# Patient Record
Sex: FEMALE | Race: BLACK OR AFRICAN AMERICAN | NOT HISPANIC OR LATINO | Employment: OTHER | URBAN - METROPOLITAN AREA
[De-identification: names, ages, dates, MRNs, and addresses within clinical notes are randomized per-mention and may not be internally consistent; named-entity substitution may affect disease eponyms.]

---

## 2017-01-19 ENCOUNTER — LAB VISIT (OUTPATIENT)
Dept: LAB | Facility: HOSPITAL | Age: 52
End: 2017-01-19
Attending: EMERGENCY MEDICINE
Payer: MEDICARE

## 2017-01-19 ENCOUNTER — OFFICE VISIT (OUTPATIENT)
Dept: FAMILY MEDICINE | Facility: CLINIC | Age: 52
End: 2017-01-19
Payer: MEDICARE

## 2017-01-19 VITALS
HEART RATE: 71 BPM | HEIGHT: 66 IN | RESPIRATION RATE: 96 BRPM | WEIGHT: 293 LBS | BODY MASS INDEX: 47.09 KG/M2 | SYSTOLIC BLOOD PRESSURE: 190 MMHG | DIASTOLIC BLOOD PRESSURE: 110 MMHG

## 2017-01-19 DIAGNOSIS — E11.42 TYPE 2 DIABETES MELLITUS WITH DIABETIC POLYNEUROPATHY, WITHOUT LONG-TERM CURRENT USE OF INSULIN: ICD-10-CM

## 2017-01-19 DIAGNOSIS — M25.561 CHRONIC PAIN OF BOTH KNEES: ICD-10-CM

## 2017-01-19 DIAGNOSIS — G47.33 OSA (OBSTRUCTIVE SLEEP APNEA): ICD-10-CM

## 2017-01-19 DIAGNOSIS — E11.9 TYPE 2 DIABETES MELLITUS WITHOUT COMPLICATION, WITHOUT LONG-TERM CURRENT USE OF INSULIN: Primary | Chronic | ICD-10-CM

## 2017-01-19 DIAGNOSIS — E11.9 TYPE 2 DIABETES MELLITUS WITHOUT COMPLICATION, WITHOUT LONG-TERM CURRENT USE OF INSULIN: Chronic | ICD-10-CM

## 2017-01-19 DIAGNOSIS — G89.4 CHRONIC PAIN SYNDROME: Chronic | ICD-10-CM

## 2017-01-19 DIAGNOSIS — E03.9 HYPOTHYROIDISM, UNSPECIFIED TYPE: ICD-10-CM

## 2017-01-19 DIAGNOSIS — G89.29 CHRONIC PAIN OF BOTH KNEES: ICD-10-CM

## 2017-01-19 DIAGNOSIS — I10 ESSENTIAL HYPERTENSION: Chronic | ICD-10-CM

## 2017-01-19 DIAGNOSIS — M25.562 CHRONIC PAIN OF BOTH KNEES: ICD-10-CM

## 2017-01-19 DIAGNOSIS — K21.9 GASTROESOPHAGEAL REFLUX DISEASE WITHOUT ESOPHAGITIS: ICD-10-CM

## 2017-01-19 LAB
CREAT UR-MCNC: 112 MG/DL
MICROALBUMIN UR DL<=1MG/L-MCNC: 11 UG/ML
MICROALBUMIN/CREATININE RATIO: 9.8 UG/MG

## 2017-01-19 PROCEDURE — 1159F MED LIST DOCD IN RCRD: CPT | Mod: S$GLB,,, | Performed by: EMERGENCY MEDICINE

## 2017-01-19 PROCEDURE — 99999 PR PBB SHADOW E&M-NEW PATIENT-LVL III: CPT | Mod: PBBFAC,,, | Performed by: EMERGENCY MEDICINE

## 2017-01-19 PROCEDURE — 3044F HG A1C LEVEL LT 7.0%: CPT | Mod: S$GLB,,, | Performed by: EMERGENCY MEDICINE

## 2017-01-19 PROCEDURE — 2022F DILAT RTA XM EVC RTNOPTHY: CPT | Mod: S$GLB,,, | Performed by: EMERGENCY MEDICINE

## 2017-01-19 PROCEDURE — 99499 UNLISTED E&M SERVICE: CPT | Mod: S$GLB,,, | Performed by: EMERGENCY MEDICINE

## 2017-01-19 PROCEDURE — 3080F DIAST BP >= 90 MM HG: CPT | Mod: S$GLB,,, | Performed by: EMERGENCY MEDICINE

## 2017-01-19 PROCEDURE — 82570 ASSAY OF URINE CREATININE: CPT

## 2017-01-19 PROCEDURE — 99204 OFFICE O/P NEW MOD 45 MIN: CPT | Mod: S$GLB,,, | Performed by: EMERGENCY MEDICINE

## 2017-01-19 PROCEDURE — 4010F ACE/ARB THERAPY RXD/TAKEN: CPT | Mod: S$GLB,,, | Performed by: EMERGENCY MEDICINE

## 2017-01-19 PROCEDURE — 3077F SYST BP >= 140 MM HG: CPT | Mod: S$GLB,,, | Performed by: EMERGENCY MEDICINE

## 2017-01-19 RX ORDER — AMITRIPTYLINE HYDROCHLORIDE 25 MG/1
25 TABLET, FILM COATED ORAL NIGHTLY
Qty: 90 TABLET | Refills: 3 | Status: SHIPPED | OUTPATIENT
Start: 2017-01-19 | End: 2017-07-05 | Stop reason: SDUPTHER

## 2017-01-19 RX ORDER — NITROGLYCERIN 0.4 MG/1
0.4 TABLET SUBLINGUAL EVERY 5 MIN PRN
COMMUNITY
End: 2023-11-17 | Stop reason: SDUPTHER

## 2017-01-19 RX ORDER — FUROSEMIDE 40 MG/1
40 TABLET ORAL DAILY
COMMUNITY
End: 2018-06-05 | Stop reason: SDUPTHER

## 2017-01-19 RX ORDER — OLMESARTAN MEDOXOMIL, AMLODIPINE AND HYDROCHLOROTHIAZIDE TABLET 40/5/12.5 MG 40; 5; 12.5 MG/1; MG/1; MG/1
40 TABLET ORAL DAILY
COMMUNITY
End: 2017-01-19

## 2017-01-19 RX ORDER — GABAPENTIN 400 MG/1
400 CAPSULE ORAL 3 TIMES DAILY
COMMUNITY

## 2017-01-19 RX ORDER — LEVOTHYROXINE SODIUM 200 UG/1
200 TABLET ORAL DAILY
COMMUNITY
End: 2018-04-13 | Stop reason: SDUPTHER

## 2017-01-19 RX ORDER — POTASSIUM CHLORIDE 1.5 G/1.58G
20 POWDER, FOR SOLUTION ORAL 2 TIMES DAILY
COMMUNITY
End: 2017-11-27 | Stop reason: CLARIF

## 2017-01-19 RX ORDER — OMEPRAZOLE 20 MG/1
20 CAPSULE, DELAYED RELEASE ORAL 2 TIMES DAILY
COMMUNITY
End: 2017-01-19 | Stop reason: SDUPTHER

## 2017-01-19 RX ORDER — ALPRAZOLAM 1 MG/1
1 TABLET ORAL 2 TIMES DAILY
Qty: 60 TABLET | Refills: 0 | Status: SHIPPED | OUTPATIENT
Start: 2017-01-19 | End: 2017-03-28 | Stop reason: SDUPTHER

## 2017-01-19 RX ORDER — OMEPRAZOLE 20 MG/1
20 CAPSULE, DELAYED RELEASE ORAL 2 TIMES DAILY
Qty: 60 CAPSULE | Refills: 1 | Status: SHIPPED | OUTPATIENT
Start: 2017-01-19 | End: 2017-07-06 | Stop reason: SDUPTHER

## 2017-01-19 RX ORDER — NAPROXEN 500 MG/1
500 TABLET ORAL 2 TIMES DAILY
COMMUNITY

## 2017-01-19 RX ORDER — ASPIRIN 81 MG/1
81 TABLET ORAL DAILY
COMMUNITY

## 2017-01-19 RX ORDER — LISINOPRIL 20 MG/1
20 TABLET ORAL DAILY
Qty: 90 TABLET | Refills: 3 | Status: SHIPPED | OUTPATIENT
Start: 2017-01-19 | End: 2018-02-04 | Stop reason: SDUPTHER

## 2017-01-19 RX ORDER — METFORMIN HYDROCHLORIDE 500 MG/1
1000 TABLET ORAL NIGHTLY
COMMUNITY
End: 2017-03-31 | Stop reason: DRUGHIGH

## 2017-01-19 RX ORDER — AMLODIPINE BESYLATE 5 MG/1
5 TABLET ORAL DAILY
Qty: 90 TABLET | Refills: 3 | Status: SHIPPED | OUTPATIENT
Start: 2017-01-19 | End: 2017-11-27 | Stop reason: SDUPTHER

## 2017-01-19 RX ORDER — AMITRIPTYLINE HYDROCHLORIDE 25 MG/1
25 TABLET, FILM COATED ORAL NIGHTLY
COMMUNITY
End: 2017-01-19 | Stop reason: SDUPTHER

## 2017-01-19 RX ORDER — HYDROCODONE BITARTRATE AND ACETAMINOPHEN 10; 325 MG/1; MG/1
10 TABLET ORAL 3 TIMES DAILY
COMMUNITY

## 2017-01-19 RX ORDER — ALPRAZOLAM 1 MG/1
1 TABLET, EXTENDED RELEASE ORAL 2 TIMES DAILY
COMMUNITY
End: 2017-01-19 | Stop reason: DRUGHIGH

## 2017-01-19 NOTE — Clinical Note
We did not make arrangements for nurse visit follow-up on her blood pressure.  Please contact her for an appointment early next week.

## 2017-01-19 NOTE — PROGRESS NOTES
Subjective:   THIS NOTE IS DONE WITH VOICE RECOGNITION        Patient ID: Amena Anderson is a 51 y.o. female.    Chief Complaint: Establish Care      HPI     This is my first opportunity to meet this 51-year-old woman.  She presents to establish care.    She is complaining about discomfort in her stomach.  When asked specifically where, she points to her groin, not the epigastric region.  She has had a hysterectomy.  She is not had any unusual bleeding.  She had her hysterectomy for nonmalignant reasons, her history.  This was many years ago.  She is not on estrogen replacement at this point.  She is very uncomfortable talking about GYN topics.  She did see Dr. Luis Eduardo Mccarty.  Mammography in 11/2016 was negative.  Labs done.  Unknown results.    She also has possible thyroid problems.  April 2012 she had thyroid surgery.  It sounds like this was a partial thyroidectomy for nonmalignant reasons.  She has not had recent thyroid testing by her history.  She does describe some hot flushing and generally feeling poorly.  She has been overweight for a significant portion of her life, and does not feel that the thyroid surgery in 2012 impacted her ability to either being or lose weight.      She does have diabetes, likely type II.  She has an eye appointment scheduled within the next few weeks.  She may have associated neuropathy, again somewhat difficult to get a clear understanding of what's happening.  She is accompanied by a friend of hers who is helpful with many things, but these details she could not help me with.    She is seen Dr. Dio De La Cruz for pain management.  This sounds mostly like low back pain.  She describes epidural steroid injections which have helped some.  She does not know the status of her MRIs or other diagnostic criteria.    She does have sleep apnea.  She does use a CPAP.  This is followed by Dr. Mckeon at [facility].  She is not complaining of any equipment issues.  Her sleep study  is not available, she thinks is been within the past 2-3 years.    She has been seen by cardiology, Dr. Rao.  She does not describe catheterization, echocardiograms or other diagnostic maneuvers.  She has not been taking her blood pressure medications for several months.  She was on a combination medication that was too expensive for her, so she stopped taking it.  That combination included an ARB, a diuretic, and amlodipine.    She also describes bilateral knee pain.  This is mostly lateral.  There've been no formal recommendations for intervention.    As mentioned above, she has had morbid obesity for a number of years.  She has not had any bariatric evaluation.  She is aware that her weight is contributing to many of her medical conditions.      Current Outpatient Prescriptions   Medication Sig Dispense Refill    amitriptyline (ELAVIL) 25 MG tablet Take 1 tablet (25 mg total) by mouth every evening. 90 tablet 3    amlodipine (NORVASC) 5 MG tablet Take 1 tablet (5 mg total) by mouth once daily. 90 tablet 3    aspirin (ECOTRIN) 81 MG EC tablet Take 81 mg by mouth once daily.      furosemide (LASIX) 40 MG tablet Take 40 mg by mouth once daily.      gabapentin (NEURONTIN) 400 MG capsule Take 400 mg by mouth 3 (three) times daily.      hydrocodone-acetaminophen 10-325mg (NORCO)  mg Tab Take 10 tablets by mouth 3 (three) times daily.      levothyroxine (SYNTHROID) 200 MCG tablet Take 200 mcg by mouth once daily.      lisinopril (PRINIVIL,ZESTRIL) 20 MG tablet Take 1 tablet (20 mg total) by mouth once daily. 90 tablet 3    metformin (GLUCOPHAGE) 500 MG tablet Take 1,000 mg by mouth nightly.      naproxen (EC NAPROSYN) 500 MG EC tablet Take 500 mg by mouth 2 (two) times daily.      nitroGLYCERIN (NITROSTAT) 0.4 MG SL tablet Place 0.4 mg under the tongue every 5 (five) minutes as needed for Chest pain.      omeprazole (PRILOSEC) 20 MG capsule Take 1 capsule (20 mg total) by mouth 2 (two) times daily.  60 capsule 1    potassium chloride (KLOR-CON) 20 mEq Pack Take 20 mEq by mouth 2 (two) times daily.      alprazolam (XANAX) 1 MG tablet Take 1 tablet (1 mg total) by mouth 2 (two) times daily. 60 tablet 0     No current facility-administered medications for this visit.          Review of Systems   Constitutional: Positive for fatigue and unexpected weight change. Negative for chills and fever.   HENT: Negative for hearing loss, trouble swallowing and voice change.    Eyes: Negative for visual disturbance.   Respiratory: Positive for shortness of breath. Negative for cough and wheezing.    Cardiovascular: Positive for palpitations and leg swelling. Negative for chest pain.   Gastrointestinal: Positive for abdominal pain (if she does not use her proton pump inhibitor.). Negative for anal bleeding.   Genitourinary: Positive for pelvic pain and vaginal pain. Negative for difficulty urinating, dysuria, flank pain, frequency and menstrual problem.   Musculoskeletal: Positive for arthralgias (bilateral knee) and back pain. Negative for neck pain and neck stiffness.   Skin: Negative for pallor and wound.   Neurological: Negative for seizures, syncope and speech difficulty.        Describes neuropathic pain in lower legs, bilateral.   Hematological: Does not bruise/bleed easily.   Psychiatric/Behavioral: Negative.        Objective:      Physical Exam   Constitutional:   Pleasant woman, significantly overweight.  I do not have a good feel for reliability of reporting.  She does keeping meticulous journal with her about her health care.   HENT:   Head: Normocephalic and atraumatic.   Mouth/Throat: Oropharynx is clear and moist.   Eyes: Conjunctivae are normal. Pupils are equal, round, and reactive to light. No scleral icterus.   Neck: Normal range of motion. No JVD present.   Cardiovascular: Normal rate and regular rhythm.    No murmur heard.  Heart tones are distant, and hard to hear in a sitting position.   Abdominal:  There is no tenderness.   Significant obesity.  Palpation did not reveal any major issues in the epigastric area.   Genitourinary:   Genitourinary Comments: Gynecological exam not done today.   Musculoskeletal: She exhibits tenderness (bilateral joint line, knees.).   Lymphadenopathy:     She has no cervical adenopathy.   Skin: No rash noted.       Assessment:       1. Type 2 diabetes mellitus without complication, without long-term current use of insulin    2. Essential hypertension    3. Hypothyroidism, unspecified type    4. Gastroesophageal reflux disease without esophagitis    5. MANUEL (obstructive sleep apnea)    6. BMI 50.0-59.9, adult    7. Chronic pain syndrome    8. Chronic pain of both knees    9. Type 2 diabetes mellitus with diabetic polyneuropathy, without long-term current use of insulin        Plan:       1. Type 2 diabetes mellitus without complication, without long-term current use of insulin  Status unknown  Work was done in the late fall, we will update that as is been over 3 months.  No new medications added today.    - Comprehensive metabolic panel; Future  - Hemoglobin A1c; Future  - Microalbumin/creatinine urine ratio; Future  - Lipid panel; Future    2. Essential hypertension  As noted above, she was unable to afford the combination pill  restart amlodipine  Start lisinopril 20 mg  Will need to follow up renal function  Nurse visit for follow up on blood pressure in two weeks.    - Comprehensive metabolic panel; Future    3. Hypothyroidism, unspecified type  Status unknown  Check TSH  No change in medicines at this time.    - TSH; Future    4. Gastroesophageal reflux disease without esophagitis  Restart omeprazole twice daily based on her history.'    - omeprazole (PRILOSEC) 20 MG capsule; Take 1 capsule (20 mg total) by mouth 2 (two) times daily.  Dispense: 60 capsule; Refill: 1    5. MANUEL (obstructive sleep apnea)  Stable  Continue CPAP  Continue with pulmonary medicine    6. BMI 50.0-59.9,  adult  Discussed  No changes anticipated in regards to major interventions  If insurance would cover, excellent candidate for weight loss program.    7. Chronic pain syndrome  Spine, details unknown  She will continue with her current pain management physician.  We will request records to clarify cause and treatment plan     8. Chronic pain of both knees  Suspect osteoarthritis  Will workup as other issues addressed    9. Type 2 diabetes mellitus with diabetic polyneuropathy, without long-term current use of insulin  See above comments.  Foot exam with next visit.  Keep appointment for eye exam.  Copy of clinical exam note requested.    This was a lengthy exam.  The majority of time of this almost 1 hour visit was spent gathering history and data.  I did speak with her insurance nurse navigator, and have recommended case management, pharmacy consultation, and consolidation of records.    I have no idea about immunizations, colon cancer screening, or results of recent mammography.

## 2017-01-19 NOTE — MR AVS SNAPSHOT
[facility]  [address]  [facility] [address]  Phone: [phone]  Fax: [phone]                  Amena Anderson   2017 10:00 AM   Office Visit    Description:  Female : 1965   Provider:  Jose Miller Jr., MD   Department:  [facility]           Reason for Visit     Establish Care           Diagnoses this Visit        Comments    Hypothyroidism, unspecified type    -  Primary     Type 2 diabetes mellitus without complication, without long-term current use of insulin         Essential hypertension         Gastroesophageal reflux disease without esophagitis         MANUEL (obstructive sleep apnea)                To Do List           Future Appointments        Provider Department Dept Phone    2017 12:30 PM URINE [facility] [phone]    2017 12:35 PM LAB, [place] [facility] [phone]    2017 9:30 AM Ryland Mackey MD [facility] Endocrinology [phone]      Goals (5 Years of Data)     None       These Medications        Disp Refills Start End    lisinopril (PRINIVIL,ZESTRIL) 20 MG tablet 90 tablet 3 2017    Take 1 tablet (20 mg total) by mouth once daily. - Oral    Pharmacy: [facility] [place] [address] AT [facility] [address] Ph #: [phone]       amlodipine (NORVASC) 5 MG tablet 90 tablet 3 2017    Take 1 tablet (5 mg total) by mouth once daily. - Oral    Pharmacy: [facility] [address] [place] [address] [id number] [place] [address] [address] AT [facility] [address] Ph #: [phone]       amitriptyline (ELAVIL) 25 MG tablet 90 tablet 3 2017     Take 1 tablet (25 mg total) by mouth every evening. - Oral    Pharmacy: [facility] [address] [place] [id number] [facility] [address] Ph #: [phone]       alprazolam (XANAX) 1 MG tablet 60 tablet 0 2017    Take 1  tablet (1 mg total) by mouth 2 (two) times daily. - Oral    Pharmacy: [facility] [address] [place] [address] [id number] [place] [address] [address] [facility] [address] Ph #: [phone]       omeprazole (PRILOSEC) 20 MG capsule 60 capsule 1 1/19/2017     Take 1 capsule (20 mg total) by mouth 2 (two) times daily. - Oral    Pharmacy: [facility] [address] [place] [address] [id number] [place] [address] [address] [facility] [place] [facility] [address] Ph #: [phone]         [facility] On [facility]     [facility] - 24/7 Assistance  Registered nurses in the [facility] provide clinical advisement, health education, appointment booking, and other advisory services.  Call for this free service at [phone].             Medications           Message regarding Medications     Verify the changes and/or additions to your medication regime listed below are the same as discussed with your clinician today.  If any of these changes or additions are incorrect, please notify your healthcare provider.        START taking these NEW medications        Refills    lisinopril (PRINIVIL,ZESTRIL) 20 MG tablet 3    Sig: Take 1 tablet (20 mg total) by mouth once daily.    Class: Normal    Route: Oral    amlodipine (NORVASC) 5 MG tablet 3    Sig: Take 1 tablet (5 mg total) by mouth once daily.    Class: Normal    Route: Oral    amitriptyline (ELAVIL) 25 MG tablet 3    Sig: Take 1 tablet (25 mg total) by mouth every evening.    Class: Normal    Route: Oral    alprazolam (XANAX) 1 MG tablet 0    Sig: Take 1 tablet (1 mg total) by mouth 2 (two) times daily.    Class: Normal    Route: Oral    omeprazole (PRILOSEC) 20 MG capsule 1    Sig: Take 1 capsule (20 mg total) by mouth 2 (two) times daily.    Class: Normal    Route: Oral      STOP taking these medications     olmesartan-amlodipin-hcthiazid (TRIBENZOR) 40-5-12.5 mg Tab Take 40 mg by mouth once daily.    alprazolam (XANAX XR) 1 MG Tb24 Take 1 mg by mouth 2 (two) times daily.          "  Verify that the below list of medications is an accurate representation of the medications you are currently taking.  If none reported, the list may be blank. If incorrect, please contact your healthcare provider. Carry this list with you in case of emergency.           Current Medications     amitriptyline (ELAVIL) 25 MG tablet Take 1 tablet (25 mg total) by mouth every evening.    amlodipine (NORVASC) 5 MG tablet Take 1 tablet (5 mg total) by mouth once daily.    aspirin (ECOTRIN) 81 MG EC tablet Take 81 mg by mouth once daily.    furosemide (LASIX) 40 MG tablet Take 40 mg by mouth once daily.    gabapentin (NEURONTIN) 400 MG capsule Take 400 mg by mouth 3 (three) times daily.    hydrocodone-acetaminophen 10-325mg (NORCO)  mg Tab Take 10 tablets by mouth 3 (three) times daily.    levothyroxine (SYNTHROID) 200 MCG tablet Take 200 mcg by mouth once daily.    lisinopril (PRINIVIL,ZESTRIL) 20 MG tablet Take 1 tablet (20 mg total) by mouth once daily.    metformin (GLUCOPHAGE) 500 MG tablet Take 1,000 mg by mouth nightly.    naproxen (EC NAPROSYN) 500 MG EC tablet Take 500 mg by mouth 2 (two) times daily.    nitroGLYCERIN (NITROSTAT) 0.4 MG SL tablet Place 0.4 mg under the tongue every 5 (five) minutes as needed for Chest pain.    omeprazole (PRILOSEC) 20 MG capsule Take 1 capsule (20 mg total) by mouth 2 (two) times daily.    potassium chloride (KLOR-CON) 20 mEq Pack Take 20 mEq by mouth 2 (two) times daily.    alprazolam (XANAX) 1 MG tablet Take 1 tablet (1 mg total) by mouth 2 (two) times daily.           Clinical Reference Information           Vital Signs - Last Recorded  Most recent update: 1/19/2017 11:24 AM by Delmi Jones LPN    BP Pulse Resp Ht Wt BMI    (!) 190/110 (BP Location: Left arm, Patient Position: Sitting, BP Method: Manual) 71 (!) 96 5' 6" (1.676 m) (!) 142.8 kg (314 lb 13.1 oz) 50.81 kg/m2      Blood Pressure          Most Recent Value    BP  (!)  190/110      Allergies as of " 1/19/2017     No Known Allergies      Immunizations Administered on Date of Encounter - 1/19/2017     None      Orders Placed During Today's Visit     Future Labs/Procedures Expected by Expires    Comprehensive metabolic panel  1/19/2017 3/20/2018    Hemoglobin A1c  1/19/2017 3/20/2018    Lipid panel  1/19/2017 3/20/2018    Microalbumin/creatinine urine ratio  1/19/2017 3/20/2018    TSH  1/19/2017 3/20/2018

## 2017-01-20 DIAGNOSIS — Z12.31 OTHER SCREENING MAMMOGRAM: ICD-10-CM

## 2017-01-22 PROBLEM — K21.9 GASTROESOPHAGEAL REFLUX DISEASE WITHOUT ESOPHAGITIS: Chronic | Status: ACTIVE | Noted: 2017-01-22

## 2017-01-22 PROBLEM — G89.4 CHRONIC PAIN SYNDROME: Status: ACTIVE | Noted: 2017-01-22

## 2017-01-22 PROBLEM — E03.9 HYPOTHYROIDISM: Status: ACTIVE | Noted: 2017-01-22

## 2017-01-22 PROBLEM — G89.4 CHRONIC PAIN SYNDROME: Chronic | Status: ACTIVE | Noted: 2017-01-22

## 2017-01-22 PROBLEM — E11.42 TYPE 2 DIABETES MELLITUS WITH DIABETIC POLYNEUROPATHY, WITHOUT LONG-TERM CURRENT USE OF INSULIN: Status: ACTIVE | Noted: 2017-01-19

## 2017-01-22 PROBLEM — K21.9 GASTROESOPHAGEAL REFLUX DISEASE WITHOUT ESOPHAGITIS: Status: ACTIVE | Noted: 2017-01-22

## 2017-01-22 PROBLEM — E11.42 TYPE 2 DIABETES MELLITUS WITH DIABETIC POLYNEUROPATHY, WITHOUT LONG-TERM CURRENT USE OF INSULIN: Chronic | Status: ACTIVE | Noted: 2017-01-19

## 2017-01-22 PROBLEM — E03.9 HYPOTHYROIDISM: Chronic | Status: ACTIVE | Noted: 2017-01-22

## 2017-01-22 PROBLEM — G47.33 OSA (OBSTRUCTIVE SLEEP APNEA): Chronic | Status: ACTIVE | Noted: 2017-01-22

## 2017-01-30 ENCOUNTER — TELEPHONE (OUTPATIENT)
Dept: FAMILY MEDICINE | Facility: CLINIC | Age: 52
End: 2017-01-30

## 2017-01-30 DIAGNOSIS — E78.2 MIXED HYPERLIPIDEMIA: Primary | ICD-10-CM

## 2017-01-30 NOTE — TELEPHONE ENCOUNTER
----- Message from Jose Miller Jr., MD sent at 1/22/2017  7:04 PM CST -----  We did not make arrangements for nurse visit follow-up on her blood pressure.  Please contact her for an appointment early next week.

## 2017-01-30 NOTE — TELEPHONE ENCOUNTER
Spoke with patient and have patient scheduled for nurse visit tomorrow. Patient verbalized understanding,.

## 2017-01-31 RX ORDER — ATORVASTATIN CALCIUM 20 MG/1
20 TABLET, FILM COATED ORAL DAILY
Qty: 90 TABLET | Refills: 3 | Status: SHIPPED | OUTPATIENT
Start: 2017-01-31 | End: 2017-05-17

## 2017-01-31 NOTE — TELEPHONE ENCOUNTER
----- Message from Fatou Toro sent at 1/31/2017  8:31 AM CST -----  Patient is requesting to cancel her nurse visit this morning due to transportation issues she will call back to reschedule. Contact patient at [phone] with any questions.     Thank you

## 2017-01-31 NOTE — TELEPHONE ENCOUNTER
Cholesterol levels reviewed.  We'll start atorvastatin 20 mg, and recheck lipid profile and ALT in 2 months.  Orders are in.    This was discussed with PHN navigator.  PHN representative will be meeting with patient later today.    MARGARITO

## 2017-02-06 ENCOUNTER — TELEPHONE (OUTPATIENT)
Dept: FAMILY MEDICINE | Facility: CLINIC | Age: 52
End: 2017-02-06

## 2017-02-06 NOTE — TELEPHONE ENCOUNTER
----- Message from Hoa Glass sent at 2/6/2017  7:32 AM CST -----  Contact: self  Needs to schedule a nurse visit for a BP check on Wednesday2/8.  Please call back at

## 2017-02-08 ENCOUNTER — CLINICAL SUPPORT (OUTPATIENT)
Dept: FAMILY MEDICINE | Facility: CLINIC | Age: 52
End: 2017-02-08
Payer: MEDICARE

## 2017-02-08 ENCOUNTER — TELEPHONE (OUTPATIENT)
Dept: ENDOCRINOLOGY | Facility: CLINIC | Age: 52
End: 2017-02-08

## 2017-02-08 VITALS — SYSTOLIC BLOOD PRESSURE: 130 MMHG | DIASTOLIC BLOOD PRESSURE: 84 MMHG

## 2017-02-08 NOTE — TELEPHONE ENCOUNTER
Called pt regarding missed apt.  Spoke with pt's daughter Joyce.  Joyce stated that she had dropped her mother off at the clinic at 9am.  Pt's daughter said that she would call her mother and call me back.  Pt's daughter verbalized understanding.

## 2017-02-13 ENCOUNTER — OFFICE VISIT (OUTPATIENT)
Dept: OBSTETRICS AND GYNECOLOGY | Facility: CLINIC | Age: 52
End: 2017-02-13
Payer: MEDICARE

## 2017-02-13 VITALS
DIASTOLIC BLOOD PRESSURE: 89 MMHG | BODY MASS INDEX: 48.82 KG/M2 | SYSTOLIC BLOOD PRESSURE: 173 MMHG | HEIGHT: 65 IN | WEIGHT: 293 LBS | HEART RATE: 86 BPM

## 2017-02-13 DIAGNOSIS — E11.42 TYPE 2 DIABETES MELLITUS WITH DIABETIC POLYNEUROPATHY, WITHOUT LONG-TERM CURRENT USE OF INSULIN: ICD-10-CM

## 2017-02-13 DIAGNOSIS — E89.40 SURGICAL MENOPAUSE: ICD-10-CM

## 2017-02-13 DIAGNOSIS — B35.4 TINEA CORPORIS: Primary | ICD-10-CM

## 2017-02-13 PROCEDURE — 99999 PR PBB SHADOW E&M-EST. PATIENT-LVL III: CPT | Mod: PBBFAC,,, | Performed by: OBSTETRICS & GYNECOLOGY

## 2017-02-13 PROCEDURE — 3044F HG A1C LEVEL LT 7.0%: CPT | Mod: S$GLB,,, | Performed by: OBSTETRICS & GYNECOLOGY

## 2017-02-13 PROCEDURE — 3077F SYST BP >= 140 MM HG: CPT | Mod: S$GLB,,, | Performed by: OBSTETRICS & GYNECOLOGY

## 2017-02-13 PROCEDURE — 2022F DILAT RTA XM EVC RTNOPTHY: CPT | Mod: S$GLB,,, | Performed by: OBSTETRICS & GYNECOLOGY

## 2017-02-13 PROCEDURE — 4010F ACE/ARB THERAPY RXD/TAKEN: CPT | Mod: S$GLB,,, | Performed by: OBSTETRICS & GYNECOLOGY

## 2017-02-13 PROCEDURE — 3079F DIAST BP 80-89 MM HG: CPT | Mod: S$GLB,,, | Performed by: OBSTETRICS & GYNECOLOGY

## 2017-02-13 PROCEDURE — 99499 UNLISTED E&M SERVICE: CPT | Mod: S$GLB,,, | Performed by: OBSTETRICS & GYNECOLOGY

## 2017-02-13 PROCEDURE — 99204 OFFICE O/P NEW MOD 45 MIN: CPT | Mod: S$GLB,,, | Performed by: OBSTETRICS & GYNECOLOGY

## 2017-02-13 RX ORDER — ESTRADIOL 1 MG/1
1 TABLET ORAL DAILY
Qty: 30 TABLET | Refills: 6 | Status: SHIPPED | OUTPATIENT
Start: 2017-02-13 | End: 2017-07-29 | Stop reason: SDUPTHER

## 2017-02-13 RX ORDER — FLUCONAZOLE 200 MG/1
200 TABLET ORAL EVERY OTHER DAY
Qty: 7 TABLET | Refills: 1 | Status: SHIPPED | OUTPATIENT
Start: 2017-02-13 | End: 2017-07-06 | Stop reason: SDUPTHER

## 2017-02-13 RX ORDER — ESTRADIOL 0.1 MG/G
CREAM VAGINAL
Refills: 1 | COMMUNITY
Start: 2017-01-27 | End: 2017-02-13

## 2017-02-13 RX ORDER — NYSTATIN 100000 [USP'U]/G
POWDER TOPICAL
Qty: 60 G | Refills: 3 | Status: SHIPPED | OUTPATIENT
Start: 2017-02-13 | End: 2017-09-01 | Stop reason: SDUPTHER

## 2017-02-13 NOTE — PROGRESS NOTES
"Chief Complaint   Patient presents with    new patient    pain in lower pelvis       History of Present Illness   51 y.o. -American Female patient presents today for rash and vaginal itching on and off for months, acutely worsening last week. Poorly controlled diabetic. Also request change to oral estorgen, unable to place cream because of obesity. Up to date  On PAP, also complains of itchy rash below breasts.     Past medical and surgical history reviewed.   I have reviewed the patient's medical history in detail and updated the computerized patient record.    Review of patient's allergies indicates:  No Known Allergies      Review of Systems - Negative except HPI  GEN ROS: negative for - chills or fever  Breast ROS: negative for breast lumps  Genito-Urinary ROS: no dysuria, trouble voiding, or hematuria      Physical Examination:  Visit Vitals    BP (!) 173/89    Pulse 86    Ht 5' 5" (1.651 m)    Wt (!) 144.2 kg (317 lb 14.5 oz)    BMI 52.9 kg/m2      Constitutional: She is oriented to person, place, and time. She appears well-developed and well-nourished. No distress. Morbid obesity  HENT:   Head: Normocephalic and atraumatic.   Eyes: Conjunctivae and EOM are normal. No scleral icterus.   Neck: Normal range of motion. Neck supple. No tracheal deviation present.   Breasts: are symmetrical. Hyperpigmented tinea rash below both breasts.   Abdominal: Soft. She exhibits no distension and no mass. There is no tenderness. There is no rebound and no guarding.   Genitourinary:    External rectal exam shows no thrombosed external hemorrhoids.    Pelvic exam was performed with patient supine.   No labial fusion.   There is  Tinea rash, no lesion or injury on the right labia.   There is tinea rash, lesion or injury on the left labia.   No bleeding and no signs of injury around the vaginal introitus, urethra is without lesions and well supported.    thick white vaginal discharge found.    No significant " Cystocele, Enterocele or rectocele, and cuff well supported.   Bimanual exam:   The urethra and vagina are without palpable masses or tenderness.remainder deferred.   Musculoskeletal: Normal range of motion.   Lymphadenopathy: No inguinal adenopathy present.   Neurological: She is alert and oriented to person, place, and time. Coordination normal.   Skin: Skin is warm and dry. She is not diaphoretic.   Psychiatric: She has a normal mood and affect.          Assessment:  Tinea  Surgical menopause    Plan:  Diflucan x 2 weeks  Change to oral estrogen  Topical nystatin powder  Follow up 1 month if not improved

## 2017-02-13 NOTE — MR AVS SNAPSHOT
[facility] - OB/GYN  [address]  [facility] [address]  Phone: [phone]                  Amena Anderson   2017 11:00 AM   Office Visit    Description:  Female : 1965   Provider:  Jacob Vernon MD   Department:  [facility] - OB/GYN           Reason for Visit     new patient     pain in lower pelvis                To Do List           Future Appointments        Provider Department Dept Phone    2017 2:30 PM Jacob Dejesus DPM [place] - Podiatry [phone]    2017 8:40 AM Jose Miller Jr., MD [facility] Family Medicine [phone]    2017 9:30 AM Ryland Mackey MD [place] - Endocrinology [phone]      Goals (5 Years of Data)     None       These Medications        Disp Refills Start End    estradiol (ESTRACE) 1 MG tablet 30 tablet 6 2017    Take 1 tablet (1 mg total) by mouth once daily. - Oral    Pharmacy: [facility] [address] [place] [address] 59 AT [facility] OF [facility] [address] Ph #: [phone]       nystatin (MYCOSTATIN) powder 60 g 3 2017    Apply to affected area 3 times daily    Pharmacy: [facility] [address] [place] [address] 59 AT [facility] OF [facility] [address] Ph #: [phone]       fluconazole (DIFLUCAN) 200 MG Tab 7 tablet 1 2017    Take 1 tablet (200 mg total) by mouth every other day. - Oral    Pharmacy: [facility] [address] [place] [address] 59 AT [facility] OF [address] Ph #: [phone]         [facility]     [facility] On Call Nurse Care Line -  Assistance  Registered nurses in the [facility] On Call Center provide clinical advisement, health education, appointment booking, and other advisory services.  Call for this free service at [phone].             Medications           Message regarding Medications     Verify the changes and/or additions to your medication regime listed below are the same  as discussed with your clinician today.  If any of these changes or additions are incorrect, please notify your healthcare provider.        START taking these NEW medications        Refills    estradiol (ESTRACE) 1 MG tablet 6    Sig: Take 1 tablet (1 mg total) by mouth once daily.    Class: Normal    Route: Oral    nystatin (MYCOSTATIN) powder 3    Sig: Apply to affected area 3 times daily    Class: Normal    fluconazole (DIFLUCAN) 200 MG Tab 1    Sig: Take 1 tablet (200 mg total) by mouth every other day.    Class: Normal    Route: Oral      STOP taking these medications     ESTRACE 0.01 % (0.1 mg/gram) vaginal cream APPLY 1/2 CREAM VAGINALLY AT BEDTIME.           Verify that the below list of medications is an accurate representation of the medications you are currently taking.  If none reported, the list may be blank. If incorrect, please contact your healthcare provider. Carry this list with you in case of emergency.           Current Medications     alprazolam (XANAX) 1 MG tablet Take 1 tablet (1 mg total) by mouth 2 (two) times daily.    amitriptyline (ELAVIL) 25 MG tablet Take 1 tablet (25 mg total) by mouth every evening.    amlodipine (NORVASC) 5 MG tablet Take 1 tablet (5 mg total) by mouth once daily.    aspirin (ECOTRIN) 81 MG EC tablet Take 81 mg by mouth once daily.    furosemide (LASIX) 40 MG tablet Take 40 mg by mouth once daily.    gabapentin (NEURONTIN) 400 MG capsule Take 400 mg by mouth 3 (three) times daily.    hydrocodone-acetaminophen 10-325mg (NORCO)  mg Tab Take 10 tablets by mouth 3 (three) times daily.    levothyroxine (SYNTHROID) 200 MCG tablet Take 200 mcg by mouth once daily.    lisinopril (PRINIVIL,ZESTRIL) 20 MG tablet Take 1 tablet (20 mg total) by mouth once daily.    metformin (GLUCOPHAGE) 500 MG tablet Take 1,000 mg by mouth nightly.    naproxen (EC NAPROSYN) 500 MG EC tablet Take 500 mg by mouth 2 (two) times daily.    nitroGLYCERIN (NITROSTAT) 0.4 MG SL tablet Place 0.4  "mg under the tongue every 5 (five) minutes as needed for Chest pain.    omeprazole (PRILOSEC) 20 MG capsule Take 1 capsule (20 mg total) by mouth 2 (two) times daily.    potassium chloride (KLOR-CON) 20 mEq Pack Take 20 mEq by mouth 2 (two) times daily.    atorvastatin (LIPITOR) 20 MG tablet Take 1 tablet (20 mg total) by mouth once daily.    estradiol (ESTRACE) 1 MG tablet Take 1 tablet (1 mg total) by mouth once daily.    fluconazole (DIFLUCAN) 200 MG Tab Take 1 tablet (200 mg total) by mouth every other day.    nystatin (MYCOSTATIN) powder Apply to affected area 3 times daily           Clinical Reference Information           Your Vitals Were     BP Pulse Height Weight BMI    173/89 86 5' 5" (1.651 m) 144.2 kg (317 lb 14.5 oz) 52.9 kg/m2      Blood Pressure          Most Recent Value    BP  (!)  173/89      Allergies as of 2/13/2017     No Known Allergies      Immunizations Administered on Date of Encounter - 2/13/2017     None      Language Assistance Services     ATTENTION: Language assistance services are available, free of charge. Please call [phone].      ATENCIÓN: Si habla sharonda, tiene a baird disposición servicios gratuitos de asistencia lingüística. Llame al [phone].     VINCE Ý: N?u b?n nói Ti?ng Vi?t, có các d?ch v? h? tr? ngôn ng? mi?n phí dành cho b?n. G?i s? [phone].         [facility] - OB/GYN complies with applicable Federal civil rights laws and does not discriminate on the basis of race, color, national origin, age, disability, or sex.        "

## 2017-02-20 DIAGNOSIS — M79.671 PAIN IN BOTH FEET: Primary | ICD-10-CM

## 2017-02-20 DIAGNOSIS — M79.672 PAIN IN BOTH FEET: Primary | ICD-10-CM

## 2017-02-21 ENCOUNTER — HOSPITAL ENCOUNTER (OUTPATIENT)
Dept: RADIOLOGY | Facility: HOSPITAL | Age: 52
Discharge: HOME | End: 2017-02-21
Payer: MEDICARE

## 2017-02-21 ENCOUNTER — OFFICE VISIT (OUTPATIENT)
Dept: PODIATRY | Facility: CLINIC | Age: 52
End: 2017-02-21
Payer: MEDICARE

## 2017-02-21 VITALS — BODY MASS INDEX: 48.82 KG/M2 | HEIGHT: 65 IN | WEIGHT: 293 LBS

## 2017-02-21 DIAGNOSIS — M20.42 HAMMER TOES OF BOTH FEET: ICD-10-CM

## 2017-02-21 DIAGNOSIS — M20.10 HALLUX ABDUCTO VALGUS, UNSPECIFIED LATERALITY: ICD-10-CM

## 2017-02-21 DIAGNOSIS — M76.62 ACHILLES TENDINITIS OF BOTH LOWER EXTREMITIES: ICD-10-CM

## 2017-02-21 DIAGNOSIS — M76.61 ACHILLES TENDINITIS OF BOTH LOWER EXTREMITIES: ICD-10-CM

## 2017-02-21 DIAGNOSIS — B35.3 TINEA PEDIS OF BOTH FEET: ICD-10-CM

## 2017-02-21 DIAGNOSIS — M77.30 HEEL SPUR, UNSPECIFIED LATERALITY: ICD-10-CM

## 2017-02-21 DIAGNOSIS — M79.672 PAIN IN BOTH FEET: ICD-10-CM

## 2017-02-21 DIAGNOSIS — M79.671 PAIN IN BOTH FEET: ICD-10-CM

## 2017-02-21 DIAGNOSIS — E11.42 TYPE 2 DIABETES MELLITUS WITH DIABETIC POLYNEUROPATHY, WITHOUT LONG-TERM CURRENT USE OF INSULIN: Primary | Chronic | ICD-10-CM

## 2017-02-21 DIAGNOSIS — M20.41 HAMMER TOES OF BOTH FEET: ICD-10-CM

## 2017-02-21 PROCEDURE — 1160F RVW MEDS BY RX/DR IN RCRD: CPT | Mod: S$GLB,,,

## 2017-02-21 PROCEDURE — 73630 X-RAY EXAM OF FOOT: CPT | Mod: 26,RT,, | Performed by: RADIOLOGY

## 2017-02-21 PROCEDURE — 99203 OFFICE O/P NEW LOW 30 MIN: CPT | Mod: 25,S$GLB,,

## 2017-02-21 PROCEDURE — 73630 X-RAY EXAM OF FOOT: CPT | Mod: 26,LT,, | Performed by: RADIOLOGY

## 2017-02-21 PROCEDURE — 2022F DILAT RTA XM EVC RTNOPTHY: CPT | Mod: S$GLB,,,

## 2017-02-21 PROCEDURE — 99499 UNLISTED E&M SERVICE: CPT | Mod: S$GLB,,,

## 2017-02-21 PROCEDURE — 11721 DEBRIDE NAIL 6 OR MORE: CPT | Mod: Q9,S$GLB,,

## 2017-02-21 PROCEDURE — 4010F ACE/ARB THERAPY RXD/TAKEN: CPT | Mod: S$GLB,,,

## 2017-02-21 PROCEDURE — 99999 PR PBB SHADOW E&M-EST. PATIENT-LVL III: CPT | Mod: PBBFAC,,,

## 2017-02-21 PROCEDURE — 3044F HG A1C LEVEL LT 7.0%: CPT | Mod: S$GLB,,,

## 2017-02-21 RX ORDER — CLOTRIMAZOLE 1 G/ML
SOLUTION TOPICAL 2 TIMES DAILY
Qty: 30 ML | Refills: 11 | Status: SHIPPED | OUTPATIENT
Start: 2017-02-21 | End: 2017-05-22

## 2017-02-21 NOTE — MR AVS SNAPSHOT
[facility] Podiatry  [address]  [facility] [address]  Phone: [phone]                  Amena Anderson   2017 2:30 PM   Office Visit    Description:  Female : 1965   Provider:  Jacob Dejesus DPM   Department:  [facility] - Podiatry           Reason for Visit     Foot Problem     Diabetic Foot Exam           Diagnoses this Visit        Comments    Type 2 diabetes mellitus with diabetic polyneuropathy, without long-term current use of insulin    -  Primary     BMI 50.0-59.9, adult         Achilles tendinitis of both lower extremities         Heel spur, unspecified laterality         Tinea pedis of both feet         Hammer toes of both feet         Hallux abducto valgus, unspecified laterality                To Do List           Future Appointments        Provider Department Dept Phone    2017 2:30 PM Jacob Dejesus DPM [facility] Podiatry [phone]    2017 8:40 AM Jose Miller Jr., MD [facility] - Family Medicine [phone]    2017 1:00 PM Jacob Dejesus DPM [facility] Podiatry [phone]    2017 9:30 AM Ryland Mackey MD [facility] - Endocrinology [phone]      Goals (5 Years of Data)     None       These Medications        Disp Refills Start End    clotrimazole (LOTRIMIN) 1 % Soln 30 mL 11 2017     Apply topically 2 (two) times daily. - Topical    Pharmacy: [facility] [address] [place] [address] [address] AT [facility] [address] Ph #: [phone]         [facility] On Call     [facility] On Call Nurse Care Line -  Assistance  Registered nurses in the [facility] On Call Center provide clinical advisement, health education, appointment booking, and other advisory services.  Call for this free service at [phone].             Medications           Message regarding Medications     Verify the changes and/or additions to your medication regime listed below are the same as discussed with your clinician  today.  If any of these changes or additions are incorrect, please notify your healthcare provider.        START taking these NEW medications        Refills    clotrimazole (LOTRIMIN) 1 % Soln 11    Sig: Apply topically 2 (two) times daily.    Class: Normal    Route: Topical           Verify that the below list of medications is an accurate representation of the medications you are currently taking.  If none reported, the list may be blank. If incorrect, please contact your healthcare provider. Carry this list with you in case of emergency.           Current Medications     amitriptyline (ELAVIL) 25 MG tablet Take 1 tablet (25 mg total) by mouth every evening.    amlodipine (NORVASC) 5 MG tablet Take 1 tablet (5 mg total) by mouth once daily.    aspirin (ECOTRIN) 81 MG EC tablet Take 81 mg by mouth once daily.    atorvastatin (LIPITOR) 20 MG tablet Take 1 tablet (20 mg total) by mouth once daily.    estradiol (ESTRACE) 1 MG tablet Take 1 tablet (1 mg total) by mouth once daily.    fluconazole (DIFLUCAN) 200 MG Tab Take 1 tablet (200 mg total) by mouth every other day.    furosemide (LASIX) 40 MG tablet Take 40 mg by mouth once daily.    gabapentin (NEURONTIN) 400 MG capsule Take 400 mg by mouth 3 (three) times daily.    hydrocodone-acetaminophen 10-325mg (NORCO)  mg Tab Take 10 tablets by mouth 3 (three) times daily.    levothyroxine (SYNTHROID) 200 MCG tablet Take 200 mcg by mouth once daily.    lisinopril (PRINIVIL,ZESTRIL) 20 MG tablet Take 1 tablet (20 mg total) by mouth once daily.    metformin (GLUCOPHAGE) 500 MG tablet Take 1,000 mg by mouth nightly.    naproxen (EC NAPROSYN) 500 MG EC tablet Take 500 mg by mouth 2 (two) times daily.    nitroGLYCERIN (NITROSTAT) 0.4 MG SL tablet Place 0.4 mg under the tongue every 5 (five) minutes as needed for Chest pain.    nystatin (MYCOSTATIN) powder Apply to affected area 3 times daily    omeprazole (PRILOSEC) 20 MG capsule Take 1 capsule (20 mg total) by mouth 2  "(two) times daily.    potassium chloride (KLOR-CON) 20 mEq Pack Take 20 mEq by mouth 2 (two) times daily.    alprazolam (XANAX) 1 MG tablet Take 1 tablet (1 mg total) by mouth 2 (two) times daily.    clotrimazole (LOTRIMIN) 1 % Soln Apply topically 2 (two) times daily.           Clinical Reference Information           Your Vitals Were     Height Weight BMI          5' 5" (1.651 m) 144.2 kg (317 lb 14.5 oz) 52.9 kg/m2        Allergies as of 2/21/2017     No Known Allergies      Immunizations Administered on Date of Encounter - 2/21/2017     None      Orders Placed During Today's Visit      Normal Orders This Visit    DIABETIC SHOES FOR HOME USE       Instructions    Tinactin spray to feet and troes.    Clotrimazole to toenails daily.    Becker's wool between the toes    Diabetic shoes and inserts       Language Assistance Services     ATTENTION: Language assistance services are available, free of charge. Please call [phone].      ATENCIÓN: Si jessica mcdonough, tiene a baird disposición servicios gratuitos de asistencia lingüística. Llame al [phone].     VINCE Ý: N?u b?n nói Ti?ng Vi?t, có các d?ch v? h? tr? ngôn ng? mi?n phí dành cho b?n. G?i s? [phone].         [place] - Podiatry complies with applicable Federal civil rights laws and does not discriminate on the basis of race, color, national origin, age, disability, or sex.        "

## 2017-02-21 NOTE — PATIENT INSTRUCTIONS
Tinactin spray to feet and troes.    Clotrimazole to toenails daily.    Becker's wool between the toes    Diabetic shoes and inserts

## 2017-02-21 NOTE — PROGRESS NOTES
Subjective:       Patient ID: Amena Anderson is a 51 y.o. female.    Chief Complaint: Foot Problem (bone sput on amrkus heels ) and Diabetic Foot Exam (HgbA1c: 6.1 1/19/17 PCP: Paul 1/19/17)    HPI  Amena is a 51 y.o. female who presents to the clinic for evaluation and treatment of high risk feet. Amena has a past medical history of BMI 50.0-59.9, adult (1/22/2017); Hypertension; and Hypothyroidism. The patient's chief complaint is long, thick toenails. This patient has documented high risk feet requiring routine maintenance secondary to diabetes mellitis and those secondary complications of diabetes, as mentioned..    Also reporting severe pain at insertions of achilles tendon b/l as well as itchy macerated between the toes.    PCP: Jose Miller Jr, MD        Current shoe gear:  Affected Foot: slippers     Unaffected Foot: slippers    Hemoglobin A1C   Date Value Ref Range Status   01/19/2017 6.1 4.5 - 6.2 % Final     Comment:     According to ADA guidelines, hemoglobin A1C <7.0% represents  optimal control in non-pregnant diabetic patients.  Different  metrics may apply to specific populations.   Standards of Medical Care in Diabetes - 2016.  For the purpose of screening for the presence of diabetes:  <5.7%     Consistent with the absence of diabetes  5.7-6.4%  Consistent with increasing risk for diabetes   (prediabetes)  >or=6.5%  Consistent with diabetes  Currently no consensus exists for use of hemoglobin A1C  for diagnosis of diabetes for children.       Review of Systems  ROS:  Constitution: Negative for chills, fever, weakness and malaise/fatigue.   HEENT: Negative for headaches.   Cardiovascular: Negative for chest pain and claudication.   Respiratory: Negative for cough and shortness of breath.   Musculoskeletal: Positive for foot pain.  Negative for muscle cramps and muscle weakness.   Gastrointestinal: Negative for nausea and vomiting.   Neurological: Positive for numbness and paresthesias.    Dermatological: - for wound.        Objective:      Physical Exam  Constitutional:   Patient is oriented to person, place, and time. Vital signs are normal. Appears well-developed and well-nourished.     Vascular:   Dorsalis pedis pulses are 1+ on the right side, and 1+ on the left side.   Posterior tibial pulses are 2+ on the right side, and 2+ on the left side.   - digital hair growth, capillary fill time to all toes <3 seconds, toes are cool to touch  + swelling feet and ankles    Skin/Dermatological:   Skin is thin, warm, shiny and atrophic. No cyanosis or clubbing. No rashes noted. No open wounds.   All ten toenails yellow discolored, thickened 3 mm, elongated 3 mm with subungual debris and tenderness.  Webspaces macerated.    Musculoskeletal:   Mild bunions, hammertoes and bunionettes observed.  Decreased range of motion of bilateral midtarsal, subtalar joints, ankle joint dorsiflexion is restricted bilaterally. Muscle strength to tibialis anterior, extensor hallucis longus, extensor digitorum longus, peroneal muscles, flexor hallucis/digotorum longus, posterior tibial and gastrosoleal complex is 5/5.  Severe thickening of b/l achilles and tednerness at insertion b/l    Neurological:   Positive deficits to sharp/dull, light touch or vibratory sensation bilateral feet       X-ray weightbearing bilateral feet show severe heel spurs      Assessment:       1. Type 2 diabetes mellitus with diabetic polyneuropathy, without long-term current use of insulin    2. BMI 50.0-59.9, adult    3. Achilles tendinitis of both lower extremities    4. Heel spur, unspecified laterality    5. Tinea pedis of both feet    6. Hammer toes of both feet    7. Hallux abducto valgus, unspecified laterality        Plan:       Type 2 diabetes mellitus with diabetic polyneuropathy, without long-term current use of insulin  -     DIABETIC SHOES FOR HOME USE    BMI 50.0-59.9, adult  -     DIABETIC SHOES FOR HOME USE    Achilles tendinitis of  both lower extremities  -     DIABETIC SHOES FOR HOME USE    Heel spur, unspecified laterality  -     DIABETIC SHOES FOR HOME USE    Tinea pedis of both feet  -     clotrimazole (LOTRIMIN) 1 % Soln; Apply topically 2 (two) times daily.  Dispense: 30 mL; Refill: 11    Hammer toes of both feet  -     DIABETIC SHOES FOR HOME USE    Hallux abducto valgus, unspecified laterality  -     DIABETIC SHOES FOR HOME USE        Shoe inspection. Diabetic Foot Education. Patient reminded of the importance of good nutrition and blood sugar control to help prevent podiatric complications of diabetes. Patient instructed on proper foot hygeine. We discussed wearing proper shoe gear, daily foot inspections, never walking without protective shoe gear, never putting sharp instruments to feet.  We also discussed padding and shoes with high toe boxes for  foot deformities.    - With patient's permission, all ten toenails were aggressively reduced and debrided  to their soft tissue attachment mechanically with nail nipper, removing all offending nail and debris.  Patient relates relief following the procedure. Patient will continue to monitor the areas daily, inspect her feet, wear protective shoe gear when ambulatory, moisturizer to maintain skin integrity and follow in this office in approximately 3 months, sooner p.r.n.    Insertional b/l Achilles tendinitis/tendinosis, heel bursitis, Juancho's deformity: Patient was prescribed diabetic arch supports with a ¼ inch left heel lift.  Patient will rest, ice, elevate the lower extremity.   Patient is to avoid wearing flat flexible shoes.  Return to clinic 3 months.

## 2017-02-22 ENCOUNTER — PATIENT MESSAGE (OUTPATIENT)
Dept: ENDOCRINOLOGY | Facility: CLINIC | Age: 52
End: 2017-02-22

## 2017-03-28 ENCOUNTER — TELEPHONE (OUTPATIENT)
Dept: FAMILY MEDICINE | Facility: CLINIC | Age: 52
End: 2017-03-28

## 2017-03-28 RX ORDER — ALPRAZOLAM 1 MG/1
1 TABLET ORAL 2 TIMES DAILY
Qty: 60 TABLET | Refills: 0 | Status: SHIPPED | OUTPATIENT
Start: 2017-03-28 | End: 2017-04-28 | Stop reason: SDUPTHER

## 2017-03-28 RX ORDER — ALPRAZOLAM 1 MG/1
TABLET ORAL
Qty: 60 TABLET | Refills: 0 | OUTPATIENT
Start: 2017-03-28

## 2017-03-28 NOTE — TELEPHONE ENCOUNTER
----- Message from Jose Valles sent at 3/28/2017  8:45 AM CDT -----  Contact: self  Patient need a refill on alprazolam please send to [facility] any question please back at [phone]    [facility] [id number] [place] [address] [facility] 59 AT [facility] [address]  [address] [address] [address]  Phone: [phone] Fax: [phone]

## 2017-03-31 RX ORDER — METFORMIN HYDROCHLORIDE 500 MG/1
1000 TABLET ORAL NIGHTLY
Qty: 180 TABLET | Refills: 3 | Status: CANCELLED | OUTPATIENT
Start: 2017-03-31

## 2017-03-31 RX ORDER — METFORMIN HYDROCHLORIDE 1000 MG/1
1000 TABLET ORAL NIGHTLY
Qty: 90 TABLET | Refills: 1 | Status: SHIPPED | OUTPATIENT
Start: 2017-03-31 | End: 2017-09-27 | Stop reason: SDUPTHER

## 2017-03-31 NOTE — TELEPHONE ENCOUNTER
----- Message from Maggie Adam sent at 3/31/2017  1:28 PM CDT -----  Contact: [facility]   Need refill on Rx Metformin 100 mg at bedtime 90 day supply    [facility] [address] [facility] [address] [facility] 59 AT [facility] OF [address]  [address] [address] [address]  Phone: [phone] Fax: [phone]    Thank you!

## 2017-04-28 RX ORDER — ALPRAZOLAM 1 MG/1
TABLET ORAL
Qty: 60 TABLET | Refills: 0 | Status: SHIPPED | OUTPATIENT
Start: 2017-04-28 | End: 2017-05-27 | Stop reason: SDUPTHER

## 2017-05-17 ENCOUNTER — OFFICE VISIT (OUTPATIENT)
Dept: ENDOCRINOLOGY | Facility: CLINIC | Age: 52
End: 2017-05-17
Payer: MEDICARE

## 2017-05-17 VITALS
SYSTOLIC BLOOD PRESSURE: 138 MMHG | HEIGHT: 65 IN | WEIGHT: 293 LBS | HEART RATE: 81 BPM | DIASTOLIC BLOOD PRESSURE: 78 MMHG | BODY MASS INDEX: 48.82 KG/M2

## 2017-05-17 DIAGNOSIS — I10 ESSENTIAL HYPERTENSION: Chronic | ICD-10-CM

## 2017-05-17 DIAGNOSIS — E11.42 TYPE 2 DIABETES MELLITUS WITH DIABETIC POLYNEUROPATHY, WITHOUT LONG-TERM CURRENT USE OF INSULIN: Chronic | ICD-10-CM

## 2017-05-17 DIAGNOSIS — E03.9 HYPOTHYROIDISM, UNSPECIFIED TYPE: Primary | Chronic | ICD-10-CM

## 2017-05-17 PROCEDURE — 4010F ACE/ARB THERAPY RXD/TAKEN: CPT | Mod: S$GLB,,, | Performed by: INTERNAL MEDICINE

## 2017-05-17 PROCEDURE — 99499 UNLISTED E&M SERVICE: CPT | Mod: S$GLB,,, | Performed by: INTERNAL MEDICINE

## 2017-05-17 PROCEDURE — 3044F HG A1C LEVEL LT 7.0%: CPT | Mod: S$GLB,,, | Performed by: INTERNAL MEDICINE

## 2017-05-17 PROCEDURE — 3078F DIAST BP <80 MM HG: CPT | Mod: S$GLB,,, | Performed by: INTERNAL MEDICINE

## 2017-05-17 PROCEDURE — 3075F SYST BP GE 130 - 139MM HG: CPT | Mod: S$GLB,,, | Performed by: INTERNAL MEDICINE

## 2017-05-17 PROCEDURE — 1160F RVW MEDS BY RX/DR IN RCRD: CPT | Mod: S$GLB,,, | Performed by: INTERNAL MEDICINE

## 2017-05-17 PROCEDURE — 99204 OFFICE O/P NEW MOD 45 MIN: CPT | Mod: S$GLB,,, | Performed by: INTERNAL MEDICINE

## 2017-05-17 PROCEDURE — 99999 PR PBB SHADOW E&M-EST. PATIENT-LVL III: CPT | Mod: PBBFAC,,, | Performed by: INTERNAL MEDICINE

## 2017-05-17 RX ORDER — LISINOPRIL 20 MG/1
TABLET ORAL
Refills: 2 | COMMUNITY
Start: 2017-04-28 | End: 2017-05-22

## 2017-05-17 NOTE — PROGRESS NOTES
Subjective:      Patient ID: Amena Anderson is a 51 y.o. female.    Chief Complaint:  Hypothyroidism and Diabetes Mellitus      History of Present Illness    Ms.Sheila GAVI Anderson referred to me for hypothyroidism     [facility] thyroidectomy with  for goiter in 2012  She feels he left a piece -    She was following with      She is on synthroid 200 mcg daily     She was told by  she had remnant thyroid     C/o difficulty swallowing and change in voice - reports manly voice and hoarseness          Review of Systems   Constitutional: Negative for unexpected weight change.   HENT: Positive for trouble swallowing (feels on the right side ) and voice change.    Cardiovascular: Negative for palpitations and leg swelling.   Gastrointestinal: Negative for constipation and diarrhea.   Endocrine: Negative for cold intolerance and heat intolerance.   Neurological: Negative for tremors.   Psychiatric/Behavioral: Negative for sleep disturbance.        Sleeps with CPAP          Objective:   Physical Exam   Constitutional: She appears well-developed.   HENT:   Right Ear: External ear normal.   Left Ear: External ear normal.   Nose: Nose normal.   Neck: No tracheal deviation present. No thyromegaly present.   Cardiovascular: Normal rate.    No murmur heard.  Pulmonary/Chest: Effort normal and breath sounds normal.   Abdominal: Soft. There is no tenderness. No hernia.   Musculoskeletal: She exhibits no edema.   Neurological: She displays normal reflexes. No cranial nerve deficit.   Skin: No rash noted.          Psychiatric:   Difficulty with comprehension , requires repetition    Vitals reviewed.      Lab Review:   Results for orders placed or performed in visit on 01/19/17   Comprehensive metabolic panel   Result Value Ref Range    Sodium 142 136 - 145 mmol/L    Potassium 4.1 3.5 - 5.1 mmol/L    Chloride 105 95 - 110 mmol/L    CO2 26 23 - 29 mmol/L    Glucose 83 70 - 110 mg/dL    BUN, Bld 11 6 - 20  mg/dL    Creatinine 0.8 0.5 - 1.4 mg/dL    Calcium 9.3 8.7 - 10.5 mg/dL    Total Protein 7.9 6.0 - 8.4 g/dL    Albumin 3.7 3.5 - 5.2 g/dL    Total Bilirubin 0.4 0.1 - 1.0 mg/dL    Alkaline Phosphatase 84 55 - 135 U/L    AST 19 10 - 40 U/L    ALT 19 10 - 44 U/L    Anion Gap 11 8 - 16 mmol/L    eGFR if African American >60.0 >60 mL/min/1.73 m^2    eGFR if non African American >60.0 >60 mL/min/1.73 m^2   Hemoglobin A1c   Result Value Ref Range    Hemoglobin A1C 6.1 4.5 - 6.2 %    Estimated Avg Glucose 128 68 - 131 mg/dL   TSH   Result Value Ref Range    TSH 2.309 0.400 - 4.000 uIU/mL   Lipid panel   Result Value Ref Range    Cholesterol 227 (H) 120 - 199 mg/dL    Triglycerides 109 30 - 150 mg/dL    HDL 40 40 - 75 mg/dL    LDL Cholesterol 165.2 (H) 63.0 - 159.0 mg/dL    HDL/Chol Ratio 17.6 (L) 20.0 - 50.0 %    Total Cholesterol/HDL Ratio 5.7 (H) 2.0 - 5.0    Non-HDL Cholesterol 187 mg/dL         Assessment:       # hypothyroidism , post surgical , TSH at goal   - continue current dose of synthroid     # goiter s/p thyroidectomy   Still feels there is right sided trouble swallowing   Get US thyroid and referral to ENT     Plan discussed with her and sister   Sister verbalized understanding     # T2Dm controlled   Continue metformin       Plan:       Follow up:    Please get records from 's office and   ENT referral at [facility]   US thyroid prior to ENT visit   TSH in 6 months   F/U in 6 months

## 2017-05-17 NOTE — MR AVS SNAPSHOT
[facility] Endocrinology  [address]  [facility] [address]  Phone: [phone]  Fax: [phone]                  Amena Anderson   2017 11:30 AM   Office Visit    Description:  Female : 1965   Provider:  Ryland Mackey MD   Department:  [place] - Endocrinology           Reason for Visit     Hypothyroidism     Diabetes Mellitus           Diagnoses this Visit        Comments    Hypothyroidism, unspecified type    -  Primary     Essential hypertension                To Do List           Future Appointments        Provider Department Dept Phone    2017 8:40 AM Jose Miller Jr., MD [facility] Family [facility] [phone]    2017 1:00 PM Jacob Dejesus DPM [facility] Podiatry [phone]    2017 11:00 AM Noemy Gomez NP [facility] [phone]    10/11/2017 10:00 AM LAB, [place] [facility] [phone]    10/18/2017 9:00 AM Ryland Mackey MD [facility] Endocrinology [phone]      Goals (5 Years of Data)     None      [facility]     [facility] Nurse Care Line -  Assistance  Unless otherwise directed by your provider, please contact [facility], our nurse care line that is available for  assistance.     Registered nurses in the [facility] provide: appointment scheduling, clinical advisement, health education, and other advisory services.  Call: [phone] (toll free)               Medications           Message regarding Medications     Verify the changes and/or additions to your medication regime listed below are the same as discussed with your clinician today.  If any of these changes or additions are incorrect, please notify your healthcare provider.        STOP taking these medications     atorvastatin (LIPITOR) 20 MG tablet Take 1 tablet (20 mg total) by mouth once daily.           Verify that the below list of medications is an accurate representation of the medications you are currently  "taking.  If none reported, the list may be blank. If incorrect, please contact your healthcare provider. Carry this list with you in case of emergency.           Current Medications     alprazolam (XANAX) 1 MG tablet TAKE 1 TABLET BY MOUTH TWICE DAILY.    amitriptyline (ELAVIL) 25 MG tablet Take 1 tablet (25 mg total) by mouth every evening.    amlodipine (NORVASC) 5 MG tablet Take 1 tablet (5 mg total) by mouth once daily.    aspirin (ECOTRIN) 81 MG EC tablet Take 81 mg by mouth once daily.    clotrimazole (LOTRIMIN) 1 % Soln Apply topically 2 (two) times daily.    estradiol (ESTRACE) 1 MG tablet Take 1 tablet (1 mg total) by mouth once daily.    furosemide (LASIX) 40 MG tablet Take 40 mg by mouth once daily.    gabapentin (NEURONTIN) 400 MG capsule Take 400 mg by mouth 3 (three) times daily.    hydrocodone-acetaminophen 10-325mg (NORCO)  mg Tab Take 10 tablets by mouth 3 (three) times daily.    levothyroxine (SYNTHROID) 200 MCG tablet Take 200 mcg by mouth once daily.    lisinopril (PRINIVIL,ZESTRIL) 20 MG tablet Take 1 tablet (20 mg total) by mouth once daily.    lisinopril (PRINIVIL,ZESTRIL) 20 MG tablet TK 1 T PO ONCE D.    metformin (GLUCOPHAGE) 1000 MG tablet Take 1 tablet (1,000 mg total) by mouth every evening.    naproxen (EC NAPROSYN) 500 MG EC tablet Take 500 mg by mouth 2 (two) times daily.    nitroGLYCERIN (NITROSTAT) 0.4 MG SL tablet Place 0.4 mg under the tongue every 5 (five) minutes as needed for Chest pain.    nystatin (MYCOSTATIN) powder Apply to affected area 3 times daily    omeprazole (PRILOSEC) 20 MG capsule Take 1 capsule (20 mg total) by mouth 2 (two) times daily.    potassium chloride (KLOR-CON) 20 mEq Pack Take 20 mEq by mouth 2 (two) times daily.           Clinical Reference Information           Your Vitals Were     BP Pulse Height Weight BMI    138/78 (BP Method: Manual) 81 5' 5" (1.651 m) 136.1 kg (300 lb 0.7 oz) 49.93 kg/m2      Blood Pressure          Most Recent Value    BP  " 138/78      Allergies as of 5/17/2017     No Known Allergies      Immunizations Administered on Date of Encounter - 5/17/2017     None      Orders Placed During Today's Visit      Normal Orders This Visit    Ambulatory Referral to ENT     Future Labs/Procedures Expected by Expires    TSH  5/17/2017 7/16/2018    US Soft Tissue Head Neck Thyroid  5/17/2017 5/17/2018      Language Assistance Services     ATTENTION: Language assistance services are available, free of charge. Please call [phone].      ATENCIÓN: Si habla sharonda, tiene a baird disposición servicios gratuitos de asistencia lingüística. Llame al [phone].     CHÚ Ý: N?u b?n nói Ti?ng Vi?t, có các d?ch v? h? tr? ngôn ng? mi?n phí dành cho b?n. G?i s? [phone].         [facility] complies with applicable Federal civil rights laws and does not discriminate on the basis of race, color, national origin, age, disability, or sex.

## 2017-05-22 ENCOUNTER — OFFICE VISIT (OUTPATIENT)
Dept: FAMILY MEDICINE | Facility: CLINIC | Age: 52
End: 2017-05-22
Payer: MEDICARE

## 2017-05-22 VITALS
HEIGHT: 65 IN | BODY MASS INDEX: 48.82 KG/M2 | SYSTOLIC BLOOD PRESSURE: 140 MMHG | HEART RATE: 86 BPM | OXYGEN SATURATION: 95 % | DIASTOLIC BLOOD PRESSURE: 88 MMHG | RESPIRATION RATE: 16 BRPM | WEIGHT: 293 LBS

## 2017-05-22 DIAGNOSIS — Z23 VACCINE FOR STREPTOCOCCUS PNEUMONIAE AND INFLUENZA: ICD-10-CM

## 2017-05-22 DIAGNOSIS — G47.33 OSA (OBSTRUCTIVE SLEEP APNEA): Chronic | ICD-10-CM

## 2017-05-22 DIAGNOSIS — E03.9 HYPOTHYROIDISM, UNSPECIFIED TYPE: Chronic | ICD-10-CM

## 2017-05-22 DIAGNOSIS — E11.42 TYPE 2 DIABETES MELLITUS WITH DIABETIC POLYNEUROPATHY, WITHOUT LONG-TERM CURRENT USE OF INSULIN: Chronic | ICD-10-CM

## 2017-05-22 DIAGNOSIS — E78.2 MIXED HYPERLIPIDEMIA: ICD-10-CM

## 2017-05-22 DIAGNOSIS — I10 ESSENTIAL HYPERTENSION: Primary | Chronic | ICD-10-CM

## 2017-05-22 DIAGNOSIS — K21.9 GASTROESOPHAGEAL REFLUX DISEASE WITHOUT ESOPHAGITIS: Chronic | ICD-10-CM

## 2017-05-22 PROCEDURE — 4010F ACE/ARB THERAPY RXD/TAKEN: CPT | Mod: S$GLB,,, | Performed by: EMERGENCY MEDICINE

## 2017-05-22 PROCEDURE — 99999 PR PBB SHADOW E&M-EST. PATIENT-LVL III: CPT | Mod: PBBFAC,,, | Performed by: EMERGENCY MEDICINE

## 2017-05-22 PROCEDURE — 99214 OFFICE O/P EST MOD 30 MIN: CPT | Mod: S$GLB,,, | Performed by: EMERGENCY MEDICINE

## 2017-05-22 PROCEDURE — 1160F RVW MEDS BY RX/DR IN RCRD: CPT | Mod: S$GLB,,, | Performed by: EMERGENCY MEDICINE

## 2017-05-22 PROCEDURE — 3079F DIAST BP 80-89 MM HG: CPT | Mod: S$GLB,,, | Performed by: EMERGENCY MEDICINE

## 2017-05-22 PROCEDURE — 3077F SYST BP >= 140 MM HG: CPT | Mod: S$GLB,,, | Performed by: EMERGENCY MEDICINE

## 2017-05-22 PROCEDURE — 3044F HG A1C LEVEL LT 7.0%: CPT | Mod: S$GLB,,, | Performed by: EMERGENCY MEDICINE

## 2017-05-22 PROCEDURE — 99499 UNLISTED E&M SERVICE: CPT | Mod: S$GLB,,, | Performed by: EMERGENCY MEDICINE

## 2017-05-22 RX ORDER — ATORVASTATIN CALCIUM 20 MG/1
20 TABLET, FILM COATED ORAL DAILY
Qty: 90 TABLET | Refills: 3 | Status: SHIPPED | OUTPATIENT
Start: 2017-05-22 | End: 2017-08-16

## 2017-05-22 RX ORDER — FLUCONAZOLE 200 MG/1
100 TABLET ORAL DAILY
COMMUNITY
End: 2017-08-14

## 2017-05-22 NOTE — PATIENT INSTRUCTIONS
Dasha,    We gave you the first shot for pneumonia today.  You will need another shot for pneumonia in one year.    We need to check your diabetes blood work in July.    You are doing a great job of taking care of yourself.    I need to see you in six months.    RGM      Atorvastatin tablets  What is this medicine?  ATORVASTATIN (a TORE va sta tin) is known as a HMG-CoA reductase inhibitor or 'statin'. It lowers the level of cholesterol and triglycerides in the blood. This drug may also reduce the risk of heart attack, stroke, or other health problems in patients with risk factors for heart disease. Diet and lifestyle changes are often used with this drug.  How should I use this medicine?  Take this medicine by mouth with a glass of water. Follow the directions on the prescription label. You can take this medicine with or without food. Take your doses at regular intervals. Do not take your medicine more often than directed.  Talk to your pediatrician regarding the use of this medicine in children. While this drug may be prescribed for children as young as 10 years old for selected conditions, precautions do apply.  What side effects may I notice from receiving this medicine?  Side effects that you should report to your doctor or health care professional as soon as possible:  · allergic reactions like skin rash, itching or hives, swelling of the face, lips, or tongue  · dark urine  · fever  · joint pain  · muscle cramps, pain  · redness, blistering, peeling or loosening of the skin, including inside the mouth  · trouble passing urine or change in the amount of urine  · unusually weak or tired  · yellowing of eyes or skin  Side effects that usually do not require medical attention (report to your doctor or health care professional if they continue or are bothersome):  · constipation  · heartburn  · stomach gas, pain, upset  What may interact with this medicine?  Do not take this medicine with any of the following  medications:  · red yeast rice  · telaprevir  · telithromycin  · voriconazole  This medicine may also interact with the following medications:  · alcohol  · antiviral medicines for HIV or AIDS  · boceprevir  · certain antibiotics like clarithromycin, erythromycin, troleandomycin  · certain medicines for cholesterol like fenofibrate or gemfibrozil  · cimetidine  · clarithromycin  · colchicine  · cyclosporine  · digoxin  · female hormones, like estrogens or progestins and birth control pills  · grapefruit juice  · medicines for fungal infections like fluconazole, itraconazole, ketoconazole  · niacin  · rifampin  · spironolactone  What if I miss a dose?  If you miss a dose, take it as soon as you can. If it is almost time for your next dose, take only that dose. Do not take double or extra doses.  Where should I keep my medicine?  Keep out of the reach of children.  Store at room temperature between 20 to 25 degrees C (68 to 77 degrees F). Throw away any unused medicine after the expiration date.  What should I tell my health care provider before I take this medicine?  They need to know if you have any of these conditions:  · frequently drink alcoholic beverages  · history of stroke, TIA  · kidney disease  · liver disease  · muscle aches or weakness  · other medical condition  · an unusual or allergic reaction to atorvastatin, other medicines, foods, dyes, or preservatives  · pregnant or trying to get pregnant  · breast-feeding  What should I watch for while using this medicine?  Visit your doctor or health care professional for regular check-ups. You may need regular tests to make sure your liver is working properly.  Tell your doctor or health care professional right away if you get any unexplained muscle pain, tenderness, or weakness, especially if you also have a fever and tiredness. Your doctor or health care professional may tell you to stop taking this medicine if you develop muscle problems. If your muscle  problems do not go away after stopping this medicine, contact your health care professional.  This drug is only part of a total heart-health program. Your doctor or a dietician can suggest a low-cholesterol and low-fat diet to help. Avoid alcohol and smoking, and keep a proper exercise schedule.  Do not use this drug if you are pregnant or breast-feeding. Serious side effects to an unborn child or to an infant are possible. Talk to your doctor or pharmacist for more information.  This medicine may affect blood sugar levels. If you have diabetes, check with your doctor or health care professional before you change your diet or the dose of your diabetic medicine.  If you are going to have surgery tell your health care professional that you are taking this drug.  Date Last Reviewed:   NOTE:This sheet is a summary. It may not cover all possible information. If you have questions about this medicine, talk to your doctor, pharmacist, or health care provider. Copyright© 2016 [employer]

## 2017-05-22 NOTE — PROGRESS NOTES
Subjective:   THIS NOTE IS DONE WITH VOICE RECOGNITION        Patient ID: Amena Anderson is a 51 y.o. female.    Chief Complaint: type 2 diabetes mellitus without complications (3 month follow up)      HPI     She has seen Dr. Dejesus for her podiatric issues.  She does have some calcaneal spurs.  Some plantar fasciitis.  She is reassured that she probably will not need surgery for this, and of encouraged her to adapt the therapies at Dr. Dejesus has adjusted.    She has seen endocrinology, Dr. Mackey.  She's concerned that there may be some residual thyroid gland or possible hypertrophy.  Not so much Dr. Mackey, but the patient.  An ultrasound has been scheduled.    She is seen optometry, and has been recommended that she receive additional care given her propensity for glaucoma and also that she has some increasing density of the the lens suggestive of cataract.  She will be following up with optometry.    Her CPAP machine is currently broken.  She's having increasing snoring.  She normally sees Dr. Mckeon for this.  When she her machine is working, she has very little snoring is quite comfortable.  We will try to get replacement parts for her existing CPAP machine until she sees Dr. Mckeon.      Mammogram and PAP done in November 2016.  No new issues were identified.    Lab Results   Component Value Date    CHOL 227 (H) 01/19/2017     Lab Results   Component Value Date    HDL 40 01/19/2017     Lab Results   Component Value Date    LDLCALC 165.2 (H) 01/19/2017     Lab Results   Component Value Date    TRIG 109 01/19/2017     Lab Results   Component Value Date    CHOLHDL 17.6 (L) 01/19/2017         Current Outpatient Prescriptions   Medication Sig Dispense Refill    alprazolam (XANAX) 1 MG tablet TAKE 1 TABLET BY MOUTH TWICE DAILY. 60 tablet 0    amitriptyline (ELAVIL) 25 MG tablet Take 1 tablet (25 mg total) by mouth every evening. 90 tablet 3    amlodipine (NORVASC) 5 MG tablet Take 1 tablet (5 mg total) by  mouth once daily. 90 tablet 3    aspirin (ECOTRIN) 81 MG EC tablet Take 81 mg by mouth once daily.      estradiol (ESTRACE) 1 MG tablet Take 1 tablet (1 mg total) by mouth once daily. 30 tablet 6    fluconazole (DIFLUCAN) 200 MG Tab Take 100 mg by mouth once daily.      furosemide (LASIX) 40 MG tablet Take 40 mg by mouth once daily.      gabapentin (NEURONTIN) 400 MG capsule Take 400 mg by mouth 3 (three) times daily.      hydrocodone-acetaminophen 10-325mg (NORCO)  mg Tab Take 10 tablets by mouth 3 (three) times daily.      levothyroxine (SYNTHROID) 200 MCG tablet Take 200 mcg by mouth once daily.      lisinopril (PRINIVIL,ZESTRIL) 20 MG tablet Take 1 tablet (20 mg total) by mouth once daily. 90 tablet 3    metformin (GLUCOPHAGE) 1000 MG tablet Take 1 tablet (1,000 mg total) by mouth every evening. 90 tablet 1    naproxen (EC NAPROSYN) 500 MG EC tablet Take 500 mg by mouth 2 (two) times daily.      nitroGLYCERIN (NITROSTAT) 0.4 MG SL tablet Place 0.4 mg under the tongue every 5 (five) minutes as needed for Chest pain.      nystatin (MYCOSTATIN) powder Apply to affected area 3 times daily 60 g 3    omeprazole (PRILOSEC) 20 MG capsule Take 1 capsule (20 mg total) by mouth 2 (two) times daily. 60 capsule 1    potassium chloride (KLOR-CON) 20 mEq Pack Take 20 mEq by mouth 2 (two) times daily.       No current facility-administered medications for this visit.          Review of Systems   Constitutional: Negative for activity change, appetite change, chills, diaphoresis, fatigue, fever and unexpected weight change.   HENT: Negative for congestion, ear pain, hearing loss, rhinorrhea, trouble swallowing and voice change.    Eyes: Negative for pain and visual disturbance.   Respiratory: Negative for cough, chest tightness and shortness of breath.    Cardiovascular: Negative for chest pain, palpitations and leg swelling.   Gastrointestinal: Negative for abdominal distention, abdominal pain and blood in  stool.   Genitourinary: Negative for difficulty urinating, flank pain, frequency and urgency.   Musculoskeletal: Negative for arthralgias, back pain, joint swelling, myalgias, neck pain and neck stiffness.   Skin: Negative for pallor and rash.   Neurological: Positive for numbness (right thigh). Negative for dizziness, tremors, syncope, weakness and headaches.   Hematological: Negative for adenopathy.   Psychiatric/Behavioral: Negative for dysphoric mood and sleep disturbance. The patient is not nervous/anxious.        Objective:      Physical Exam   Constitutional: She appears well-developed and well-nourished.   HENT:   Head: Normocephalic and atraumatic.   Mouth/Throat: Oropharynx is clear and moist.   Eyes: Conjunctivae and EOM are normal. Pupils are equal, round, and reactive to light.   Neck: Normal range of motion.   I cannot feel any exogenous thyroid tissue.   Vitals reviewed.      Assessment:       1. Essential hypertension    2. Type 2 diabetes mellitus with diabetic polyneuropathy, without long-term current use of insulin    3. BMI 50.0-59.9, adult    4. MANUEL (obstructive sleep apnea)    5. Hypothyroidism, unspecified type    6. Gastroesophageal reflux disease without esophagitis    7. Vaccine for streptococcus pneumoniae and influenza    8. Mixed hyperlipidemia        Plan:       1. Essential hypertension  Probably controlled  Continue to monitor  If not at target with next visit, we'll need to modify pharmacology.  Avoiding salt recommended  Continued focus on weight loss encouraged    2. Type 2 diabetes mellitus with diabetic polyneuropathy, without long-term current use of insulin  Stable  Hemoglobin A1c at her convenience    - Hemoglobin A1c; Future    3. BMI 50.0-59.9, adult  Central issue  Continued focus on trying to reduce weight discussed  I did not mention bariatric surgery today, as she's terrified of physicians.    4. MANUEL (obstructive sleep apnea)  Malfunctioning CPAP machine  Orders written  for updating equipment  She will follow-up with Dr. Mckeon if this is not adequate to fix it.    5. Hypothyroidism, unspecified type  TSH stable  Wait for ultrasound of thyroid for additional interventions.    6. Gastroesophageal reflux disease without esophagitis  Continue current medications, well-controlled  No new interventions anticipated    7. Vaccine for streptococcus pneumoniae and influenza  Initiation of pneumococcal vaccines, we'll start with Prevnar today  1 year Pneumovax (pneumococcal 23 Valent)    8. Mixed hyperlipidemia  Initiate atorvastatin therapy  Recheck in 3 months.  Side effects of medication reviewed.    - Lipid panel; Future  - ALT (SGPT); Future      She is encouraged follow-up with ophthalmology regarding possible cataract and elevated intraocular pressure.

## 2017-05-23 RX ORDER — CLOTRIMAZOLE 1 G/ML
SOLUTION TOPICAL 2 TIMES DAILY
Qty: 30 ML | Refills: 11 | Status: SHIPPED | OUTPATIENT
Start: 2017-05-23 | End: 2017-11-27

## 2017-05-27 DIAGNOSIS — F41.9 ANXIETY: Primary | ICD-10-CM

## 2017-05-28 RX ORDER — ALPRAZOLAM 1 MG/1
TABLET ORAL
Qty: 60 TABLET | Refills: 2 | Status: SHIPPED | OUTPATIENT
Start: 2017-05-28 | End: 2017-10-07 | Stop reason: SDUPTHER

## 2017-05-29 ENCOUNTER — HOSPITAL ENCOUNTER (OUTPATIENT)
Dept: RADIOLOGY | Facility: HOSPITAL | Age: 52
Discharge: HOME | End: 2017-05-29
Attending: INTERNAL MEDICINE
Payer: MEDICARE

## 2017-05-29 DIAGNOSIS — E03.9 HYPOTHYROIDISM, UNSPECIFIED TYPE: Chronic | ICD-10-CM

## 2017-05-29 DIAGNOSIS — I10 ESSENTIAL HYPERTENSION: Chronic | ICD-10-CM

## 2017-05-29 PROCEDURE — 76536 US EXAM OF HEAD AND NECK: CPT | Mod: TC,PO

## 2017-05-29 PROCEDURE — 76536 US EXAM OF HEAD AND NECK: CPT | Mod: 26,,, | Performed by: RADIOLOGY

## 2017-06-08 ENCOUNTER — TELEPHONE (OUTPATIENT)
Dept: FAMILY MEDICINE | Facility: CLINIC | Age: 52
End: 2017-06-08

## 2017-06-08 NOTE — TELEPHONE ENCOUNTER
Spoke with pt she had appt with ENT NP Sivan regarding thyroid issues it was canceled [place] wants to wait and see new provider that is scheduled for August. Please advise.

## 2017-06-20 ENCOUNTER — PATIENT MESSAGE (OUTPATIENT)
Dept: FAMILY MEDICINE | Facility: CLINIC | Age: 52
End: 2017-06-20

## 2017-06-22 ENCOUNTER — PATIENT MESSAGE (OUTPATIENT)
Dept: FAMILY MEDICINE | Facility: CLINIC | Age: 52
End: 2017-06-22

## 2017-06-22 DIAGNOSIS — Z86.010 HISTORY OF COLON POLYPS: Primary | ICD-10-CM

## 2017-07-05 RX ORDER — AMITRIPTYLINE HYDROCHLORIDE 25 MG/1
TABLET, FILM COATED ORAL
Qty: 90 TABLET | Refills: 0 | Status: SHIPPED | OUTPATIENT
Start: 2017-07-05 | End: 2018-01-03 | Stop reason: SDUPTHER

## 2017-07-06 DIAGNOSIS — K21.9 GASTROESOPHAGEAL REFLUX DISEASE WITHOUT ESOPHAGITIS: ICD-10-CM

## 2017-07-06 RX ORDER — FLUCONAZOLE 200 MG/1
TABLET ORAL
Qty: 7 TABLET | Refills: 0 | Status: SHIPPED | OUTPATIENT
Start: 2017-07-06 | End: 2017-08-16

## 2017-07-06 RX ORDER — OMEPRAZOLE 20 MG/1
CAPSULE, DELAYED RELEASE ORAL
Qty: 60 CAPSULE | Refills: 0 | Status: SHIPPED | OUTPATIENT
Start: 2017-07-06 | End: 2017-08-06 | Stop reason: SDUPTHER

## 2017-07-10 ENCOUNTER — LAB VISIT (OUTPATIENT)
Dept: LAB | Facility: HOSPITAL | Age: 52
End: 2017-07-10
Attending: EMERGENCY MEDICINE
Payer: MEDICARE

## 2017-07-10 DIAGNOSIS — E78.2 MIXED HYPERLIPIDEMIA: ICD-10-CM

## 2017-07-10 DIAGNOSIS — E11.42 TYPE 2 DIABETES MELLITUS WITH DIABETIC POLYNEUROPATHY, WITHOUT LONG-TERM CURRENT USE OF INSULIN: Chronic | ICD-10-CM

## 2017-07-10 LAB
ALT SERPL W/O P-5'-P-CCNC: 14 U/L
CHOLEST/HDLC SERPL: 3.3 {RATIO}
ESTIMATED AVG GLUCOSE: 120 MG/DL
HBA1C MFR BLD HPLC: 5.8 %
HDL/CHOLESTEROL RATIO: 30.5 %
HDLC SERPL-MCNC: 128 MG/DL
HDLC SERPL-MCNC: 39 MG/DL
LDLC SERPL CALC-MCNC: 75.4 MG/DL
NONHDLC SERPL-MCNC: 89 MG/DL
TRIGL SERPL-MCNC: 68 MG/DL

## 2017-07-10 PROCEDURE — 36415 COLL VENOUS BLD VENIPUNCTURE: CPT | Mod: PO

## 2017-07-10 PROCEDURE — 84460 ALANINE AMINO (ALT) (SGPT): CPT

## 2017-07-10 PROCEDURE — 80061 LIPID PANEL: CPT

## 2017-07-10 PROCEDURE — 83036 HEMOGLOBIN GLYCOSYLATED A1C: CPT

## 2017-07-17 ENCOUNTER — OFFICE VISIT (OUTPATIENT)
Dept: PODIATRY | Facility: CLINIC | Age: 52
End: 2017-07-17
Payer: MEDICARE

## 2017-07-17 VITALS — WEIGHT: 293 LBS | HEIGHT: 65 IN | BODY MASS INDEX: 48.82 KG/M2

## 2017-07-17 DIAGNOSIS — M76.62 ACHILLES TENDINITIS OF BOTH LOWER EXTREMITIES: ICD-10-CM

## 2017-07-17 DIAGNOSIS — M76.61 ACHILLES TENDINITIS OF BOTH LOWER EXTREMITIES: ICD-10-CM

## 2017-07-17 DIAGNOSIS — M77.30 HEEL SPUR, UNSPECIFIED LATERALITY: ICD-10-CM

## 2017-07-17 DIAGNOSIS — B35.3 TINEA PEDIS OF BOTH FEET: ICD-10-CM

## 2017-07-17 DIAGNOSIS — E11.42 TYPE 2 DIABETES MELLITUS WITH DIABETIC POLYNEUROPATHY, WITHOUT LONG-TERM CURRENT USE OF INSULIN: Primary | ICD-10-CM

## 2017-07-17 PROCEDURE — 4010F ACE/ARB THERAPY RXD/TAKEN: CPT | Mod: S$GLB,,,

## 2017-07-17 PROCEDURE — 99213 OFFICE O/P EST LOW 20 MIN: CPT | Mod: 25,S$GLB,,

## 2017-07-17 PROCEDURE — 11721 DEBRIDE NAIL 6 OR MORE: CPT | Mod: Q9,S$GLB,,

## 2017-07-17 PROCEDURE — 99499 UNLISTED E&M SERVICE: CPT | Mod: S$GLB,,,

## 2017-07-17 PROCEDURE — 99999 PR PBB SHADOW E&M-EST. PATIENT-LVL III: CPT | Mod: PBBFAC,,,

## 2017-07-17 PROCEDURE — 3044F HG A1C LEVEL LT 7.0%: CPT | Mod: S$GLB,,,

## 2017-07-17 NOTE — PROGRESS NOTES
Subjective:       Patient ID: Amena Anderson is a 51 y.o. female.    Chief Complaint: Diabetic Foot Exam (HgbA1c: 5.8 7/10/17 PCP: Paul 5/22/17)    HPI  Amena is a 51 y.o. female who presents to the clinic for evaluation and treatment of high risk feet. Amena has a past medical history of BMI 50.0-59.9, adult (1/22/2017); Hypertension; and Hypothyroidism. The patient's chief complaint is long, thick toenails, posterior heel pain secondary to achilles tendinitis, tinea pedis. This patient has documented high risk feet requiring routine maintenance secondary to diabetes mellitis and those secondary complications of diabetes, as mentioned..  She does report some improvement in the achilles pain since getting the inserts and shoes and opening the back up the shoes by OPS.  Maceration has improved since using the cream.        PCP: Jose Miller Jr, MD        Current shoe gear:  Affected Foot: slippers     Unaffected Foot: slippers    Hemoglobin A1C   Date Value Ref Range Status   07/10/2017 5.8 (H) 4.0 - 5.6 % Final     Comment:     According to ADA guidelines, hemoglobin A1c <7.0% represents  optimal control in non-pregnant diabetic patients. Different  metrics may apply to specific patient populations.   Standards of Medical Care in Diabetes-2016.  For the purpose of screening for the presence of diabetes:  <5.7%     Consistent with the absence of diabetes  5.7-6.4%  Consistent with increasing risk for diabetes   (prediabetes)  >or=6.5%  Consistent with diabetes  Currently, no consensus exists for use of hemoglobin A1c  for diagnosis of diabetes for children.  This Hemoglobin A1c assay has significant interference with fetal   hemoglobin   (HbF). The results are invalid for patients with abnormal amounts of   HbF,   including those with known Hereditary Persistence   of Fetal Hemoglobin. Heterozygous hemoglobin variants (HbAS, HbAC,   HbAD, HbAE, HbA2) do not significantly interfere with this assay;   however,  presence of multiple variants in a sample may impact the %   interference.     01/19/2017 6.1 4.5 - 6.2 % Final     Comment:     According to ADA guidelines, hemoglobin A1C <7.0% represents  optimal control in non-pregnant diabetic patients.  Different  metrics may apply to specific populations.   Standards of Medical Care in Diabetes - 2016.  For the purpose of screening for the presence of diabetes:  <5.7%     Consistent with the absence of diabetes  5.7-6.4%  Consistent with increasing risk for diabetes   (prediabetes)  >or=6.5%  Consistent with diabetes  Currently no consensus exists for use of hemoglobin A1C  for diagnosis of diabetes for children.       Review of Systems  ROS:  Constitution: Negative for chills, fever, weakness and malaise/fatigue.   HEENT: Negative for headaches.   Cardiovascular: Negative for chest pain and claudication.   Respiratory: Negative for cough and shortness of breath.   Musculoskeletal: Positive for foot pain.  Negative for muscle cramps and muscle weakness.   Gastrointestinal: Negative for nausea and vomiting.   Neurological: Positive for numbness and paresthesias.   Dermatological: - for wound.        Objective:      Physical Exam  Constitutional:   Patient is oriented to person, place, and time. Vital signs are normal. Appears well-developed and well-nourished.     Vascular:   Dorsalis pedis pulses are 1+ on the right side, and 1+ on the left side.   Posterior tibial pulses are 2+ on the right side, and 2+ on the left side.   - digital hair growth, capillary fill time to all toes <3 seconds, toes are cool to touch  + swelling feet and ankles    Skin/Dermatological:   Skin is thin, warm, shiny and atrophic. No cyanosis or clubbing. No rashes noted. No open wounds.   All ten toenails yellow discolored, thickened 3 mm, elongated 3 mm with subungual debris and tenderness.  Webspaces no maceration.    Musculoskeletal:   Mild bunions, hammertoes and bunionettes observed.  Decreased  range of motion of bilateral midtarsal, subtalar joints, ankle joint dorsiflexion is restricted bilaterally. Muscle strength to tibialis anterior, extensor hallucis longus, extensor digitorum longus, peroneal muscles, flexor hallucis/digotorum longus, posterior tibial and gastrosoleal complex is 5/5.  Severe thickening of b/l achilles but less tenderness at insertion b/l    Neurological:   Positive deficits to sharp/dull, light touch or vibratory sensation bilateral feet       X-ray weightbearing bilateral feet show severe heel spurs      Assessment:       1. Type 2 diabetes mellitus with diabetic polyneuropathy, without long-term current use of insulin    2. BMI 50.0-59.9, adult    3. Achilles tendinitis of both lower extremities    4. Heel spur, unspecified laterality    5. Tinea pedis of both feet        Plan:       Type 2 diabetes mellitus with diabetic polyneuropathy, without long-term current use of insulin    BMI 50.0-59.9, adult    Achilles tendinitis of both lower extremities    Heel spur, unspecified laterality    Tinea pedis of both feet        Shoe inspection. Diabetic Foot Education. Patient reminded of the importance of good nutrition and blood sugar control to help prevent podiatric complications of diabetes. Patient instructed on proper foot hygeine. We discussed wearing proper shoe gear, daily foot inspections, never walking without protective shoe gear, never putting sharp instruments to feet.  We also discussed padding and shoes with high toe boxes for  foot deformities.  Weight loss.    - With patient's permission, all ten toenails were aggressively reduced and debrided  to their soft tissue attachment mechanically with nail nipper, removing all offending nail and debris.  Patient relates relief following the procedure. Patient will continue to monitor the areas daily, inspect her feet, wear protective shoe gear when ambulatory, moisturizer to maintain skin integrity and follow in this office in  approximately 3 months, sooner p.r.n.    Continue the creams and the heel lifts, shoes.  She will return in a month to reevaluate the Achilles tendinitis, we did discuss PT and surgery for this.

## 2017-07-31 RX ORDER — ESTRADIOL 1 MG/1
TABLET ORAL
Qty: 90 TABLET | Refills: 0 | Status: SHIPPED | OUTPATIENT
Start: 2017-07-31 | End: 2018-02-06 | Stop reason: SDUPTHER

## 2017-08-06 DIAGNOSIS — K21.9 GASTROESOPHAGEAL REFLUX DISEASE WITHOUT ESOPHAGITIS: ICD-10-CM

## 2017-08-06 RX ORDER — OMEPRAZOLE 20 MG/1
CAPSULE, DELAYED RELEASE ORAL
Qty: 180 CAPSULE | Refills: 3 | Status: ON HOLD | OUTPATIENT
Start: 2017-08-06 | End: 2017-12-14 | Stop reason: HOSPADM

## 2017-08-14 ENCOUNTER — OFFICE VISIT (OUTPATIENT)
Dept: PODIATRY | Facility: CLINIC | Age: 52
End: 2017-08-14
Payer: MEDICARE

## 2017-08-14 VITALS — WEIGHT: 293 LBS | HEIGHT: 66 IN | BODY MASS INDEX: 47.09 KG/M2

## 2017-08-14 DIAGNOSIS — M20.41 HAMMER TOES OF BOTH FEET: ICD-10-CM

## 2017-08-14 DIAGNOSIS — M20.42 HAMMER TOES OF BOTH FEET: ICD-10-CM

## 2017-08-14 DIAGNOSIS — M20.10 HALLUX ABDUCTO VALGUS, UNSPECIFIED LATERALITY: ICD-10-CM

## 2017-08-14 DIAGNOSIS — M76.61 ACHILLES TENDINITIS OF BOTH LOWER EXTREMITIES: ICD-10-CM

## 2017-08-14 DIAGNOSIS — M76.62 ACHILLES TENDINITIS OF BOTH LOWER EXTREMITIES: ICD-10-CM

## 2017-08-14 DIAGNOSIS — E11.42 TYPE 2 DIABETES MELLITUS WITH DIABETIC POLYNEUROPATHY, WITHOUT LONG-TERM CURRENT USE OF INSULIN: Primary | ICD-10-CM

## 2017-08-14 DIAGNOSIS — M77.30 HEEL SPUR, UNSPECIFIED LATERALITY: ICD-10-CM

## 2017-08-14 DIAGNOSIS — L57.0 KERATOSIS: ICD-10-CM

## 2017-08-14 DIAGNOSIS — B35.3 TINEA PEDIS OF BOTH FEET: ICD-10-CM

## 2017-08-14 PROCEDURE — 99213 OFFICE O/P EST LOW 20 MIN: CPT | Mod: S$GLB,,,

## 2017-08-14 PROCEDURE — 99499 UNLISTED E&M SERVICE: CPT | Mod: S$GLB,,,

## 2017-08-14 PROCEDURE — 3008F BODY MASS INDEX DOCD: CPT | Mod: S$GLB,,,

## 2017-08-14 PROCEDURE — 3044F HG A1C LEVEL LT 7.0%: CPT | Mod: S$GLB,,,

## 2017-08-14 PROCEDURE — 99999 PR PBB SHADOW E&M-EST. PATIENT-LVL III: CPT | Mod: PBBFAC,,,

## 2017-08-14 PROCEDURE — 4010F ACE/ARB THERAPY RXD/TAKEN: CPT | Mod: S$GLB,,,

## 2017-08-14 RX ORDER — PRENATAL VIT 91/IRON/FOLIC/DHA 28-975-200
COMBINATION PACKAGE (EA) ORAL 2 TIMES DAILY
Qty: 42 G | Refills: 11 | Status: SHIPPED | OUTPATIENT
Start: 2017-08-14 | End: 2018-01-08 | Stop reason: ALTCHOICE

## 2017-08-14 NOTE — PROGRESS NOTES
Subjective:       Patient ID: Amena Anderson is a 51 y.o. female.    Chief Complaint: Diabetes Mellitus (PCP:  Dr Miller  5/22/17; HgbA1c: 7/10/17  5.8); Nail Problem (Discoloration); and Foot Pain (Improved some)    HPI  Amena is a 51 y.o. female who presents to the clinic for evaluation and treatment of high risk feet. Amena has a past medical history of BMI 50.0-59.9, adult (1/22/2017); Diabetes mellitus, type 2; Hypertension; and Hypothyroidism. The patient's chief complaint is long, thick toenails, posterior heel pain secondary to achilles tendinitis, tinea pedis. This patient has documented high risk feet requiring routine maintenance secondary to diabetes mellitis and those secondary complications of diabetes, as mentioned..  She does continued improvement in her achilles pain, is reporting that she does have some build up of callus on her heels.  She wears Spenco inserts and apex with opening in the back.  Also reports improvement in her toenails since using the cream.      PCP: Jose Miller Jr, MD            Hemoglobin A1C   Date Value Ref Range Status   07/10/2017 5.8 (H) 4.0 - 5.6 % Final     Comment:     According to ADA guidelines, hemoglobin A1c <7.0% represents  optimal control in non-pregnant diabetic patients. Different  metrics may apply to specific patient populations.   Standards of Medical Care in Diabetes-2016.  For the purpose of screening for the presence of diabetes:  <5.7%     Consistent with the absence of diabetes  5.7-6.4%  Consistent with increasing risk for diabetes   (prediabetes)  >or=6.5%  Consistent with diabetes  Currently, no consensus exists for use of hemoglobin A1c  for diagnosis of diabetes for children.  This Hemoglobin A1c assay has significant interference with fetal   hemoglobin   (HbF). The results are invalid for patients with abnormal amounts of   HbF,   including those with known Hereditary Persistence   of Fetal Hemoglobin. Heterozygous hemoglobin variants (HbAS,  HbAC,   HbAD, HbAE, HbA2) do not significantly interfere with this assay;   however, presence of multiple variants in a sample may impact the %   interference.     01/19/2017 6.1 4.5 - 6.2 % Final     Comment:     According to ADA guidelines, hemoglobin A1C <7.0% represents  optimal control in non-pregnant diabetic patients.  Different  metrics may apply to specific populations.   Standards of Medical Care in Diabetes - 2016.  For the purpose of screening for the presence of diabetes:  <5.7%     Consistent with the absence of diabetes  5.7-6.4%  Consistent with increasing risk for diabetes   (prediabetes)  >or=6.5%  Consistent with diabetes  Currently no consensus exists for use of hemoglobin A1C  for diagnosis of diabetes for children.       Review of Systems  ROS:  Constitution: Negative for chills, fever, weakness and malaise/fatigue.   HEENT: Negative for headaches.   Cardiovascular: Negative for chest pain and claudication.   Respiratory: Negative for cough and shortness of breath.   Musculoskeletal: Positive for foot pain.  Negative for muscle cramps and muscle weakness.   Gastrointestinal: Negative for nausea and vomiting.   Neurological: Positive for numbness and paresthesias.   Dermatological: - for wound.        Objective:      Physical Exam  Constitutional:   Patient is oriented to person, place, and time. Vital signs are normal. Appears well-developed and well-nourished.     Vascular:   Dorsalis pedis pulses are 1+ on the right side, and 1+ on the left side.   Posterior tibial pulses are 2+ on the right side, and 2+ on the left side.   - digital hair growth, capillary fill time to all toes <3 seconds, toes are cool to touch  + swelling feet and ankles    Skin/Dermatological:   Skin is thin, warm, shiny and atrophic. No cyanosis or clubbing. No rashes noted. No open wounds.   All ten toenails yellow discolored, thickened 3 mm, elongated 3 mm with subungual debris and tenderness but improved  Mild heel  keratosis and erythemetous patches.  Webspaces no maceration.    Musculoskeletal:   Mild bunions, hammertoes and bunionettes observed.  Decreased range of motion of bilateral midtarsal, subtalar joints, ankle joint dorsiflexion is restricted bilaterally. Muscle strength to tibialis anterior, extensor hallucis longus, extensor digitorum longus, peroneal muscles, flexor hallucis/digotorum longus, posterior tibial and gastrosoleal complex is 5/5.  Severe thickening of b/l achilles with mild tenderness at insertion b/l    Neurological:   Positive deficits to sharp/dull, light touch or vibratory sensation bilateral feet       X-ray weightbearing bilateral feet show severe heel spurs      Assessment:       1. Type 2 diabetes mellitus with diabetic polyneuropathy, without long-term current use of insulin    2. BMI 50.0-59.9, adult    3. Achilles tendinitis of both lower extremities    4. Heel spur, unspecified laterality    5. Tinea pedis of both feet    6. Hammer toes of both feet    7. Hallux abducto valgus, unspecified laterality    8. Keratosis        Plan:       Type 2 diabetes mellitus with diabetic polyneuropathy, without long-term current use of insulin    BMI 50.0-59.9, adult    Achilles tendinitis of both lower extremities    Heel spur, unspecified laterality    Tinea pedis of both feet  -     terbinafine HCl (LAMISIL) 1 % cream; Apply topically 2 (two) times daily.  Dispense: 42 g; Refill: 11  -     lactic acid 10 % Lotn; Apply 1 application topically 2 (two) times daily.  Dispense: 473.12 mL; Refill: 11    Hammer toes of both feet    Hallux abducto valgus, unspecified laterality    Keratosis        Shoe inspection. Diabetic Foot Education. Patient reminded of the importance of good nutrition and blood sugar control to help prevent podiatric complications of diabetes. Patient instructed on proper foot hygeine. We discussed wearing proper shoe gear, daily foot inspections, never walking without protective shoe  gear, never putting sharp instruments to feet.  We also discussed padding and shoes with high toe boxes for  foot deformities.  Weight loss.    Creams as above for the tinea pedis.    Continue spenco orthotics with heel lifts, shoes.  She will return in 3 months.

## 2017-08-21 ENCOUNTER — ANESTHESIA (OUTPATIENT)
Dept: ENDOSCOPY | Facility: HOSPITAL | Age: 52
End: 2017-08-21
Payer: MEDICARE

## 2017-08-21 ENCOUNTER — SURGERY (OUTPATIENT)
Age: 52
End: 2017-08-21

## 2017-08-21 ENCOUNTER — HOSPITAL ENCOUNTER (OUTPATIENT)
Facility: HOSPITAL | Age: 52
Discharge: HOME | End: 2017-08-21
Attending: INTERNAL MEDICINE | Admitting: INTERNAL MEDICINE
Payer: MEDICARE

## 2017-08-21 ENCOUNTER — ANESTHESIA EVENT (OUTPATIENT)
Dept: ENDOSCOPY | Facility: HOSPITAL | Age: 52
End: 2017-08-21
Payer: MEDICARE

## 2017-08-21 VITALS — RESPIRATION RATE: 13 BRPM

## 2017-08-21 DIAGNOSIS — Z86.010 HX OF COLONIC POLYPS: ICD-10-CM

## 2017-08-21 PROBLEM — Z86.0100 HX OF COLONIC POLYPS: Status: ACTIVE | Noted: 2017-08-21

## 2017-08-21 LAB — GLUCOSE SERPL-MCNC: 106 MG/DL (ref 70–110)

## 2017-08-21 PROCEDURE — 37000009 HC ANESTHESIA EA ADD 15 MINS: Mod: PO | Performed by: INTERNAL MEDICINE

## 2017-08-21 PROCEDURE — G0105 COLORECTAL SCRN; HI RISK IND: HCPCS | Mod: PO | Performed by: INTERNAL MEDICINE

## 2017-08-21 PROCEDURE — D9220A PRA ANESTHESIA: Mod: ANES,,, | Performed by: ANESTHESIOLOGY

## 2017-08-21 PROCEDURE — 63600175 PHARM REV CODE 636 W HCPCS: Mod: PO | Performed by: NURSE ANESTHETIST, CERTIFIED REGISTERED

## 2017-08-21 PROCEDURE — 37000008 HC ANESTHESIA 1ST 15 MINUTES: Mod: PO | Performed by: INTERNAL MEDICINE

## 2017-08-21 PROCEDURE — D9220A PRA ANESTHESIA: Mod: CRNA,,, | Performed by: NURSE ANESTHETIST, CERTIFIED REGISTERED

## 2017-08-21 PROCEDURE — 25000003 PHARM REV CODE 250: Mod: PO | Performed by: INTERNAL MEDICINE

## 2017-08-21 PROCEDURE — G0121 COLON CA SCRN NOT HI RSK IND: HCPCS | Mod: ,,, | Performed by: INTERNAL MEDICINE

## 2017-08-21 PROCEDURE — G0121 COLON CA SCRN NOT HI RSK IND: HCPCS | Mod: PO | Performed by: INTERNAL MEDICINE

## 2017-08-21 RX ORDER — LIDOCAINE HCL/PF 100 MG/5ML
SYRINGE (ML) INTRAVENOUS
Status: DISCONTINUED | OUTPATIENT
Start: 2017-08-21 | End: 2017-08-21

## 2017-08-21 RX ORDER — SODIUM CHLORIDE, SODIUM LACTATE, POTASSIUM CHLORIDE, CALCIUM CHLORIDE 600; 310; 30; 20 MG/100ML; MG/100ML; MG/100ML; MG/100ML
INJECTION, SOLUTION INTRAVENOUS CONTINUOUS
Status: DISCONTINUED | OUTPATIENT
Start: 2017-08-21 | End: 2017-08-21 | Stop reason: HOSPADM

## 2017-08-21 RX ORDER — PROPOFOL 10 MG/ML
VIAL (ML) INTRAVENOUS
Status: DISCONTINUED | OUTPATIENT
Start: 2017-08-21 | End: 2017-08-21

## 2017-08-21 RX ORDER — LIDOCAINE HYDROCHLORIDE 10 MG/ML
1 INJECTION, SOLUTION EPIDURAL; INFILTRATION; INTRACAUDAL; PERINEURAL ONCE
Status: COMPLETED | OUTPATIENT
Start: 2017-08-21 | End: 2017-08-21

## 2017-08-21 RX ADMIN — PROPOFOL 30 MG: 10 INJECTION, EMULSION INTRAVENOUS at 09:08

## 2017-08-21 RX ADMIN — LIDOCAINE HYDROCHLORIDE 100 MG: 20 INJECTION, SOLUTION INTRAVENOUS at 09:08

## 2017-08-21 RX ADMIN — PROPOFOL 50 MG: 10 INJECTION, EMULSION INTRAVENOUS at 09:08

## 2017-08-21 RX ADMIN — LIDOCAINE HYDROCHLORIDE 1 MG: 10 INJECTION, SOLUTION EPIDURAL; INFILTRATION; INTRACAUDAL; PERINEURAL at 09:08

## 2017-08-21 RX ADMIN — SODIUM CHLORIDE, SODIUM LACTATE, POTASSIUM CHLORIDE, AND CALCIUM CHLORIDE: .6; .31; .03; .02 INJECTION, SOLUTION INTRAVENOUS at 09:08

## 2017-08-21 RX ADMIN — PROPOFOL 100 MG: 10 INJECTION, EMULSION INTRAVENOUS at 09:08

## 2017-08-21 NOTE — H&P
History & Physical - Short Stay  Gastroenterology      SUBJECTIVE:     Procedure: Colonoscopy    Chief Complaint/Indication for Procedure: Surveillance, Hx of colon polyps    History of Present Illness:  Asymptomatic    Jose Miller Jr., MD   to Amena Anderson      6/22/17 8:06 PM   Amena,   Since you have had polyps in the past, I will submit for a diagnostic colonoscopy.   RGM    Last read by Amena Anderson at 1:46 PM on 6/26/2017.            6/22/17 10:01 AM   Note      Patient requesting a colonoscopy. Colonoscopy pending. Please advise       Amena Anderson   to Jose Miller Jr., MD           6/22/17 9:54 AM   I haven't had a colonoscopy in years and do not remember the ordering doctor.  I don't remember if I had any polyps since it has been so many years.  Please order test since I have medical history of this in the family.         PTA Medications   Medication Sig    alprazolam (XANAX) 1 MG tablet TAKE 1 TABLET BY MOUTH TWICE DAILY    amitriptyline (ELAVIL) 25 MG tablet TAKE 1 TABLET BY MOUTH EVERY EVENING.    amlodipine (NORVASC) 5 MG tablet Take 1 tablet (5 mg total) by mouth once daily.    aspirin (ECOTRIN) 81 MG EC tablet Take 81 mg by mouth once daily.    clotrimazole (LOTRIMIN) 1 % Soln Apply topically 2 (two) times daily.    estradiol (ESTRACE) 1 MG tablet TAKE 1 TABLET(1 MG) BY MOUTH EVERY DAY    furosemide (LASIX) 40 MG tablet Take 40 mg by mouth once daily.    gabapentin (NEURONTIN) 400 MG capsule Take 400 mg by mouth 3 (three) times daily.    hydrocodone-acetaminophen 10-325mg (NORCO)  mg Tab Take 10 tablets by mouth 3 (three) times daily.    lactic acid 10 % Lotn Apply 1 application topically 2 (two) times daily.    levothyroxine (SYNTHROID) 200 MCG tablet Take 200 mcg by mouth once daily.    lisinopril (PRINIVIL,ZESTRIL) 20 MG tablet Take 1 tablet (20 mg total) by mouth once daily.    metformin (GLUCOPHAGE) 1000 MG tablet Take 1 tablet (1,000 mg total) by mouth every  "evening.    naproxen (EC NAPROSYN) 500 MG EC tablet Take 500 mg by mouth 2 (two) times daily.    nystatin (MYCOSTATIN) powder Apply to affected area 3 times daily    omeprazole (PRILOSEC) 20 MG capsule TAKE 1 CAPSULE BY MOUTH TWICE DAILY    ranitidine (ZANTAC) 150 MG tablet Take 1 tablet (150 mg total) by mouth 2 (two) times daily.    terbinafine HCl (LAMISIL) 1 % cream Apply topically 2 (two) times daily.    nitroGLYCERIN (NITROSTAT) 0.4 MG SL tablet Place 0.4 mg under the tongue every 5 (five) minutes as needed for Chest pain.    potassium chloride (KLOR-CON) 20 mEq Pack Take 20 mEq by mouth 2 (two) times daily.       Review of patient's allergies indicates:  No Known Allergies     Past Medical History:   Diagnosis Date    Back pain     BMI 50.0-59.9, adult 2017    Diabetes mellitus, type 2     Hypertension     Hypothyroidism     Mitral valve prolapse     per pt    MANUEL on CPAP      Past Surgical History:   Procedure Laterality Date     SECTION      Three times    HYSTERECTOMY      total    LASIK      LUMBAR EPIDURAL INJECTION      THYROID SURGERY       History reviewed. No pertinent family history.  Social History   Substance Use Topics    Smoking status: Never Smoker    Smokeless tobacco: Never Used    Alcohol use No         OBJECTIVE:     Vital Signs (Most Recent)  Temp: 98.1 °F (36.7 °C) (17 0859)  Pulse: 86 (17 0859)  Resp: 18 (17 0859)  BP: (!) 166/87 (17 0859)  SpO2: 97 % (17 0859)    Physical Exam:   : Ht 5' 5" (1.651 m)    Wt 142.7 kg (314 lb 9.5 oz)    BMI 52.35 kg/m²                      GENERAL:  Comfortable, in no acute distress.                                 HEENT EXAM:  Nonicteric.  No adenopathy.  Oropharynx is clear.               NECK:  Supple.                                                               LUNGS:  Clear.                                                               CARDIAC:  Regular rate and rhythm.  S1, S2.  No " murmur.                      ABDOMEN:  OBESE.  Soft, positive bowel sounds, nontender.  No hepatosplenomegaly or masses.  No rebound or guarding.                   EXTREMITIES:  No edema.     MENTAL STATUS:  Alert and oriented.    ASSESSMENT/PLAN:     Assessment: Surveillance, Hx of colon polyps    Plan: Colonoscopy    Anesthesia Plan:   MAC / General Anaesthesia    ASA Grade: ASA 2 - Patient with mild systemic disease with no functional limitations    MALLAMPATI SCORE: II (hard and soft palate, upper portion of tonsils anduvula visible)

## 2017-08-21 NOTE — DISCHARGE INSTRUCTIONS
Procedural Sedation (Adult)  You have been given medicine by vein to make you sleep during your surgery. This may have included both a pain medicine and sleeping medicine. Most of the effects have worn off. But you may still have some drowsiness for the next 6 to 8 hours.  Home care  Follow these guidelines when you get home:  · For the next 8 hours, you should be watched by a responsible adult. This person should make sure your condition is not getting worse.  · Don't take any medicine by mouth for pain or for sleep during the next 4 hours. These might react with the medicines you were given in the hospital. This could cause a much stronger response than usual.  · Don't drink any alcohol for the next 24 hours.  · Don't drive, operate dangerous machinery, or make important business or personal decisions during the next 24 hours.  Follow-up care  Follow up with your healthcare provider if you are not alert and back to your usual level of activity within 12 hours.  When to seek medical advice  Call your healthcare provider right away if any of these occur:  · Drowsiness gets worse  · Weakness or dizziness gets worse  · Repeated vomiting  · You cannot be awakened   Date Last Reviewed: 10/18/2016  © 2149-9804 [employer]. [address] [place] [address]. All rights reserved. This information is not intended as a substitute for professional medical care. Always follow your healthcare professional's instructions.        High-Fiber Diet  Fiber is in fruits, vegetables, cereals, and grains. Fiber passes through your body undigested. A high-fiber diet helps food move through your intestinal tract. The added bulk is helpful in preventing constipation. In people with diverticulosis, fiber helps clean out the pouches along the colon wall. It also prevents new pouches from forming. A high-fiber diet reduces the risk of colon cancer. It also lowers blood cholesterol and prevents high blood sugar in  people with diabetes.    The fiber-rich foods listed below should be part of your diet. If you are not used to high-fiber foods, start with 1 or 2 foods from this list. Every 3 to 4 days add a new one to your diet. Do this until you are eating 4 high-fiber foods per day. This should give you 20 to 35 grams of fiber a day. It is also important to drink a lot of water when you are on this diet. You should have 6 to 8 glasses of water a day. Water makes the fiber swell and increases the benefit.  Foods high in dietary fiber  The following foods are high in dietary fiber:  · Breads. Breads made with 100% whole-wheat flour; arnoldo, wheat, or rye crackers; whole-grain tortillas, bran muffins.  · Cereals. Whole-grain and bran cereals with bran (shredded wheat, wheat flakes, raisin bran, corn bran); oatmeal, rolled oats, granola, and brown rice.  · Fruits. Fresh fruits and their edible skins (pears, prunes, raisins, berries, apples, and apricots); bananas, citrus fruit, mangoes, pineapple; and prune juice.  · Nuts. Any nuts and seeds.  · Vegetables. Best served raw or lightly cooked. All types, especially: green peas, celery, eggplant, potatoes, spinach, broccoli, [place] sprouts, winter squash, carrots, cauliflower, soybeans, lentils, and fresh and dried beans of all kinds.  · Other. Popcorn, any spices.  Date Last Reviewed: 8/1/2016  © 1533-3282 [employer]. [address] [place] [address], [place] [address]. All rights reserved. This information is not intended as a substitute for professional medical care. Always follow your healthcare professional's instructions.

## 2017-08-21 NOTE — TRANSFER OF CARE
"Anesthesia Transfer of Care Note    Patient: Amena Anderson    Procedure(s) Performed: Procedure(s) (LRB):  COLONOSCOPY (N/A)    Patient location: PACU    Anesthesia Type: general    Transport from OR: Transported from OR on room air with adequate spontaneous ventilation    Post pain: adequate analgesia    Post assessment: no apparent anesthetic complications and tolerated procedure well    Post vital signs: stable    Level of consciousness: awake and sedated    Nausea/Vomiting: no nausea/vomiting    Complications: none    Transfer of care protocol was followed      Last vitals:   Visit Vitals  BP (!) 166/87 (BP Location: Right arm, Patient Position: Lying)   Pulse 86   Temp 36.7 °C (98.1 °F) (Skin)   Resp 18   Ht 5' 6" (1.676 m)   Wt 134.3 kg (296 lb)   SpO2 97%   Breastfeeding? No   BMI 47.78 kg/m²     "

## 2017-08-21 NOTE — BRIEF OP NOTE
Discharge Note  Short Stay      SUMMARY     Admit Date: 8/21/2017    Attending Physician: Randy Benitez Jr., MD     Discharge Physician: Randy Benitez Jr., MD    Discharge Date: 8/21/2017 9:44 AM    Final Diagnosis: History of colon polyps [Z86.010]    Normal colon and TI.    Disposition: HOME OR SELF CARE    Patient Instructions:   Current Discharge Medication List      CONTINUE these medications which have NOT CHANGED    Details   alprazolam (XANAX) 1 MG tablet TAKE 1 TABLET BY MOUTH TWICE DAILY  Qty: 60 tablet, Refills: 2    Associated Diagnoses: Anxiety      amitriptyline (ELAVIL) 25 MG tablet TAKE 1 TABLET BY MOUTH EVERY EVENING.  Qty: 90 tablet, Refills: 0      amlodipine (NORVASC) 5 MG tablet Take 1 tablet (5 mg total) by mouth once daily.  Qty: 90 tablet, Refills: 3      aspirin (ECOTRIN) 81 MG EC tablet Take 81 mg by mouth once daily.      clotrimazole (LOTRIMIN) 1 % Soln Apply topically 2 (two) times daily.  Qty: 30 mL, Refills: 11      estradiol (ESTRACE) 1 MG tablet TAKE 1 TABLET(1 MG) BY MOUTH EVERY DAY  Qty: 90 tablet, Refills: 0    Comments: **Patient requests 90 days supply**      furosemide (LASIX) 40 MG tablet Take 40 mg by mouth once daily.      gabapentin (NEURONTIN) 400 MG capsule Take 400 mg by mouth 3 (three) times daily.      hydrocodone-acetaminophen 10-325mg (NORCO)  mg Tab Take 10 tablets by mouth 3 (three) times daily.      lactic acid 10 % Lotn Apply 1 application topically 2 (two) times daily.  Qty: 473.12 mL, Refills: 11    Associated Diagnoses: Tinea pedis of both feet      levothyroxine (SYNTHROID) 200 MCG tablet Take 200 mcg by mouth once daily.      lisinopril (PRINIVIL,ZESTRIL) 20 MG tablet Take 1 tablet (20 mg total) by mouth once daily.  Qty: 90 tablet, Refills: 3      metformin (GLUCOPHAGE) 1000 MG tablet Take 1 tablet (1,000 mg total) by mouth every evening.  Qty: 90 tablet, Refills: 1      naproxen (EC NAPROSYN) 500 MG EC tablet Take 500 mg by mouth 2 (two) times  daily.      nystatin (MYCOSTATIN) powder Apply to affected area 3 times daily  Qty: 60 g, Refills: 3      omeprazole (PRILOSEC) 20 MG capsule TAKE 1 CAPSULE BY MOUTH TWICE DAILY  Qty: 180 capsule, Refills: 3    Comments: **Patient requests 90 days supply**  Associated Diagnoses: Gastroesophageal reflux disease without esophagitis      ranitidine (ZANTAC) 150 MG tablet Take 1 tablet (150 mg total) by mouth 2 (two) times daily.  Qty: 60 tablet, Refills: 5      terbinafine HCl (LAMISIL) 1 % cream Apply topically 2 (two) times daily.  Qty: 42 g, Refills: 11    Associated Diagnoses: Tinea pedis of both feet      nitroGLYCERIN (NITROSTAT) 0.4 MG SL tablet Place 0.4 mg under the tongue every 5 (five) minutes as needed for Chest pain.      potassium chloride (KLOR-CON) 20 mEq Pack Take 20 mEq by mouth 2 (two) times daily.             Discharge Procedure Orders (must include Diet, Follow-up, Activity)    Follow Up:  Follow up with PCP as per your routine.  Please follow a high fiber diet.  Activity as tolerated.    No driving day of procedure.

## 2017-08-21 NOTE — ANESTHESIA PREPROCEDURE EVALUATION
08/21/2017  Amena Anderson is a 51 y.o., female.    Anesthesia Evaluation    I have reviewed the Patient Summary Reports.    I have reviewed the Nursing Notes.   I have reviewed the Medications.     Review of Systems  Anesthesia Hx:  Denies Family Hx of Anesthesia complications.   Denies Personal Hx of Anesthesia complications.   Social:  Non-Smoker    Cardiovascular:   Hypertension    Pulmonary:   Sleep Apnea, CPAP    Hepatic/GI:   Bowel Prep. GERD    Neurological:   Denies Neuromuscular Disease.   Peripheral Neuropathy    Endocrine:   Diabetes, type 2 Hypothyroidism        Physical Exam  General:  Malnutrition, Morbid Obesity    Airway/Jaw/Neck:  Airway Findings: Mouth Opening: Normal Tongue: Large  General Airway Assessment: Adult, Possible difficult intubation  Mallampati: IV  Improves to III with phonation.  TM Distance: Normal, at least 6 cm  Jaw/Neck Findings:  Neck ROM: Extension Decreased, Mild  Neck Findings:  Girth Increased      Dental:  Dental Findings: In tact   Chest/Lungs:  Chest/Lungs Clear    Heart/Vascular:  Heart Findings: Normal       Mental Status:  Mental Status Findings:  Cooperative, Alert and Oriented, Anxious         Anesthesia Plan  Type of Anesthesia, risks & benefits discussed:  Anesthesia Type:  general  Patient's Preference:   Intra-op Monitoring Plan: standard ASA monitors  Intra-op Monitoring Plan Comments:   Post Op Pain Control Plan:   Post Op Pain Control Plan Comments:   Induction:   IV  Beta Blocker:  Patient is not currently on a Beta-Blocker (No further documentation required).       Informed Consent: Patient understands risks and agrees with Anesthesia plan.  Questions answered. Anesthesia consent signed with patient.  ASA Score: 3     Day of Surgery Review of History & Physical:    H&P update referred to the provider.         Ready For Surgery From Anesthesia  Perspective.

## 2017-08-21 NOTE — ANESTHESIA POSTPROCEDURE EVALUATION
"Anesthesia Post Evaluation    Patient: Amena Anderson    Procedure(s) Performed: Procedure(s) (LRB):  COLONOSCOPY (N/A)    Final Anesthesia Type: general  Patient location during evaluation: PACU  Patient participation: Yes- Able to Participate  Level of consciousness: awake and alert  Post-procedure vital signs: reviewed and stable  Pain management: adequate  Airway patency: patent  PONV status at discharge: No PONV  Anesthetic complications: no      Cardiovascular status: blood pressure returned to baseline  Respiratory status: unassisted  Hydration status: euvolemic  Follow-up not needed.        Visit Vitals  BP (!) 144/76 (BP Location: Left arm, Patient Position: Lying)   Pulse 80   Temp 36.6 °C (97.8 °F) (Skin)   Resp 16   Ht 5' 6" (1.676 m)   Wt 134.3 kg (296 lb)   SpO2 96%   Breastfeeding? No   BMI 47.78 kg/m²       Pain/Marciano Score: Pain Assessment Performed: Yes (8/21/2017  9:38 AM)  Presence of Pain: denies (8/21/2017  9:38 AM)  Marciano Score: 9 (8/21/2017  9:38 AM)      "

## 2017-08-22 VITALS
OXYGEN SATURATION: 98 % | WEIGHT: 293 LBS | SYSTOLIC BLOOD PRESSURE: 164 MMHG | DIASTOLIC BLOOD PRESSURE: 89 MMHG | BODY MASS INDEX: 47.09 KG/M2 | HEART RATE: 76 BPM | TEMPERATURE: 98 F | RESPIRATION RATE: 16 BRPM | HEIGHT: 66 IN

## 2017-09-01 RX ORDER — NYSTATIN 100000 [USP'U]/G
POWDER TOPICAL
Qty: 60 G | Refills: 0 | Status: SHIPPED | OUTPATIENT
Start: 2017-09-01 | End: 2018-02-06 | Stop reason: SDUPTHER

## 2017-09-08 ENCOUNTER — OFFICE VISIT (OUTPATIENT)
Dept: URGENT CARE | Facility: CLINIC | Age: 52
End: 2017-09-08
Payer: MEDICARE

## 2017-09-08 VITALS
TEMPERATURE: 98 F | WEIGHT: 293 LBS | BODY MASS INDEX: 47.09 KG/M2 | RESPIRATION RATE: 16 BRPM | DIASTOLIC BLOOD PRESSURE: 85 MMHG | HEART RATE: 79 BPM | SYSTOLIC BLOOD PRESSURE: 162 MMHG | HEIGHT: 66 IN | OXYGEN SATURATION: 96 %

## 2017-09-08 DIAGNOSIS — R52 PAIN: Primary | ICD-10-CM

## 2017-09-08 DIAGNOSIS — G89.29 CHRONIC PAIN OF LEFT ANKLE: ICD-10-CM

## 2017-09-08 DIAGNOSIS — M25.572 CHRONIC PAIN OF LEFT ANKLE: ICD-10-CM

## 2017-09-08 PROCEDURE — 3077F SYST BP >= 140 MM HG: CPT | Mod: S$GLB,,, | Performed by: FAMILY MEDICINE

## 2017-09-08 PROCEDURE — 3079F DIAST BP 80-89 MM HG: CPT | Mod: S$GLB,,, | Performed by: FAMILY MEDICINE

## 2017-09-08 PROCEDURE — 3008F BODY MASS INDEX DOCD: CPT | Mod: S$GLB,,, | Performed by: FAMILY MEDICINE

## 2017-09-08 PROCEDURE — 99214 OFFICE O/P EST MOD 30 MIN: CPT | Mod: S$GLB,,, | Performed by: FAMILY MEDICINE

## 2017-09-08 RX ORDER — DIAZEPAM 10 MG/1
TABLET ORAL
Refills: 0 | COMMUNITY
Start: 2017-06-14 | End: 2017-10-08 | Stop reason: ALTCHOICE

## 2017-09-08 RX ORDER — POLYETHYLENE GLYCOL 3350, SODIUM SULFATE, SODIUM CHLORIDE, POTASSIUM CHLORIDE, ASCORBIC ACID, SODIUM ASCORBATE 7.5-2.691G
KIT ORAL
Refills: 0 | Status: ON HOLD | COMMUNITY
Start: 2017-08-01 | End: 2017-12-14 | Stop reason: CLARIF

## 2017-09-09 NOTE — PROGRESS NOTES
"Subjective:       Patient ID: Amena Anderson is a 51 y.o. female.    Vitals:  height is 5' 6" (1.676 m) and weight is 134.3 kg (296 lb). Her temperature is 98.3 °F (36.8 °C). Her blood pressure is 162/85 (abnormal) and her pulse is 79. Her respiration is 16 and oxygen saturation is 96%.     Chief Complaint: Ankle Pain    Patient left ankle started hurting when she stood from a seated position. Did not twist ankle of foot but when stood, she fell back to the sofa. Has ambulated with a cane on the foot but favors the ankle with her right leg. Left foot pain. Does have h/o bony spurs on foot. Narcotic pain medication didn't help.      Ankle Pain    The incident occurred 1 to 3 hours ago. The incident occurred at home. The injury mechanism was a fall. The pain is present in the left ankle. The pain is at a severity of 10/10. Associated symptoms include an inability to bear weight and a loss of motion. She reports no foreign bodies present. The symptoms are aggravated by movement and weight bearing.     Review of Systems   Constitution: Negative for chills and fever.   HENT: Negative for sore throat.    Eyes: Negative for blurred vision.   Cardiovascular: Negative for chest pain.   Respiratory: Negative for shortness of breath.    Skin: Negative for rash.   Musculoskeletal: Positive for falls, joint pain and joint swelling. Negative for back pain.   Gastrointestinal: Negative for abdominal pain, diarrhea, nausea and vomiting.   Neurological: Negative for headaches.   Psychiatric/Behavioral: The patient is not nervous/anxious.        Objective:      Physical Exam   Constitutional: She is oriented to person, place, and time. She appears well-developed and well-nourished. She is cooperative.  Non-toxic appearance. She does not appear ill. No distress.   HENT:   Head: Normocephalic and atraumatic.   Right Ear: Hearing, tympanic membrane, external ear and ear canal normal.   Left Ear: Hearing, tympanic membrane, external ear " and ear canal normal.   Nose: Nose normal. No mucosal edema, rhinorrhea or nasal deformity. No epistaxis. Right sinus exhibits no maxillary sinus tenderness and no frontal sinus tenderness. Left sinus exhibits no maxillary sinus tenderness and no frontal sinus tenderness.   Mouth/Throat: Uvula is midline, oropharynx is clear and moist and mucous membranes are normal. No trismus in the jaw. Normal dentition. No uvula swelling. No posterior oropharyngeal erythema.   Eyes: Conjunctivae and lids are normal. Right eye exhibits no discharge. Left eye exhibits no discharge. No scleral icterus.   Sclera clear bilat   Neck: Trachea normal, normal range of motion, full passive range of motion without pain and phonation normal. Neck supple.   Cardiovascular: Normal rate, intact distal pulses and normal pulses.    Pulmonary/Chest: No respiratory distress.   Abdominal: Soft. Normal appearance and bowel sounds are normal. She exhibits no distension, no pulsatile midline mass and no mass. There is no tenderness.   Musculoskeletal: She exhibits edema (equal bilateral ankles). She exhibits no deformity.        Left foot: There is tenderness and bony tenderness.        Feet:    Pt is limping and using walking cane   Neurological: She is alert and oriented to person, place, and time. She exhibits normal muscle tone. Coordination normal.   Skin: Skin is warm, dry and intact. She is not diaphoretic. No pallor.   Psychiatric: She has a normal mood and affect. Her speech is normal and behavior is normal. Judgment and thought content normal. Cognition and memory are normal.   Nursing note and vitals reviewed.    Discussed xray findings with patient. Patient v/u.    Assessment:       1. Pain    2. Chronic pain of left ankle        Plan:         Pain    Chronic pain of left ankle  -     X-Ray Ankle Complete Left; Future; Expected date: 09/08/2017  -     X-Ray Foot 2 View Left; Future; Expected date: 09/08/2017  -     HME - OTHER        pt  has relief with boot in place

## 2017-09-26 ENCOUNTER — OFFICE VISIT (OUTPATIENT)
Dept: PODIATRY | Facility: CLINIC | Age: 52
End: 2017-09-26
Payer: MEDICARE

## 2017-09-26 VITALS — WEIGHT: 293 LBS | HEIGHT: 66 IN | BODY MASS INDEX: 47.09 KG/M2

## 2017-09-26 DIAGNOSIS — S96.912A ANKLE STRAIN, LEFT, INITIAL ENCOUNTER: Primary | ICD-10-CM

## 2017-09-26 DIAGNOSIS — M25.472 PAIN AND SWELLING OF LEFT ANKLE: ICD-10-CM

## 2017-09-26 DIAGNOSIS — M24.573 EQUINUS CONTRACTURE OF ANKLE: ICD-10-CM

## 2017-09-26 DIAGNOSIS — M25.572 PAIN AND SWELLING OF LEFT ANKLE: ICD-10-CM

## 2017-09-26 PROCEDURE — 3008F BODY MASS INDEX DOCD: CPT | Mod: S$GLB,,, | Performed by: PODIATRIST

## 2017-09-26 PROCEDURE — 99213 OFFICE O/P EST LOW 20 MIN: CPT | Mod: S$GLB,,, | Performed by: PODIATRIST

## 2017-09-26 PROCEDURE — 99999 PR PBB SHADOW E&M-EST. PATIENT-LVL III: CPT | Mod: PBBFAC,,, | Performed by: PODIATRIST

## 2017-09-26 RX ORDER — DICLOFENAC SODIUM 10 MG/G
2 GEL TOPICAL 4 TIMES DAILY
Qty: 100 G | Refills: 2 | Status: SHIPPED | OUTPATIENT
Start: 2017-09-26 | End: 2023-01-24 | Stop reason: SDUPTHER

## 2017-09-26 NOTE — PROGRESS NOTES
Subjective:      Patient ID: Amena Anderson is a 51 y.o. female.    Chief Complaint: Foot Pain (Left )    Sharp pain lateral heel/ankle left.  [facility] with [facility]  Minimal improvement past few weeks, aggravated by increased weight bearing, shoe gear, pressure.  Boot and quad cane help some. No self care.      Review of Systems   Constitution: Negative for chills, diaphoresis, fever, malaise/fatigue and night sweats.   Cardiovascular: Negative for claudication, cyanosis, leg swelling and syncope.   Skin: Negative for color change, dry skin, nail changes, rash, suspicious lesions and unusual hair distribution.   Musculoskeletal: Positive for joint pain. Negative for falls, joint swelling, muscle cramps, muscle weakness and stiffness.   Gastrointestinal: Negative for constipation, diarrhea, nausea and vomiting.   Neurological: Negative for brief paralysis, disturbances in coordination, focal weakness, numbness, paresthesias, sensory change and tremors.           Objective:      Physical Exam   Constitutional: She appears well-developed and well-nourished. She is cooperative. No distress.   Cardiovascular:   Pulses:       Popliteal pulses are 2+ on the right side, and 2+ on the left side.        Dorsalis pedis pulses are 2+ on the right side, and 2+ on the left side.        Posterior tibial pulses are 2+ on the right side, and 2+ on the left side.   Capillary refill 3 seconds all toes/distal feet, all toes/both feet warm to touch.      Negative lymphadenopathy bilateral popliteal fossa and tarsal tunnel.      Negavie lower extremity edema bilateral.     Musculoskeletal:        Right ankle: Normal. She exhibits normal range of motion, no swelling, no ecchymosis, no deformity, no laceration and normal pulse. Achilles tendon normal. Achilles tendon exhibits no pain, no defect and normal De La Cruz's test results.   Moderate pain to palpation lateral left ankle at cfl and ptfl without  instability deformity or loss of function.    Ankle dorsiflexion decreased at <10 degrees bilateral with moderate increase with knee flexion bilateral.    Ankle bilateral has negative anterior drawer sign, negative posterior drawer sign, negative external rotation test, negative squeeze test.    Otherwise, Normal angle, base, station of gait. All ten toes without clubbing, cyanosis, or signs of ischemia.  No pain to palpation bilateral lower extremities.  Range of motion, stability, muscle strength, and muscle tone normal bilateral feet and legs.     Lymphadenopathy:   Negative lymphadenopathy bilateral popliteal fossa and tarsal tunnel.   Neurological: She is alert. She has normal strength. She displays no atrophy and no tremor. No sensory deficit. She exhibits normal muscle tone. She displays no seizure activity. Gait normal.   Reflex Scores:       Patellar reflexes are 2+ on the right side and 2+ on the left side.       Achilles reflexes are 2+ on the right side and 2+ on the left side.  Negative tinel sign to percussion sural, superficial peroneal, deep peroneal, saphenous, and posterior tibial nerves right and left ankles and feet.     Skin: Skin is warm, dry and intact. No abrasion, no bruising, no burn, no ecchymosis, no laceration, no lesion and no rash noted. She is not diaphoretic. No cyanosis or erythema. No pallor. Nails show no clubbing.     Skin is normal age and health appropriate color, turgor, texture, and temperature bilateral lower extremities without ulceration, hyperpigmentation, discoloration, masses nodules or cords palpated.  No ecchymosis, erythema, edema, or cardinal signs of infection bilateral lower extremities.               Assessment:       Encounter Diagnoses   Name Primary?    Ankle strain, left, initial encounter Yes    Equinus contracture of ankle     Pain and swelling of left ankle          Plan:       Amena was seen today for foot pain.    Diagnoses and all orders for this  visit:    Ankle strain, left, initial encounter  -     Ambulatory consult to Physical Therapy    Equinus contracture of ankle  -     Ambulatory consult to Physical Therapy    Pain and swelling of left ankle  -     Ambulatory consult to Physical Therapy    Other orders  -     diclofenac sodium 1 % Gel; Apply 2 g topically 4 (four) times daily.      I counseled the patient on her conditions, their implications and medical management.        Patient will stretch the tendo achilles complex three times daily as demonstrated in the office.  Literature was dispensed illustrating proper stretching technique.    Patient will obtain over the counter arch supports and wear them in shoes whenever possible.  Athletic shoes intended for walking or running are usually best.    The patient was advised that NSAID-type medications have two very important potential side effects: gastrointestinal irritation including hemorrhage and renal injuries. She was asked to take the medication with food and to stop if she experiences any GI upset. I asked her to call for vomiting, abdominal pain or black/bloody stools. The patient expresses understanding of these issues and questions were answered.    Discussed conservative treatment with shoes of adequate dimensions, material, and style to alleviate symptoms and delay or prevent surgical intervention.    Rx PT.    Continue fx boot left, quad cane.          Return in about 6 weeks (around 11/7/2017) for ankle strain left.

## 2017-09-27 DIAGNOSIS — E11.9 TYPE 2 DIABETES MELLITUS WITHOUT COMPLICATION, WITHOUT LONG-TERM CURRENT USE OF INSULIN: Primary | ICD-10-CM

## 2017-09-27 RX ORDER — METFORMIN HYDROCHLORIDE 1000 MG/1
TABLET ORAL
Qty: 90 TABLET | Refills: 3 | Status: SHIPPED | OUTPATIENT
Start: 2017-09-27 | End: 2018-09-27 | Stop reason: SDUPTHER

## 2017-09-28 RX ORDER — ESTRADIOL 1 MG/1
TABLET ORAL
Qty: 30 TABLET | Refills: 1 | Status: ON HOLD | OUTPATIENT
Start: 2017-09-28 | End: 2017-12-14 | Stop reason: CLARIF

## 2017-10-07 DIAGNOSIS — F41.9 ANXIETY: ICD-10-CM

## 2017-10-08 RX ORDER — ALPRAZOLAM 1 MG/1
TABLET ORAL
Qty: 60 TABLET | Refills: 0 | Status: SHIPPED | OUTPATIENT
Start: 2017-10-08 | End: 2017-11-30 | Stop reason: SDUPTHER

## 2017-10-10 ENCOUNTER — LAB VISIT (OUTPATIENT)
Dept: LAB | Facility: HOSPITAL | Age: 52
End: 2017-10-10
Attending: INTERNAL MEDICINE
Payer: MEDICARE

## 2017-10-10 DIAGNOSIS — E03.9 HYPOTHYROIDISM, UNSPECIFIED TYPE: Chronic | ICD-10-CM

## 2017-10-10 DIAGNOSIS — I10 ESSENTIAL HYPERTENSION: Chronic | ICD-10-CM

## 2017-10-10 LAB — TSH SERPL DL<=0.005 MIU/L-ACNC: 3.06 UIU/ML

## 2017-10-10 PROCEDURE — 84443 ASSAY THYROID STIM HORMONE: CPT

## 2017-10-10 PROCEDURE — 36415 COLL VENOUS BLD VENIPUNCTURE: CPT | Mod: PO

## 2017-10-18 ENCOUNTER — OFFICE VISIT (OUTPATIENT)
Dept: ENDOCRINOLOGY | Facility: CLINIC | Age: 52
End: 2017-10-18
Payer: MEDICARE

## 2017-10-18 VITALS
HEART RATE: 88 BPM | RESPIRATION RATE: 18 BRPM | SYSTOLIC BLOOD PRESSURE: 130 MMHG | BODY MASS INDEX: 47.09 KG/M2 | DIASTOLIC BLOOD PRESSURE: 88 MMHG | WEIGHT: 293 LBS | HEIGHT: 66 IN

## 2017-10-18 DIAGNOSIS — E03.9 HYPOTHYROIDISM, UNSPECIFIED TYPE: Primary | Chronic | ICD-10-CM

## 2017-10-18 DIAGNOSIS — R49.0 HOARSENESS: ICD-10-CM

## 2017-10-18 DIAGNOSIS — E04.2 MULTINODULAR GOITER: ICD-10-CM

## 2017-10-18 DIAGNOSIS — R13.10 DYSPHAGIA, UNSPECIFIED TYPE: ICD-10-CM

## 2017-10-18 PROCEDURE — 99499 UNLISTED E&M SERVICE: CPT | Mod: S$GLB,,, | Performed by: INTERNAL MEDICINE

## 2017-10-18 PROCEDURE — 99214 OFFICE O/P EST MOD 30 MIN: CPT | Mod: S$GLB,,, | Performed by: INTERNAL MEDICINE

## 2017-10-18 PROCEDURE — 99999 PR PBB SHADOW E&M-EST. PATIENT-LVL III: CPT | Mod: PBBFAC,,, | Performed by: INTERNAL MEDICINE

## 2017-10-18 NOTE — PROGRESS NOTES
Subjective:      Patient ID: Amena Anderson is a 52 y.o. female.    Chief Complaint: MNG    History of Present Illness    Ms.Sheila GAVI Anderson referred to me for hypothyroidism  Comes for f/u today     [facility] thyroidectomy with  for goiter in 2012  She feels he left a piece -    She was following with      She is on synthroid 200 mcg daily     She was told by  she had remnant thyroid     C/o difficulty swallowing and change in voice - reports manly voice and hoarseness          Review of Systems   Constitutional: Negative for unexpected weight change.   HENT: Positive for trouble swallowing (feels on the right side ) and voice change.    Cardiovascular: Negative for palpitations and leg swelling.   Gastrointestinal: Negative for constipation and diarrhea.   Endocrine: Negative for cold intolerance and heat intolerance.   Neurological: Negative for tremors.   Psychiatric/Behavioral: Negative for sleep disturbance.        Sleeps with CPAP          Objective:   Physical Exam   Vitals reviewed.      Lab Review:   Results for orders placed or performed in visit on 10/10/17   TSH   Result Value Ref Range    TSH 3.055 0.400 - 4.000 uIU/mL     The patient is status post subtotal right thyroid lobe resection with a small remnant measuring 1.1 x 0.6 x 0.6 cm.  The left thyroid lobe is quite small and measures 2.5 x 0.9 x 1.2 cm.  The right thyroid lobe and remnant is homogeneous without evidence of focal defect or nodule.  The left thyroid lobe is heterogeneous.  A benign-appearing cystic nodule measuring 5 x 3 x 2 mm is present in the upper pole of the left thyroid lobe.  No solid or suspicious nodules are present in the left thyroid lobe.    The remainder of the soft tissues of the neck are unremarkable and no lymphadenopathy is present.   Impression       1.  Subtotal right thyroid lobe resection with a small thyroid remnant.  2.  Heterogeneous small left thyroid lobe with a benign  appearing cystic nodule in the upper pole.      Electronically signed by: Karlo Bryan  Date: 05/29/17  Time: 14:22          Assessment:       # hypothyroidism , post surgical , TSH at goal   - continue current dose of synthroid     # goiter s/p thyroidectomy   Us thyroid report as above   Get US thyroid in 6 months   Hoarseness and difficulty swallowing - referral to ENT       # T2Dm controlled   Continue metformin       Plan:       Follow up:      TSH and US thyroid in 6 months   F/U in 6 months

## 2017-11-06 ENCOUNTER — OFFICE VISIT (OUTPATIENT)
Dept: OTOLARYNGOLOGY | Facility: CLINIC | Age: 52
End: 2017-11-06
Payer: MEDICARE

## 2017-11-06 VITALS
DIASTOLIC BLOOD PRESSURE: 92 MMHG | WEIGHT: 293 LBS | HEIGHT: 66 IN | SYSTOLIC BLOOD PRESSURE: 154 MMHG | BODY MASS INDEX: 47.09 KG/M2

## 2017-11-06 DIAGNOSIS — R13.14 DYSPHAGIA, PHARYNGOESOPHAGEAL: Primary | ICD-10-CM

## 2017-11-06 PROCEDURE — 99499 UNLISTED E&M SERVICE: CPT | Mod: S$GLB,,, | Performed by: OTOLARYNGOLOGY

## 2017-11-06 PROCEDURE — 99204 OFFICE O/P NEW MOD 45 MIN: CPT | Mod: S$GLB,,, | Performed by: OTOLARYNGOLOGY

## 2017-11-06 PROCEDURE — 99999 PR PBB SHADOW E&M-EST. PATIENT-LVL III: CPT | Mod: PBBFAC,,, | Performed by: OTOLARYNGOLOGY

## 2017-11-06 NOTE — LETTER
November 7, 2017      Ryland Mackey MD  [address]  [facility] [address]           [place] [facility]  [address] [facility] [address] [place] [address]  Phone: [phone]  Fax: [phone]          Patient: Amena Anderson   MR Number: [id number]   YOB: 1965   Date of Visit: 11/6/2017       Dear Dr. Ryland Mackey:    Thank you for referring Amena Anderson to me for evaluation. Attached you will find relevant portions of my assessment and plan of care.    If you have questions, please do not hesitate to call me. I look forward to following Amena Anderson along with you.    Sincerely,    Kenan Carrasquillo MD    Enclosure  CC:  No Recipients    If you would like to receive this communication electronically, please contact [email or web address] or [phone] to request more information on [facility] access.    For providers and/or their staff who would like to refer a patient to [facility], please contact us through our one-stop-shop provider referral line, [facility], at [phone].    If you feel you have received this communication in error or would no longer like to receive these types of communications, please e-mail [email or web address]

## 2017-11-06 NOTE — PROGRESS NOTES
Subjective:       Patient ID: Amena Anderson is a 52 y.o. female.    Chief Complaint: Hoarse and feeling like throat closing    Amena is here for dysphagia. Symptoms have been present for a while but have been getting worse for 2-3 months. She has sticking of food with gagging. She does have acid reflux that is reportedly bad - she takes Omeprazole BID and Zantac as well. She reports coughing and choking with solids and liquids. She does report some hoarseness as well. No PNA. No ear pain. The dysphagia is not with every meal.     Right lobectomy in 2012 for MNG but a small right remnant remained.   Has MVP  Previous surgery: as above    History   Smoking Status    Never Smoker   Smokeless Tobacco    Never Used     History   Alcohol Use No          Review of Systems   Constitutional: Negative for activity change and appetite change.   Eyes: Negative for discharge.   Respiratory: Negative for difficulty breathing and wheezing   Cardiovascular: Negative for chest pain.   Gastrointestinal: Negative for abdominal distention and abdominal pain.   Endocrine: Negative for cold intolerance and heat intolerance.   Genitourinary: Negative for dysuria.   Musculoskeletal: Negative for gait problem and joint swelling.   Skin: Negative for color change and pallor.   Neurological: Negative for syncope and weakness.   Psychiatric/Behavioral: Negative for agitation and confusion.     Objective:        Constitutional:   She is oriented to person, place, and time. She appears well-developed and well-nourished. She appears alert. She is active.   obese Normal speech.  No hoarse voice and breathy voice.      Head:  Normocephalic and atraumatic. Head is without TMJ tenderness. No scars. Salivary glands normal.  Facial strength is normal.      Ears:    Right Ear: No drainage or swelling. No middle ear effusion.   Left Ear: No drainage or swelling.  No middle ear effusion.     Nose:  No mucosal edema, rhinorrhea or sinus tenderness. No  turbinate hypertrophy.      Mouth/Throat  Oropharynx clear and moist without lesions or asymmetry, normal uvula midline and mirror exam normal. Normal dentition. No uvula swelling, lacerations or trismus. No oropharyngeal exudate. Tonsillar erythema, tonsillar exudate.    Strong gag      Neck:  Full range of motion with neck supple and no adenopathy. Thyroid tenderness is present. No tracheal deviation, no edema, no erythema, normal range of motion, no stridor, no crepitus and no neck rigidity present. No thyroid mass present.     Cardiovascular:   Intact distal pulses and normal pulses.      Pulmonary/Chest:   Effort normal and breath sounds normal. No stridor.     Psychiatric:   Her speech is normal. Her mood appears anxious. Her affect is not labile.     Neurological:   She is alert and oriented to person, place, and time. No sensory deficit.     Skin:   No abrasions, lacerations, lesions, or rashes. No abrasion and no bruising noted.         Tests / Results:  I personally reviewed the Ultrasound from May 2017 and my findings reveal: small thyroid remnant right superior pole. Normal appearing left thyroid lobe. No compression apparent    Official read: The patient is status post subtotal right thyroid lobe resection with a small remnant measuring 1.1 x 0.6 x 0.6 cm.  The left thyroid lobe is quite small and measures 2.5 x 0.9 x 1.2 cm.  The right thyroid lobe and remnant is homogeneous without evidence of focal defect or nodule.  The left thyroid lobe is heterogeneous.  A benign-appearing cystic nodule measuring 5 x 3 x 2 mm is present in the upper pole of the left thyroid lobe.  No solid or suspicious nodules are present in the left thyroid lobe.    Assessment:       1. Dysphagia, pharyngoesophageal          Plan:         She did not tolerate the anesthetic spray and was very anxious and intolerant of minimal head and neck examination. We deferred NP scope for now as a result. Would begin with modified barium  swallow with speech to evaluate pharyngeal swallow. I am not concerned at this point that the residual thyroid is causing her dysphagia. Would also strongly consider EGD given her reported severe reflux symptoms. Will call with MBS results.

## 2017-11-16 ENCOUNTER — TELEPHONE (OUTPATIENT)
Dept: OTOLARYNGOLOGY | Facility: CLINIC | Age: 52
End: 2017-11-16

## 2017-11-16 ENCOUNTER — TELEPHONE (OUTPATIENT)
Dept: GASTROENTEROLOGY | Facility: CLINIC | Age: 52
End: 2017-11-16

## 2017-11-16 NOTE — TELEPHONE ENCOUNTER
P/c from pt   Having trouble swallowing  Thinks she may need to have esophagus dilated     EGD scheduled for  12/14/17

## 2017-11-27 ENCOUNTER — HOSPITAL ENCOUNTER (OUTPATIENT)
Dept: RADIOLOGY | Facility: HOSPITAL | Age: 52
Discharge: HOME | End: 2017-11-27
Attending: EMERGENCY MEDICINE
Payer: MEDICARE

## 2017-11-27 ENCOUNTER — OFFICE VISIT (OUTPATIENT)
Dept: FAMILY MEDICINE | Facility: CLINIC | Age: 52
End: 2017-11-27
Payer: MEDICARE

## 2017-11-27 VITALS
HEART RATE: 72 BPM | BODY MASS INDEX: 48.04 KG/M2 | DIASTOLIC BLOOD PRESSURE: 96 MMHG | WEIGHT: 293 LBS | RESPIRATION RATE: 20 BRPM | SYSTOLIC BLOOD PRESSURE: 160 MMHG

## 2017-11-27 DIAGNOSIS — Z91.89 AT RISK FOR DECREASED BONE DENSITY: ICD-10-CM

## 2017-11-27 DIAGNOSIS — I10 ESSENTIAL HYPERTENSION: Primary | Chronic | ICD-10-CM

## 2017-11-27 DIAGNOSIS — E11.42 TYPE 2 DIABETES MELLITUS WITH DIABETIC POLYNEUROPATHY, WITHOUT LONG-TERM CURRENT USE OF INSULIN: Chronic | ICD-10-CM

## 2017-11-27 PROCEDURE — G0008 ADMIN INFLUENZA VIRUS VAC: HCPCS | Mod: S$GLB,,, | Performed by: EMERGENCY MEDICINE

## 2017-11-27 PROCEDURE — 77080 DXA BONE DENSITY AXIAL: CPT | Mod: TC,PO

## 2017-11-27 PROCEDURE — 99499 UNLISTED E&M SERVICE: CPT | Mod: S$GLB,,, | Performed by: EMERGENCY MEDICINE

## 2017-11-27 PROCEDURE — 77080 DXA BONE DENSITY AXIAL: CPT | Mod: 26,,, | Performed by: RADIOLOGY

## 2017-11-27 PROCEDURE — 99214 OFFICE O/P EST MOD 30 MIN: CPT | Mod: S$GLB,,, | Performed by: EMERGENCY MEDICINE

## 2017-11-27 PROCEDURE — 90686 IIV4 VACC NO PRSV 0.5 ML IM: CPT | Mod: S$GLB,,, | Performed by: EMERGENCY MEDICINE

## 2017-11-27 PROCEDURE — 99999 PR PBB SHADOW E&M-EST. PATIENT-LVL III: CPT | Mod: PBBFAC,,, | Performed by: EMERGENCY MEDICINE

## 2017-11-27 RX ORDER — POTASSIUM CHLORIDE 20 MEQ/1
20 TABLET, EXTENDED RELEASE ORAL 2 TIMES DAILY
Qty: 180 TABLET | Refills: 2 | Status: SHIPPED | OUTPATIENT
Start: 2017-11-27 | End: 2018-08-27 | Stop reason: SDUPTHER

## 2017-11-27 RX ORDER — AMLODIPINE BESYLATE 10 MG/1
10 TABLET ORAL DAILY
Qty: 90 TABLET | Refills: 3 | Status: SHIPPED | OUTPATIENT
Start: 2017-11-27 | End: 2018-02-05 | Stop reason: SDUPTHER

## 2017-11-27 NOTE — PROGRESS NOTES
"Subjective:   THIS NOTE IS DONE WITH VOICE RECOGNITION        Patient ID: Amena Anderson is a 52 y.o. female.    Chief Complaint: Hypertension      HPI     She is in today to follow-up on her diabetes, hypertension, and a couple of other issues.    Her blood pressure at home as been relatively good.  Her last couple of blood pressures here have not been at target.  The change in her blood pressure medications as advised.  She is not experiencing any angina or other worrisome symptomatology.  She is trying to avoid excessive salt.  She is also trying to limit her calories.     BP Readings from Last 3 Encounters:   11/27/17 (!) 160/96   11/06/17 (!) 154/92   10/18/17 130/88     Diabetic control continues to be good.  We will check again today to make sure that it really remains good.  She is not having trouble with her medications.      Results for AMENA ANDERSON (MRN [id number]) as of 11/26/2017 22:19   Ref. Range 1/19/2017 12:20 7/10/2017 08:02   Hemoglobin A1C Latest Ref Range: 4.0 - 5.6 % 6.1 5.8 (H)   Estimated Avg Glucose Latest Ref Range: 68 - 131 mg/dL 128 120     She is currently being evaluated for some discharge.  She is scheduled for EGD.  ENT consultation was not able to identify any specific cause.  Her gag reflex was particularly sensitive, and precluded exam.  She does not describe any significant pain, just a persistence of trouble swallowing.    Her sleep apnea is controlled.    She remains focused on her morbid obesity.  She is very pleased that she's been able to get below 300 pounds.    Vitals - 1 value per visit 9/26/2017 10/18/2017 11/6/2017   Weight (lb) 296 296.8 298.5   Weight (kg) 134.265 134.628 135.4   HEIGHT 5' 6" 5' 6" 5' 6"   BODY MASS INDEX 47.78 47.9 48.18     Dr. De La Cruz is concerned about bone density.  Dr. De La Cruz is her pain management physician.  He has done additional injections in the low back.  He is managing her pain medications.  We will get a bone density help answer these " questions.    Immunization History   Administered Date(s) Administered    Influenza - Quadrivalent - PF (3 years & older) 11/27/2017    Pneumococcal Conjugate - 13 Valent 05/22/2017     Pneumococcal vaccine recommended in the spring of 2018.    Influenza recommended.    Current Outpatient Prescriptions   Medication Sig Dispense Refill    alprazolam (XANAX) 1 MG tablet TAKE 1 TABLET BY MOUTH TWICE DAILY 60 tablet 0    amitriptyline (ELAVIL) 25 MG tablet TAKE 1 TABLET BY MOUTH EVERY EVENING. 90 tablet 0    amlodipine (NORVASC) 5 MG tablet Take 1 tablet (5 mg total) by mouth once daily. 90 tablet 3    aspirin (ECOTRIN) 81 MG EC tablet Take 81 mg by mouth once daily.      diclofenac sodium 1 % Gel Apply 2 g topically 4 (four) times daily. 100 g 2    estradiol (ESTRACE) 1 MG tablet TAKE 1 TABLET(1 MG) BY MOUTH EVERY DAY 90 tablet 0    furosemide (LASIX) 40 MG tablet Take 40 mg by mouth once daily.      gabapentin (NEURONTIN) 400 MG capsule Take 400 mg by mouth 3 (three) times daily.      hydrocodone-acetaminophen 10-325mg (NORCO)  mg Tab Take 10 tablets by mouth 3 (three) times daily.      lactic acid 10 % Lotn Apply 1 application topically 2 (two) times daily. 473.12 mL 11    levothyroxine (SYNTHROID) 200 MCG tablet Take 200 mcg by mouth once daily.      lisinopril (PRINIVIL,ZESTRIL) 20 MG tablet Take 1 tablet (20 mg total) by mouth once daily. 90 tablet 3    metformin (GLUCOPHAGE) 1000 MG tablet TAKE 1 TABLET(1000 MG) BY MOUTH EVERY EVENING 90 tablet 3    naproxen (EC NAPROSYN) 500 MG EC tablet Take 500 mg by mouth 2 (two) times daily.      nitroGLYCERIN (NITROSTAT) 0.4 MG SL tablet Place 0.4 mg under the tongue every 5 (five) minutes as needed for Chest pain.      NYSTOP powder APPLY AFFECTED AREA THREE TIMES DAILY 60 g 0    omeprazole (PRILOSEC) 20 MG capsule TAKE 1 CAPSULE BY MOUTH TWICE DAILY 180 capsule 3    potassium chloride (KLOR-CON) 20 mEq Pack Take 20 mEq by mouth 2 (two) times  daily.      ranitidine (ZANTAC) 150 MG tablet Take 1 tablet (150 mg total) by mouth 2 (two) times daily. 60 tablet 5    terbinafine HCl (LAMISIL) 1 % cream Apply topically 2 (two) times daily. 42 g 11    estradiol (ESTRACE) 1 MG tablet TAKE 1 TABLET(1 MG) BY MOUTH EVERY DAY 30 tablet 1    MOVIPREP 100-7.5-2.691 gram solution TK UTD  0     No current facility-administered medications for this visit.          Review of Systems   Constitutional: Negative for activity change, appetite change, chills, diaphoresis, fatigue, fever and unexpected weight change.   HENT: Negative for congestion, ear pain, hearing loss, rhinorrhea, trouble swallowing and voice change.    Eyes: Negative for pain and visual disturbance.   Respiratory: Negative for cough, chest tightness and shortness of breath.    Cardiovascular: Negative for chest pain, palpitations and leg swelling.   Gastrointestinal: Negative for abdominal distention, abdominal pain and blood in stool.   Genitourinary: Negative for difficulty urinating, flank pain, frequency and urgency.   Musculoskeletal: Negative for arthralgias, back pain, joint swelling, myalgias, neck pain and neck stiffness.   Skin: Negative for pallor and rash.   Neurological: Negative for dizziness, tremors, syncope, weakness and headaches.   Hematological: Negative for adenopathy.   Psychiatric/Behavioral: Negative for dysphoric mood and sleep disturbance. The patient is not nervous/anxious.        Objective:      Physical Exam   Constitutional: She appears well-developed. No distress.   HENT:   Head: Normocephalic.   Mouth/Throat: Oropharynx is clear and moist.   No unusual movement of the soft palate or pharynx is noted with swallowing.   Eyes: Pupils are equal, round, and reactive to light.   Neck: Normal range of motion. No thyromegaly present.   Cardiovascular: Regular rhythm and normal heart sounds.    No murmur heard.  Pulmonary/Chest: Effort normal and breath sounds normal.   Abdominal:  Soft. Bowel sounds are normal. She exhibits no distension.   Lymphadenopathy:     She has no cervical adenopathy.   Neurological: She is alert.   Skin: Capillary refill takes less than 2 seconds.   Psychiatric: She has a normal mood and affect.   Vitals reviewed.      Assessment:       1. Essential hypertension    2. Type 2 diabetes mellitus with diabetic polyneuropathy, without long-term current use of insulin    3. BMI 50.0-59.9, adult    4. At risk for decreased bone density        Plan:     1. Essential hypertension  Not controlled  Increase amlodipine to 10 mg  Recheck in two weeks.    - amLODIPine (NORVASC) 10 MG tablet; Take 1 tablet (10 mg total) by mouth once daily.  Dispense: 90 tablet; Refill: 3    2. Type 2 diabetes mellitus with diabetic polyneuropathy, without long-term current use of insulin  Stable  Update hemoglobin A1c  Continued focus on appropriate food choices and amounts    - Hemoglobin A1c; Future    3. BMI 50.0-59.9, adult  Some improvement  Strongly encouraged to keep moving forward.  We did not discuss bariatric surgery, but she would be a potential candidate.    4. At risk for decreased bone density  Status unknown  Update bone density  Copy of bone density to her pain management physician.    - DXA Bone Density Spine And Hip; Future

## 2017-11-28 NOTE — PATIENT INSTRUCTIONS
Amena,    Please keep up the good work with your weight annual or diabetes.  We will check her diabetes today with a hemoglobin A1c.    I hope that your study on her throat is within normal limits.    You did get her flu shot today.  We need to give your second pneumonia shot in April and May of next year.    Please increase her amlodipine to 10 mg a day.  This is for your blood pressure, which was not controlled today.  I would like to recheck your blood pressure in 2-3 weeks.    MARGARITO

## 2017-11-30 DIAGNOSIS — F41.9 ANXIETY: ICD-10-CM

## 2017-11-30 RX ORDER — ALPRAZOLAM 1 MG/1
1 TABLET ORAL 2 TIMES DAILY
Qty: 60 TABLET | Refills: 2 | Status: SHIPPED | OUTPATIENT
Start: 2017-11-30 | End: 2018-04-05 | Stop reason: SDUPTHER

## 2017-11-30 NOTE — TELEPHONE ENCOUNTER
----- Message from Claribel Moss sent at 11/30/2017  2:11 PM CST -----  Contact: pt [phone]  Patient called she was seen on 11/27/2017 her Xanax has not been called into the pharmacy patient is asking if you will call in today. She just had a Death in the family.

## 2017-12-07 RX ORDER — ESTRADIOL 1 MG/1
TABLET ORAL
Qty: 30 TABLET | Refills: 0 | OUTPATIENT
Start: 2017-12-07

## 2017-12-14 ENCOUNTER — ANESTHESIA EVENT (OUTPATIENT)
Dept: ENDOSCOPY | Facility: HOSPITAL | Age: 52
End: 2017-12-14
Payer: MEDICARE

## 2017-12-14 ENCOUNTER — TELEPHONE (OUTPATIENT)
Dept: ENDOSCOPY | Facility: HOSPITAL | Age: 52
End: 2017-12-14

## 2017-12-14 ENCOUNTER — HOSPITAL ENCOUNTER (OUTPATIENT)
Facility: HOSPITAL | Age: 52
Discharge: HOME | End: 2017-12-14
Attending: INTERNAL MEDICINE | Admitting: INTERNAL MEDICINE
Payer: MEDICARE

## 2017-12-14 ENCOUNTER — ANESTHESIA (OUTPATIENT)
Dept: ENDOSCOPY | Facility: HOSPITAL | Age: 52
End: 2017-12-14
Payer: MEDICARE

## 2017-12-14 VITALS
HEIGHT: 67 IN | HEART RATE: 75 BPM | DIASTOLIC BLOOD PRESSURE: 85 MMHG | TEMPERATURE: 98 F | BODY MASS INDEX: 45.99 KG/M2 | SYSTOLIC BLOOD PRESSURE: 147 MMHG | OXYGEN SATURATION: 95 % | WEIGHT: 293 LBS | RESPIRATION RATE: 16 BRPM

## 2017-12-14 DIAGNOSIS — R13.10 DYSPHAGIA: ICD-10-CM

## 2017-12-14 LAB
GLUCOSE SERPL-MCNC: 77 MG/DL (ref 70–110)
H PYLORI INDEX VALUE: POSITIVE

## 2017-12-14 PROCEDURE — 87449 NOS EACH ORGANISM AG IA: CPT | Mod: PO | Performed by: INTERNAL MEDICINE

## 2017-12-14 PROCEDURE — D9220A PRA ANESTHESIA: Mod: CRNA,,, | Performed by: NURSE ANESTHETIST, CERTIFIED REGISTERED

## 2017-12-14 PROCEDURE — 43248 EGD GUIDE WIRE INSERTION: CPT | Mod: ,,, | Performed by: INTERNAL MEDICINE

## 2017-12-14 PROCEDURE — 27201012 HC FORCEPS, HOT/COLD, DISP: Mod: PO | Performed by: INTERNAL MEDICINE

## 2017-12-14 PROCEDURE — 88342 IMHCHEM/IMCYTCHM 1ST ANTB: CPT | Mod: 26,,, | Performed by: PATHOLOGY

## 2017-12-14 PROCEDURE — 88305 TISSUE EXAM BY PATHOLOGIST: CPT | Mod: 26,,, | Performed by: PATHOLOGY

## 2017-12-14 PROCEDURE — 82962 GLUCOSE BLOOD TEST: CPT | Mod: PO | Performed by: INTERNAL MEDICINE

## 2017-12-14 PROCEDURE — D9220A PRA ANESTHESIA: Mod: ANES,,, | Performed by: ANESTHESIOLOGY

## 2017-12-14 PROCEDURE — 63600175 PHARM REV CODE 636 W HCPCS: Mod: PO | Performed by: NURSE ANESTHETIST, CERTIFIED REGISTERED

## 2017-12-14 PROCEDURE — 43248 EGD GUIDE WIRE INSERTION: CPT | Mod: PO | Performed by: INTERNAL MEDICINE

## 2017-12-14 PROCEDURE — 43239 EGD BIOPSY SINGLE/MULTIPLE: CPT | Mod: 59,,, | Performed by: INTERNAL MEDICINE

## 2017-12-14 PROCEDURE — 37000008 HC ANESTHESIA 1ST 15 MINUTES: Mod: PO | Performed by: INTERNAL MEDICINE

## 2017-12-14 PROCEDURE — 43239 EGD BIOPSY SINGLE/MULTIPLE: CPT | Mod: PO | Performed by: INTERNAL MEDICINE

## 2017-12-14 PROCEDURE — 25000003 PHARM REV CODE 250: Mod: PO | Performed by: INTERNAL MEDICINE

## 2017-12-14 PROCEDURE — 88305 TISSUE EXAM BY PATHOLOGIST: CPT | Performed by: PATHOLOGY

## 2017-12-14 PROCEDURE — 37000009 HC ANESTHESIA EA ADD 15 MINS: Mod: PO | Performed by: INTERNAL MEDICINE

## 2017-12-14 RX ORDER — OMEPRAZOLE 40 MG/1
40 CAPSULE, DELAYED RELEASE ORAL
Qty: 60 CAPSULE | Refills: 11 | Status: SHIPPED | OUTPATIENT
Start: 2017-12-14 | End: 2018-08-05 | Stop reason: SDUPTHER

## 2017-12-14 RX ORDER — FENTANYL CITRATE 50 UG/ML
INJECTION, SOLUTION INTRAMUSCULAR; INTRAVENOUS
Status: DISCONTINUED | OUTPATIENT
Start: 2017-12-14 | End: 2017-12-14

## 2017-12-14 RX ORDER — LIDOCAINE HYDROCHLORIDE 10 MG/ML
1 INJECTION, SOLUTION EPIDURAL; INFILTRATION; INTRACAUDAL; PERINEURAL ONCE
Status: COMPLETED | OUTPATIENT
Start: 2017-12-14 | End: 2017-12-14

## 2017-12-14 RX ORDER — MIDAZOLAM HYDROCHLORIDE 1 MG/ML
INJECTION, SOLUTION INTRAMUSCULAR; INTRAVENOUS
Status: DISCONTINUED | OUTPATIENT
Start: 2017-12-14 | End: 2017-12-14

## 2017-12-14 RX ORDER — LIDOCAINE HCL/PF 100 MG/5ML
SYRINGE (ML) INTRAVENOUS
Status: DISCONTINUED | OUTPATIENT
Start: 2017-12-14 | End: 2017-12-14

## 2017-12-14 RX ORDER — SODIUM CHLORIDE, SODIUM LACTATE, POTASSIUM CHLORIDE, CALCIUM CHLORIDE 600; 310; 30; 20 MG/100ML; MG/100ML; MG/100ML; MG/100ML
INJECTION, SOLUTION INTRAVENOUS CONTINUOUS
Status: DISCONTINUED | OUTPATIENT
Start: 2017-12-14 | End: 2017-12-14 | Stop reason: HOSPADM

## 2017-12-14 RX ORDER — PROPOFOL 10 MG/ML
VIAL (ML) INTRAVENOUS
Status: DISCONTINUED | OUTPATIENT
Start: 2017-12-14 | End: 2017-12-14

## 2017-12-14 RX ADMIN — LIDOCAINE HYDROCHLORIDE 50 MG: 20 INJECTION, SOLUTION INTRAVENOUS at 01:12

## 2017-12-14 RX ADMIN — PROPOFOL 50 MG: 10 INJECTION, EMULSION INTRAVENOUS at 01:12

## 2017-12-14 RX ADMIN — FENTANYL CITRATE 50 MCG: 50 INJECTION, SOLUTION INTRAMUSCULAR; INTRAVENOUS at 01:12

## 2017-12-14 RX ADMIN — PROPOFOL 20 MG: 10 INJECTION, EMULSION INTRAVENOUS at 01:12

## 2017-12-14 RX ADMIN — MIDAZOLAM HYDROCHLORIDE 2 MG: 1 INJECTION, SOLUTION INTRAMUSCULAR; INTRAVENOUS at 01:12

## 2017-12-14 RX ADMIN — PROPOFOL 40 MG: 10 INJECTION, EMULSION INTRAVENOUS at 01:12

## 2017-12-14 RX ADMIN — LIDOCAINE HYDROCHLORIDE: 10 INJECTION, SOLUTION EPIDURAL; INFILTRATION; INTRACAUDAL; PERINEURAL at 01:12

## 2017-12-14 RX ADMIN — SODIUM CHLORIDE, SODIUM LACTATE, POTASSIUM CHLORIDE, CALCIUM CHLORIDE: 600; 310; 30; 20 INJECTION, SOLUTION INTRAVENOUS at 01:12

## 2017-12-14 RX ADMIN — PROPOFOL 30 MG: 10 INJECTION, EMULSION INTRAVENOUS at 01:12

## 2017-12-14 RX ADMIN — ALUMINUM HYDROXIDE, MAGNESIUM HYDROXIDE, AND SIMETHICONE 50 ML: 200; 200; 20 SUSPENSION ORAL at 02:12

## 2017-12-14 NOTE — TRANSFER OF CARE
"Anesthesia Transfer of Care Note    Patient: Amena Anderson    Procedure(s) Performed: Procedure(s) (LRB):  ESOPHAGOGASTRODUODENOSCOPY (EGD) (N/A)    Patient location: PACU    Anesthesia Type: general    Transport from OR: Transported from OR on room air with adequate spontaneous ventilation    Post pain: adequate analgesia    Post assessment: no apparent anesthetic complications and tolerated procedure well    Post vital signs: stable    Level of consciousness: awake and sedated    Nausea/Vomiting: no nausea/vomiting    Complications: none    Transfer of care protocol was followed      Last vitals:   Visit Vitals  BP (!) 172/96 (Patient Position: Lying)   Pulse 85   Temp 36.5 °C (97.7 °F) (Skin)   Resp 16   Ht 5' 7" (1.702 m)   Wt 134.3 kg (296 lb)   SpO2 (!) 93%   Breastfeeding? No   BMI 46.36 kg/m²     "

## 2017-12-14 NOTE — H&P
History & Physical - Short Stay  Gastroenterology      SUBJECTIVE:     Procedure: Gastroscopy    Chief Complaint/Indication for Procedure:  Dysphagia    History of Present Illness:  Office Visit     11/6/2017  [facility] - ENT   Kenan Carrasquillo MD   Otolaryngology   Dysphagia, pharyngoesophageal   Dx   Hoarse, feeling like throat closing; Referred by Ryland Mackey MD   Reason for Visit      Progress Notes      Subjective:       Patient ID: Amena Anderson is a 52 y.o. female.     Chief Complaint: Hoarse and feeling like throat closing     Amena is here for dysphagia. Symptoms have been present for a while but have been getting worse for 2-3 months. She has sticking of food with gagging. She does have acid reflux that is reportedly bad - she takes Omeprazole BID and Zantac as well. She reports coughing and choking with solids and liquids. She does report some hoarseness as well. No PNA. No ear pain. The dysphagia is not with every meal.      Right lobectomy in 2012 for MNG but a small right remnant remained.   Has MVP  Previous surgery: as above    Tests / Results:  I personally reviewed the Ultrasound from May 2017 and my findings reveal: small thyroid remnant right superior pole. Normal appearing left thyroid lobe. No compression apparent     Official read: The patient is status post subtotal right thyroid lobe resection with a small remnant measuring 1.1 x 0.6 x 0.6 cm.  The left thyroid lobe is quite small and measures 2.5 x 0.9 x 1.2 cm.  The right thyroid lobe and remnant is homogeneous without evidence of focal defect or nodule.  The left thyroid lobe is heterogeneous.  A benign-appearing cystic nodule measuring 5 x 3 x 2 mm is present in the upper pole of the left thyroid lobe.  No solid or suspicious nodules are present in the left thyroid lobe.     Assessment:       1. Dysphagia, pharyngoesophageal           Plan:          She did not tolerate the anesthetic spray and was very anxious and intolerant of  minimal head and neck examination. We deferred NP scope for now as a result. Would begin with modified barium swallow with speech to evaluate pharyngeal swallow. I am not concerned at this point that the residual thyroid is causing her dysphagia. Would also strongly consider EGD given her reported severe reflux symptoms. Will call with MBS results.         Communications              Modified Barium Swallow 11/13/2017   IMPRESSIONS:  Ms. Anderson's oral/pharyngeal swallow is within normal limits.   Although the study was limited by the patient's tolerance, there was no aspiration or penetration viewed on any consistency although she had some coughing, gagging and one episode of regurgitation with pudding consistency.            RECOMMENDATIONS:  Ms. Anderson viewed the swallow study and the normal swallow process was discussed.  It was recommended that she continue with those food items she feels she best tolerates and thin liquids.  No further S.T. Services are recommended.  Based on the patient's complaints, she may benefit from a GI referral.       Thank you for this referral.         ARIANA Ríos., CCC-SLP  Speech/Language Pathologist        PTA Medications   Medication Sig    ALPRAZolam (XANAX) 1 MG tablet Take 1 tablet (1 mg total) by mouth 2 (two) times daily.    amitriptyline (ELAVIL) 25 MG tablet TAKE 1 TABLET BY MOUTH EVERY EVENING.    amLODIPine (NORVASC) 10 MG tablet Take 1 tablet (10 mg total) by mouth once daily.    aspirin (ECOTRIN) 81 MG EC tablet Take 81 mg by mouth once daily.    diclofenac sodium 1 % Gel Apply 2 g topically 4 (four) times daily.    estradiol (ESTRACE) 1 MG tablet TAKE 1 TABLET(1 MG) BY MOUTH EVERY DAY    furosemide (LASIX) 40 MG tablet Take 40 mg by mouth once daily.    gabapentin (NEURONTIN) 400 MG capsule Take 400 mg by mouth 3 (three) times daily.    hydrocodone-acetaminophen 10-325mg (NORCO)  mg Tab Take 10 tablets by mouth 3 (three) times daily.    lactic  acid 10 % Lotn Apply 1 application topically 2 (two) times daily.    levothyroxine (SYNTHROID) 200 MCG tablet Take 200 mcg by mouth once daily.    lisinopril (PRINIVIL,ZESTRIL) 20 MG tablet Take 1 tablet (20 mg total) by mouth once daily.    metformin (GLUCOPHAGE) 1000 MG tablet TAKE 1 TABLET(1000 MG) BY MOUTH EVERY EVENING    naproxen (EC NAPROSYN) 500 MG EC tablet Take 500 mg by mouth 2 (two) times daily.    NYSTOP powder APPLY AFFECTED AREA THREE TIMES DAILY    omeprazole (PRILOSEC) 20 MG capsule TAKE 1 CAPSULE BY MOUTH TWICE DAILY    potassium chloride SA (K-DUR,KLOR-CON) 20 MEQ tablet Take 1 tablet (20 mEq total) by mouth 2 (two) times daily.    ranitidine (ZANTAC) 150 MG tablet Take 1 tablet (150 mg total) by mouth 2 (two) times daily.    terbinafine HCl (LAMISIL) 1 % cream Apply topically 2 (two) times daily.    nitroGLYCERIN (NITROSTAT) 0.4 MG SL tablet Place 0.4 mg under the tongue every 5 (five) minutes as needed for Chest pain.       Review of patient's allergies indicates:  No Known Allergies     Past Medical History:   Diagnosis Date    AC (acromioclavicular) joint bone spurs, unspecified laterality     bilateral    Anticoagulant long-term use     aspirin    Anxiety     Back pain     BMI 50.0-59.9, adult 2017    CHF (congestive heart failure)     Diabetes mellitus, type 2     Difficulty swallowing 10/18/2017    Dysphagia     Hoarseness 10/18/2017    Hormone replacement therapy (HRT)     Hypertension     Hypothyroidism     Insomnia     Mitral valve prolapse     per pt    Multinodular goiter 10/18/2017    MANUEL on CPAP      Past Surgical History:   Procedure Laterality Date     SECTION      Three times    HYSTERECTOMY      total    LASIK      LUMBAR EPIDURAL INJECTION      THYROIDECTOMY, PARTIAL       History reviewed. No pertinent family history.  Social History   Substance Use Topics    Smoking status: Never Smoker    Smokeless tobacco: Never Used    Alcohol  "use No         OBJECTIVE:     Vital Signs (Most Recent)  Temp: 97.7 °F (36.5 °C) (12/14/17 1303)  Pulse: 85 (12/14/17 1355)  Resp: 16 (12/14/17 1355)  BP: (!) 172/96 (12/14/17 1355)  SpO2: (!) 93 % (12/14/17 1355)    Physical Exam:           : Ht 5' 6" (1.676 m)    Wt 135.4 kg (298 lb 8.1 oz)    BMI 48.18 kg/m²                                                        GENERAL:  Comfortable, in no acute distress.                                 HEENT EXAM:  Nonicteric.  No adenopathy.  Oropharynx is clear.               NECK:  Supple.                                                               LUNGS:  Clear.                                                               CARDIAC:  Regular rate and rhythm.  S1, S2.  No murmur.                      ABDOMEN:  OBESE.  Soft, positive bowel sounds, nontender.  No hepatosplenomegaly or masses.  No rebound or guarding.                                             EXTREMITIES:  No edema.     MENTAL STATUS:  Alert and oriented.    ASSESSMENT/PLAN:     Assessment:  Dysphagia    Plan: Gastroscopy    Anesthesia Plan:   MAC / General Anaesthesia    ASA Grade: ASA 2 - Patient with mild systemic disease with no functional limitations    MALLAMPATI SCORE: II (hard and soft palate, upper portion of tonsils anduvula visible)    "

## 2017-12-14 NOTE — ANESTHESIA PREPROCEDURE EVALUATION
12/14/2017  Amena Anderson is a 52 y.o., female.    Anesthesia Evaluation      I have reviewed the Medications.     Review of Systems  Anesthesia Hx:  No problems with previous Anesthesia   Social:  Non-Smoker, No Alcohol Use    Cardiovascular:   Hypertension CHF    Pulmonary:   Sleep Apnea, CPAP    Renal/:  Renal/ Normal     Hepatic/GI:   GERD    Neurological:   Chronic Pain Syndrome   Endocrine:   Diabetes, type 2 Hypothyroidism        Physical Exam  General:  Morbid Obesity    Airway/Jaw/Neck:  Airway Findings: Mouth Opening: Normal Tongue: Normal  General Airway Assessment: Adult, Possible difficult mask airway, Possible difficult intubation  Mallampati: III  Jaw/Neck Findings:  Neck ROM: Normal ROM  Neck Findings:  Girth Increased       Chest/Lungs:  Chest/Lungs Findings: Clear to auscultation, Normal Respiratory Rate     Heart/Vascular:  Heart Findings: Rate: Normal  Rhythm: Regular Rhythm  Sounds: Normal  Heart murmur: negative       Mental Status:  Mental Status Findings:  Cooperative, Alert and Oriented, Anxious         Anesthesia Plan  Type of Anesthesia, risks & benefits discussed:  Anesthesia Type:  general  Patient's Preference:   Intra-op Monitoring Plan:   Intra-op Monitoring Plan Comments:   Post Op Pain Control Plan:   Post Op Pain Control Plan Comments:   Induction:   IV  Beta Blocker:  Patient is not currently on a Beta-Blocker (No further documentation required).       Informed Consent: Patient understands risks and agrees with Anesthesia plan.  Questions answered. Anesthesia consent signed with patient.  ASA Score: 3     Day of Surgery Review of History & Physical:        Anesthesia Plan Notes: Propofol general.        Ready For Surgery From Anesthesia Perspective.

## 2017-12-14 NOTE — DISCHARGE INSTRUCTIONS
Recovery After Procedural Sedation (Adult)  You have been given medicine by vein to make you sleep during your surgery. This may have included both a pain medicine and sleeping medicine. Most of the effects have worn off. But you may still have some drowsiness for the next 6 to 8 hours.  Home care  Follow these guidelines when you get home:  · For the next 8 hours, you should be watched by a responsible adult. This person should make sure your condition is not getting worse.  · Don't drink any alcohol for the next 24 hours.  · Don't drive, operate dangerous machinery, or make important business or personal decisions during the next 24 hours.  Note: Your healthcare provider may tell you not to take any medicine by mouth for pain or sleep in the next 4 hours. These medicines may react with the medicines you were given in the hospital. This could cause a much stronger response than usual.  Follow-up care  Follow up with your healthcare provider if you are not alert and back to your usual level of activity within 12 hours.  When to seek medical advice  Call your healthcare provider right away if any of these occur:  · Drowsiness gets worse  · Weakness or dizziness gets worse  · Repeated vomiting  · You can't be awakened   Date Last Reviewed: 10/18/2016  © 8075-6351 [employer]. [address] [place] [address], [place] [address]. All rights reserved. This information is not intended as a substitute for professional medical care. Always follow your healthcare professional's instructions.        Tips to Control Acid Reflux    To control acid reflux, youll need to make some basic diet and lifestyle changes. The simple steps outlined below may be all youll need to ease discomfort.  Watch what you eat  · Avoid fatty foods and spicy foods.  · Eat fewer acidic foods, such as citrus and tomato-based foods. These can increase symptoms.  · Limit drinking alcohol, caffeine, and fizzy beverages. All increase acid  reflux.  · Try limiting chocolate, peppermint, and spearmint. These can worsen acid reflux in some people.  Watch when you eat  · Avoid lying down for 3 hours after eating.  · Do not snack before going to bed.  Raise your head  Raising your head and upper body by 4 to 6 inches helps limit reflux when youre lying down. Put blocks under the head of your bed frame to raise it.  Other changes  · Lose weight, if you need to  · Dont exercise near bedtime  · Avoid tight-fitting clothes  · Limit aspirin and ibuprofen  · Stop smoking   Date Last Reviewed: 7/1/2016  © 0902-6032 [employer]. [address] [place] [address], [place] [address]. All rights reserved. This information is not intended as a substitute for professional medical care. Always follow your healthcare professional's instructions.

## 2017-12-14 NOTE — BRIEF OP NOTE
Discharge Note  Short Stay      SUMMARY     Admit Date: 12/14/2017    Attending Physician: Randy Benitez Jr., MD     Discharge Physician: Randy Benitez Jr., MD    Discharge Date: 12/14/2017 2:12 PM    Final Diagnosis: Dysphagia, unspecified type [R13.10]    Normal esophagus. Dilated, 54 Fr.  Fundal gastritis.    Disposition: HOME OR SELF CARE    Patient Instructions:   Current Discharge Medication List      CONTINUE these medications which have CHANGED    Details   omeprazole (PRILOSEC) 40 MG capsule Take 1 capsule (40 mg total) by mouth 2 (two) times daily before meals.  Qty: 60 capsule, Refills: 11      ranitidine (ZANTAC) 300 MG tablet Take 1 tablet (300 mg total) by mouth every evening. , about 30-60 minutes before bedtime.  Qty: 30 tablet, Refills: 11         CONTINUE these medications which have NOT CHANGED    Details   ALPRAZolam (XANAX) 1 MG tablet Take 1 tablet (1 mg total) by mouth 2 (two) times daily.  Qty: 60 tablet, Refills: 2    Associated Diagnoses: Anxiety      amitriptyline (ELAVIL) 25 MG tablet TAKE 1 TABLET BY MOUTH EVERY EVENING.  Qty: 90 tablet, Refills: 0      amLODIPine (NORVASC) 10 MG tablet Take 1 tablet (10 mg total) by mouth once daily.  Qty: 90 tablet, Refills: 3    Associated Diagnoses: Essential hypertension      aspirin (ECOTRIN) 81 MG EC tablet Take 81 mg by mouth once daily.      diclofenac sodium 1 % Gel Apply 2 g topically 4 (four) times daily.  Qty: 100 g, Refills: 2      estradiol (ESTRACE) 1 MG tablet TAKE 1 TABLET(1 MG) BY MOUTH EVERY DAY  Qty: 90 tablet, Refills: 0    Comments: **Patient requests 90 days supply**      furosemide (LASIX) 40 MG tablet Take 40 mg by mouth once daily.      gabapentin (NEURONTIN) 400 MG capsule Take 400 mg by mouth 3 (three) times daily.      hydrocodone-acetaminophen 10-325mg (NORCO)  mg Tab Take 10 tablets by mouth 3 (three) times daily.      lactic acid 10 % Lotn Apply 1 application topically 2 (two) times daily.  Qty: 473.12 mL,  Refills: 11    Associated Diagnoses: Tinea pedis of both feet      levothyroxine (SYNTHROID) 200 MCG tablet Take 200 mcg by mouth once daily.      lisinopril (PRINIVIL,ZESTRIL) 20 MG tablet Take 1 tablet (20 mg total) by mouth once daily.  Qty: 90 tablet, Refills: 3      metformin (GLUCOPHAGE) 1000 MG tablet TAKE 1 TABLET(1000 MG) BY MOUTH EVERY EVENING  Qty: 90 tablet, Refills: 3    Associated Diagnoses: Type 2 diabetes mellitus without complication, without long-term current use of insulin      naproxen (EC NAPROSYN) 500 MG EC tablet Take 500 mg by mouth 2 (two) times daily.      NYSTOP powder APPLY AFFECTED AREA THREE TIMES DAILY  Qty: 60 g, Refills: 0      potassium chloride SA (K-DUR,KLOR-CON) 20 MEQ tablet Take 1 tablet (20 mEq total) by mouth 2 (two) times daily.  Qty: 180 tablet, Refills: 2      terbinafine HCl (LAMISIL) 1 % cream Apply topically 2 (two) times daily.  Qty: 42 g, Refills: 11    Associated Diagnoses: Tinea pedis of both feet      nitroGLYCERIN (NITROSTAT) 0.4 MG SL tablet Place 0.4 mg under the tongue every 5 (five) minutes as needed for Chest pain.             Discharge Procedure Orders (must include Diet, Follow-up, Activity)    Follow Up:  Follow up with PCP as per your routine.  Please follow an anti reflux diet.  Activity as tolerated.    No driving day of procedure.

## 2017-12-14 NOTE — ANESTHESIA POSTPROCEDURE EVALUATION
"Anesthesia Post Evaluation    Patient: Amena Anderson    Procedure(s) Performed: Procedure(s) (LRB):  ESOPHAGOGASTRODUODENOSCOPY (EGD) (N/A)    Final Anesthesia Type: general  Patient location during evaluation: PACU  Patient participation: Yes- Able to Participate  Level of consciousness: awake and alert  Post-procedure vital signs: reviewed and stable  Pain management: adequate  Airway patency: patent  PONV status at discharge: No PONV  Anesthetic complications: no      Cardiovascular status: hemodynamically stable and blood pressure returned to baseline  Respiratory status: unassisted, spontaneous ventilation and room air  Hydration status: euvolemic  Follow-up not needed.        Visit Vitals  BP (!) 147/85   Pulse 75   Temp 36.5 °C (97.7 °F) (Skin)   Resp 16   Ht 5' 7" (1.702 m)   Wt 134.3 kg (296 lb)   SpO2 95%   Breastfeeding? No   BMI 46.36 kg/m²       Pain/Marciano Score: Pain Assessment Performed: Yes (12/14/2017  1:57 PM)  Presence of Pain: complains of pain/discomfort (12/14/2017  1:57 PM)  Marciano Score: 10 (12/14/2017  1:57 PM)      "

## 2017-12-18 ENCOUNTER — TELEPHONE (OUTPATIENT)
Dept: FAMILY MEDICINE | Facility: CLINIC | Age: 52
End: 2017-12-18

## 2017-12-18 ENCOUNTER — HOSPITAL ENCOUNTER (OUTPATIENT)
Dept: RADIOLOGY | Facility: HOSPITAL | Age: 52
Discharge: HOME | End: 2017-12-18
Attending: EMERGENCY MEDICINE
Payer: MEDICARE

## 2017-12-18 ENCOUNTER — CLINICAL SUPPORT (OUTPATIENT)
Dept: FAMILY MEDICINE | Facility: CLINIC | Age: 52
End: 2017-12-18
Payer: MEDICARE

## 2017-12-18 VITALS — HEART RATE: 68 BPM | SYSTOLIC BLOOD PRESSURE: 134 MMHG | OXYGEN SATURATION: 98 % | DIASTOLIC BLOOD PRESSURE: 84 MMHG

## 2017-12-18 DIAGNOSIS — Z12.31 SCREENING MAMMOGRAM, ENCOUNTER FOR: ICD-10-CM

## 2017-12-18 DIAGNOSIS — I10 HYPERTENSION, UNSPECIFIED TYPE: Primary | ICD-10-CM

## 2017-12-18 PROCEDURE — 77067 SCR MAMMO BI INCL CAD: CPT | Mod: 26,,, | Performed by: RADIOLOGY

## 2017-12-18 PROCEDURE — 99999 PR PBB SHADOW E&M-EST. PATIENT-LVL I: CPT | Mod: PBBFAC,,,

## 2017-12-18 PROCEDURE — 77063 BREAST TOMOSYNTHESIS BI: CPT | Mod: 26,,, | Performed by: RADIOLOGY

## 2017-12-18 PROCEDURE — 77067 SCR MAMMO BI INCL CAD: CPT | Mod: TC,PO

## 2017-12-18 NOTE — PROGRESS NOTES
Amena Anderson 52 y.o. female is here today for Blood Pressure check.   History of HTN yes.    Review of patient's allergies indicates:  No Known Allergies  Creatinine   Date Value Ref Range Status   01/19/2017 0.8 0.5 - 1.4 mg/dL Final     Sodium   Date Value Ref Range Status   01/19/2017 142 136 - 145 mmol/L Final     Potassium   Date Value Ref Range Status   01/19/2017 4.1 3.5 - 5.1 mmol/L Final   ]  Patient denies taking blood pressure medications on a regular basis at the same time of the day.     Current Outpatient Prescriptions:     ALPRAZolam (XANAX) 1 MG tablet, Take 1 tablet (1 mg total) by mouth 2 (two) times daily., Disp: 60 tablet, Rfl: 2    amitriptyline (ELAVIL) 25 MG tablet, TAKE 1 TABLET BY MOUTH EVERY EVENING., Disp: 90 tablet, Rfl: 0    amLODIPine (NORVASC) 10 MG tablet, Take 1 tablet (10 mg total) by mouth once daily., Disp: 90 tablet, Rfl: 3    aspirin (ECOTRIN) 81 MG EC tablet, Take 81 mg by mouth once daily., Disp: , Rfl:     diclofenac sodium 1 % Gel, Apply 2 g topically 4 (four) times daily., Disp: 100 g, Rfl: 2    estradiol (ESTRACE) 1 MG tablet, TAKE 1 TABLET(1 MG) BY MOUTH EVERY DAY, Disp: 90 tablet, Rfl: 0    furosemide (LASIX) 40 MG tablet, Take 40 mg by mouth once daily., Disp: , Rfl:     gabapentin (NEURONTIN) 400 MG capsule, Take 400 mg by mouth 3 (three) times daily., Disp: , Rfl:     hydrocodone-acetaminophen 10-325mg (NORCO)  mg Tab, Take 10 tablets by mouth 3 (three) times daily., Disp: , Rfl:     lactic acid 10 % Lotn, Apply 1 application topically 2 (two) times daily., Disp: 473.12 mL, Rfl: 11    levothyroxine (SYNTHROID) 200 MCG tablet, Take 200 mcg by mouth once daily., Disp: , Rfl:     lisinopril (PRINIVIL,ZESTRIL) 20 MG tablet, Take 1 tablet (20 mg total) by mouth once daily., Disp: 90 tablet, Rfl: 3    metformin (GLUCOPHAGE) 1000 MG tablet, TAKE 1 TABLET(1000 MG) BY MOUTH EVERY EVENING, Disp: 90 tablet, Rfl: 3    naproxen (EC NAPROSYN) 500 MG EC tablet,  Take 500 mg by mouth 2 (two) times daily., Disp: , Rfl:     nitroGLYCERIN (NITROSTAT) 0.4 MG SL tablet, Place 0.4 mg under the tongue every 5 (five) minutes as needed for Chest pain., Disp: , Rfl:     NYSTOP powder, APPLY AFFECTED AREA THREE TIMES DAILY, Disp: 60 g, Rfl: 0    omeprazole (PRILOSEC) 40 MG capsule, Take 1 capsule (40 mg total) by mouth 2 (two) times daily before meals., Disp: 60 capsule, Rfl: 11    potassium chloride SA (K-DUR,KLOR-CON) 20 MEQ tablet, Take 1 tablet (20 mEq total) by mouth 2 (two) times daily., Disp: 180 tablet, Rfl: 2    ranitidine (ZANTAC) 300 MG tablet, TAKE 1 TABLET BY MOUTH EVERY EVENING, ABOUT 30 TO 60 MINUTES BEFORE BEDTIME, Disp: 90 tablet, Rfl: 4    terbinafine HCl (LAMISIL) 1 % cream, Apply topically 2 (two) times daily., Disp: 42 g, Rfl: 11  Does patient have record of home blood pressure readings no. Readings have been averaging N/A.   Last dose of blood pressure medication (Lasix and Lisinopril) was taken at this morning at 0800 (Norvasc and Elavil taken at night) .  Patient is asymptomatic.   No complaints at this time.    ,   .    Blood pressure reading after 15 minutes was 134/84, Pulse 68.  Dr. Miller notified via Epic message.   Please advise.

## 2017-12-18 NOTE — TELEPHONE ENCOUNTER
Attempted to contact pt in regards to her nurse visit this AM. No answer on her phone and unable to leave a message. Called her 3-4 times after being checked in. Pt did have an appt for a mammogram. CLC

## 2017-12-21 DIAGNOSIS — A04.8 H. PYLORI INFECTION: Primary | ICD-10-CM

## 2017-12-21 NOTE — TELEPHONE ENCOUNTER
----- Message from Chula Chela sent at 12/21/2017  9:14 AM CST -----  Prescription for antibiotic.  Please send into [facility].  Any questions call friend/Deepthi Pan at [phone].

## 2017-12-22 RX ORDER — AMOXICILLIN 500 MG/1
1000 CAPSULE ORAL 2 TIMES DAILY WITH MEALS
Qty: 56 CAPSULE | Refills: 0 | Status: SHIPPED | OUTPATIENT
Start: 2017-12-22 | End: 2017-12-22 | Stop reason: SDUPTHER

## 2017-12-22 RX ORDER — CLARITHROMYCIN 500 MG/1
500 TABLET, FILM COATED ORAL 2 TIMES DAILY WITH MEALS
Qty: 28 TABLET | Refills: 0 | Status: SHIPPED | OUTPATIENT
Start: 2017-12-22 | End: 2017-12-22 | Stop reason: SDUPTHER

## 2017-12-22 NOTE — TELEPHONE ENCOUNTER
Let her she was positive for the bacteria, and we'll go ahead and put her on antibiotics.    Biaxin 500, # 20, 1 po bid with meals x 14 days.  Amoxicillin 500 mg, # 40, 2 po bid with meals x 14 days.  Omeprazole 40 mg,  1 po bid (before breakfast and 1 hour before supper) for 2 weeks, then back to every morning thereafter.    Rx sent to [facility] on [address].    If she wants to wait, til after the Xmas holidays, that's perfectly alright.

## 2017-12-22 NOTE — TELEPHONE ENCOUNTER
----- Message from Laron Bledsoe sent at 12/22/2017 10:15 AM CST -----  Contact: Patient  Patient states that some antibiotics were suppose to be called in and can you please look in to this matter because the holidays.  She would like to know if any antibiotics will be called in.  She would like this done today.  Please call her back at [phone].  Thank you      [facility] [address] [place] [address] [id number] [place] [address] AT [place] [facility] [address]  [id number] [place] [id number]  Phone: [phone] Fax: [phone]

## 2017-12-23 DIAGNOSIS — A04.8 H. PYLORI INFECTION: ICD-10-CM

## 2017-12-23 RX ORDER — AMOXICILLIN 500 MG/1
1000 TABLET, FILM COATED ORAL 2 TIMES DAILY WITH MEALS
Qty: 56 TABLET | Refills: 0 | Status: SHIPPED | OUTPATIENT
Start: 2017-12-23 | End: 2018-01-06

## 2017-12-23 RX ORDER — OMEPRAZOLE 40 MG/1
CAPSULE, DELAYED RELEASE ORAL
Qty: 60 CAPSULE | Refills: 0 | Status: SHIPPED | OUTPATIENT
Start: 2017-12-23 | End: 2018-01-08 | Stop reason: SDUPTHER

## 2017-12-23 RX ORDER — OMEPRAZOLE 40 MG/1
40 CAPSULE, DELAYED RELEASE ORAL
Qty: 30 CAPSULE | Refills: 0 | Status: SHIPPED | OUTPATIENT
Start: 2017-12-23 | End: 2017-12-23 | Stop reason: SDUPTHER

## 2017-12-23 RX ORDER — CLARITHROMYCIN 500 MG/1
500 TABLET, FILM COATED ORAL 2 TIMES DAILY WITH MEALS
Qty: 28 TABLET | Refills: 0 | Status: SHIPPED | OUTPATIENT
Start: 2017-12-23 | End: 2018-01-06

## 2018-01-03 RX ORDER — AMITRIPTYLINE HYDROCHLORIDE 25 MG/1
TABLET, FILM COATED ORAL
Qty: 90 TABLET | Refills: 1 | Status: SHIPPED | OUTPATIENT
Start: 2018-01-03 | End: 2018-04-02 | Stop reason: SDUPTHER

## 2018-01-08 ENCOUNTER — TELEPHONE (OUTPATIENT)
Dept: GASTROENTEROLOGY | Facility: CLINIC | Age: 53
End: 2018-01-08

## 2018-01-08 ENCOUNTER — OFFICE VISIT (OUTPATIENT)
Dept: PODIATRY | Facility: CLINIC | Age: 53
End: 2018-01-08
Payer: MEDICARE

## 2018-01-08 VITALS — HEIGHT: 67 IN | BODY MASS INDEX: 45.92 KG/M2 | WEIGHT: 292.56 LBS

## 2018-01-08 DIAGNOSIS — E11.42 TYPE 2 DIABETES MELLITUS WITH DIABETIC POLYNEUROPATHY, WITHOUT LONG-TERM CURRENT USE OF INSULIN: ICD-10-CM

## 2018-01-08 DIAGNOSIS — M25.472 PAIN AND SWELLING OF LEFT ANKLE: ICD-10-CM

## 2018-01-08 DIAGNOSIS — M76.62 ACHILLES TENDINITIS OF BOTH LOWER EXTREMITIES: ICD-10-CM

## 2018-01-08 DIAGNOSIS — B35.3 TINEA PEDIS OF BOTH FEET: ICD-10-CM

## 2018-01-08 DIAGNOSIS — G89.29 CHRONIC PAIN OF LEFT ANKLE: Primary | ICD-10-CM

## 2018-01-08 DIAGNOSIS — M25.572 CHRONIC PAIN OF LEFT ANKLE: Primary | ICD-10-CM

## 2018-01-08 DIAGNOSIS — B35.1 ONYCHOMYCOSIS DUE TO DERMATOPHYTE: ICD-10-CM

## 2018-01-08 DIAGNOSIS — E11.51 TYPE II DIABETES MELLITUS WITH PERIPHERAL CIRCULATORY DISORDER: ICD-10-CM

## 2018-01-08 DIAGNOSIS — M25.572 PAIN AND SWELLING OF LEFT ANKLE: ICD-10-CM

## 2018-01-08 DIAGNOSIS — M76.61 ACHILLES TENDINITIS OF BOTH LOWER EXTREMITIES: ICD-10-CM

## 2018-01-08 PROCEDURE — 99999 PR PBB SHADOW E&M-EST. PATIENT-LVL III: CPT | Mod: PBBFAC,,, | Performed by: PODIATRIST

## 2018-01-08 PROCEDURE — 99214 OFFICE O/P EST MOD 30 MIN: CPT | Mod: S$GLB,,, | Performed by: PODIATRIST

## 2018-01-08 PROCEDURE — 99499 UNLISTED E&M SERVICE: CPT | Mod: S$GLB,,, | Performed by: PODIATRIST

## 2018-01-08 RX ORDER — ATORVASTATIN CALCIUM 20 MG/1
20 TABLET, FILM COATED ORAL DAILY
Refills: 3 | COMMUNITY
Start: 2017-11-03 | End: 2018-08-02 | Stop reason: SDUPTHER

## 2018-01-08 RX ORDER — CICLOPIROX OLAMINE 7.7 MG/G
CREAM TOPICAL 2 TIMES DAILY
Qty: 90 G | Refills: 3 | Status: SHIPPED | OUTPATIENT
Start: 2018-01-08 | End: 2019-02-13 | Stop reason: SDUPTHER

## 2018-01-08 RX ORDER — METHYLPREDNISOLONE 4 MG/1
TABLET ORAL
Qty: 1 PACKAGE | Refills: 0 | Status: SHIPPED | OUTPATIENT
Start: 2018-01-08 | End: 2018-01-29

## 2018-01-08 NOTE — TELEPHONE ENCOUNTER
----- Message from Davi SALINAS Alyssa sent at 1/8/2018 11:31 AM CST -----  Contact: same  Patient called in and stated that she has completed the antibiotic, Clarithromycin 500 mg and was taking it 2 x daily and the other was Amoxicillin 500 mg and was taking 2 in AM & 2 in PM, that Dr. Benitez had given her.  Patient just had procedure on 12/14/17 and patient just wanted to know what her next plan of treatment was going to be.  Patient call back number is [phone]

## 2018-01-08 NOTE — TELEPHONE ENCOUNTER
Spoke to pt.  She has finished medicine and is not having any more trouble swallowing    Reassurance offered.  No need to do any more testing is symptoms are gone

## 2018-01-10 NOTE — PROGRESS NOTES
Subjective:      Patient ID: Amena Anderson is a 52 y.o. female.    Chief Complaint: Diabetes Mellitus (PCP:  Dr Miller  11/27/17; HgbA1c: 10/27/17  5.5); Foot Pain (both feet); Ankle Pain (left); and Nail Problem (fungus)    Sharp pain lateral heel/ankle left.  Sudden onset with fall around 9/8/17, she was seen by Dr. Davis around that time. She was in an orthopedic boot for a couple weeks but when she started to feel better came out of the boot on her own. The lateral left ankle has been bothering her more and more ever since. She relates increased pain with weight bearing, better with rest.     Also here for a routine diabetic foot exam and checkup, relates some burning and tingling sensations at times. She also relates thick and discolored nails and dry flaking skin to her feet. Denies wounds, change in color or drainage.       Review of Systems   Constitution: Negative for chills, diaphoresis, fever, malaise/fatigue and night sweats.   Cardiovascular: Positive for leg swelling. Negative for claudication, cyanosis and syncope.   Skin: Negative for color change, dry skin, nail changes, rash, suspicious lesions and unusual hair distribution.   Musculoskeletal: Positive for joint pain. Negative for falls, joint swelling, muscle cramps, muscle weakness and stiffness.   Gastrointestinal: Negative for constipation, diarrhea, nausea and vomiting.   Neurological: Positive for paresthesias. Negative for brief paralysis, disturbances in coordination, focal weakness, numbness, sensory change and tremors.           Objective:      Physical Exam   Constitutional: She appears well-developed and well-nourished. She is cooperative. No distress.   Cardiovascular:   Pulses:       Popliteal pulses are 2+ on the right side, and 2+ on the left side.        Dorsalis pedis pulses are 2+ on the right side, and 2+ on the left side.        Posterior tibial pulses are 2+ on the right side, and 2+ on the left side.   Capillary refill 3  seconds all toes/distal feet, all toes/both feet warm to touch.      Negative lymphadenopathy bilateral popliteal fossa and tarsal tunnel.      Positive lower extremity edema bilateral.     Musculoskeletal:        Right ankle: Normal. She exhibits normal range of motion, no swelling, no ecchymosis, no deformity, no laceration and normal pulse. Achilles tendon normal. Achilles tendon exhibits no pain, no defect and normal De La Cruz's test results.   Moderate pain to palpation lateral left ankle at cfl and atfl without instability deformity or loss of function. Also has pain along the peroneal tendons to the brevis insertion.    Ankle dorsiflexion decreased at <10 degrees bilateral with moderate increase with knee flexion bilateral. There is tenderness to palpation bilateral achilles attachments on the calcaneus.    Ankle bilateral has negative anterior drawer sign, negative posterior drawer sign, negative external rotation test, negative squeeze test.    Otherwise, Normal angle, base, station of gait. All ten toes without clubbing, cyanosis, or signs of ischemia.  No pain to palpation bilateral lower extremities.  Range of motion, stability, muscle strength, and muscle tone normal bilateral feet and legs.     Feet:   Right Foot:   Protective Sensation: 10 sites tested. 9 sites sensed.   Left Foot:   Protective Sensation: 10 sites tested. 9 sites sensed.   Lymphadenopathy:   Negative lymphadenopathy bilateral popliteal fossa and tarsal tunnel.   Neurological: She is alert. She has normal strength. She displays no atrophy and no tremor. No sensory deficit. She exhibits normal muscle tone. She displays no seizure activity. Gait normal.   Reflex Scores:       Patellar reflexes are 2+ on the right side and 2+ on the left side.       Achilles reflexes are 2+ on the right side and 2+ on the left side.  Negative tinel sign to percussion sural, superficial peroneal, deep peroneal, saphenous, and posterior tibial nerves right  and left ankles and feet.     Skin: Skin is warm, dry and intact. No abrasion, no bruising, no burn, no ecchymosis, no laceration, no lesion and no rash noted. She is not diaphoretic. No cyanosis. No pallor. Nails show no clubbing.     No ulceration, hyperpigmentation, discoloration, masses nodules or cords palpated.  No ecchymosis, erythema, edema, or cardinal signs of infection bilateral lower extremities.    Dry scale with superficial flakes over an erythematous base  without ulceration, drainage, pus, tracking, fluctuance, malodor, or cardinal signs infection.    Nails 1-5 bilateral are thick 3-4 mm, long 3-6 mm, and discolored with subungual debris                   Assessment:       Encounter Diagnoses   Name Primary?    Chronic pain of left ankle Yes    Type 2 diabetes mellitus with diabetic polyneuropathy, without long-term current use of insulin     Achilles tendinitis of both lower extremities     Pain and swelling of left ankle     BMI 50.0-59.9, adult     Type II diabetes mellitus with peripheral circulatory disorder     Onychomycosis due to dermatophyte     Tinea pedis of both feet          Plan:       Amena was seen today for diabetes mellitus, foot pain, ankle pain and nail problem.    Diagnoses and all orders for this visit:    Chronic pain of left ankle    Type 2 diabetes mellitus with diabetic polyneuropathy, without long-term current use of insulin    Achilles tendinitis of both lower extremities    Pain and swelling of left ankle    BMI 50.0-59.9, adult    Type II diabetes mellitus with peripheral circulatory disorder    Onychomycosis due to dermatophyte    Tinea pedis of both feet    Other orders  -     methylPREDNISolone (MEDROL DOSEPACK) 4 mg tablet; use as directed  -     ciclopirox (LOPROX) 0.77 % Crea; Apply topically 2 (two) times daily.      I counseled the patient on her conditions, their implications and medical management.      Patient will stretch the tendo achilles complex  three times daily as demonstrated in the office.  Literature was dispensed illustrating proper stretching technique.    Patient will go back into the orthopedic boot for the next month, after she will transition to a supportive tennis shoe with an AFO ankle brace and will consider PT.    Medrol dose pack for inflammation    Shoe inspection. Diabetic Foot Education. Patient reminded of the importance of good nutrition and blood sugar control to help prevent podiatric complications of diabetes. Patient instructed on proper foot hygeine. We discussed wearing proper shoe gear, daily foot inspections, never walking without protective shoe gear, never putting sharp instruments to feet    Ciclopirox cream BID for tines    Discussed importance of healthy eating and weight loss as pertaining to her diabetes control and foot stress/pain        No Follow-up on file.    Follow up in 1 months    Edson Martinez DPM

## 2018-02-04 DIAGNOSIS — I10 ESSENTIAL HYPERTENSION: Primary | ICD-10-CM

## 2018-02-04 DIAGNOSIS — E11.9 TYPE 2 DIABETES MELLITUS WITHOUT COMPLICATION, WITHOUT LONG-TERM CURRENT USE OF INSULIN: ICD-10-CM

## 2018-02-05 ENCOUNTER — HOSPITAL ENCOUNTER (OUTPATIENT)
Dept: RADIOLOGY | Facility: HOSPITAL | Age: 53
Discharge: HOME | End: 2018-02-05
Attending: NURSE PRACTITIONER
Payer: MEDICARE

## 2018-02-05 ENCOUNTER — OFFICE VISIT (OUTPATIENT)
Dept: GASTROENTEROLOGY | Facility: CLINIC | Age: 53
End: 2018-02-05
Payer: MEDICARE

## 2018-02-05 VITALS
WEIGHT: 292 LBS | BODY MASS INDEX: 46.93 KG/M2 | HEART RATE: 81 BPM | SYSTOLIC BLOOD PRESSURE: 139 MMHG | HEIGHT: 66 IN | DIASTOLIC BLOOD PRESSURE: 78 MMHG

## 2018-02-05 DIAGNOSIS — Z86.19 HISTORY OF HELICOBACTER PYLORI INFECTION: ICD-10-CM

## 2018-02-05 DIAGNOSIS — Z87.19 HISTORY OF GASTRITIS: ICD-10-CM

## 2018-02-05 DIAGNOSIS — R10.84 GENERALIZED ABDOMINAL PAIN: ICD-10-CM

## 2018-02-05 DIAGNOSIS — R10.13 DYSPEPSIA: ICD-10-CM

## 2018-02-05 DIAGNOSIS — R10.84 GENERALIZED ABDOMINAL PAIN: Primary | ICD-10-CM

## 2018-02-05 PROBLEM — Z86.0100 HX OF COLONIC POLYPS: Status: RESOLVED | Noted: 2017-08-21 | Resolved: 2018-02-05

## 2018-02-05 PROBLEM — Z86.010 HX OF COLONIC POLYPS: Status: RESOLVED | Noted: 2017-08-21 | Resolved: 2018-02-05

## 2018-02-05 PROCEDURE — 74019 RADEX ABDOMEN 2 VIEWS: CPT | Mod: 26,,, | Performed by: RADIOLOGY

## 2018-02-05 PROCEDURE — 99999 PR PBB SHADOW E&M-EST. PATIENT-LVL V: CPT | Mod: PBBFAC,,, | Performed by: NURSE PRACTITIONER

## 2018-02-05 PROCEDURE — 3008F BODY MASS INDEX DOCD: CPT | Mod: S$GLB,,, | Performed by: NURSE PRACTITIONER

## 2018-02-05 PROCEDURE — 74019 RADEX ABDOMEN 2 VIEWS: CPT | Mod: TC,FY,PO

## 2018-02-05 PROCEDURE — 99214 OFFICE O/P EST MOD 30 MIN: CPT | Mod: S$GLB,,, | Performed by: NURSE PRACTITIONER

## 2018-02-05 RX ORDER — DICYCLOMINE HYDROCHLORIDE 10 MG/1
10 CAPSULE ORAL EVERY 6 HOURS PRN
Qty: 90 CAPSULE | Refills: 1 | Status: SHIPPED | OUTPATIENT
Start: 2018-02-05 | End: 2018-03-07

## 2018-02-05 RX ORDER — LISINOPRIL 20 MG/1
TABLET ORAL
Qty: 90 TABLET | Refills: 0 | Status: SHIPPED | OUTPATIENT
Start: 2018-02-05 | End: 2018-05-07 | Stop reason: SDUPTHER

## 2018-02-05 RX ORDER — AMLODIPINE BESYLATE 5 MG/1
TABLET ORAL
Qty: 90 TABLET | Refills: 0 | Status: SHIPPED | OUTPATIENT
Start: 2018-02-05 | End: 2018-05-07 | Stop reason: SDUPTHER

## 2018-02-05 NOTE — PATIENT INSTRUCTIONS
Abdominal Pain    Abdominal pain is pain in the stomach or belly area. Everyone has this pain from time to time. In many cases it goes away on its own. But abdominal pain can sometimes be due to a serious problem, such as appendicitis. So its important to know when to seek help.  Causes of abdominal pain  There are many possible causes of abdominal pain. Common causes in adults include:  · Constipation, diarrhea, or gas  · Stomach acid flowing back up into the esophagus (acid reflux or heartburn)  · Severe acid reflux, called GERD (gastroesophageal reflux disease)  · A sore in the lining of the stomach or small intestine (peptic ulcer)  · Inflammation of the gallbladder, liver, or pancreas  · Gallstones or kidney stones  · Appendicitis   · Intestinal blockage   · An internal organ pushing through a muscle or other tissue (hernia)  · Urinary tract infections  · In women, menstrual cramps, fibroids, or endometriosis  · Inflammation or infection of the intestines  Diagnosing the cause of abdominal pain  Your healthcare provider will do a physical exam help find the cause of your pain. If needed, tests will be ordered. Belly pain has many possible causes. So it can be hard to find the reason for your pain. Giving details about your pain can help. Tell your provider where and when you feel the pain, and what makes it better or worse. Also let your provider know if you have other symptoms such as:  · Fever  · Tiredness  · Upset stomach (nausea)  · Vomiting  · Changes in bathroom habits  Treating abdominal pain  Some causes of pain need emergency medical treatment right away. These include appendicitis or a bowel blockage. Other problems can be treated with rest, fluids, or medicines. Your healthcare provider can give you specific instructions for treatment or self-care based on what is causing your pain.  If you have vomiting or diarrhea, sip water or other clear fluids. When you are ready to eat solid foods again,  start with small amounts of easy-to-digest, low-fat foods. These include apple sauce, toast, or crackers.   When to seek medical care  Call 911 or go to the hospital right away if you:  · Cant pass stool and are vomiting  · Are vomiting blood or have bloody diarrhea or black, tarry diarrhea  · Have chest, neck, or shoulder pain  · Feel like you might pass out  · Have pain in your shoulder blades with nausea  · Have sudden, severe belly pain  · Have new, severe pain unlike any you have felt before  · Have a belly that is rigid, hard, and tender to touch  Call your healthcare provider if you have:  · Pain for more than 5 days  · Bloating for more than 2 days  · Diarrhea for more than 5 days  · A fever of 100.4°F (38.0°C) or higher, or as directed by your provider  · Pain that gets worse  · Weight loss for no reason  · Continued lack of appetite  · Blood in your stool  How to prevent abdominal pain  Here are some tips to help prevent abdominal pain:  · Eat smaller amounts of food at one time.  · Avoid greasy, fried, or other high-fat foods.  · Avoid foods that give you gas.  · Exercise regularly.  · Drink plenty of fluids.  To help prevent GERD symptoms:  · Quit smoking.  · Reduce alcohol and certain foods that increase stomach acid.  · Avoid aspirin and over-the-counter pain and fever medicines (NSAIDS or nonsteroidal anti-inflammatory drugs), if possible  · Lose extra weight.  · Finish eating at least 2 hours before you go to bed or lie down.  · Raise the head of your bed.  Date Last Reviewed: 7/1/2016  © 5811-0470 [employer]. [address] [place] [address], [place] [address]. All rights reserved. This information is not intended as a substitute for professional medical care. Always follow your healthcare professional's instructions.

## 2018-02-05 NOTE — PROGRESS NOTES
Subjective:       Patient ID: Amena Anderson is a 52 y.o. female Body mass index is 47.13 kg/m².    Chief Complaint: Abdominal Pain (since egd in Dec has pain when eating)    This patient is new to me.  Established patient of Dr. Benitez.    Reports had mass in her thyroid that was removed at [facility] 2012. Over the past few months started with dysphagia. Had EGD and started 3 weeks ago with stomach pains after eating.      Abdominal Pain   This is a new problem. Episode onset: started 3 weeks ago. The problem occurs 2 to 4 times per day. Duration: lasts a few hours. The problem has been unchanged. The pain is located in the generalized abdominal region. The pain is at a severity of 5/10. The quality of the pain is sharp. The abdominal pain does not radiate. Associated symptoms include nausea and weight loss (trying to lose weight with dieting; lost about 100 lbs over the past few years). Pertinent negatives include no anorexia, belching, constipation, diarrhea, dysuria, fever, flatus, hematochezia, melena or vomiting. The pain is aggravated by eating. Relieved by: rest. She has tried proton pump inhibitors and H2 blockers (prilosec 40 mg BID, zantac 300 mg once daily, naproxen TID, Norco TID) for the symptoms. Prior diagnostic workup includes upper endoscopy. Her past medical history is significant for GERD. There is no history of Crohn's disease, gallstones, pancreatitis, PUD or ulcerative colitis.     Review of Systems   Constitutional: Positive for weight loss (trying to lose weight with dieting; lost about 100 lbs over the past few years). Negative for appetite change, chills, fatigue, fever and unexpected weight change.   HENT: Negative for sore throat and trouble swallowing (has resolved since EGD with dilation).    Respiratory: Negative for cough, choking and shortness of breath.    Cardiovascular: Negative for chest pain.   Gastrointestinal: Positive for abdominal pain and nausea. Negative for anal bleeding,  anorexia, blood in stool, constipation, diarrhea, flatus, hematochezia, melena, rectal pain and vomiting.        Bowel movements twice daily of formed stool.   Genitourinary: Negative for difficulty urinating, dysuria and flank pain.   Neurological: Negative for weakness.       Past Medical History:   Diagnosis Date    AC (acromioclavicular) joint bone spurs, unspecified laterality     bilateral    Anticoagulant long-term use     aspirin    Anxiety     Back pain     BMI 50.0-59.9, adult 2017    CHF (congestive heart failure)     Diabetes mellitus, type 2     Difficulty swallowing 10/18/2017    Dysphagia     H. pylori infection     Hoarseness 10/18/2017    Hormone replacement therapy (HRT)     Hypertension     Hypothyroidism     Insomnia     Mitral valve prolapse     per pt    Multinodular goiter 10/18/2017    MANUEL on CPAP      Past Surgical History:   Procedure Laterality Date     SECTION      Three times    COLONOSCOPY  2017    Dr. Benitez, repeat in 10 years    HYSTERECTOMY      total    LASIK      LUMBAR EPIDURAL INJECTION      OOPHORECTOMY      THYROIDECTOMY, PARTIAL      UPPER GASTROINTESTINAL ENDOSCOPY  2017    Dr. Benitez     Family History   Problem Relation Age of Onset    Throat cancer Maternal Uncle     Breast cancer Other     Colon cancer Neg Hx     Colon polyps Neg Hx     Crohn's disease Neg Hx     Ulcerative colitis Neg Hx     Stomach cancer Neg Hx     Esophageal cancer Neg Hx     Ovarian cancer Neg Hx      Wt Readings from Last 10 Encounters:   18 132.5 kg (292 lb)   18 132.7 kg (292 lb 8.8 oz)   17 134.3 kg (296 lb)   17 135 kg (297 lb 9.9 oz)   17 135.4 kg (298 lb 8.1 oz)   10/18/17 134.6 kg (296 lb 12.8 oz)   17 134.3 kg (296 lb)   17 134.3 kg (296 lb)   17 134.3 kg (296 lb)   17 134.7 kg (296 lb 15.4 oz)     Lab Results   Component Value Date    WBC 9.04 2008    HGB 11.8 (L)  04/04/2008    HCT 37.6 04/04/2008    MCV 83.6 04/04/2008     (H) 04/04/2008     CMP  Sodium   Date Value Ref Range Status   01/19/2017 142 136 - 145 mmol/L Final     Potassium   Date Value Ref Range Status   01/19/2017 4.1 3.5 - 5.1 mmol/L Final     Chloride   Date Value Ref Range Status   01/19/2017 105 95 - 110 mmol/L Final     CO2   Date Value Ref Range Status   01/19/2017 26 23 - 29 mmol/L Final     Glucose   Date Value Ref Range Status   01/19/2017 83 70 - 110 mg/dL Final     BUN, Bld   Date Value Ref Range Status   01/19/2017 11 6 - 20 mg/dL Final     Creatinine   Date Value Ref Range Status   01/19/2017 0.8 0.5 - 1.4 mg/dL Final     Calcium   Date Value Ref Range Status   01/19/2017 9.3 8.7 - 10.5 mg/dL Final     Total Protein   Date Value Ref Range Status   01/19/2017 7.9 6.0 - 8.4 g/dL Final     Albumin   Date Value Ref Range Status   01/19/2017 3.7 3.5 - 5.2 g/dL Final     Total Bilirubin   Date Value Ref Range Status   01/19/2017 0.4 0.1 - 1.0 mg/dL Final     Comment:     For infants and newborns, interpretation of results should be based  on gestational age, weight and in agreement with clinical  observations.  Premature Infant recommended reference ranges:  Up to 24 hours.............<8.0 mg/dL  Up to 48 hours............<12.0 mg/dL  3-5 days..................<15.0 mg/dL  6-29 days.................<15.0 mg/dL       Alkaline Phosphatase   Date Value Ref Range Status   01/19/2017 84 55 - 135 U/L Final     AST   Date Value Ref Range Status   01/19/2017 19 10 - 40 U/L Final     ALT   Date Value Ref Range Status   07/10/2017 14 10 - 44 U/L Final     Anion Gap   Date Value Ref Range Status   01/19/2017 11 8 - 16 mmol/L Final     eGFR if    Date Value Ref Range Status   01/19/2017 >60.0 >60 mL/min/1.73 m^2 Final     eGFR if non    Date Value Ref Range Status   01/19/2017 >60.0 >60 mL/min/1.73 m^2 Final     Comment:     Calculation used to obtain the estimated glomerular  "filtration  rate (eGFR) is the CKD-EPI equation. Since race is unknown   in our information system, the eGFR values for   -American and Non--American patients are given   for each creatinine result.       Lab Results   Component Value Date    TSH 3.055 10/10/2017     Reviewed prior medical records including radiology report of 11/13/17 modified barium swallow study & endoscopy history (see surgical history).    12/14/17 EGD was reviewed and procedure report states:   " Findings:       The oropharynx was normal.       The cricopharyngeus was normal. No Zenker's..       The examined esophagus was normal. (NERD?)       The Z-line was regular and was found 37 cm from the incisors.       Some laxness of the lower esophageal sphincter was noted, but I did        not see a definite hiatal hernia.       Minimal inflammation characterized by congestion (edema) and        erythema was found in the gastric fundus. Biopsies were taken with a        cold forceps for histology.       Thestomach was otherwise normal. Biopsies were taken from the antrum        with a cold forceps for Helicobacter pylori testing using CLOtest.        Biopsies were taken with a cold forceps for histology.       The examined duodenum was normal. Biopsies were taken with a cold        forceps for histology.       No endoscopic abnormality was evident in the esophagus to explain        the patient's complaint of dysphagia. It was decided, however, to        proceed with dilation of the entire esophagus. A guidewire was        placed and the scope was withdrawn. Dilation was performed with a        Savary dilator with no resistance at 54 Fr.  Impression:          - Normal oropharynx.                       - Normal cricopharyngeus.                       - Normal esophagus.                       - Z-line regular, 37 cm from the incisors.                       - No endoscopic esophageal abnormality to explain                        patient's " "dysphagia. Esophagus dilated, 54 Fr.                       - Non-erosive esophageal reflux (NERD) disease?.                       - The history / examination was suspicious for an                        esophageal spasm?.                       - Fundal Gastritis. Biopsied.                       - Normal stomach otherwise. Biopsied.                       - Normal examined duodenum. Biopsied.  Recommendation:      - Discharge patient to home.                       - Await pathology and CLOtest results.                       - Follow an antireflux regimen.                       - Use Prilosec (omeprazole) 40 mg PO BID for 2                        months.                       - Use Zantac (ranitidine) 300 mg PO nightly.                       - Return to GI clinic in 6-8 weeks.                       - If symptoms persist, next to perform a modified                        barium swallow. ".  Biopsy results:   "Supplemental Diagnosis  Immunohistochemical stains for Helicobacter species are performed on both specimens numbers 1 and 2 gastric  biopsies and is positive for Helicobacter species organisms in both biopsy specimens with satisfactory positive and  negative controls.  (Electronically Signed: 2017-12-20 14:54:58 )  Diagnosed by: Myranda Li M.D.  FINAL PATHOLOGIC DIAGNOSIS  Stomach, fundus, biopsy:  Marked chronic gastritis with lymphoid follicle formation and focal activity.  Immunohistochemical stain for Helicobacter species will be performed and results will follow in a supplemental  report.  2. Stomach, antrum, biopsy:  Marked chronic focally active gastritis.  Immunohistochemical stain for Helicobacter species will be performed and results will follow in a supplemental  report.  3. Duodenum, random biopsy:  Duodenal mucosa with gastric type foveolar metaplasia and chronic inflammation suggestive of peptic  duodenitis.  No evidence of dysplasia or malignancy."    8/21/17 Colonoscopy was reviewed and " "procedure report states:   " Findings:       The perianal and digital rectal examinations were normal. Pertinent        negatives include normal sphincter tone and no palpable rectal        lesions.       Non-bleeding internal hemorrhoids were found during retroflexion.        The hemorrhoids were small and Grade I (internal hemorrhoids that do        not prolapse).       No additional abnormalities were found on retroflexion.       The colon appeared normal.       The terminal ileum appeared normal.  Impression:          - Non-bleeding internal hemorrhoids.                       - The entire examined colon is normal.                       - The examined portion of the ileum was normal.                       - No specimens collected.  Recommendation:      - Discharge patient to home.                       - High fiber diet.                       - Repeat colonoscopy in 10 years for screening                        purposes.                       - Continue present medications.                       - Patient has a contact number available for                        emergencies. The signs and symptoms of potential                        delayed complications were discussed with the                        patient. Return to normal activities tomorrow.                        Written discharge instructions were provided to the                        patient.                       - Return to normal activities tomorrow. ".     Objective:      Physical Exam   Constitutional: She is oriented to person, place, and time. She appears well-developed and well-nourished. No distress.   HENT:   Mouth/Throat: Oropharynx is clear and moist and mucous membranes are normal. No oral lesions. No oropharyngeal exudate.   Eyes: Conjunctivae are normal. Pupils are equal, round, and reactive to light. No scleral icterus.   Cardiovascular: Normal rate.    Pulmonary/Chest: Effort normal and breath sounds normal. No respiratory distress. She " has no wheezes.   Abdominal: Normal appearance and bowel sounds are normal. She exhibits no distension, no abdominal bruit and no mass. There is no hepatosplenomegaly. There is tenderness (mild) in the right lower quadrant, suprapubic area and left lower quadrant. There is no rigidity, no rebound and no guarding. No hernia.   Neurological: She is alert and oriented to person, place, and time.   Skin: Skin is warm and dry. No rash noted. She is not diaphoretic. No erythema. No pallor.   Non-jaundiced   Psychiatric: She has a normal mood and affect. Her behavior is normal. Judgment and thought content normal.   Nursing note and vitals reviewed.      Assessment:       1. Generalized abdominal pain    2. History of Helicobacter pylori infection    3. Dyspepsia    4. History of gastritis        Plan:       Generalized abdominal pain  -     H. pylori antigen, stool; Future; Expected date: 02/05/2018  -     US Abdomen Complete; Future; Expected date: 02/05/2018  -     CBC auto differential; Future; Expected date: 02/05/2018  -     Comprehensive metabolic panel; Future; Expected date: 02/05/2018  -     Amylase; Future; Expected date: 02/05/2018  -     Lipase; Future; Expected date: 02/05/2018  -     X-Ray Abdomen Flat And Erect; Future; Expected date: 02/05/2018  -  START   dicyclomine (BENTYL) 10 MG capsule; Take 1 capsule (10 mg total) by mouth every 6 (six) hours as needed. PRN Abdominal pain/cramping  Dispense: 90 capsule; Refill: 1  - possible ct scan pending results of testing and if symptoms persist, patient verbalized understanding and reports will follow-up as directed     History of Helicobacter pylori infection  -     H. pylori antigen, stool; Future; Expected date: 02/05/2018    History of gastritis & Dyspepsia  -     H. pylori antigen, stool; Future; Expected date: 02/05/2018  -     X-Ray Abdomen Flat And Erect; Future; Expected date: 02/05/2018  - CONTINUE ZANTAC 300 MG ONCE DAILY AS DIRECTED  - CONTINUE  PRILOSEC 40 MG BID AS DIRECTED, take in the morning 30-60 minutes before a meal, discussed about possible long term use of medication (prefer to use lowest effective dose or discontinuing if possible) and discussed the risks & benefits with taking a reflux medication long term, and to take OTC calcium and vitamin d supplements as directed (such as Citracal +D), pt verbalized understanding  -discussed about the different types of medications used to treat reflux and how to use them, antacids can be used PRN for breakthrough heartburn symptoms by reducing stomach acid that is already produced, H2 blockers (zantac) work by limiting the amount acid production, & PPI's work to block acid production and are taken daily, patient verbalized understanding.  -Educated patient on lifestyle modifications to help control/reduce reflux/abdominal pain including: avoid large meals, avoid eating within 2-3 hours of bedtime (avoid late night eating & lying down soon after eating), elevate head of bed if nocturnal symptoms are present, smoking cessation (if current smoker), & weight loss (if overweight).   -Educated to avoid known foods which trigger reflux symptoms & to minimize/avoid high-fat foods, chocolate, caffeine, citrus, alcohol, & tomato products.  -Advised to avoid/limit use of NSAID's, since they can cause GI upset, bleeding, and/or ulcers. If needed, take with food.     Follow-up in about 1 month (around 3/5/2018), or if symptoms worsen or fail to improve.      If no improvement in symptoms or symptoms worsen, call/follow-up at clinic or go to ER.

## 2018-02-05 NOTE — TELEPHONE ENCOUNTER
Routine blood work ordered.  Would like to have this in last Feb or March.  Orders in.  Medications refilled.    MARGARITO

## 2018-02-06 ENCOUNTER — OFFICE VISIT (OUTPATIENT)
Dept: OBSTETRICS AND GYNECOLOGY | Facility: CLINIC | Age: 53
End: 2018-02-06
Payer: MEDICARE

## 2018-02-06 ENCOUNTER — TELEPHONE (OUTPATIENT)
Dept: GASTROENTEROLOGY | Facility: CLINIC | Age: 53
End: 2018-02-06

## 2018-02-06 VITALS
DIASTOLIC BLOOD PRESSURE: 92 MMHG | BODY MASS INDEX: 47.09 KG/M2 | HEIGHT: 66 IN | SYSTOLIC BLOOD PRESSURE: 142 MMHG | WEIGHT: 293 LBS

## 2018-02-06 DIAGNOSIS — B35.4 TINEA CORPORIS: Primary | ICD-10-CM

## 2018-02-06 DIAGNOSIS — E11.42 TYPE 2 DIABETES MELLITUS WITH DIABETIC POLYNEUROPATHY, WITHOUT LONG-TERM CURRENT USE OF INSULIN: ICD-10-CM

## 2018-02-06 DIAGNOSIS — E89.40 SURGICAL MENOPAUSE: ICD-10-CM

## 2018-02-06 PROCEDURE — 3008F BODY MASS INDEX DOCD: CPT | Mod: S$GLB,,, | Performed by: OBSTETRICS & GYNECOLOGY

## 2018-02-06 PROCEDURE — 99214 OFFICE O/P EST MOD 30 MIN: CPT | Mod: S$GLB,,, | Performed by: OBSTETRICS & GYNECOLOGY

## 2018-02-06 PROCEDURE — 99999 PR PBB SHADOW E&M-EST. PATIENT-LVL III: CPT | Mod: PBBFAC,,, | Performed by: OBSTETRICS & GYNECOLOGY

## 2018-02-06 PROCEDURE — 99499 UNLISTED E&M SERVICE: CPT | Mod: S$GLB,,, | Performed by: OBSTETRICS & GYNECOLOGY

## 2018-02-06 RX ORDER — ESTRADIOL 1 MG/1
TABLET ORAL
Qty: 90 TABLET | Refills: 3 | Status: SHIPPED | OUTPATIENT
Start: 2018-02-06 | End: 2019-02-24 | Stop reason: SDUPTHER

## 2018-02-06 RX ORDER — NYSTATIN 100000 [USP'U]/G
POWDER TOPICAL
Qty: 60 G | Refills: 3 | Status: SHIPPED | OUTPATIENT
Start: 2018-02-06 | End: 2019-05-14 | Stop reason: SDUPTHER

## 2018-02-06 NOTE — PROGRESS NOTES
"Chief Complaint   Patient presents with    Medication Refill       History of Present Illness   52 y.o. -American Female patient presents today for follow up rash and vaginal itching on and off for months, better on oral estrogen and nystatin powder, no powder around vaginal area. Poorly controlled diabetic. Also request refill oral estorgen, unable to place cream because of obesity. Up to date  On mammogram,  itchy rash below breasts.     Past medical and surgical history reviewed.   I have reviewed the patient's medical history in detail and updated the computerized patient record.    Review of patient's allergies indicates:  No Known Allergies      Review of Systems - Negative except HPI  GEN ROS: negative for - chills or fever  Breast ROS: negative for breast lumps  Genito-Urinary ROS: no dysuria, trouble voiding, or hematuria      Physical Examination:  BP (!) 142/92   Ht 5' 6" (1.676 m)   Wt 133.1 kg (293 lb 6.9 oz)   BMI 47.36 kg/m²    Constitutional: She is oriented to person, place, and time. She appears well-developed and well-nourished. No distress. Morbid obesity  HENT:   Head: Normocephalic and atraumatic.   Eyes: Conjunctivae and EOM are normal. No scleral icterus.   Neck: Normal range of motion. Neck supple. No tracheal deviation present.   Breasts: are symmetrical. Hyperpigmented tinea rash below both breasts. Rash improved below breasts. No masses , skin changes or nipple discharge bilaterally.  Abdominal: Soft. She exhibits no distension and no mass. There is no tenderness. There is no rebound and no guarding.   Genitourinary:    External rectal exam shows no thrombosed external hemorrhoids.    Pelvic exam was performed with patient supine.   No labial fusion.   There is  Tinea rash, no lesion or injury on the right labia.   There is tinea rash, lesion or injury on the left labia.   No bleeding and no signs of injury around the vaginal introitus, urethra is without lesions and well " supported.    thick white vaginal discharge found.    No significant Cystocele, Enterocele or rectocele, and cuff well supported.   Bimanual exam:   The urethra and vagina are without palpable masses or tenderness.remainder deferred.   Musculoskeletal: Normal range of motion.   Lymphadenopathy: No inguinal adenopathy present.   Neurological: She is alert and oriented to person, place, and time. Coordination normal.   Skin: Skin is warm and dry. She is not diaphoretic.   Psychiatric: She has a normal mood and affect.          Assessment:  Tinea corporis  Vaginal atrophy- improved on oral estrogen, counseled on Risks, Benefits and Alternatives, discused with patient in detail, all questions answered and patient agreed to proceed.    Surgical menopause    Plan:  Tinea powder as needed  Refill oral estrogen  Topical nystatin powder as needed  Follow up 1 year or as needed  Up to date  On mammogram

## 2018-02-06 NOTE — TELEPHONE ENCOUNTER
Please call to inform & review the results with the patient- radiology report of the Abdominal x-ray showed unremarkable bowel gas pattern, no signs of intestinal obstruction and a large amount of stool in the colon which suggest constipation. Recommend high fiber diet (20-30 grams of fiber daily)/OTC fiber supplements daily as directed.  Can try one time dose of OTC magnesium citrate (295ml- take as directed) to help clear the stool out of the colon. Otherwise, continue previous recommendations as discussed.  Blood work showed normal electrolytes, kidney and liver function levels; normal pancreatic enzymes; and stable blood counts (no anemia).  Continue with previous recommendations. If no improvement in symptoms or symptoms worsen, call/follow-up at clinic or go to ER.  Please release results to patient's mychart once you have discussed results and recommendations with patient.  Thanks,  Virgie CRABTREE

## 2018-02-12 ENCOUNTER — HOSPITAL ENCOUNTER (OUTPATIENT)
Dept: RADIOLOGY | Facility: HOSPITAL | Age: 53
Discharge: HOME | End: 2018-02-12
Attending: NURSE PRACTITIONER
Payer: MEDICARE

## 2018-02-12 DIAGNOSIS — R10.84 GENERALIZED ABDOMINAL PAIN: ICD-10-CM

## 2018-02-12 PROCEDURE — 76700 US EXAM ABDOM COMPLETE: CPT | Mod: TC,PO

## 2018-02-12 PROCEDURE — 76700 US EXAM ABDOM COMPLETE: CPT | Mod: 26,,, | Performed by: RADIOLOGY

## 2018-02-15 ENCOUNTER — TELEPHONE (OUTPATIENT)
Dept: GASTROENTEROLOGY | Facility: CLINIC | Age: 53
End: 2018-02-15

## 2018-02-15 DIAGNOSIS — K83.9 BILE DUCT ABNORMALITY: Primary | ICD-10-CM

## 2018-02-15 DIAGNOSIS — K76.0 FATTY LIVER: ICD-10-CM

## 2018-02-15 DIAGNOSIS — F40.240 CLAUSTROPHOBIA: ICD-10-CM

## 2018-02-15 DIAGNOSIS — R10.84 GENERALIZED ABDOMINAL PAIN: ICD-10-CM

## 2018-02-15 NOTE — TELEPHONE ENCOUNTER
I prescribe xanax 0.25 m tablet to be taken 1 hour prior and 1 tablet at time of procedure. Patient has xanax 1 mg on medication list. I recommend she take 1 mg xanax one hour prior to scan and recommend someone drive her home.  Thanks  LAURY

## 2018-02-15 NOTE — TELEPHONE ENCOUNTER
Please call to inform & review the results with the patient- radiology report of the abdominal ultrasound showed enlarged fatty liver. For fatty liver recommend: low fat, low cholesterol diet, maintain good control of blood sugars and cholesterol levels, exercise, weight loss, minimize/avoid alcohol and tylenol products, & follow-up with PCP for continued evaluation and management; if specialist is needed, recommend seeing hepatology.  Also the bile duct was mildly prominent (normal LFTs on 2/5/18). Recommend MRCP to further evaluate this finding. Please verify that the patient does not have a pacemaker/defibrillator, metal implant, cerebral aneurysm or surgical clip, pump, middle or inner ear prosthetic, or nerve or brain stimulator, if so, please notify me so I can adjust the order accordingly. Please ask if patient is claustrophobic, if so, let me know.    Otherwise unremarkable findings.  Continue with previous recommendations. If no improvement in symptoms or symptoms worsen, call/follow-up at clinic or go to ER.  Please release results to patient's mychart once you have discussed results and recommendations with patient.  Thanks,  Virgie CRABTREE

## 2018-02-19 ENCOUNTER — LAB VISIT (OUTPATIENT)
Dept: LAB | Facility: HOSPITAL | Age: 53
End: 2018-02-19
Attending: NURSE PRACTITIONER
Payer: MEDICARE

## 2018-02-19 ENCOUNTER — OFFICE VISIT (OUTPATIENT)
Dept: PODIATRY | Facility: CLINIC | Age: 53
End: 2018-02-19
Payer: MEDICARE

## 2018-02-19 VITALS — HEIGHT: 66 IN | WEIGHT: 293 LBS | BODY MASS INDEX: 47.09 KG/M2

## 2018-02-19 DIAGNOSIS — R10.13 DYSPEPSIA: ICD-10-CM

## 2018-02-19 DIAGNOSIS — M25.572 PAIN AND SWELLING OF LEFT ANKLE: Primary | ICD-10-CM

## 2018-02-19 DIAGNOSIS — M76.72 PERONEAL TENDONITIS OF LEFT LOWER EXTREMITY: ICD-10-CM

## 2018-02-19 DIAGNOSIS — M25.472 PAIN AND SWELLING OF LEFT ANKLE: Primary | ICD-10-CM

## 2018-02-19 DIAGNOSIS — B35.3 TINEA PEDIS OF BOTH FEET: ICD-10-CM

## 2018-02-19 DIAGNOSIS — Z86.19 HISTORY OF HELICOBACTER PYLORI INFECTION: ICD-10-CM

## 2018-02-19 DIAGNOSIS — R10.84 GENERALIZED ABDOMINAL PAIN: ICD-10-CM

## 2018-02-19 DIAGNOSIS — B35.1 ONYCHOMYCOSIS DUE TO DERMATOPHYTE: ICD-10-CM

## 2018-02-19 PROCEDURE — 3008F BODY MASS INDEX DOCD: CPT | Mod: S$GLB,,, | Performed by: PODIATRIST

## 2018-02-19 PROCEDURE — 99212 OFFICE O/P EST SF 10 MIN: CPT | Mod: S$GLB,,, | Performed by: PODIATRIST

## 2018-02-19 PROCEDURE — 99999 PR PBB SHADOW E&M-EST. PATIENT-LVL III: CPT | Mod: PBBFAC,,, | Performed by: PODIATRIST

## 2018-02-19 PROCEDURE — 87338 HPYLORI STOOL AG IA: CPT

## 2018-02-21 NOTE — PROGRESS NOTES
Subjective:      Patient ID: Amena Anderson is a 52 y.o. female.    Chief Complaint: Follow-up (1 mo re-ck ); Foot Pain (left foot); Nail Problem (right great - turning dark); and Diabetes Mellitus (PCP:  Dr Miller 5/22/17; HgbA1c: 11/27/17  5.5)    Sharp pain lateral heel/ankle left.  Sudden onset with fall around 9/8/17, she was seen by Dr. Davis around that time. She was in an orthopedic boot for a couple weeks but when she started to feel better came out of the boot on her own. The lateral left ankle has been bothering her more and more ever since. She relates increased pain with weight bearing, better with rest.     Also here for a routine diabetic foot exam and checkup, relates some burning and tingling sensations at times. She also relates thick and discolored nails and dry flaking skin to her feet. Denies wounds, change in color or drainage.     2/19/18: Patient returns for 1 month follow up left ankle pain. Ambulating in the boot, she relates significant improvement with the boot and the medrol dose pack. She relates she is still in 6/10 pain however. No new pedal complaints.       Review of Systems   Constitution: Negative for chills, diaphoresis, fever, malaise/fatigue and night sweats.   Cardiovascular: Positive for leg swelling. Negative for claudication, cyanosis and syncope.   Skin: Negative for color change, dry skin, nail changes, rash, suspicious lesions and unusual hair distribution.   Musculoskeletal: Positive for joint pain. Negative for falls, joint swelling, muscle cramps, muscle weakness and stiffness.   Gastrointestinal: Negative for constipation, diarrhea, nausea and vomiting.   Neurological: Positive for paresthesias. Negative for brief paralysis, disturbances in coordination, focal weakness, numbness, sensory change and tremors.           Objective:      Physical Exam   Constitutional: She appears well-developed and well-nourished. She is cooperative. No distress.   Cardiovascular:    Pulses:       Popliteal pulses are 2+ on the right side, and 2+ on the left side.        Dorsalis pedis pulses are 2+ on the right side, and 2+ on the left side.        Posterior tibial pulses are 2+ on the right side, and 2+ on the left side.   Capillary refill 3 seconds all toes/distal feet, all toes/both feet warm to touch.      Negative lymphadenopathy bilateral popliteal fossa and tarsal tunnel.      Positive lower extremity edema bilateral.     Musculoskeletal:        Right ankle: Normal. She exhibits normal range of motion, no swelling, no ecchymosis, no deformity, no laceration and normal pulse. Achilles tendon normal. Achilles tendon exhibits no pain, no defect and normal De La Cruz's test results.   Moderate pain to palpation lateral left ankle at cfl and atfl without instability deformity or loss of function. Also has pain along the peroneal tendons to the brevis insertion.    Ankle dorsiflexion decreased at <10 degrees bilateral with moderate increase with knee flexion bilateral. There is tenderness to palpation bilateral achilles attachments on the calcaneus.    Ankle bilateral has negative anterior drawer sign, negative posterior drawer sign, negative external rotation test, negative squeeze test.    Otherwise, Normal angle, base, station of gait. All ten toes without clubbing, cyanosis, or signs of ischemia.  No pain to palpation bilateral lower extremities.  Range of motion, stability, muscle strength, and muscle tone normal bilateral feet and legs.     Feet:   Right Foot:   Protective Sensation: 10 sites tested. 9 sites sensed.   Left Foot:   Protective Sensation: 10 sites tested. 9 sites sensed.   Lymphadenopathy:   Negative lymphadenopathy bilateral popliteal fossa and tarsal tunnel.   Neurological: She is alert. She has normal strength. She displays no atrophy and no tremor. No sensory deficit. She exhibits normal muscle tone. She displays no seizure activity. Gait normal.   Reflex Scores:        Patellar reflexes are 2+ on the right side and 2+ on the left side.       Achilles reflexes are 2+ on the right side and 2+ on the left side.  Negative tinel sign to percussion sural, superficial peroneal, deep peroneal, saphenous, and posterior tibial nerves right and left ankles and feet.     Skin: Skin is warm, dry and intact. No abrasion, no bruising, no burn, no ecchymosis, no laceration, no lesion and no rash noted. She is not diaphoretic. No cyanosis. No pallor. Nails show no clubbing.     No ulceration, hyperpigmentation, discoloration, masses nodules or cords palpated.  No ecchymosis, erythema, edema, or cardinal signs of infection bilateral lower extremities.    Dry scale with superficial flakes over an erythematous base  without ulceration, drainage, pus, tracking, fluctuance, malodor, or cardinal signs infection.    Nails 1-5 bilateral are thick 3-4 mm, long 3-6 mm, and discolored with subungual debris                   Assessment:       Encounter Diagnoses   Name Primary?    Pain and swelling of left ankle Yes    Onychomycosis due to dermatophyte     Peroneal tendonitis of left lower extremity     Tinea pedis of both feet          Plan:       Amena was seen today for follow-up, foot pain, nail problem and diabetes mellitus.    Diagnoses and all orders for this visit:    Pain and swelling of left ankle    Onychomycosis due to dermatophyte    Peroneal tendonitis of left lower extremity    Tinea pedis of both feet      I counseled the patient on her conditions, their implications and medical management.      Patient will stretch the tendo achilles complex three times daily as demonstrated in the office.  Literature was dispensed illustrating proper stretching technique.    Patient will go back into the orthopedic boot for the 2-3 weeks then slowly transition to a supportive tennis shoe with an AFO ankle brace and will consider PT.    Continue Ciclopirox cream BID for tinea    Discussed importance of  healthy eating and weight loss as pertaining to her diabetes control and foot stress/pain      If pain is not resolving I will order an MRI to further evaluate the lateral ankle ligaments and the peroneal tendons.    Follow up in 2 weeks    Edson Martinez DPM

## 2018-02-24 LAB — H PYLORI AG STL QL: NOT DETECTED

## 2018-03-01 ENCOUNTER — TELEPHONE (OUTPATIENT)
Dept: FAMILY MEDICINE | Facility: CLINIC | Age: 53
End: 2018-03-01

## 2018-03-01 NOTE — TELEPHONE ENCOUNTER
Spoke with Ms Tejeda she will come in and let michela see a lump on back of head. She is requesting to get xray at same time of her mri. She will need orders. She needs to see someone first. Appointment made.

## 2018-03-01 NOTE — TELEPHONE ENCOUNTER
----- Message from Edna Aguilar sent at 3/1/2018  3:25 PM CST -----  Contact: Pt  Pt is returning a call. Pt can be reached at [phone] (kmdw)

## 2018-03-01 NOTE — TELEPHONE ENCOUNTER
----- Message from Zenia Rees sent at 3/1/2018  2:02 PM CST -----  Contact: patient  Patient called requesting order for xray for head while having mri 03/12/18 done.call back at [phone] to advise

## 2018-03-12 ENCOUNTER — HOSPITAL ENCOUNTER (OUTPATIENT)
Dept: RADIOLOGY | Facility: HOSPITAL | Age: 53
Discharge: HOME | End: 2018-03-12
Attending: NURSE PRACTITIONER
Payer: MEDICARE

## 2018-03-12 DIAGNOSIS — K76.0 FATTY LIVER: ICD-10-CM

## 2018-03-12 DIAGNOSIS — K83.9 BILE DUCT ABNORMALITY: ICD-10-CM

## 2018-03-12 DIAGNOSIS — R10.84 GENERALIZED ABDOMINAL PAIN: ICD-10-CM

## 2018-03-12 PROCEDURE — 74181 MRI ABDOMEN W/O CONTRAST: CPT | Mod: 26,,, | Performed by: RADIOLOGY

## 2018-03-12 PROCEDURE — 76376 3D RENDER W/INTRP POSTPROCES: CPT | Mod: TC,PO

## 2018-03-12 PROCEDURE — 76376 3D RENDER W/INTRP POSTPROCES: CPT | Mod: 26,,, | Performed by: RADIOLOGY

## 2018-03-12 PROCEDURE — 74181 MRI ABDOMEN W/O CONTRAST: CPT | Mod: TC,PO

## 2018-03-13 ENCOUNTER — TELEPHONE (OUTPATIENT)
Dept: GASTROENTEROLOGY | Facility: CLINIC | Age: 53
End: 2018-03-13

## 2018-03-13 NOTE — TELEPHONE ENCOUNTER
Please call to inform & review the results with the patient- radiology report of the MRI/MRCP showed fatty liver (seen on previous imaging). Otherwise unremarkable findings including the common bile duct.  Continue with previous recommendations. If no improvement in symptoms or symptoms worsen, call/follow-up at clinic or go to ER.  Please release results to patient's mychart once you have discussed results and recommendations with patient.  Thanks,  Virgie CRABTREE

## 2018-03-19 ENCOUNTER — OFFICE VISIT (OUTPATIENT)
Dept: PODIATRY | Facility: CLINIC | Age: 53
End: 2018-03-19
Payer: MEDICARE

## 2018-03-19 VITALS — BODY MASS INDEX: 47.09 KG/M2 | HEIGHT: 66 IN | WEIGHT: 293 LBS

## 2018-03-19 DIAGNOSIS — G89.29 CHRONIC PAIN OF LEFT ANKLE: ICD-10-CM

## 2018-03-19 DIAGNOSIS — M25.572 CHRONIC PAIN OF LEFT ANKLE: ICD-10-CM

## 2018-03-19 DIAGNOSIS — M24.573 EQUINUS CONTRACTURE OF ANKLE: ICD-10-CM

## 2018-03-19 DIAGNOSIS — M25.472 PAIN AND SWELLING OF LEFT ANKLE: Primary | ICD-10-CM

## 2018-03-19 DIAGNOSIS — M25.572 PAIN AND SWELLING OF LEFT ANKLE: Primary | ICD-10-CM

## 2018-03-19 PROCEDURE — 99999 PR PBB SHADOW E&M-EST. PATIENT-LVL III: CPT | Mod: PBBFAC,,, | Performed by: PODIATRIST

## 2018-03-19 PROCEDURE — 99499 UNLISTED E&M SERVICE: CPT | Mod: S$GLB,,, | Performed by: PODIATRIST

## 2018-03-19 PROCEDURE — 99213 OFFICE O/P EST LOW 20 MIN: CPT | Mod: S$GLB,,, | Performed by: PODIATRIST

## 2018-03-19 NOTE — PROGRESS NOTES
Subjective:      Patient ID: Amena Anderson is a 52 y.o. female.    Chief Complaint: Follow-up (left foot pain - 1 mo re-ck - some improvement) and Diabetes Mellitus (PCP:  Dr Miller 11/27/17; HgbA1c: 11/27/17  5.5)    Sharp pain lateral heel/ankle left.  Sudden onset with fall around 9/8/17, she was seen by Dr. Davis around that time. She was in an orthopedic boot for a couple weeks but when she started to feel better came out of the boot on her own. The lateral left ankle has been bothering her more and more ever since. She relates increased pain with weight bearing, better with rest.     Also here for a routine diabetic foot exam and checkup, relates some burning and tingling sensations at times. She also relates thick and discolored nails and dry flaking skin to her feet. Denies wounds, change in color or drainage.     2/19/18: Patient returns for 1 month follow up left ankle pain. Ambulating in the boot, she relates significant improvement with the boot and the medrol dose pack. She relates she is still in 6/10 pain however. No new pedal complaints.     3/19/18: Patient returns for follow up left ankle pain. Ambulating in the boot and feels much better, still noting pain when not wearing the boot, stays in the boot as much as possible. No other pedal complaints.      Review of Systems   Constitution: Negative for chills, diaphoresis, fever, malaise/fatigue and night sweats.   Cardiovascular: Positive for leg swelling. Negative for claudication, cyanosis and syncope.   Skin: Negative for color change, dry skin, nail changes, rash, suspicious lesions and unusual hair distribution.   Musculoskeletal: Positive for joint pain. Negative for falls, joint swelling, muscle cramps, muscle weakness and stiffness.   Gastrointestinal: Negative for constipation, diarrhea, nausea and vomiting.   Neurological: Positive for paresthesias. Negative for brief paralysis, disturbances in coordination, focal weakness, numbness,  sensory change and tremors.           Objective:      Physical Exam   Constitutional: She is oriented to person, place, and time. She appears well-developed and well-nourished. She is cooperative. No distress.   Cardiovascular:   Pulses:       Popliteal pulses are 2+ on the right side, and 2+ on the left side.        Dorsalis pedis pulses are 2+ on the right side, and 2+ on the left side.        Posterior tibial pulses are 2+ on the right side, and 2+ on the left side.   Capillary refill 3 seconds all toes/distal feet, all toes/both feet warm to touch.      Negative lymphadenopathy bilateral popliteal fossa and tarsal tunnel.      Positive lower extremity edema bilateral.     Musculoskeletal:        Right ankle: Normal. She exhibits normal range of motion, no swelling, no ecchymosis, no deformity, no laceration and normal pulse. Achilles tendon normal. Achilles tendon exhibits no pain, no defect and normal De La Cruz's test results.   Improving pain to palpation lateral left ankle at cfl and atfl without instability deformity or loss of function. Also has pain along the peroneal tendons to the brevis insertion.    Ankle dorsiflexion decreased at <10 degrees bilateral with moderate increase with knee flexion bilateral. There is tenderness to palpation bilateral achilles attachments on the calcaneus.    Ankle bilateral has negative anterior drawer sign, negative posterior drawer sign, negative external rotation test, negative squeeze test.    Otherwise, Normal angle, base, station of gait. All ten toes without clubbing, cyanosis, or signs of ischemia.  No pain to palpation bilateral lower extremities.  Range of motion, stability, muscle strength, and muscle tone normal bilateral feet and legs.     Feet:   Right Foot:   Protective Sensation: 10 sites tested. 9 sites sensed.   Left Foot:   Protective Sensation: 10 sites tested. 9 sites sensed.   Lymphadenopathy:   Negative lymphadenopathy bilateral popliteal fossa and  tarsal tunnel.   Neurological: She is alert and oriented to person, place, and time. She has normal strength. She displays no atrophy and no tremor. No sensory deficit. She exhibits normal muscle tone. She displays no seizure activity. Gait normal.   Negative tinel sign to percussion sural, superficial peroneal, deep peroneal, saphenous, and posterior tibial nerves right and left ankles and feet.     Skin: Skin is warm, dry and intact. No abrasion, no bruising, no burn, no ecchymosis, no laceration, no lesion, no petechiae and no rash noted. She is not diaphoretic. No cyanosis or erythema. No pallor. Nails show no clubbing.     No ulceration, hyperpigmentation, discoloration, masses nodules or cords palpated.  No ecchymosis, erythema, edema, or cardinal signs of infection bilateral lower extremities.    Dry scale with superficial flakes over an erythematous base  without ulceration, drainage, pus, tracking, fluctuance, malodor, or cardinal signs infection.    Nails 1-5 bilateral are thick 3-4 mm, long 3-6 mm, and discolored with subungual debris         Psychiatric: She has a normal mood and affect. Her behavior is normal.             Assessment:       Encounter Diagnoses   Name Primary?    Pain and swelling of left ankle Yes    Chronic pain of left ankle     Equinus contracture of ankle          Plan:       Amena was seen today for follow-up and diabetes mellitus.    Diagnoses and all orders for this visit:    Pain and swelling of left ankle    Chronic pain of left ankle    Equinus contracture of ankle      I counseled the patient on her conditions, their implications and medical management.      Patient will stretch the tendo achilles complex three times daily as demonstrated in the office.  Literature was dispensed illustrating proper stretching technique.    Patient will slowly transition to a supportive tennis shoe with an AFO ankle brace and will consider PT.    Tinea has resolved with loprox  treatment    Discussed importance of healthy eating and weight loss as pertaining to her diabetes control and foot stress/pain      If pain is not continuing to resolve after returning to the ankle brace I will order an MRI to further evaluate the lateral ankle ligaments and the peroneal tendons.    Dispensed, fitted and gait trained with ankle brace. Instructed to wear with supportive shoe at all times when placing weight on the foot.    Follow up in 2 months    Edson Martinez DPM

## 2018-03-26 ENCOUNTER — TELEPHONE (OUTPATIENT)
Dept: ENDOCRINOLOGY | Facility: CLINIC | Age: 53
End: 2018-03-26

## 2018-03-26 NOTE — TELEPHONE ENCOUNTER
----- Message from Criss Wright sent at 3/26/2018  3:46 PM CDT -----  Pt does not have transportation for Monday 04/02  /asking to schedule for Wednesday or Thursday ... Call pt at [phone]

## 2018-03-28 ENCOUNTER — HOSPITAL ENCOUNTER (OUTPATIENT)
Dept: RADIOLOGY | Facility: HOSPITAL | Age: 53
Discharge: HOME | End: 2018-03-28
Attending: INTERNAL MEDICINE
Payer: MEDICARE

## 2018-03-28 DIAGNOSIS — R13.10 DYSPHAGIA, UNSPECIFIED TYPE: ICD-10-CM

## 2018-03-28 DIAGNOSIS — E04.2 MULTINODULAR GOITER: ICD-10-CM

## 2018-03-28 DIAGNOSIS — E03.9 HYPOTHYROIDISM, UNSPECIFIED TYPE: Chronic | ICD-10-CM

## 2018-03-28 DIAGNOSIS — R49.0 HOARSENESS: ICD-10-CM

## 2018-03-28 PROCEDURE — 76536 US EXAM OF HEAD AND NECK: CPT | Mod: TC,PO

## 2018-03-28 PROCEDURE — 76536 US EXAM OF HEAD AND NECK: CPT | Mod: 26,,, | Performed by: RADIOLOGY

## 2018-04-02 RX ORDER — AMITRIPTYLINE HYDROCHLORIDE 25 MG/1
TABLET, FILM COATED ORAL
Qty: 90 TABLET | Refills: 1 | Status: SHIPPED | OUTPATIENT
Start: 2018-04-02 | End: 2018-06-05 | Stop reason: SDUPTHER

## 2018-04-05 DIAGNOSIS — F41.9 ANXIETY: ICD-10-CM

## 2018-04-05 PROBLEM — R13.10 DYSPHAGIA: Status: RESOLVED | Noted: 2017-12-14 | Resolved: 2018-04-05

## 2018-04-05 PROBLEM — R49.0 HOARSENESS: Status: RESOLVED | Noted: 2017-10-18 | Resolved: 2018-04-05

## 2018-04-05 PROBLEM — R13.14 DYSPHAGIA, PHARYNGOESOPHAGEAL: Status: RESOLVED | Noted: 2017-10-18 | Resolved: 2018-04-05

## 2018-04-05 RX ORDER — ALPRAZOLAM 1 MG/1
TABLET ORAL
Qty: 60 TABLET | Refills: 0 | Status: SHIPPED | OUTPATIENT
Start: 2018-04-05 | End: 2018-06-05 | Stop reason: SDUPTHER

## 2018-04-13 ENCOUNTER — OFFICE VISIT (OUTPATIENT)
Dept: ENDOCRINOLOGY | Facility: CLINIC | Age: 53
End: 2018-04-13
Payer: MEDICARE

## 2018-04-13 VITALS
SYSTOLIC BLOOD PRESSURE: 138 MMHG | HEIGHT: 66 IN | DIASTOLIC BLOOD PRESSURE: 88 MMHG | BODY MASS INDEX: 47.09 KG/M2 | HEART RATE: 81 BPM | WEIGHT: 293 LBS

## 2018-04-13 DIAGNOSIS — E04.1 THYROID CYST: ICD-10-CM

## 2018-04-13 DIAGNOSIS — E03.9 HYPOTHYROIDISM, UNSPECIFIED TYPE: Primary | ICD-10-CM

## 2018-04-13 PROCEDURE — 99214 OFFICE O/P EST MOD 30 MIN: CPT | Mod: S$GLB,,, | Performed by: INTERNAL MEDICINE

## 2018-04-13 PROCEDURE — 3079F DIAST BP 80-89 MM HG: CPT | Mod: CPTII,S$GLB,, | Performed by: INTERNAL MEDICINE

## 2018-04-13 PROCEDURE — 3075F SYST BP GE 130 - 139MM HG: CPT | Mod: CPTII,S$GLB,, | Performed by: INTERNAL MEDICINE

## 2018-04-13 PROCEDURE — 99999 PR PBB SHADOW E&M-EST. PATIENT-LVL III: CPT | Mod: PBBFAC,,, | Performed by: INTERNAL MEDICINE

## 2018-04-13 RX ORDER — LEVOTHYROXINE SODIUM 200 UG/1
200 TABLET ORAL DAILY
Qty: 90 TABLET | Refills: 3 | Status: SHIPPED | OUTPATIENT
Start: 2018-04-13 | End: 2019-04-10

## 2018-04-13 NOTE — PROGRESS NOTES
CHIEF COMPLAINT: Hypothyroid  52 year old being seen as a new patient to me. Was seeing Dr. Mackey. Had a thyroidectomy in 2012. Appears to have some remnant tissue on the left with cyst.  On synthroid 200 mcg.  No palpitations. No tremors. Takes stool softener for constipation.       PAST MEDICAL HISTORY/PAST SURGICAL HISTORY:  Reviewed in [facility]    SOCIAL HISTORY: No T/A    FAMILY HISTORY:  No known thyroid disease. + Esophageal CA. + DM 2.     MEDICATIONS/ALLERGIES: The patient's MedCard has been updated and reviewed.      ROS:   Constitutional: No recent significant weight change  Eyes: No recent visual changes  ENT: Has had EGD with stretching and dysphagia is improved.   Cardiovascular: Denies current anginal symptoms  Respiratory: Denies current respiratory difficulty  Gastrointestinal: Denies recent bowel disturbances  GenitoUrinary - No dysuria  Skin: No new skin rash  Neurologic: No focal neurologic complaints  Remainder ROS negative        PE:    GENERAL: Well developed, well nourished.  PSYCH:  appropriate mood and affect  EYES:  PERRL, EOM intact.  ENT: Nares patent, oropharynx clear, mucosa pink,   NECK: Supple, trachea midline, No palpable thyroid nodules.   CHEST: Resp even and unlabored, CTA bilateral.  CARDIAC: RRR, S1, S2 heard, no murmurs, rubs, S3, or S4  ABDOMEN: Soft, non-tender, non-distended;  No organomegaly  VASCULAR:  DP pulses +2/4 bilaterally, no edema  NEURO: Gait steady, CN II-VII grossly intact  SKIN: No areas of breakdown, no acanthosis nigracans.    LABS   Results for NHUNG CRONIN (MRN [id number]) as of 4/13/2018 11:01   Ref. Range 3/28/2018 08:47   TSH Latest Ref Range: 0.400 - 4.000 uIU/mL 0.945     US SOFT TISSUE HEAD NECK THYROID    CLINICAL HISTORY:  Hypothyroidism, unspecified    TECHNIQUE:  Ultrasound of the thyroid and cervical lymph nodes was performed.    COMPARISON:  May 9, 2017    FINDINGS:  Multiple normal sized lymph nodes are seen in and about the right thyroid bed.   The largest of these measures 10 x 6 x 6 mm.  These are unchanged relative to prior examination.  The left lobe of the thyroid measures 2.4 x 0.9 x 1.4 cm.  It is diffusely heterogeneous.  T a he dominant lesion is not evident within the residual thyroid tissue there.  There is a cystic area superiorly measuring 5 x 3 x 2 mm.  This does not meet biopsy criteria this is similar to the the previous exam.   Impression       No significant change relative to May 29, 2017.  There is evidence of apparent partial thyroidectomy and normal sized lymph nodes are seen in the right neck and thyroid bed.    The remaining left thyroid tissue is heterogeneous and contains a small cystic area which does not meet by biopsy criteria         ASSESSMENT/PLAN:  1. Hypothyroid- TSH WNL. Symptomatically doing well. Will contnue current Tx    2. Thyroid Cyst- has some remnant tissue after thyroidectomy. Discussed that I do not see anything concerning. Juno reviewed US images with patient. She is concerned so will repeat US at f/u      FOLLOWUP  F/u 1 year with TSH, thyroid US

## 2018-05-07 DIAGNOSIS — E11.9 TYPE 2 DIABETES MELLITUS WITHOUT COMPLICATION, WITHOUT LONG-TERM CURRENT USE OF INSULIN: ICD-10-CM

## 2018-05-07 DIAGNOSIS — I10 ESSENTIAL HYPERTENSION: ICD-10-CM

## 2018-05-09 RX ORDER — AMLODIPINE BESYLATE 5 MG/1
TABLET ORAL
Qty: 90 TABLET | Refills: 0 | Status: SHIPPED | OUTPATIENT
Start: 2018-05-09 | End: 2018-08-08 | Stop reason: SDUPTHER

## 2018-05-09 RX ORDER — LISINOPRIL 20 MG/1
TABLET ORAL
Qty: 90 TABLET | Refills: 0 | Status: SHIPPED | OUTPATIENT
Start: 2018-05-09 | End: 2018-07-24 | Stop reason: SDUPTHER

## 2018-05-17 ENCOUNTER — TELEPHONE (OUTPATIENT)
Dept: FAMILY MEDICINE | Facility: CLINIC | Age: 53
End: 2018-05-17

## 2018-05-17 NOTE — TELEPHONE ENCOUNTER
----- Message from Zenia Rees sent at 5/17/2018  8:27 AM CDT -----  Contact: Lisa  Type: Needs Medical Advice    Who Called:  Lisa  Symptoms (please be specific):    How long has patient had these symptoms:    Pharmacy name and phone #:    Best Call Back Number: [phone]  Additional Information: Lisa with [employer] called requesting clinical notes, Rx, and medical necessity form for cpat supplies fax# [phone]

## 2018-05-22 ENCOUNTER — TELEPHONE (OUTPATIENT)
Dept: FAMILY MEDICINE | Facility: CLINIC | Age: 53
End: 2018-05-22

## 2018-05-22 NOTE — TELEPHONE ENCOUNTER
----- Message from Peter Munoz sent at 2018  9:00 AM CDT -----  Contact: [facility], [phone]  Good morning! We received a request from Ms. Anderson for her PAP supplies, but the Rx we have on file has . Please submit a new order for PAP supplies. Pt's mask of choice is FFM. Our fax number is [phone], but we can also pull the order from Epic as well. Thank you!

## 2018-05-23 ENCOUNTER — OFFICE VISIT (OUTPATIENT)
Dept: PODIATRY | Facility: CLINIC | Age: 53
End: 2018-05-23
Payer: MEDICARE

## 2018-05-23 VITALS — HEIGHT: 66 IN | WEIGHT: 293 LBS | BODY MASS INDEX: 47.09 KG/M2

## 2018-05-23 DIAGNOSIS — M25.572 CHRONIC PAIN OF LEFT ANKLE: ICD-10-CM

## 2018-05-23 DIAGNOSIS — G89.29 CHRONIC PAIN OF LEFT ANKLE: ICD-10-CM

## 2018-05-23 DIAGNOSIS — E11.42 TYPE 2 DIABETES MELLITUS WITH DIABETIC POLYNEUROPATHY, WITHOUT LONG-TERM CURRENT USE OF INSULIN: Primary | ICD-10-CM

## 2018-05-23 DIAGNOSIS — E11.51 TYPE II DIABETES MELLITUS WITH PERIPHERAL CIRCULATORY DISORDER: ICD-10-CM

## 2018-05-23 DIAGNOSIS — B35.1 ONYCHOMYCOSIS DUE TO DERMATOPHYTE: ICD-10-CM

## 2018-05-23 PROCEDURE — 99212 OFFICE O/P EST SF 10 MIN: CPT | Mod: 25,S$GLB,, | Performed by: PODIATRIST

## 2018-05-23 PROCEDURE — 3044F HG A1C LEVEL LT 7.0%: CPT | Mod: CPTII,S$GLB,, | Performed by: PODIATRIST

## 2018-05-23 PROCEDURE — 3008F BODY MASS INDEX DOCD: CPT | Mod: CPTII,S$GLB,, | Performed by: PODIATRIST

## 2018-05-23 PROCEDURE — 99999 PR PBB SHADOW E&M-EST. PATIENT-LVL III: CPT | Mod: PBBFAC,,, | Performed by: PODIATRIST

## 2018-05-23 PROCEDURE — 11721 DEBRIDE NAIL 6 OR MORE: CPT | Mod: Q9,S$GLB,, | Performed by: PODIATRIST

## 2018-05-23 RX ORDER — DICYCLOMINE HYDROCHLORIDE 10 MG/1
CAPSULE ORAL
Refills: 1 | COMMUNITY
Start: 2018-05-04 | End: 2018-05-24 | Stop reason: SDUPTHER

## 2018-05-23 NOTE — PROGRESS NOTES
Subjective:      Patient ID: Amena Anderson is a 52 y.o. female.    Chief Complaint: Follow-up (2 mo re-ck - left foot pain) and Diabetes Mellitus (PCP:  Dr Miller 11/27/17; HgbA1c: 3/28/17  5.4)    Sharp pain lateral heel/ankle left.  Sudden onset with fall around 9/8/17, she was seen by Dr. Davis around that time. She was in an orthopedic boot for a couple weeks but when she started to feel better came out of the boot on her own. The lateral left ankle has been bothering her more and more ever since. She relates increased pain with weight bearing, better with rest.     Also here for a routine diabetic foot exam and checkup, relates some burning and tingling sensations at times. She also relates thick and discolored nails and dry flaking skin to her feet. Denies wounds, change in color or drainage.     2/19/18: Patient returns for 1 month follow up left ankle pain. Ambulating in the boot, she relates significant improvement with the boot and the medrol dose pack. She relates she is still in 6/10 pain however. No new pedal complaints.     3/19/18: Patient returns for follow up left ankle pain. Ambulating in the boot and feels much better, still noting pain when not wearing the boot, stays in the boot as much as possible. No other pedal complaints.    5/23/18: Patient returns for follow up left ankle pain, she has transitioned to the ankle brace and reports there is gradual slow improvement with the brace although she still relates the pain is 6/10. She also wants to know about her thick and discolored nails, she is on penlac and feels they are slowly improving, she is inquiring about help with trimming the nails. She is high risk secondary to the diabetes with PVD.      Review of Systems   Constitution: Negative for chills, diaphoresis, fever, malaise/fatigue and night sweats.   Cardiovascular: Positive for leg swelling. Negative for claudication, cyanosis and syncope.   Skin: Negative for color change, dry skin,  nail changes, rash, suspicious lesions and unusual hair distribution.   Musculoskeletal: Positive for joint pain. Negative for falls, joint swelling, muscle cramps, muscle weakness and stiffness.   Gastrointestinal: Negative for constipation, diarrhea, nausea and vomiting.   Neurological: Positive for paresthesias. Negative for brief paralysis, disturbances in coordination, focal weakness, numbness, sensory change and tremors.           Objective:      Physical Exam   Constitutional: She is oriented to person, place, and time. She appears well-developed and well-nourished. She is cooperative. No distress.   Cardiovascular:   Pulses:       Popliteal pulses are 2+ on the right side, and 2+ on the left side.        Dorsalis pedis pulses are 2+ on the right side, and 2+ on the left side.        Posterior tibial pulses are 2+ on the right side, and 2+ on the left side.   Capillary refill 3 seconds all toes/distal feet, all toes/both feet warm to touch.      Positive lower extremity edema bilateral.     Musculoskeletal:        Right ankle: Normal. She exhibits normal range of motion, no swelling, no ecchymosis, no deformity, no laceration and normal pulse. Achilles tendon normal. Achilles tendon exhibits no pain, no defect and normal De La Cruz's test results.   Improving pain to palpation lateral left ankle at cfl and atfl without instability deformity or loss of function. Also has pain along the peroneal tendons to the brevis insertion.    Ankle dorsiflexion decreased at <10 degrees bilateral with moderate increase with knee flexion bilateral. There is tenderness to palpation bilateral achilles attachments on the calcaneus.    Ankle bilateral has negative anterior drawer sign, negative posterior drawer sign, negative external rotation test, negative squeeze test.    Otherwise, Normal angle, base, station of gait. All ten toes without clubbing, cyanosis, or signs of ischemia.  No pain to palpation bilateral lower  extremities.  Range of motion, stability, muscle strength, and muscle tone normal bilateral feet and legs.     Feet:   Right Foot:   Protective Sensation: 10 sites tested. 9 sites sensed.   Left Foot:   Protective Sensation: 10 sites tested. 9 sites sensed.   Lymphadenopathy:   Negative lymphadenopathy bilateral popliteal fossa and tarsal tunnel.   Neurological: She is alert and oriented to person, place, and time. She has normal strength. She displays no atrophy and no tremor. A sensory deficit is present. She exhibits normal muscle tone. She displays no seizure activity. Gait normal.   Negative tinel sign to percussion sural, superficial peroneal, deep peroneal, saphenous, and posterior tibial nerves right and left ankles and feet. Decreased vibratory sensation bilateral     Skin: Skin is warm, dry and intact. No abrasion, no bruising, no burn, no ecchymosis, no laceration, no lesion, no petechiae and no rash noted. She is not diaphoretic. No cyanosis or erythema. No pallor. Nails show no clubbing.     No ulceration, hyperpigmentation, discoloration, masses nodules or cords palpated.  No ecchymosis, erythema, edema, or cardinal signs of infection bilateral lower extremities.    Skin is thin and atrophic bilateral    Nails 1-5 bilateral are thick 3-4 mm, long 3-6 mm, and discolored with subungual debris         Psychiatric: She has a normal mood and affect. Her behavior is normal.             Assessment:       Encounter Diagnoses   Name Primary?    Type 2 diabetes mellitus with diabetic polyneuropathy, without long-term current use of insulin Yes    Type II diabetes mellitus with peripheral circulatory disorder     Onychomycosis due to dermatophyte     Chronic pain of left ankle          Plan:       Amena was seen today for follow-up and diabetes mellitus.    Diagnoses and all orders for this visit:    Type 2 diabetes mellitus with diabetic polyneuropathy, without long-term current use of insulin    Type II  diabetes mellitus with peripheral circulatory disorder    Onychomycosis due to dermatophyte    Chronic pain of left ankle      I counseled the patient on her conditions, their implications and medical management.    Shoe inspection. Diabetic Foot Education. Patient reminded of the importance of good nutrition and blood sugar control to help prevent podiatric complications of diabetes. Patient instructed on proper foot hygeine. We discussed wearing proper shoe gear, daily foot inspections, never walking without protective shoe gear, never putting sharp instruments to feet    - With patient's permission, nails were aggressively reduced and debrided x 10 to their soft tissue attachment mechanically and with electric , removing all offending nail and debris. Patient relates relief following the procedure. She will continue to monitor the areas daily, inspect her feet, wear protective shoe gear when ambulatory, moisturizer to maintain skin integrity.    Patient will stretch the tendo achilles complex three times daily as demonstrated in the office.  Literature was dispensed illustrating proper stretching technique.    Continue with an AFO ankle brace and will consider PT, decline PT for now.    Tinea has resolved with loprox treatment    Discussed importance of healthy eating and weight loss as pertaining to her diabetes control and foot stress/pain      If pain is not continuing to resolve after returning to the ankle brace I will order an MRI to further evaluate the lateral ankle ligaments and the peroneal tendons.    Follow up in 3 months    Edson Martinez DPM

## 2018-05-24 ENCOUNTER — TELEPHONE (OUTPATIENT)
Dept: ENDOCRINOLOGY | Facility: CLINIC | Age: 53
End: 2018-05-24

## 2018-05-24 NOTE — TELEPHONE ENCOUNTER
----- Message from Mamie Henry sent at 5/24/2018 10:42 AM CDT -----  Type:  Sooner Apoointment Request    Caller is requesting a sooner appointment.  Caller declined first available appointment listed below.  Caller will not accept being placed on the waitlist and is requesting a message be sent to doctor.    Name of Caller:  Amena  When is the first available appointment?  Nothing until end of open schedule  Symptoms:  Pain and swelling in stomach, lower abdominal  Best Call Back Number: [phone]  Additional Information:  n/a

## 2018-05-24 NOTE — TELEPHONE ENCOUNTER
Pt complaining of stomach pain and wants medication refilled that alleviates pain to her stomach. Pt informed this office sees her for thyroid. Pt has seen ANGELLA Irving NP in the past for gastro. Pt states she will call ANGELLA Irving's office

## 2018-05-25 ENCOUNTER — TELEPHONE (OUTPATIENT)
Dept: FAMILY MEDICINE | Facility: CLINIC | Age: 53
End: 2018-05-25

## 2018-05-25 RX ORDER — DICYCLOMINE HYDROCHLORIDE 10 MG/1
10 CAPSULE ORAL
Qty: 90 CAPSULE | Refills: 1 | Status: SHIPPED | OUTPATIENT
Start: 2018-05-25 | End: 2022-06-07

## 2018-06-05 ENCOUNTER — OFFICE VISIT (OUTPATIENT)
Dept: FAMILY MEDICINE | Facility: CLINIC | Age: 53
End: 2018-06-05
Payer: MEDICARE

## 2018-06-05 VITALS
OXYGEN SATURATION: 98 % | BODY MASS INDEX: 47.09 KG/M2 | SYSTOLIC BLOOD PRESSURE: 132 MMHG | DIASTOLIC BLOOD PRESSURE: 86 MMHG | HEART RATE: 74 BPM | WEIGHT: 293 LBS | HEIGHT: 66 IN | RESPIRATION RATE: 17 BRPM

## 2018-06-05 DIAGNOSIS — E11.42 TYPE 2 DIABETES MELLITUS WITH DIABETIC POLYNEUROPATHY, WITHOUT LONG-TERM CURRENT USE OF INSULIN: Primary | Chronic | ICD-10-CM

## 2018-06-05 DIAGNOSIS — E03.9 HYPOTHYROIDISM, UNSPECIFIED TYPE: Chronic | ICD-10-CM

## 2018-06-05 DIAGNOSIS — K76.0 FATTY LIVER: Chronic | ICD-10-CM

## 2018-06-05 DIAGNOSIS — F41.9 ANXIETY: ICD-10-CM

## 2018-06-05 DIAGNOSIS — I10 ESSENTIAL HYPERTENSION: Chronic | ICD-10-CM

## 2018-06-05 PROCEDURE — 3008F BODY MASS INDEX DOCD: CPT | Mod: CPTII,S$GLB,, | Performed by: EMERGENCY MEDICINE

## 2018-06-05 PROCEDURE — 3075F SYST BP GE 130 - 139MM HG: CPT | Mod: CPTII,S$GLB,, | Performed by: EMERGENCY MEDICINE

## 2018-06-05 PROCEDURE — 99214 OFFICE O/P EST MOD 30 MIN: CPT | Mod: S$GLB,,, | Performed by: EMERGENCY MEDICINE

## 2018-06-05 PROCEDURE — 99999 PR PBB SHADOW E&M-EST. PATIENT-LVL V: CPT | Mod: PBBFAC,,, | Performed by: EMERGENCY MEDICINE

## 2018-06-05 PROCEDURE — 3044F HG A1C LEVEL LT 7.0%: CPT | Mod: CPTII,S$GLB,, | Performed by: EMERGENCY MEDICINE

## 2018-06-05 PROCEDURE — 3079F DIAST BP 80-89 MM HG: CPT | Mod: CPTII,S$GLB,, | Performed by: EMERGENCY MEDICINE

## 2018-06-05 RX ORDER — ALPRAZOLAM 1 MG/1
TABLET ORAL
Qty: 60 TABLET | Refills: 0 | Status: SHIPPED | OUTPATIENT
Start: 2018-06-05 | End: 2018-07-06 | Stop reason: SDUPTHER

## 2018-06-05 RX ORDER — AMITRIPTYLINE HYDROCHLORIDE 25 MG/1
25 TABLET, FILM COATED ORAL NIGHTLY
Qty: 90 TABLET | Refills: 3 | Status: SHIPPED | OUTPATIENT
Start: 2018-06-05 | End: 2018-09-28 | Stop reason: SDUPTHER

## 2018-06-05 RX ORDER — FUROSEMIDE 40 MG/1
40 TABLET ORAL DAILY
Qty: 90 TABLET | Refills: 3 | Status: SHIPPED | OUTPATIENT
Start: 2018-06-05 | End: 2019-05-23 | Stop reason: SDUPTHER

## 2018-06-05 NOTE — PROGRESS NOTES
Subjective:   THIS NOTE IS DONE WITH VOICE RECOGNITION        Patient ID: Amena Anderson is a 52 y.o. female.    Chief Complaint: type 2 diabetes mellitus without complication, without long-      HPI     She is in today to follow-up on her diabetes, blood pressure, weight, and other issues.    Her diabetes has been remarkably well controlled.  She has been diligent about her medications, as focused on her diet, and is losing weight.  She does feel better.  She would like to lose another 100 pounds.    Lab Results   Component Value Date    HGBA1C 5.4 03/28/2018     Her cholesterol targets have been she.  She is using atorvastatin 20 milligrams a day.  No recognized side effects.    Lab Results   Component Value Date    CHOL 135 03/28/2018    CHOL 128 07/10/2017    CHOL 227 (H) 01/19/2017     Lab Results   Component Value Date    HDL 41 03/28/2018    HDL 39 (L) 07/10/2017    HDL 40 01/19/2017     Lab Results   Component Value Date    LDLCALC 80.2 03/28/2018    LDLCALC 75.4 07/10/2017    LDLCALC 165.2 (H) 01/19/2017     Lab Results   Component Value Date    TRIG 69 03/28/2018    TRIG 68 07/10/2017    TRIG 109 01/19/2017     Lab Results   Component Value Date    CHOLHDL 30.4 03/28/2018    CHOLHDL 30.5 07/10/2017    CHOLHDL 17.6 (L) 01/19/2017     She was evaluated by Gastroenterology earlier this year.  She did have an MRI secondary to concerns about pancreatic and common duct issues.  The MRI was unremarkable with the exception of fatty liver infiltration being noted.    Immunization History   Administered Date(s) Administered    Influenza - Quadrivalent - PF 11/27/2017    Pneumococcal Conjugate - 13 Valent 05/22/2017     Wants pneumovax during next visit.    Current Outpatient Prescriptions   Medication Sig Dispense Refill    ALPRAZolam (XANAX) 1 MG tablet TAKE 1 TABLET(1 MG) BY MOUTH TWICE DAILY 60 tablet 0    amitriptyline (ELAVIL) 25 MG tablet TAKE 1 TABLET BY MOUTH EVERY EVENING 90 tablet 1    amLODIPine  (NORVASC) 5 MG tablet TAKE 1 TABLET BY MOUTH EVERY DAY 90 tablet 0    aspirin (ECOTRIN) 81 MG EC tablet Take 81 mg by mouth once daily.      atorvastatin (LIPITOR) 20 MG tablet Take 20 mg by mouth once daily.   3    ciclopirox (LOPROX) 0.77 % Crea Apply topically 2 (two) times daily. 90 g 3    diclofenac sodium 1 % Gel Apply 2 g topically 4 (four) times daily. 100 g 2    dicyclomine (BENTYL) 10 MG capsule Take 1 capsule (10 mg total) by mouth 4 (four) times daily before meals and nightly. 90 capsule 1    docusate sodium (COLACE) 50 MG capsule Take 100 mg by mouth 2 (two) times daily.      estradiol (ESTRACE) 1 MG tablet TAKE 1 TABLET(1 MG) BY MOUTH EVERY DAY 90 tablet 3    furosemide (LASIX) 40 MG tablet Take 40 mg by mouth once daily.      gabapentin (NEURONTIN) 400 MG capsule Take 400 mg by mouth 3 (three) times daily.      hydrocodone-acetaminophen 10-325mg (NORCO)  mg Tab Take 10 tablets by mouth 3 (three) times daily.      levothyroxine (SYNTHROID) 200 MCG tablet Take 1 tablet (200 mcg total) by mouth once daily. 90 tablet 3    lisinopril (PRINIVIL,ZESTRIL) 20 MG tablet TAKE 1 TABLET BY MOUTH EVERY DAY 90 tablet 0    metformin (GLUCOPHAGE) 1000 MG tablet TAKE 1 TABLET(1000 MG) BY MOUTH EVERY EVENING 90 tablet 3    naproxen (EC NAPROSYN) 500 MG EC tablet Take 500 mg by mouth 2 (two) times daily.      nitroGLYCERIN (NITROSTAT) 0.4 MG SL tablet Place 0.4 mg under the tongue every 5 (five) minutes as needed for Chest pain.      nystatin (NYSTOP) powder APPLY AFFECTED AREA THREE TIMES DAILY 60 g 3    omeprazole (PRILOSEC) 40 MG capsule Take 1 capsule (40 mg total) by mouth 2 (two) times daily before meals. 60 capsule 11    potassium chloride SA (K-DUR,KLOR-CON) 20 MEQ tablet Take 1 tablet (20 mEq total) by mouth 2 (two) times daily. 180 tablet 2    ranitidine (ZANTAC) 300 MG tablet TAKE 1 TABLET BY MOUTH EVERY EVENING, ABOUT 30 TO 60 MINUTES BEFORE BEDTIME 90 tablet 4     No current  facility-administered medications for this visit.          Review of Systems   Constitutional: Negative for activity change, appetite change, chills, diaphoresis, fatigue, fever and unexpected weight change.   HENT: Negative for congestion, ear pain, hearing loss, rhinorrhea, trouble swallowing and voice change.    Eyes: Negative for pain and visual disturbance.   Respiratory: Negative for cough, chest tightness and shortness of breath.    Cardiovascular: Negative for chest pain, palpitations and leg swelling.   Gastrointestinal: Negative for abdominal distention, abdominal pain and blood in stool.   Genitourinary: Negative for difficulty urinating, flank pain, frequency and urgency.   Musculoskeletal: Negative for arthralgias, back pain, joint swelling, myalgias, neck pain and neck stiffness.   Skin: Negative for pallor and rash.   Neurological: Negative for dizziness, tremors, syncope, weakness and headaches.   Hematological: Negative for adenopathy.   Psychiatric/Behavioral: Negative for dysphoric mood and sleep disturbance. The patient is not nervous/anxious.        Objective:      Physical Exam   Constitutional: She is oriented to person, place, and time. She appears well-developed and well-nourished. No distress.   HENT:   Head: Normocephalic and atraumatic.   Nose: Nose normal.   Mouth/Throat: Oropharynx is clear and moist.   Eyes: Conjunctivae and EOM are normal. Pupils are equal, round, and reactive to light. No scleral icterus.   Neck: Normal range of motion. Neck supple. No JVD present. No thyromegaly present.   Cardiovascular: Normal rate, regular rhythm, normal heart sounds and intact distal pulses.    No murmur heard.  Pulmonary/Chest: Effort normal and breath sounds normal. She has no wheezes. She has no rales.   Abdominal: Soft. Bowel sounds are normal. She exhibits no distension. There is no tenderness.   Musculoskeletal: Normal range of motion. She exhibits no edema or tenderness.    Lymphadenopathy:     She has no cervical adenopathy.   Neurological: She is alert and oriented to person, place, and time. She has normal reflexes. No cranial nerve deficit. Coordination normal.   Skin: Skin is warm and dry. No rash noted. No erythema.   Psychiatric: She has a normal mood and affect. Her behavior is normal.   Vitals reviewed.      Assessment:       1. Type 2 diabetes mellitus with diabetic polyneuropathy, without long-term current use of insulin    2. Anxiety    3. Essential hypertension    4. Hypothyroidism, unspecified type    5. Fatty liver        Plan:       1. Anxiety  Continues to be a problem, but gradually improving.  She will continue current medications including alprazolam.  She is reminded alprazolam can be habit forming, and is not trials medication.    - amitriptyline (ELAVIL) 25 MG tablet; Take 1 tablet (25 mg total) by mouth every evening.  Dispense: 90 tablet; Refill: 3  - ALPRAZolam (XANAX) 1 MG tablet; TAKE 1 TABLET(1 MG) BY MOUTH TWICE DAILY  Dispense: 60 tablet; Refill: 0    2. Type 2 diabetes mellitus with diabetic polyneuropathy, without long-term current use of insulin  Stable  Recheck hemoglobin A1c  No change in medications  She is reminded that she continues to lose weight, that we may need to alter medications.    - Hemoglobin A1c; Future    3. Essential hypertension  Controlled.  Continue current medications  Continue to avoid excessive sodium  Continue home monitoring  Target blood pressure is 139/89 or less    - furosemide (LASIX) 40 MG tablet; Take 1 tablet (40 mg total) by mouth once daily.  Dispense: 90 tablet; Refill: 3    4. Hypothyroidism, unspecified type  Stable  Lab Results   Component Value Date    TSH 0.945 03/28/2018     Recheck March 2019    5. Fatty liver  Likely NAFLD  Confirmed on MRI  Not an issue with alcohol  Ongoing monitoring advised.  Weight loss is the preferred intervention    - Hepatitis C antibody; Future

## 2018-06-05 NOTE — PATIENT INSTRUCTIONS
Dasha,    You are doing a great job with your diabetes and weight loss.      You do need a Pneumovax shot the next time you are in the office.    I would appreciate your getting your blood work done a week or two prior to your next visit.    MARGARITO

## 2018-06-11 RX ORDER — CLOTRIMAZOLE 1 G/ML
SOLUTION TOPICAL
Qty: 30 ML | Refills: 0 | Status: SHIPPED | OUTPATIENT
Start: 2018-06-11 | End: 2019-09-09

## 2018-07-06 DIAGNOSIS — F41.9 ANXIETY: ICD-10-CM

## 2018-07-06 RX ORDER — ALPRAZOLAM 1 MG/1
TABLET ORAL
Qty: 60 TABLET | Refills: 0 | Status: SHIPPED | OUTPATIENT
Start: 2018-07-06 | End: 2018-08-06 | Stop reason: SDUPTHER

## 2018-07-24 DIAGNOSIS — B35.3 TINEA PEDIS OF BOTH FEET: ICD-10-CM

## 2018-07-24 DIAGNOSIS — I10 ESSENTIAL HYPERTENSION: ICD-10-CM

## 2018-07-24 DIAGNOSIS — E11.9 TYPE 2 DIABETES MELLITUS WITHOUT COMPLICATION, WITHOUT LONG-TERM CURRENT USE OF INSULIN: ICD-10-CM

## 2018-07-24 RX ORDER — CLOTRIMAZOLE 1 G/ML
SOLUTION TOPICAL
Qty: 30 ML | Refills: 0 | Status: SHIPPED | OUTPATIENT
Start: 2018-07-24 | End: 2018-10-11

## 2018-07-24 RX ORDER — LISINOPRIL 20 MG/1
TABLET ORAL
Qty: 90 TABLET | Refills: 3 | Status: SHIPPED | OUTPATIENT
Start: 2018-07-24 | End: 2018-08-06 | Stop reason: SDUPTHER

## 2018-07-31 ENCOUNTER — TELEPHONE (OUTPATIENT)
Dept: ADMINISTRATIVE | Facility: HOSPITAL | Age: 53
End: 2018-07-31

## 2018-08-02 RX ORDER — ATORVASTATIN CALCIUM 20 MG/1
TABLET, FILM COATED ORAL
Qty: 90 TABLET | Refills: 3 | Status: SHIPPED | OUTPATIENT
Start: 2018-08-02 | End: 2019-07-28 | Stop reason: SDUPTHER

## 2018-08-03 DIAGNOSIS — K21.9 GASTROESOPHAGEAL REFLUX DISEASE WITHOUT ESOPHAGITIS: ICD-10-CM

## 2018-08-05 RX ORDER — OMEPRAZOLE 20 MG/1
CAPSULE, DELAYED RELEASE ORAL
Qty: 180 CAPSULE | Refills: 0 | Status: SHIPPED | OUTPATIENT
Start: 2018-08-05 | End: 2018-11-02 | Stop reason: SDUPTHER

## 2018-08-06 DIAGNOSIS — I10 ESSENTIAL HYPERTENSION: ICD-10-CM

## 2018-08-06 DIAGNOSIS — E11.9 TYPE 2 DIABETES MELLITUS WITHOUT COMPLICATION, WITHOUT LONG-TERM CURRENT USE OF INSULIN: ICD-10-CM

## 2018-08-06 DIAGNOSIS — F41.9 ANXIETY: ICD-10-CM

## 2018-08-06 RX ORDER — LISINOPRIL 20 MG/1
TABLET ORAL
Qty: 90 TABLET | Refills: 3 | Status: SHIPPED | OUTPATIENT
Start: 2018-08-06 | End: 2019-07-28 | Stop reason: SDUPTHER

## 2018-08-06 RX ORDER — ALPRAZOLAM 1 MG/1
TABLET ORAL
Qty: 60 TABLET | Refills: 0 | Status: SHIPPED | OUTPATIENT
Start: 2018-08-06 | End: 2018-09-28 | Stop reason: SDUPTHER

## 2018-08-08 DIAGNOSIS — I10 ESSENTIAL HYPERTENSION: ICD-10-CM

## 2018-08-08 RX ORDER — AMLODIPINE BESYLATE 5 MG/1
TABLET ORAL
Qty: 90 TABLET | Refills: 0 | Status: SHIPPED | OUTPATIENT
Start: 2018-08-08 | End: 2018-11-07 | Stop reason: SDUPTHER

## 2018-08-28 RX ORDER — POTASSIUM CHLORIDE 20 MEQ/1
TABLET, EXTENDED RELEASE ORAL
Qty: 180 TABLET | Refills: 0 | Status: SHIPPED | OUTPATIENT
Start: 2018-08-28 | End: 2018-11-24 | Stop reason: SDUPTHER

## 2018-08-29 ENCOUNTER — OFFICE VISIT (OUTPATIENT)
Dept: PODIATRY | Facility: CLINIC | Age: 53
End: 2018-08-29
Payer: MEDICARE

## 2018-08-29 VITALS — HEIGHT: 66 IN | BODY MASS INDEX: 47.09 KG/M2 | WEIGHT: 293 LBS

## 2018-08-29 DIAGNOSIS — E11.51 TYPE II DIABETES MELLITUS WITH PERIPHERAL CIRCULATORY DISORDER: ICD-10-CM

## 2018-08-29 DIAGNOSIS — B35.1 ONYCHOMYCOSIS DUE TO DERMATOPHYTE: ICD-10-CM

## 2018-08-29 DIAGNOSIS — E11.42 TYPE 2 DIABETES MELLITUS WITH DIABETIC POLYNEUROPATHY, WITHOUT LONG-TERM CURRENT USE OF INSULIN: Primary | ICD-10-CM

## 2018-08-29 PROCEDURE — 11721 DEBRIDE NAIL 6 OR MORE: CPT | Mod: Q9,S$GLB,, | Performed by: PODIATRIST

## 2018-08-29 PROCEDURE — 99999 PR PBB SHADOW E&M-EST. PATIENT-LVL III: CPT | Mod: PBBFAC,,, | Performed by: PODIATRIST

## 2018-08-29 PROCEDURE — 99499 UNLISTED E&M SERVICE: CPT | Mod: S$GLB,,, | Performed by: PODIATRIST

## 2018-08-29 NOTE — PROGRESS NOTES
Subjective:      Patient ID: Amena Anderson is a 52 y.o. female.    Chief Complaint: No chief complaint on file.  Patient returns for follow up  thick and discolored nails for high risk foot care. She is high risk secondary to the diabetes with PVD. She relates the ankle is feeling much better as long as she wears the brace.      Review of Systems   Constitution: Negative for chills, diaphoresis, fever, malaise/fatigue and night sweats.   Cardiovascular: Positive for leg swelling. Negative for claudication, cyanosis and syncope.   Skin: Negative for color change, dry skin, nail changes, rash, suspicious lesions and unusual hair distribution.   Musculoskeletal: Positive for joint pain. Negative for falls, joint swelling, muscle cramps, muscle weakness and stiffness.   Gastrointestinal: Negative for constipation, diarrhea, nausea and vomiting.   Neurological: Positive for paresthesias. Negative for brief paralysis, disturbances in coordination, focal weakness, numbness, sensory change and tremors.           Objective:      Physical Exam   Constitutional: She is oriented to person, place, and time. She appears well-developed and well-nourished. She is cooperative. No distress.   Cardiovascular:   Pulses:       Popliteal pulses are 2+ on the right side, and 2+ on the left side.        Dorsalis pedis pulses are 2+ on the right side, and 2+ on the left side.        Posterior tibial pulses are 2+ on the right side, and 2+ on the left side.   Capillary refill 3 seconds all toes/distal feet, all toes/both feet warm to touch.      Positive lower extremity edema bilateral.     Musculoskeletal:        Right ankle: Normal. She exhibits normal range of motion, no swelling, no ecchymosis, no deformity, no laceration and normal pulse. Achilles tendon normal. Achilles tendon exhibits no pain, no defect and normal De La Cruz's test results.   Improving pain to palpation lateral left ankle at cfl and atfl without instability deformity  or loss of function. Also has pain along the peroneal tendons to the brevis insertion.    Ankle dorsiflexion decreased at <10 degrees bilateral with moderate increase with knee flexion bilateral. There is tenderness to palpation bilateral achilles attachments on the calcaneus.    Ankle bilateral has negative anterior drawer sign, negative posterior drawer sign, negative external rotation test, negative squeeze test.    Otherwise, Normal angle, base, station of gait. All ten toes without clubbing, cyanosis, or signs of ischemia.  No pain to palpation bilateral lower extremities.  Range of motion, stability, muscle strength, and muscle tone normal bilateral feet and legs.     Feet:   Right Foot:   Protective Sensation: 10 sites tested. 9 sites sensed.   Left Foot:   Protective Sensation: 10 sites tested. 9 sites sensed.   Lymphadenopathy:   Negative lymphadenopathy bilateral popliteal fossa and tarsal tunnel.   Neurological: She is alert and oriented to person, place, and time. She has normal strength. She displays no atrophy and no tremor. A sensory deficit is present. She exhibits normal muscle tone. She displays no seizure activity. Gait normal.   Negative tinel sign to percussion sural, superficial peroneal, deep peroneal, saphenous, and posterior tibial nerves right and left ankles and feet. Decreased vibratory sensation bilateral     Skin: Skin is warm, dry and intact. No abrasion, no bruising, no burn, no ecchymosis, no laceration, no lesion, no petechiae and no rash noted. She is not diaphoretic. No cyanosis or erythema. No pallor. Nails show no clubbing.     No ulceration, hyperpigmentation, discoloration, masses nodules or cords palpated.  No ecchymosis, erythema, edema, or cardinal signs of infection bilateral lower extremities.    Skin is thin and atrophic bilateral    Nails 1-5 bilateral are thick 3-4 mm, long 3-6 mm, and discolored with subungual debris         Psychiatric: She has a normal mood and  affect. Her behavior is normal.             Assessment:       Encounter Diagnoses   Name Primary?    Type 2 diabetes mellitus with diabetic polyneuropathy, without long-term current use of insulin Yes    Type II diabetes mellitus with peripheral circulatory disorder     Onychomycosis due to dermatophyte          Plan:       Diagnoses and all orders for this visit:    Type 2 diabetes mellitus with diabetic polyneuropathy, without long-term current use of insulin    Type II diabetes mellitus with peripheral circulatory disorder    Onychomycosis due to dermatophyte      I counseled the patient on her conditions, their implications and medical management.    Shoe inspection. Diabetic Foot Education. Patient reminded of the importance of good nutrition and blood sugar control to help prevent podiatric complications of diabetes. Patient instructed on proper foot hygeine. We discussed wearing proper shoe gear, daily foot inspections, never walking without protective shoe gear, never putting sharp instruments to feet    - With patient's permission, nails were aggressively reduced and debrided x 10 to their soft tissue attachment mechanically and with electric , removing all offending nail and debris. Patient relates relief following the procedure. She will continue to monitor the areas daily, inspect her feet, wear protective shoe gear when ambulatory, moisturizer to maintain skin integrity.    Continue with an AFO ankle brace and will consider PT, decline PT for now.    Discussed importance of healthy eating and weight loss as pertaining to her diabetes control and foot stress/pain      Follow up in 3 months routine care    Edson Martinez DPM

## 2018-09-27 DIAGNOSIS — E11.9 TYPE 2 DIABETES MELLITUS WITHOUT COMPLICATION, WITHOUT LONG-TERM CURRENT USE OF INSULIN: ICD-10-CM

## 2018-09-28 DIAGNOSIS — F41.9 ANXIETY: ICD-10-CM

## 2018-09-28 RX ORDER — METFORMIN HYDROCHLORIDE 1000 MG/1
TABLET ORAL
Qty: 90 TABLET | Refills: 3 | Status: SHIPPED | OUTPATIENT
Start: 2018-09-28 | End: 2019-09-22 | Stop reason: SDUPTHER

## 2018-09-28 RX ORDER — AMITRIPTYLINE HYDROCHLORIDE 25 MG/1
TABLET, FILM COATED ORAL
Qty: 90 TABLET | Refills: 3 | Status: SHIPPED | OUTPATIENT
Start: 2018-09-28 | End: 2019-09-23 | Stop reason: SDUPTHER

## 2018-09-28 RX ORDER — ALPRAZOLAM 1 MG/1
TABLET ORAL
Qty: 60 TABLET | Refills: 0 | Status: SHIPPED | OUTPATIENT
Start: 2018-09-28 | End: 2018-10-11 | Stop reason: SDUPTHER

## 2018-10-02 ENCOUNTER — TELEPHONE (OUTPATIENT)
Dept: FAMILY MEDICINE | Facility: CLINIC | Age: 53
End: 2018-10-02

## 2018-10-02 DIAGNOSIS — N39.0 URINARY TRACT INFECTION WITHOUT HEMATURIA, SITE UNSPECIFIED: Primary | ICD-10-CM

## 2018-10-02 NOTE — TELEPHONE ENCOUNTER
Spoke with pt she is coming in for lab work in the AM. She is c/o of pain when she urinates that started last night. She is drinking lots of water and cranberry juice. I put in order for UA>

## 2018-10-03 ENCOUNTER — LAB VISIT (OUTPATIENT)
Dept: LAB | Facility: HOSPITAL | Age: 53
End: 2018-10-03
Attending: EMERGENCY MEDICINE
Payer: MEDICARE

## 2018-10-03 DIAGNOSIS — K76.0 FATTY LIVER: Chronic | ICD-10-CM

## 2018-10-03 DIAGNOSIS — E11.42 TYPE 2 DIABETES MELLITUS WITH DIABETIC POLYNEUROPATHY, WITHOUT LONG-TERM CURRENT USE OF INSULIN: Chronic | ICD-10-CM

## 2018-10-03 PROCEDURE — 36415 COLL VENOUS BLD VENIPUNCTURE: CPT | Mod: PO

## 2018-10-03 PROCEDURE — 86803 HEPATITIS C AB TEST: CPT

## 2018-10-03 PROCEDURE — 83036 HEMOGLOBIN GLYCOSYLATED A1C: CPT

## 2018-10-04 LAB
ESTIMATED AVG GLUCOSE: 114 MG/DL
HBA1C MFR BLD HPLC: 5.6 %
HCV AB SERPL QL IA: NEGATIVE

## 2018-10-11 ENCOUNTER — OFFICE VISIT (OUTPATIENT)
Dept: FAMILY MEDICINE | Facility: CLINIC | Age: 53
End: 2018-10-11
Payer: MEDICARE

## 2018-10-11 VITALS
SYSTOLIC BLOOD PRESSURE: 142 MMHG | OXYGEN SATURATION: 96 % | WEIGHT: 293 LBS | BODY MASS INDEX: 47.09 KG/M2 | DIASTOLIC BLOOD PRESSURE: 90 MMHG | RESPIRATION RATE: 18 BRPM | HEIGHT: 66 IN | HEART RATE: 82 BPM

## 2018-10-11 DIAGNOSIS — G47.33 OSA (OBSTRUCTIVE SLEEP APNEA): Chronic | ICD-10-CM

## 2018-10-11 DIAGNOSIS — I10 ESSENTIAL HYPERTENSION: Primary | Chronic | ICD-10-CM

## 2018-10-11 DIAGNOSIS — F41.9 ANXIETY: ICD-10-CM

## 2018-10-11 DIAGNOSIS — E11.42 TYPE 2 DIABETES MELLITUS WITH DIABETIC POLYNEUROPATHY, WITHOUT LONG-TERM CURRENT USE OF INSULIN: Chronic | ICD-10-CM

## 2018-10-11 DIAGNOSIS — E03.9 HYPOTHYROIDISM, UNSPECIFIED TYPE: Chronic | ICD-10-CM

## 2018-10-11 DIAGNOSIS — Z23 VACCINE FOR STREPTOCOCCUS PNEUMONIAE AND INFLUENZA: ICD-10-CM

## 2018-10-11 DIAGNOSIS — G89.4 CHRONIC PAIN SYNDROME: Chronic | ICD-10-CM

## 2018-10-11 PROCEDURE — 99214 OFFICE O/P EST MOD 30 MIN: CPT | Mod: S$PBB,,, | Performed by: EMERGENCY MEDICINE

## 2018-10-11 PROCEDURE — 99999 PR PBB SHADOW E&M-EST. PATIENT-LVL V: CPT | Mod: PBBFAC,,, | Performed by: EMERGENCY MEDICINE

## 2018-10-11 PROCEDURE — 3008F BODY MASS INDEX DOCD: CPT | Mod: CPTII,,, | Performed by: EMERGENCY MEDICINE

## 2018-10-11 PROCEDURE — 3044F HG A1C LEVEL LT 7.0%: CPT | Mod: CPTII,,, | Performed by: EMERGENCY MEDICINE

## 2018-10-11 PROCEDURE — 90686 IIV4 VACC NO PRSV 0.5 ML IM: CPT | Mod: PBBFAC,PO

## 2018-10-11 PROCEDURE — 3080F DIAST BP >= 90 MM HG: CPT | Mod: CPTII,,, | Performed by: EMERGENCY MEDICINE

## 2018-10-11 PROCEDURE — 3077F SYST BP >= 140 MM HG: CPT | Mod: CPTII,,, | Performed by: EMERGENCY MEDICINE

## 2018-10-11 PROCEDURE — 99215 OFFICE O/P EST HI 40 MIN: CPT | Mod: PBBFAC,PO | Performed by: EMERGENCY MEDICINE

## 2018-10-11 RX ORDER — ALPRAZOLAM 1 MG/1
TABLET ORAL
Qty: 60 TABLET | Refills: 0 | Status: SHIPPED | OUTPATIENT
Start: 2018-10-11 | End: 2018-11-30 | Stop reason: SDUPTHER

## 2018-10-11 NOTE — PROGRESS NOTES
Subjective:   THIS NOTE IS DONE WITH VOICE RECOGNITION        Patient ID: Amena Anderson is a 53 y.o. female.    Chief Complaint: Hypertension (pt didnt take meds this am)      HPI     Secondary to logistics, she did not take her blood pressure medicines this morning.    Here to follow up on blood pressure.  She is being very diligent about taking her medicines on a regular basis.  She is not able to take her blood pressure at home, when she checks it in the community it is generally good.    She does not describe anginal pain.    She has not been able to lose substantial amounts a weight, but she does continue to focus on this.    BP Readings from Last 3 Encounters:   10/11/18 (!) 142/90   06/05/18 132/86   04/13/18 138/88     She has not had any hypoglycemia.  Her diabetes is doing very well.  No side effects from her medications.  She is reminded that her weight is central in controlling both her diabetes and her hypertension.  She is very much aware and is trying.    Results for AMENA ANDERSON (MRN [id number]) as of 10/11/2018 07:58   Ref. Range 7/10/2017 08:02 10/10/2017 09:32 11/27/2017 10:54 3/28/2018 08:47 10/3/2018 08:19   Hemoglobin A1C Latest Ref Range: 4.0 - 5.6 % 5.8 (H)  5.5 5.4 5.6   Estimated Avg Glucose Latest Ref Range: 68 - 131 mg/dL 120  111 108 114       Hyperlipidemia monitoring.  Currently using atorvastatin 20 milligrams daily.  No recognized side effects.    Lab Results   Component Value Date    CHOL 135 03/28/2018    CHOL 128 07/10/2017    CHOL 227 (H) 01/19/2017     Lab Results   Component Value Date    HDL 41 03/28/2018    HDL 39 (L) 07/10/2017    HDL 40 01/19/2017     Lab Results   Component Value Date    LDLCALC 80.2 03/28/2018    LDLCALC 75.4 07/10/2017    LDLCALC 165.2 (H) 01/19/2017     Lab Results   Component Value Date    TRIG 69 03/28/2018    TRIG 68 07/10/2017    TRIG 109 01/19/2017     Lab Results   Component Value Date    CHOLHDL 30.4 03/28/2018    CHOLHDL 30.5 07/10/2017     CHOLHDL 17.6 (L) 01/19/2017       Vitals - 1 value per visit 2/6/2018 2/19/2018 3/19/2018 4/13/2018 5/23/2018   Weight (lb) 293.43 293.43 293.43 298.72 298.72     Vitals - 1 value per visit 6/5/2018 8/29/2018   Weight (lb) 296.52 296.52       She is continuing to see Dr. Dio Pal for spine pain.  Last injection was two months ago.  The injections were efficacious.  She is planning routine follow-up in another month or 2.    She is using as needed alprazolam.  She has not exceeded her dosing.    Immunization History   Administered Date(s) Administered    Influenza - Quadrivalent - PF 11/27/2017, 10/11/2018    Pneumococcal Conjugate - 13 Valent 05/22/2017    Pneumococcal Polysaccharide - 23 Valent 10/11/2018       Current Outpatient Medications   Medication Sig Dispense Refill    ALPRAZolam (XANAX) 1 MG tablet TAKE 1 TABLET(1 MG) BY MOUTH TWICE DAILY 60 tablet 0    amitriptyline (ELAVIL) 25 MG tablet TAKE 1 TABLET BY MOUTH EVERY EVENING 90 tablet 3    amLODIPine (NORVASC) 5 MG tablet TAKE 1 TABLET BY MOUTH EVERY DAY 90 tablet 0    aspirin (ECOTRIN) 81 MG EC tablet Take 81 mg by mouth once daily.      atorvastatin (LIPITOR) 20 MG tablet TAKE 1 TABLET(20 MG) BY MOUTH EVERY DAY 90 tablet 3    ciclopirox (LOPROX) 0.77 % Crea Apply topically 2 (two) times daily. 90 g 3    clotrimazole (LOTRIMIN) 1 % Soln APPLY EXTERNALLY TO THE AFFECTED AREA TWICE DAILY 30 mL 0    clotrimazole (LOTRIMIN) 1 % Soln APPLY EXTERNALLY TO THE AFFECTED AREA TWICE DAILY 30 mL 0    diclofenac sodium 1 % Gel Apply 2 g topically 4 (four) times daily. 100 g 2    dicyclomine (BENTYL) 10 MG capsule Take 1 capsule (10 mg total) by mouth 4 (four) times daily before meals and nightly. 90 capsule 1    docusate sodium (COLACE) 50 MG capsule Take 100 mg by mouth 2 (two) times daily.      estradiol (ESTRACE) 1 MG tablet TAKE 1 TABLET(1 MG) BY MOUTH EVERY DAY 90 tablet 3    furosemide (LASIX) 40 MG tablet Take 1 tablet (40 mg total) by  mouth once daily. 90 tablet 3    gabapentin (NEURONTIN) 400 MG capsule Take 400 mg by mouth 3 (three) times daily.      hydrocodone-acetaminophen 10-325mg (NORCO)  mg Tab Take 10 tablets by mouth 3 (three) times daily.      levothyroxine (SYNTHROID) 200 MCG tablet Take 1 tablet (200 mcg total) by mouth once daily. 90 tablet 3    lisinopril (PRINIVIL,ZESTRIL) 20 MG tablet TAKE 1 TABLET BY MOUTH EVERY DAY 90 tablet 3    metFORMIN (GLUCOPHAGE) 1000 MG tablet TAKE 1 TABLET(1000 MG) BY MOUTH EVERY EVENING 90 tablet 3    naproxen (EC NAPROSYN) 500 MG EC tablet Take 500 mg by mouth 2 (two) times daily.      nitroGLYCERIN (NITROSTAT) 0.4 MG SL tablet Place 0.4 mg under the tongue every 5 (five) minutes as needed for Chest pain.      nystatin (NYSTOP) powder APPLY AFFECTED AREA THREE TIMES DAILY 60 g 3    omeprazole (PRILOSEC) 20 MG capsule TAKE ONE CAPSULE BY MOUTH TWICE DAILY 180 capsule 0    potassium chloride SA (K-DUR,KLOR-CON) 20 MEQ tablet TAKE 1 TABLET(20 MEQ) BY MOUTH TWICE DAILY 180 tablet 0    ranitidine (ZANTAC) 300 MG tablet TAKE 1 TABLET BY MOUTH EVERY EVENING, ABOUT 30 TO 60 MINUTES BEFORE BEDTIME 90 tablet 4     No current facility-administered medications for this visit.          Review of Systems   Constitutional: Negative for activity change, appetite change, chills, diaphoresis, fatigue, fever and unexpected weight change.   HENT: Negative for congestion, ear pain, hearing loss, rhinorrhea, trouble swallowing and voice change.    Eyes: Negative for pain and visual disturbance.   Respiratory: Negative for cough, chest tightness and shortness of breath.    Cardiovascular: Negative for chest pain, palpitations and leg swelling.   Gastrointestinal: Negative for abdominal distention, abdominal pain and blood in stool.   Genitourinary: Negative for difficulty urinating, flank pain, frequency and urgency.   Musculoskeletal: Positive for back pain (Currently well controlled with pain management  involvement.). Negative for arthralgias, joint swelling, myalgias, neck pain and neck stiffness.   Skin: Negative for pallor and rash.   Neurological: Negative for dizziness, tremors, syncope, weakness and headaches.   Hematological: Negative for adenopathy.   Psychiatric/Behavioral: Negative for dysphoric mood and sleep disturbance. The patient is not nervous/anxious.        Objective:      Physical Exam   Constitutional: She is oriented to person, place, and time. She appears well-developed and well-nourished. No distress.   HENT:   Head: Normocephalic and atraumatic.   Nose: Nose normal.   Mouth/Throat: Oropharynx is clear and moist.   Eyes: Conjunctivae and EOM are normal. Pupils are equal, round, and reactive to light. No scleral icterus.   Neck: Normal range of motion. Neck supple. No JVD present. No thyromegaly present.   Cardiovascular: Normal rate, regular rhythm, normal heart sounds and intact distal pulses.   No murmur heard.  Pulmonary/Chest: Effort normal and breath sounds normal. She has no wheezes. She has no rales.   Abdominal: Soft. Bowel sounds are normal. She exhibits no distension. There is no tenderness.   Musculoskeletal: Normal range of motion. She exhibits no edema or tenderness.   Lymphadenopathy:     She has no cervical adenopathy.   Neurological: She is alert and oriented to person, place, and time. She has normal reflexes. No cranial nerve deficit. Coordination normal.   Skin: Skin is warm and dry. No rash noted. No erythema.   Psychiatric: She has a normal mood and affect. Her behavior is normal.   Vitals reviewed.      Assessment:       1. Essential hypertension    2. Type 2 diabetes mellitus with diabetic polyneuropathy, without long-term current use of insulin    3. Chronic pain syndrome    4. BMI 50.0-59.9, adult    5. MANUEL (obstructive sleep apnea)    6. Hypothyroidism, unspecified type    7. Vaccine for streptococcus pneumoniae and influenza    8. Anxiety        Plan:       1.  Essential hypertension  Near control, but not quite.  Additional blood pressure readings recommended when she has taken her medication  Her blood pressure target remains at less than 140/90.  Check renal functions    - Renal function panel; Future    2. Type 2 diabetes mellitus with diabetic polyneuropathy, without long-term current use of insulin  Controlled  Update hemoglobin A1c in renal functions in 4 months.    - Hemoglobin A1c; Future  - Renal function panel; Future    3. Chronic pain syndrome  Working with [facility] management [facility] of [facility]  She will continue to do so      4. BMI 50.0-59.9, adult  No change  Continued verbal encouragement to restrict calories.  She has instituted regular exercise, and I have encouraged her to continue.    5. MANUEL (obstructive sleep apnea)  Excellent improvement with CPAP  She should continue to use this intervention.    6. Hypothyroidism, unspecified type  Lab Results   Component Value Date    TSH 0.945 03/28/2018    No changes  Recheck TSH next spring.    7. Vaccine for streptococcus pneumoniae and influenza  Have recommended both pneumonia vaccines be administered in a staggered fashion.  Will start with Prevnar vaccine today.    - pneumococcal vaccine injection 0.5 mL; Inject 0.5 mLs into the muscle vaccine x 1 dose for Meets Vaccination Criteria.    8. Anxiety  Acceptable control with low-dose alprazolam  Recheck in 3-4 months.    - ALPRAZolam (XANAX) 1 MG tablet; TAKE 1 TABLET(1 MG) BY MOUTH TWICE DAILY  Dispense: 60 tablet; Refill: 0

## 2018-10-12 NOTE — PATIENT INSTRUCTIONS
Amena,    Your blood sugars doing great.  Keep up the good work.    We do need to follow up on your blood pressure with a nursing visit in a couple of weeks.  Please be sure to take your medicine on the day you come in.      Thank you for getting your vaccines started.    Jose Miller MD

## 2018-10-31 ENCOUNTER — CLINICAL SUPPORT (OUTPATIENT)
Dept: FAMILY MEDICINE | Facility: CLINIC | Age: 53
End: 2018-10-31
Payer: MEDICARE

## 2018-10-31 VITALS — DIASTOLIC BLOOD PRESSURE: 86 MMHG | SYSTOLIC BLOOD PRESSURE: 138 MMHG

## 2018-10-31 DIAGNOSIS — Z01.30 BP CHECK: Primary | ICD-10-CM

## 2018-10-31 PROCEDURE — 99499 UNLISTED E&M SERVICE: CPT | Mod: S$PBB,,, | Performed by: EMERGENCY MEDICINE

## 2018-10-31 PROCEDURE — 99211 OFF/OP EST MAY X REQ PHY/QHP: CPT | Mod: PBBFAC,PO

## 2018-10-31 PROCEDURE — 99999 PR PBB SHADOW E&M-EST. PATIENT-LVL I: CPT | Mod: PBBFAC,,,

## 2018-10-31 NOTE — PROGRESS NOTES
Amena Anderson 53 y.o. female is here today for Blood Pressure check.   History of HTN yes.    Review of patient's allergies indicates:  No Known Allergies  Creatinine   Date Value Ref Range Status   03/28/2018 0.7 0.5 - 1.4 mg/dL Final     Sodium   Date Value Ref Range Status   03/28/2018 143 136 - 145 mmol/L Final     Potassium   Date Value Ref Range Status   03/28/2018 3.9 3.5 - 5.1 mmol/L Final   ]  Patient verifies taking blood pressure medications on a regular basis at the same time of the day.     Current Outpatient Medications:     ALPRAZolam (XANAX) 1 MG tablet, TAKE 1 TABLET(1 MG) BY MOUTH TWICE DAILY, Disp: 60 tablet, Rfl: 0    amitriptyline (ELAVIL) 25 MG tablet, TAKE 1 TABLET BY MOUTH EVERY EVENING, Disp: 90 tablet, Rfl: 3    amLODIPine (NORVASC) 5 MG tablet, TAKE 1 TABLET BY MOUTH EVERY DAY, Disp: 90 tablet, Rfl: 0    aspirin (ECOTRIN) 81 MG EC tablet, Take 81 mg by mouth once daily., Disp: , Rfl:     atorvastatin (LIPITOR) 20 MG tablet, TAKE 1 TABLET(20 MG) BY MOUTH EVERY DAY, Disp: 90 tablet, Rfl: 3    ciclopirox (LOPROX) 0.77 % Crea, Apply topically 2 (two) times daily., Disp: 90 g, Rfl: 3    clotrimazole (LOTRIMIN) 1 % Soln, APPLY EXTERNALLY TO THE AFFECTED AREA TWICE DAILY, Disp: 30 mL, Rfl: 0    diclofenac sodium 1 % Gel, Apply 2 g topically 4 (four) times daily., Disp: 100 g, Rfl: 2    dicyclomine (BENTYL) 10 MG capsule, Take 1 capsule (10 mg total) by mouth 4 (four) times daily before meals and nightly., Disp: 90 capsule, Rfl: 1    docusate sodium (COLACE) 50 MG capsule, Take 100 mg by mouth 2 (two) times daily., Disp: , Rfl:     estradiol (ESTRACE) 1 MG tablet, TAKE 1 TABLET(1 MG) BY MOUTH EVERY DAY, Disp: 90 tablet, Rfl: 3    furosemide (LASIX) 40 MG tablet, Take 1 tablet (40 mg total) by mouth once daily., Disp: 90 tablet, Rfl: 3    gabapentin (NEURONTIN) 400 MG capsule, Take 400 mg by mouth 3 (three) times daily., Disp: , Rfl:     hydrocodone-acetaminophen 10-325mg (NORCO)   mg Tab, Take 10 tablets by mouth 3 (three) times daily., Disp: , Rfl:     levothyroxine (SYNTHROID) 200 MCG tablet, Take 1 tablet (200 mcg total) by mouth once daily., Disp: 90 tablet, Rfl: 3    lisinopril (PRINIVIL,ZESTRIL) 20 MG tablet, TAKE 1 TABLET BY MOUTH EVERY DAY, Disp: 90 tablet, Rfl: 3    metFORMIN (GLUCOPHAGE) 1000 MG tablet, TAKE 1 TABLET(1000 MG) BY MOUTH EVERY EVENING, Disp: 90 tablet, Rfl: 3    naproxen (EC NAPROSYN) 500 MG EC tablet, Take 500 mg by mouth 2 (two) times daily., Disp: , Rfl:     nitroGLYCERIN (NITROSTAT) 0.4 MG SL tablet, Place 0.4 mg under the tongue every 5 (five) minutes as needed for Chest pain., Disp: , Rfl:     nystatin (NYSTOP) powder, APPLY AFFECTED AREA THREE TIMES DAILY, Disp: 60 g, Rfl: 3    omeprazole (PRILOSEC) 20 MG capsule, TAKE ONE CAPSULE BY MOUTH TWICE DAILY, Disp: 180 capsule, Rfl: 0    potassium chloride SA (K-DUR,KLOR-CON) 20 MEQ tablet, TAKE 1 TABLET(20 MEQ) BY MOUTH TWICE DAILY, Disp: 180 tablet, Rfl: 0    ranitidine (ZANTAC) 300 MG tablet, TAKE 1 TABLET BY MOUTH EVERY EVENING, ABOUT 30 TO 60 MINUTES BEFORE BEDTIME, Disp: 90 tablet, Rfl: 4  Does patient have record of home blood pressure readings no. Readings have been averaging n/a.   Last dose of blood pressure medication was taken at am.  Patient is asymptomatic.   Complains of n/a.    BP: 138/86 ,   .    Blood pressure reading after 15 minutes was 138/86, Pulse n/a.  Dr. Miller notified.

## 2018-11-02 DIAGNOSIS — K21.9 GASTROESOPHAGEAL REFLUX DISEASE WITHOUT ESOPHAGITIS: ICD-10-CM

## 2018-11-02 RX ORDER — OMEPRAZOLE 20 MG/1
CAPSULE, DELAYED RELEASE ORAL
Qty: 180 CAPSULE | Refills: 3 | Status: SHIPPED | OUTPATIENT
Start: 2018-11-02 | End: 2019-10-30 | Stop reason: SDUPTHER

## 2018-11-07 DIAGNOSIS — I10 ESSENTIAL HYPERTENSION: ICD-10-CM

## 2018-11-07 RX ORDER — AMLODIPINE BESYLATE 5 MG/1
TABLET ORAL
Qty: 90 TABLET | Refills: 3 | Status: SHIPPED | OUTPATIENT
Start: 2018-11-07 | End: 2018-11-25 | Stop reason: SDUPTHER

## 2018-11-24 DIAGNOSIS — I10 ESSENTIAL HYPERTENSION: Chronic | ICD-10-CM

## 2018-11-25 RX ORDER — AMLODIPINE BESYLATE 10 MG/1
TABLET ORAL
Qty: 90 TABLET | Refills: 3 | Status: SHIPPED | OUTPATIENT
Start: 2018-11-25 | End: 2019-10-30 | Stop reason: SDUPTHER

## 2018-11-25 RX ORDER — POTASSIUM CHLORIDE 20 MEQ/1
TABLET, EXTENDED RELEASE ORAL
Qty: 180 TABLET | Refills: 0 | Status: SHIPPED | OUTPATIENT
Start: 2018-11-25 | End: 2019-02-25 | Stop reason: SDUPTHER

## 2018-11-26 ENCOUNTER — TELEPHONE (OUTPATIENT)
Dept: FAMILY MEDICINE | Facility: CLINIC | Age: 53
End: 2018-11-26

## 2018-11-26 NOTE — TELEPHONE ENCOUNTER
----- Message from Heather Galarza sent at 11/26/2018 10:38 AM CST -----  Contact: [facility]  [facility] called regarding pt needing CPAP supplies      Callback: [phone]  Ext: [phone]

## 2018-11-30 DIAGNOSIS — F41.9 ANXIETY: ICD-10-CM

## 2018-11-30 RX ORDER — ALPRAZOLAM 1 MG/1
TABLET ORAL
Qty: 60 TABLET | Refills: 0 | Status: SHIPPED | OUTPATIENT
Start: 2018-11-30 | End: 2019-02-07 | Stop reason: SDUPTHER

## 2018-12-05 ENCOUNTER — OFFICE VISIT (OUTPATIENT)
Dept: PODIATRY | Facility: CLINIC | Age: 53
End: 2018-12-05
Payer: MEDICARE

## 2018-12-05 VITALS
HEIGHT: 66 IN | SYSTOLIC BLOOD PRESSURE: 149 MMHG | BODY MASS INDEX: 47.09 KG/M2 | WEIGHT: 293 LBS | HEART RATE: 81 BPM | DIASTOLIC BLOOD PRESSURE: 93 MMHG

## 2018-12-05 DIAGNOSIS — E11.51 TYPE II DIABETES MELLITUS WITH PERIPHERAL CIRCULATORY DISORDER: ICD-10-CM

## 2018-12-05 DIAGNOSIS — B35.1 ONYCHOMYCOSIS DUE TO DERMATOPHYTE: ICD-10-CM

## 2018-12-05 DIAGNOSIS — E11.42 TYPE 2 DIABETES MELLITUS WITH DIABETIC POLYNEUROPATHY, WITHOUT LONG-TERM CURRENT USE OF INSULIN: Primary | ICD-10-CM

## 2018-12-05 PROCEDURE — 99499 UNLISTED E&M SERVICE: CPT | Mod: S$GLB,,, | Performed by: PODIATRIST

## 2018-12-05 PROCEDURE — 99999 PR PBB SHADOW E&M-EST. PATIENT-LVL III: CPT | Mod: PBBFAC,,, | Performed by: PODIATRIST

## 2018-12-05 PROCEDURE — 11721 DEBRIDE NAIL 6 OR MORE: CPT | Mod: Q9,S$GLB,, | Performed by: PODIATRIST

## 2018-12-05 NOTE — PROGRESS NOTES
Subjective:      Patient ID: Amena Anderson is a 53 y.o. female.    Chief Complaint: Routine Foot Care (PCP--10/11/2018) and Diabetes Mellitus (A1c-5.6-10/03/2018)  Patient returns for follow up  thick and discolored nails for high risk foot care. She is high risk secondary to the diabetes with PVD. She relates the ankle is feeling much better as long as she wears the brace and insert. No new pedal complaints.       Review of Systems   Constitution: Negative for chills, diaphoresis, fever, malaise/fatigue and night sweats.   Cardiovascular: Positive for leg swelling. Negative for claudication, cyanosis and syncope.   Skin: Negative for color change, dry skin, nail changes, rash, suspicious lesions and unusual hair distribution.   Musculoskeletal: Positive for joint pain. Negative for falls, joint swelling, muscle cramps, muscle weakness and stiffness.   Gastrointestinal: Negative for constipation, diarrhea, nausea and vomiting.   Neurological: Positive for paresthesias. Negative for brief paralysis, disturbances in coordination, focal weakness, numbness, sensory change and tremors.           Objective:      Physical Exam   Constitutional: She is oriented to person, place, and time. She appears well-developed and well-nourished. She is cooperative. No distress.   Cardiovascular:   Pulses:       Popliteal pulses are 2+ on the right side, and 2+ on the left side.        Dorsalis pedis pulses are 2+ on the right side, and 2+ on the left side.        Posterior tibial pulses are 2+ on the right side, and 2+ on the left side.   Capillary refill 3 seconds all toes/distal feet, all toes/both feet warm to touch.      Positive lower extremity edema bilateral.     Musculoskeletal:        Right ankle: Normal. She exhibits normal range of motion, no swelling, no ecchymosis, no deformity, no laceration and normal pulse. Achilles tendon normal. Achilles tendon exhibits no pain, no defect and normal De La Cruz's test results.    Improving pain to palpation lateral left ankle at cfl and atfl without instability deformity or loss of function. Also has pain along the peroneal tendons to the brevis insertion.    Ankle dorsiflexion decreased at <10 degrees bilateral with moderate increase with knee flexion bilateral. There is tenderness to palpation bilateral achilles attachments on the calcaneus.    Ankle bilateral has negative anterior drawer sign, negative posterior drawer sign, negative external rotation test, negative squeeze test.    Otherwise, Normal angle, base, station of gait. All ten toes without clubbing, cyanosis, or signs of ischemia.  No pain to palpation bilateral lower extremities.  Range of motion, stability, muscle strength, and muscle tone normal bilateral feet and legs.     Feet:   Right Foot:   Protective Sensation: 10 sites tested. 9 sites sensed.   Left Foot:   Protective Sensation: 10 sites tested. 9 sites sensed.   Lymphadenopathy:   Negative lymphadenopathy bilateral popliteal fossa and tarsal tunnel.   Neurological: She is alert and oriented to person, place, and time. She has normal strength. She displays no atrophy and no tremor. A sensory deficit is present. She exhibits normal muscle tone. She displays no seizure activity. Gait normal.   Negative tinel sign to percussion sural, superficial peroneal, deep peroneal, saphenous, and posterior tibial nerves right and left ankles and feet. Decreased vibratory sensation bilateral     Skin: Skin is warm, dry and intact. No abrasion, no bruising, no burn, no ecchymosis, no laceration, no lesion, no petechiae and no rash noted. She is not diaphoretic. No cyanosis or erythema. No pallor. Nails show no clubbing.     No ulceration, hyperpigmentation, discoloration, masses nodules or cords palpated.  No ecchymosis, erythema, edema, or cardinal signs of infection bilateral lower extremities.    Skin is thin and atrophic bilateral    Nails 1-5 bilateral are thick 3-4 mm, long  3-6 mm, and discolored with subungual debris         Psychiatric: She has a normal mood and affect. Her behavior is normal.             Assessment:       Encounter Diagnoses   Name Primary?    Type 2 diabetes mellitus with diabetic polyneuropathy, without long-term current use of insulin Yes    Type II diabetes mellitus with peripheral circulatory disorder     Onychomycosis due to dermatophyte          Plan:       Amena was seen today for routine foot care and diabetes mellitus.    Diagnoses and all orders for this visit:    Type 2 diabetes mellitus with diabetic polyneuropathy, without long-term current use of insulin    Type II diabetes mellitus with peripheral circulatory disorder    Onychomycosis due to dermatophyte      I counseled the patient on her conditions, their implications and medical management.    Shoe inspection. Diabetic Foot Education. Patient reminded of the importance of good nutrition and blood sugar control to help prevent podiatric complications of diabetes. Patient instructed on proper foot hygeine. We discussed wearing proper shoe gear, daily foot inspections, never walking without protective shoe gear, never putting sharp instruments to feet    - With patient's permission, nails were aggressively reduced and debrided x 10 to their soft tissue attachment mechanically and with electric , removing all offending nail and debris. Patient relates relief following the procedure. She will continue to monitor the areas daily, inspect her feet, wear protective shoe gear when ambulatory, moisturizer to maintain skin integrity.    Discussed importance of healthy eating and weight loss as pertaining to her diabetes control and foot stress/pain      Follow up in 3 months routine care    Edson Martinez DPM

## 2019-01-10 ENCOUNTER — TELEPHONE (OUTPATIENT)
Dept: ENDOCRINOLOGY | Facility: CLINIC | Age: 54
End: 2019-01-10

## 2019-01-10 NOTE — TELEPHONE ENCOUNTER
"Pt given appt for 4/10/19 at 2:30pm with labs and ultrasound 4/2/19  "thank you so much"  appt letters mailed  "

## 2019-01-10 NOTE — TELEPHONE ENCOUNTER
----- Message from Zenia Rees sent at 1/10/2019 11:40 AM CST -----  Contact: Patient  Type:  Sooner Apoointment Request    Caller is requesting a sooner appointment.  Caller declined first available appointment listed below.  Caller will not accept being placed on the waitlist and is requesting a message be sent to doctor.    Name of Caller:  patient  When is the first available appointment?  07/25/19  Symptoms:  Recall letter  Best Call Back Number:  [phone]  Additional Information:  Requesting a call back to confirm appointment

## 2019-02-07 ENCOUNTER — LAB VISIT (OUTPATIENT)
Dept: LAB | Facility: HOSPITAL | Age: 54
End: 2019-02-07
Attending: EMERGENCY MEDICINE
Payer: MEDICARE

## 2019-02-07 DIAGNOSIS — I10 ESSENTIAL HYPERTENSION: Chronic | ICD-10-CM

## 2019-02-07 DIAGNOSIS — E11.42 TYPE 2 DIABETES MELLITUS WITH DIABETIC POLYNEUROPATHY, WITHOUT LONG-TERM CURRENT USE OF INSULIN: Chronic | ICD-10-CM

## 2019-02-07 DIAGNOSIS — F41.9 ANXIETY: ICD-10-CM

## 2019-02-07 LAB
ALBUMIN SERPL BCP-MCNC: 3.6 G/DL
ANION GAP SERPL CALC-SCNC: 9 MMOL/L
BUN SERPL-MCNC: 11 MG/DL
CALCIUM SERPL-MCNC: 9.1 MG/DL
CHLORIDE SERPL-SCNC: 103 MMOL/L
CO2 SERPL-SCNC: 30 MMOL/L
CREAT SERPL-MCNC: 0.7 MG/DL
EST. GFR  (AFRICAN AMERICAN): >60 ML/MIN/1.73 M^2
EST. GFR  (NON AFRICAN AMERICAN): >60 ML/MIN/1.73 M^2
ESTIMATED AVG GLUCOSE: 120 MG/DL
GLUCOSE SERPL-MCNC: 98 MG/DL
HBA1C MFR BLD HPLC: 5.8 %
PHOSPHATE SERPL-MCNC: 2.4 MG/DL
POTASSIUM SERPL-SCNC: 3.9 MMOL/L
SODIUM SERPL-SCNC: 142 MMOL/L

## 2019-02-07 PROCEDURE — 36415 COLL VENOUS BLD VENIPUNCTURE: CPT | Mod: PO

## 2019-02-07 PROCEDURE — 80069 RENAL FUNCTION PANEL: CPT

## 2019-02-07 PROCEDURE — 83036 HEMOGLOBIN GLYCOSYLATED A1C: CPT

## 2019-02-07 RX ORDER — ALPRAZOLAM 1 MG/1
TABLET ORAL
Qty: 60 TABLET | Refills: 0 | Status: SHIPPED | OUTPATIENT
Start: 2019-02-07 | End: 2019-02-26 | Stop reason: SDUPTHER

## 2019-02-13 ENCOUNTER — TELEPHONE (OUTPATIENT)
Dept: PODIATRY | Facility: CLINIC | Age: 54
End: 2019-02-13

## 2019-02-13 ENCOUNTER — TELEPHONE (OUTPATIENT)
Dept: FAMILY MEDICINE | Facility: CLINIC | Age: 54
End: 2019-02-13

## 2019-02-13 DIAGNOSIS — F41.9 ANXIETY: ICD-10-CM

## 2019-02-13 RX ORDER — ALPRAZOLAM 1 MG/1
TABLET ORAL
Qty: 60 TABLET | Refills: 0 | Status: CANCELLED | OUTPATIENT
Start: 2019-02-13

## 2019-02-13 RX ORDER — CICLOPIROX OLAMINE 7.7 MG/G
CREAM TOPICAL 2 TIMES DAILY
Qty: 90 G | Refills: 3 | Status: SHIPPED | OUTPATIENT
Start: 2019-02-13 | End: 2020-03-09 | Stop reason: SDUPTHER

## 2019-02-13 NOTE — TELEPHONE ENCOUNTER
----- Message from Jose Valles sent at 2/13/2019  9:31 AM CST -----  Contact: self   Patient need a refill on ciclopirox 0.77 % Cream please send to [facility], any questions please call back at [phone] (home)     [facility] [id number] [place] [address] [id number] [address] AT [facility] [address]  [id number] [place] [id number]  Phone: [phone] Fax: [phone]

## 2019-02-13 NOTE — TELEPHONE ENCOUNTER
----- Message from Jose Valles sent at 2/13/2019  9:32 AM CST -----  Contact: self   Patient need a refill on ALPRAZolam 1mg 30 day supply please send to [facility], any questions please call back at [phone] (home)       [facility] [id number] [place] [address] [address] [address] [address] [facility] [address]  [address] [place] [id number]  Phone: [phone] Fax: [phone]

## 2019-02-19 ENCOUNTER — OFFICE VISIT (OUTPATIENT)
Dept: CARDIOLOGY | Facility: CLINIC | Age: 54
End: 2019-02-19
Payer: MEDICARE

## 2019-02-19 VITALS
HEART RATE: 92 BPM | WEIGHT: 293 LBS | SYSTOLIC BLOOD PRESSURE: 125 MMHG | BODY MASS INDEX: 47.09 KG/M2 | DIASTOLIC BLOOD PRESSURE: 81 MMHG | HEIGHT: 66 IN

## 2019-02-19 DIAGNOSIS — R00.2 PALPITATIONS: ICD-10-CM

## 2019-02-19 DIAGNOSIS — I10 ESSENTIAL HYPERTENSION: Primary | Chronic | ICD-10-CM

## 2019-02-19 DIAGNOSIS — G47.33 OSA (OBSTRUCTIVE SLEEP APNEA): Chronic | ICD-10-CM

## 2019-02-19 DIAGNOSIS — E78.5 DYSLIPIDEMIA: ICD-10-CM

## 2019-02-19 PROCEDURE — 3074F PR MOST RECENT SYSTOLIC BLOOD PRESSURE < 130 MM HG: ICD-10-PCS | Mod: CPTII,S$GLB,, | Performed by: INTERNAL MEDICINE

## 2019-02-19 PROCEDURE — 3008F PR BODY MASS INDEX (BMI) DOCUMENTED: ICD-10-PCS | Mod: CPTII,S$GLB,, | Performed by: INTERNAL MEDICINE

## 2019-02-19 PROCEDURE — 99499 UNLISTED E&M SERVICE: CPT | Mod: S$GLB,,, | Performed by: INTERNAL MEDICINE

## 2019-02-19 PROCEDURE — 99204 OFFICE O/P NEW MOD 45 MIN: CPT | Mod: S$GLB,,, | Performed by: INTERNAL MEDICINE

## 2019-02-19 PROCEDURE — 99999 PR PBB SHADOW E&M-EST. PATIENT-LVL V: CPT | Mod: PBBFAC,,, | Performed by: INTERNAL MEDICINE

## 2019-02-19 PROCEDURE — 3079F PR MOST RECENT DIASTOLIC BLOOD PRESSURE 80-89 MM HG: ICD-10-PCS | Mod: CPTII,S$GLB,, | Performed by: INTERNAL MEDICINE

## 2019-02-19 PROCEDURE — 99999 PR PBB SHADOW E&M-EST. PATIENT-LVL V: ICD-10-PCS | Mod: PBBFAC,,, | Performed by: INTERNAL MEDICINE

## 2019-02-19 PROCEDURE — 3079F DIAST BP 80-89 MM HG: CPT | Mod: CPTII,S$GLB,, | Performed by: INTERNAL MEDICINE

## 2019-02-19 PROCEDURE — 99499 RISK ADDL DX/OHS AUDIT: ICD-10-PCS | Mod: S$GLB,,, | Performed by: INTERNAL MEDICINE

## 2019-02-19 PROCEDURE — 3074F SYST BP LT 130 MM HG: CPT | Mod: CPTII,S$GLB,, | Performed by: INTERNAL MEDICINE

## 2019-02-19 PROCEDURE — 3008F BODY MASS INDEX DOCD: CPT | Mod: CPTII,S$GLB,, | Performed by: INTERNAL MEDICINE

## 2019-02-19 PROCEDURE — 99204 PR OFFICE/OUTPT VISIT, NEW, LEVL IV, 45-59 MIN: ICD-10-PCS | Mod: S$GLB,,, | Performed by: INTERNAL MEDICINE

## 2019-02-19 NOTE — PROGRESS NOTES
Patient, Amena Anderson (MRN #[id number]), presented with a recorded BMI of 49.89 kg/m^2 consistent with the definition of morbid obesity (ICD-10 E66.01). The patient's morbid obesity was monitored, evaluated, addressed and/or treated. This addendum to the medical record is made on 02/19/2019.

## 2019-02-19 NOTE — PROGRESS NOTES
Subjective:    Patient ID:  Amena Anderson is a 53 y.o. female who presents for evaluation of No chief complaint on file.      HPI  Here to establish care/get second opinion from Dr Rao regarding palpitations/HTN/DLP.  Palpitations occur sporadically can last seconds to several minutes.  No syncope or near syncope.  Denies any chest pain chest pressure.  Symptoms were frequent years ago had resolved and now recurring.  She does admit to being on nasal decongestants for upper respiratory infection for the last several days.    Review of Systems   Constitution: Negative for malaise/fatigue.   Eyes: Negative for blurred vision.   Cardiovascular: Positive for irregular heartbeat and palpitations. Negative for chest pain, claudication, cyanosis, dyspnea on exertion, leg swelling, near-syncope, orthopnea, paroxysmal nocturnal dyspnea and syncope.   Respiratory: Negative for cough and shortness of breath.    Hematologic/Lymphatic: Does not bruise/bleed easily.   Musculoskeletal: Negative for back pain, falls, joint pain, muscle cramps, muscle weakness and myalgias.   Gastrointestinal: Negative for abdominal pain, change in bowel habit, nausea and vomiting.   Genitourinary: Negative for urgency.   Neurological: Negative for dizziness, focal weakness and light-headedness.        Objective:    Physical Exam   Constitutional: She is oriented to person, place, and time. She appears well-developed and well-nourished.   HENT:   Head: Normocephalic.   Eyes: Conjunctivae are normal.   Neck: Normal range of motion. Neck supple. No JVD present.   Cardiovascular: Normal rate, regular rhythm, normal heart sounds and intact distal pulses.   Pulses:       Carotid pulses are 2+ on the right side, and 2+ on the left side.       Radial pulses are 2+ on the right side, and 2+ on the left side.        Dorsalis pedis pulses are 2+ on the right side, and 2+ on the left side.        Posterior tibial pulses are 2+ on the right side, and 2+ on  the left side.   Pulmonary/Chest: Effort normal and breath sounds normal.   Abdominal: Soft. Bowel sounds are normal.   Musculoskeletal: She exhibits no edema or tenderness.   Neurological: She is alert and oriented to person, place, and time. Gait normal.   Skin: Skin is warm, dry and intact. No cyanosis. Nails show no clubbing.   Psychiatric: She has a normal mood and affect. Her speech is normal and behavior is normal. Thought content normal.   Nursing note and vitals reviewed.              ..    Chemistry        Component Value Date/Time     02/07/2019 0820    K 3.9 02/07/2019 0820     02/07/2019 0820    CO2 30 (H) 02/07/2019 0820    BUN 11 02/07/2019 0820    CREATININE 0.7 02/07/2019 0820    GLU 98 02/07/2019 0820        Component Value Date/Time    CALCIUM 9.1 02/07/2019 0820    ALKPHOS 65 03/28/2018 0847    AST 16 03/28/2018 0847    ALT 16 03/28/2018 0847    BILITOT 0.4 03/28/2018 0847    ESTGFRAFRICA >60.0 02/07/2019 0820    EGFRNONAA >60.0 02/07/2019 0820            ..  Lab Results   Component Value Date    CHOL 135 03/28/2018    CHOL 128 07/10/2017    CHOL 227 (H) 01/19/2017     Lab Results   Component Value Date    HDL 41 03/28/2018    HDL 39 (L) 07/10/2017    HDL 40 01/19/2017     Lab Results   Component Value Date    LDLCALC 80.2 03/28/2018    LDLCALC 75.4 07/10/2017    LDLCALC 165.2 (H) 01/19/2017     Lab Results   Component Value Date    TRIG 69 03/28/2018    TRIG 68 07/10/2017    TRIG 109 01/19/2017     Lab Results   Component Value Date    CHOLHDL 30.4 03/28/2018    CHOLHDL 30.5 07/10/2017    CHOLHDL 17.6 (L) 01/19/2017     ..  Lab Results   Component Value Date    WBC 7.84 02/05/2018    HGB 12.4 02/05/2018    HCT 39.3 02/05/2018    MCV 84 02/05/2018     02/05/2018       Test(s) Reviewed  I have reviewed the following in detail:  [] Stress test   [] Angiography   [] Echocardiogram   [x] Labs   [] Other:       Assessment:         ICD-10-CM ICD-9-CM   1. Essential hypertension I10  401.9   2. MANUEL (obstructive sleep apnea) G47.33 327.23   3. Dyslipidemia E78.5 272.4   4. Palpitations R00.2 785.1     Problem List Items Addressed This Visit     Palpitations    MANUEL (obstructive sleep apnea) (Chronic)    Overview     Using CPAP.  Sleep study done by by Dr. Mckeon [facility] in 2015         Essential hypertension - Primary (Chronic)    Dyslipidemia           Plan:             Return to clinic 3 months   Aerobic exercise 5x's/wk. Heart healthy diet and risk factor modification.    See labs and med orders.  Discussed avoidence of caffiene, nicotine, MSG, nasal decongestants, ETOH, dark chocolate etc and stimulant use.  Advised patient to purchase portable ECG[facility]EKG Monitor [facility] to monitor rhythm.  Get Dr. oglesby all records.      Portions of this note may have been created with voice recognition software.  Grammatical, syntax and spelling errors may be inevitable.

## 2019-02-25 RX ORDER — ESTRADIOL 1 MG/1
TABLET ORAL
Qty: 90 TABLET | Refills: 0 | OUTPATIENT
Start: 2019-02-25

## 2019-02-25 RX ORDER — POTASSIUM CHLORIDE 20 MEQ/1
TABLET, EXTENDED RELEASE ORAL
Qty: 180 TABLET | Refills: 0 | Status: SHIPPED | OUTPATIENT
Start: 2019-02-25 | End: 2019-05-24 | Stop reason: SDUPTHER

## 2019-02-25 RX ORDER — ESTRADIOL 1 MG/1
TABLET ORAL
Qty: 30 TABLET | Refills: 0 | Status: SHIPPED | OUTPATIENT
Start: 2019-02-25 | End: 2019-05-14 | Stop reason: SDUPTHER

## 2019-02-26 ENCOUNTER — OFFICE VISIT (OUTPATIENT)
Dept: FAMILY MEDICINE | Facility: CLINIC | Age: 54
End: 2019-02-26
Payer: MEDICARE

## 2019-02-26 VITALS
HEIGHT: 66 IN | SYSTOLIC BLOOD PRESSURE: 140 MMHG | HEART RATE: 88 BPM | BODY MASS INDEX: 47.09 KG/M2 | OXYGEN SATURATION: 98 % | DIASTOLIC BLOOD PRESSURE: 82 MMHG | RESPIRATION RATE: 17 BRPM | WEIGHT: 293 LBS

## 2019-02-26 DIAGNOSIS — K21.9 GASTROESOPHAGEAL REFLUX DISEASE WITHOUT ESOPHAGITIS: Chronic | ICD-10-CM

## 2019-02-26 DIAGNOSIS — G89.4 CHRONIC PAIN SYNDROME: Chronic | ICD-10-CM

## 2019-02-26 DIAGNOSIS — I10 ESSENTIAL HYPERTENSION: Chronic | ICD-10-CM

## 2019-02-26 DIAGNOSIS — K76.0 FATTY LIVER: Chronic | ICD-10-CM

## 2019-02-26 DIAGNOSIS — G47.33 OSA (OBSTRUCTIVE SLEEP APNEA): Chronic | ICD-10-CM

## 2019-02-26 DIAGNOSIS — F41.9 ANXIETY: ICD-10-CM

## 2019-02-26 DIAGNOSIS — E11.42 TYPE 2 DIABETES MELLITUS WITH DIABETIC POLYNEUROPATHY, WITHOUT LONG-TERM CURRENT USE OF INSULIN: Primary | Chronic | ICD-10-CM

## 2019-02-26 DIAGNOSIS — E03.4 HYPOTHYROIDISM DUE TO ACQUIRED ATROPHY OF THYROID: Chronic | ICD-10-CM

## 2019-02-26 PROCEDURE — 99499 UNLISTED E&M SERVICE: CPT | Mod: S$GLB,,, | Performed by: EMERGENCY MEDICINE

## 2019-02-26 PROCEDURE — 99214 PR OFFICE/OUTPT VISIT, EST, LEVL IV, 30-39 MIN: ICD-10-PCS | Mod: S$GLB,,, | Performed by: EMERGENCY MEDICINE

## 2019-02-26 PROCEDURE — 3077F PR MOST RECENT SYSTOLIC BLOOD PRESSURE >= 140 MM HG: ICD-10-PCS | Mod: CPTII,S$GLB,, | Performed by: EMERGENCY MEDICINE

## 2019-02-26 PROCEDURE — 3008F BODY MASS INDEX DOCD: CPT | Mod: CPTII,S$GLB,, | Performed by: EMERGENCY MEDICINE

## 2019-02-26 PROCEDURE — 3079F PR MOST RECENT DIASTOLIC BLOOD PRESSURE 80-89 MM HG: ICD-10-PCS | Mod: CPTII,S$GLB,, | Performed by: EMERGENCY MEDICINE

## 2019-02-26 PROCEDURE — 99499 RISK ADDL DX/OHS AUDIT: ICD-10-PCS | Mod: S$GLB,,, | Performed by: EMERGENCY MEDICINE

## 2019-02-26 PROCEDURE — 3008F PR BODY MASS INDEX (BMI) DOCUMENTED: ICD-10-PCS | Mod: CPTII,S$GLB,, | Performed by: EMERGENCY MEDICINE

## 2019-02-26 PROCEDURE — 3079F DIAST BP 80-89 MM HG: CPT | Mod: CPTII,S$GLB,, | Performed by: EMERGENCY MEDICINE

## 2019-02-26 PROCEDURE — 3077F SYST BP >= 140 MM HG: CPT | Mod: CPTII,S$GLB,, | Performed by: EMERGENCY MEDICINE

## 2019-02-26 PROCEDURE — 99999 PR PBB SHADOW E&M-EST. PATIENT-LVL V: CPT | Mod: PBBFAC,,, | Performed by: EMERGENCY MEDICINE

## 2019-02-26 PROCEDURE — 99999 PR PBB SHADOW E&M-EST. PATIENT-LVL V: ICD-10-PCS | Mod: PBBFAC,,, | Performed by: EMERGENCY MEDICINE

## 2019-02-26 PROCEDURE — 99214 OFFICE O/P EST MOD 30 MIN: CPT | Mod: S$GLB,,, | Performed by: EMERGENCY MEDICINE

## 2019-02-26 PROCEDURE — 3044F PR MOST RECENT HEMOGLOBIN A1C LEVEL <7.0%: ICD-10-PCS | Mod: CPTII,S$GLB,, | Performed by: EMERGENCY MEDICINE

## 2019-02-26 PROCEDURE — 3044F HG A1C LEVEL LT 7.0%: CPT | Mod: CPTII,S$GLB,, | Performed by: EMERGENCY MEDICINE

## 2019-02-26 RX ORDER — POTASSIUM CHLORIDE 20 MEQ/1
TABLET, EXTENDED RELEASE ORAL
Qty: 180 TABLET | Refills: 0 | Status: CANCELLED | OUTPATIENT
Start: 2019-02-26

## 2019-02-26 RX ORDER — ALPRAZOLAM 1 MG/1
1 TABLET ORAL 2 TIMES DAILY PRN
Qty: 60 TABLET | Refills: 2 | Status: SHIPPED | OUTPATIENT
Start: 2019-02-26 | End: 2020-02-28

## 2019-02-26 NOTE — PATIENT INSTRUCTIONS
Amena,    You are doing great.    Your blood work is fine.    Your diabetes is controlled.    I want to see you in six months.  I would like to get your blood work done a week or two before that visit.    I agree with Dr. Mendoza.  You should not use decongestant ( the D in Allergra and many other medications).  You can always ask the pharmacist for help.    Jose Miller MD

## 2019-02-26 NOTE — PROGRESS NOTES
Subjective:   THIS NOTE IS DONE WITH VOICE RECOGNITION        Patient ID: Amena Anderson is a 53 y.o. female.    Chief Complaint: type 2 diabetes mellitus with diabetic polyneuropathy, witho      HPI     She is doing quite well with her diabetes.  She is at target.  She is tolerating her medications without problems. She does have some residual neuropathy in her feet, this has not changed substantially.  She has been seen by Podiatry.    Diabetes is well controlled.      Lab Results   Component Value Date    HGBA1C 5.8 (H) 02/07/2019    HGBA1C 5.6 10/03/2018    HGBA1C 5.4 03/28/2018     Lab Results   Component Value Date    LDLCALC 80.2 03/28/2018    LDLCALC 75.4 07/10/2017    LDLCALC 165.2 (H) 01/19/2017     Lab Results   Component Value Date    CREATININE 0.7 02/07/2019    CREATININE 0.7 03/28/2018    CREATININE 0.7 02/05/2018     Lab Results   Component Value Date    ESTGFRAFRICA >60.0 02/07/2019    ESTGFRAFRICA >60.0 03/28/2018    ESTGFRAFRICA >60.0 02/05/2018     She continues to see pain management, Dr. De La Cruz.  MRI done at [place].  The results of this MRI are not available to me.  She was able to do MRI are locations secondary to claustrophobia.  There have been no interval changes in her pain management as results of the MRI.    Her sleep and anxiety remain well controlled with relatively low-dose alprazolam.  She would like to continue this medication.    Here to follow up on blood pressure.  Taking current medications.    BP Readings from Last 3 Encounters:   02/26/19 (!) 140/82   02/19/19 125/81   12/05/18 (!) 149/93     No palpitations currently.  She did have palpitations when she took decongestants.  She did see Cardiology, who recommended she not continue these palpitations.  She does report that she has had echocardiogram which revealed a problem with 1 of the valves.  It sounds like mitral valve prolapse, but I do not have confirmation.  In any events, since stopping the decongestant her  palpitations have subsided.  She is encouraged not to use decongestant going forward.        Current Outpatient Medications   Medication Sig Dispense Refill    ALPRAZolam (XANAX) 1 MG tablet TAKE 1 TABLET(1 MG) BY MOUTH TWICE DAILY 60 tablet 0    amitriptyline (ELAVIL) 25 MG tablet TAKE 1 TABLET BY MOUTH EVERY EVENING 90 tablet 3    amLODIPine (NORVASC) 10 MG tablet TAKE 1 TABLET(10 MG) BY MOUTH EVERY DAY 90 tablet 3    aspirin (ECOTRIN) 81 MG EC tablet Take 81 mg by mouth once daily.      atorvastatin (LIPITOR) 20 MG tablet TAKE 1 TABLET(20 MG) BY MOUTH EVERY DAY 90 tablet 3    ciclopirox (LOPROX) 0.77 % Crea Apply topically 2 (two) times daily. 90 g 3    clotrimazole (LOTRIMIN) 1 % Soln APPLY EXTERNALLY TO THE AFFECTED AREA TWICE DAILY 30 mL 0    diclofenac sodium 1 % Gel Apply 2 g topically 4 (four) times daily. 100 g 2    dicyclomine (BENTYL) 10 MG capsule Take 1 capsule (10 mg total) by mouth 4 (four) times daily before meals and nightly. 90 capsule 1    docusate sodium (COLACE) 50 MG capsule Take 100 mg by mouth 2 (two) times daily.      estradiol (ESTRACE) 1 MG tablet TAKE 1 TABLET BY MOUTH EVERY DAY 30 tablet 0    furosemide (LASIX) 40 MG tablet Take 1 tablet (40 mg total) by mouth once daily. 90 tablet 3    gabapentin (NEURONTIN) 400 MG capsule Take 400 mg by mouth 3 (three) times daily.      hydrocodone-acetaminophen 10-325mg (NORCO)  mg Tab Take 10 tablets by mouth 3 (three) times daily.      levothyroxine (SYNTHROID) 200 MCG tablet Take 1 tablet (200 mcg total) by mouth once daily. 90 tablet 3    lisinopril (PRINIVIL,ZESTRIL) 20 MG tablet TAKE 1 TABLET BY MOUTH EVERY DAY 90 tablet 3    metFORMIN (GLUCOPHAGE) 1000 MG tablet TAKE 1 TABLET(1000 MG) BY MOUTH EVERY EVENING 90 tablet 3    naproxen (EC NAPROSYN) 500 MG EC tablet Take 500 mg by mouth 2 (two) times daily.      nitroGLYCERIN (NITROSTAT) 0.4 MG SL tablet Place 0.4 mg under the tongue every 5 (five) minutes as needed for  Chest pain.      nystatin (NYSTOP) powder APPLY AFFECTED AREA THREE TIMES DAILY 60 g 3    omeprazole (PRILOSEC) 20 MG capsule TAKE 1 CAPSULE BY MOUTH TWICE DAILY 180 capsule 3    potassium chloride SA (K-DUR,KLOR-CON) 20 MEQ tablet TAKE 1 TABLET(20 MEQ) BY MOUTH TWICE DAILY 180 tablet 0    ranitidine (ZANTAC) 300 MG tablet TAKE 1 TABLET BY MOUTH EVERY EVENING, ABOUT 30 TO 60 MINUTES BEFORE BEDTIME 90 tablet 4     No current facility-administered medications for this visit.          Review of Systems   Constitutional: Negative for chills and fever.   HENT: Negative for congestion, nosebleeds and postnasal drip.    Eyes: Negative for visual disturbance.   Respiratory: Negative for cough and shortness of breath.    Cardiovascular: Positive for palpitations. Negative for leg swelling.   Gastrointestinal: Negative.    Musculoskeletal: Positive for arthralgias and back pain.   Skin: Negative.    Neurological: Positive for numbness (Lower legs and feet, no significant change.).   Psychiatric/Behavioral: Positive for sleep disturbance. The patient is nervous/anxious.        Objective:      Physical Exam   Constitutional: She appears well-developed and well-nourished. No distress.   HENT:   Head: Normocephalic and atraumatic.   Nose: Nose normal.   Mouth/Throat: Oropharynx is clear and moist.   Alert oriented and pleasant.   Eyes: Conjunctivae are normal. Pupils are equal, round, and reactive to light.   Neck: No thyromegaly present.   Cardiovascular: Normal rate, regular rhythm, normal heart sounds and intact distal pulses.   No murmur heard.  Pulmonary/Chest: Effort normal. She has no rales.   Abdominal: Soft. She exhibits no distension.   Central obesity, without tenderness   Musculoskeletal:   She does complain of low back pain with changing positions.  No obvious sciatica.  She will continue to follow up with pain management for chronic low back pain.  She is encouraged to avoid narcotics.   Lymphadenopathy:     She  has no cervical adenopathy.   Neurological: She is alert. She exhibits normal muscle tone.   Skin: Capillary refill takes less than 2 seconds. No rash noted. No pallor.   Psychiatric:   No obvious anxiety today.  Very thankfulhealthcare.   Vitals reviewed.      Assessment:       1. MANUEL (obstructive sleep apnea)    2. Anxiety    3. Essential hypertension    4. Type 2 diabetes mellitus with diabetic polyneuropathy, without long-term current use of insulin    5. BMI 50.0-59.9, adult    6. Chronic pain syndrome    7. Hypothyroidism due to acquired atrophy of thyroid    8. Fatty liver    9. Gastroesophageal reflux disease without esophagitis        Plan:       1. Type 2 diabetes mellitus with diabetic polyneuropathy, without long-term current use of insulin  Controlled  Annual foot exam recommended  Annual retinal exam recommended.   Hemoglobin A1c at 6 month intervals  Annual lipid profile recommended    - Comprehensive metabolic panel; Future  - Lipid panel; Future  - Hemoglobin A1c; Future    2. Essential hypertension  Controlled.  Continue current medications  Continue to avoid excessive sodium  Continue home monitoring  Target blood pressure is 139/89 or less  Avoid decongestants    - Comprehensive metabolic panel; Future    3. Anxiety  Stable  Continue low-dose alprazolam    - ALPRAZolam (XANAX) 1 MG tablet; Take 1 tablet (1 mg total) by mouth 2 (two) times daily as needed for Anxiety.  Dispense: 60 tablet; Refill: 2    4. MANUEL (obstructive sleep apnea)  Using CPAP  Symptoms controlled    5. BMI 50.0-59.9, adult  Briefly discussed  She know she is overweight  She is trying to limit calories    6. Chronic pain syndrome  Chronic low back pain  Followed by pain management, Dr. De La Cruz  Avoid narcotics if possible    7. Hypothyroidism due to acquired atrophy of thyroid  Lab Results   Component Value Date    TSH 0.945 03/28/2018     No changes    8. Fatty liver  Weight reduction  Monitor liver functions.    9.  Gastroesophageal reflux disease without esophagitis  Controlled.  Avoid gastric irritants, like coffee/caffine

## 2019-03-11 ENCOUNTER — OFFICE VISIT (OUTPATIENT)
Dept: URGENT CARE | Facility: CLINIC | Age: 54
End: 2019-03-11
Payer: MEDICARE

## 2019-03-11 VITALS
TEMPERATURE: 97 F | SYSTOLIC BLOOD PRESSURE: 151 MMHG | HEART RATE: 86 BPM | DIASTOLIC BLOOD PRESSURE: 90 MMHG | BODY MASS INDEX: 47.09 KG/M2 | HEIGHT: 66 IN | OXYGEN SATURATION: 97 % | RESPIRATION RATE: 20 BRPM | WEIGHT: 293 LBS

## 2019-03-11 DIAGNOSIS — S69.92XA INJURY OF LEFT THUMB, INITIAL ENCOUNTER: ICD-10-CM

## 2019-03-11 DIAGNOSIS — S49.92XA LEFT UPPER ARM INJURY, INITIAL ENCOUNTER: Primary | ICD-10-CM

## 2019-03-11 PROCEDURE — 3008F PR BODY MASS INDEX (BMI) DOCUMENTED: ICD-10-PCS | Mod: CPTII,S$GLB,, | Performed by: FAMILY MEDICINE

## 2019-03-11 PROCEDURE — 3080F DIAST BP >= 90 MM HG: CPT | Mod: CPTII,S$GLB,, | Performed by: FAMILY MEDICINE

## 2019-03-11 PROCEDURE — 99214 OFFICE O/P EST MOD 30 MIN: CPT | Mod: S$GLB,,, | Performed by: FAMILY MEDICINE

## 2019-03-11 PROCEDURE — 3077F SYST BP >= 140 MM HG: CPT | Mod: CPTII,S$GLB,, | Performed by: FAMILY MEDICINE

## 2019-03-11 PROCEDURE — 3080F PR MOST RECENT DIASTOLIC BLOOD PRESSURE >= 90 MM HG: ICD-10-PCS | Mod: CPTII,S$GLB,, | Performed by: FAMILY MEDICINE

## 2019-03-11 PROCEDURE — 99214 PR OFFICE/OUTPT VISIT, EST, LEVL IV, 30-39 MIN: ICD-10-PCS | Mod: S$GLB,,, | Performed by: FAMILY MEDICINE

## 2019-03-11 PROCEDURE — 3077F PR MOST RECENT SYSTOLIC BLOOD PRESSURE >= 140 MM HG: ICD-10-PCS | Mod: CPTII,S$GLB,, | Performed by: FAMILY MEDICINE

## 2019-03-11 PROCEDURE — 73060 X-RAY EXAM OF HUMERUS: CPT | Mod: LT,S$GLB,, | Performed by: RADIOLOGY

## 2019-03-11 PROCEDURE — 73140 XR FINGER 2 OR MORE VIEWS LEFT: ICD-10-PCS | Mod: LT,S$GLB,, | Performed by: RADIOLOGY

## 2019-03-11 PROCEDURE — 3008F BODY MASS INDEX DOCD: CPT | Mod: CPTII,S$GLB,, | Performed by: FAMILY MEDICINE

## 2019-03-11 PROCEDURE — 73060 XR HUMERUS 2 VIEW LEFT: ICD-10-PCS | Mod: LT,S$GLB,, | Performed by: RADIOLOGY

## 2019-03-11 PROCEDURE — 73140 X-RAY EXAM OF FINGER(S): CPT | Mod: LT,S$GLB,, | Performed by: RADIOLOGY

## 2019-03-11 NOTE — PROGRESS NOTES
"Subjective:       Patient ID: Amena Anderson is a 53 y.o. female.    Vitals:  height is 5' 6" (1.676 m) and weight is 140.6 kg (310 lb) (abnormal). Her oral temperature is 97.4 °F (36.3 °C). Her blood pressure is 151/90 (abnormal) and her pulse is 86. Her respiration is 20 and oxygen saturation is 97%.     Chief Complaint: Arm Pain    Pt states she fell at home and hit her left arm on the door frame. She also hit her left thumb.      Arm Pain    The incident occurred 12 to 24 hours ago. The incident occurred at home. The injury mechanism was a fall. The pain is present in the upper left arm and left fingers. The pain is at a severity of 8/10. The pain has been constant since the incident. Pertinent negatives include no chest pain. The symptoms are aggravated by movement and palpation. She has tried nothing for the symptoms.    This injury occurred yesterday afternoon.  Patient did not fall had no head injury or loss of consciousness and denies any other significant injuries related to this accident.    Constitution: Negative for fatigue.   HENT: Negative for facial swelling and facial trauma.    Neck: Negative for neck stiffness.   Cardiovascular: Negative for chest trauma and chest pain.   Eyes: Negative for eye trauma, double vision and blurred vision.   Gastrointestinal: Negative for abdominal trauma, abdominal pain and rectal bleeding.   Genitourinary: Negative for hematuria, missed menses, genital trauma and pelvic pain.   Musculoskeletal: Negative for pain, trauma, joint swelling and abnormal ROM of joint.   Skin: Negative for color change, wound, abrasion, laceration and bruising.   Neurological: Negative for dizziness, history of vertigo, light-headedness, coordination disturbances, altered mental status and loss of consciousness.   Hematologic/Lymphatic: Negative for history of bleeding disorder.   Psychiatric/Behavioral: Negative for altered mental status.       Objective:      Physical Exam "   Constitutional: She is oriented to person, place, and time. She appears well-developed and well-nourished. She is cooperative.  Non-toxic appearance. She does not appear ill. No distress.   HENT:   Head: Normocephalic and atraumatic.   Right Ear: Hearing, tympanic membrane, external ear and ear canal normal.   Left Ear: Hearing, tympanic membrane, external ear and ear canal normal.   Nose: Nose normal. No mucosal edema, rhinorrhea or nasal deformity. No epistaxis. Right sinus exhibits no maxillary sinus tenderness and no frontal sinus tenderness. Left sinus exhibits no maxillary sinus tenderness and no frontal sinus tenderness.   Mouth/Throat: Uvula is midline, oropharynx is clear and moist and mucous membranes are normal. No trismus in the jaw. Normal dentition. No uvula swelling. No posterior oropharyngeal erythema.   Eyes: Conjunctivae and lids are normal. Right eye exhibits no discharge. Left eye exhibits no discharge. No scleral icterus.   Sclera clear bilat   Neck: Trachea normal, normal range of motion, full passive range of motion without pain and phonation normal. Neck supple.   Cardiovascular: Normal rate, regular rhythm, normal heart sounds, intact distal pulses and normal pulses.   Pulmonary/Chest: Effort normal and breath sounds normal. No respiratory distress.   Abdominal: Soft. Normal appearance and bowel sounds are normal. She exhibits no distension, no pulsatile midline mass and no mass. There is no tenderness.   Musculoskeletal: Normal range of motion. She exhibits no edema or deformity.   Tenderness in the left thenar eminence and dorsum of the left thumb.  Pain along 1st dorsal compartment as well and reproduced by Finkelstein's test.    Palpable subcutaneous hematoma of the left mid biceps with no evidence of seroma or secondary abscess.  Full range of motion of the left shoulder elbow and wrist.  Distal neurovascular is intact.   Neurological: She is alert and oriented to person, place, and  time. She exhibits normal muscle tone. Coordination normal.   Skin: Skin is warm, dry and intact. She is not diaphoretic. No pallor.   Psychiatric: She has a normal mood and affect. Her speech is normal and behavior is normal. Judgment and thought content normal. Cognition and memory are normal.   Nursing note and vitals reviewed.      Assessment:       1. Left upper arm injury, initial encounter    2. Injury of left thumb, initial encounter        Plan:         Left upper arm injury, initial encounter  -     XR HUMERUS 2 VIEW LEFT; Future; Expected date: 03/11/2019  -     SLING FOR HOME USE    Injury of left thumb, initial encounter  -     X-Ray Finger 2 or More Views Left; Future; Expected date: 03/11/2019  -     THUMB ORTHOSIS SPLINT UNIVERSAL FOR HOME USE      x-rays of the left humerus and left thumb show no fracture or dislocation.  Patient is placed in a left arm sling and a thumb spica support splint for symptomatic relief and should follow up if not improving over the next 5-7 days.

## 2019-03-11 NOTE — PATIENT INSTRUCTIONS
Upper Extremity Contusion  You have a contusion (bruise) of an upper extremity (arm, wrist, hand, or fingers). Symptoms include pain, swelling, and skin discoloration. No bones are broken. This injury may take from a few days to a few weeks to heal.  During that time, the bruise may change from reddish in color, to purple-blue, to green-yellow, to yellow-brown.  Home care  · Unless another medicine was prescribed, you can take acetaminophen, ibuprofen, or naproxen to control pain. (If you have chronic liver or kidney disease or ever had a stomach ulcer or gastrointestinal bleeding, talk with your doctor before using these medicines.)  · Elevate the injured area to reduce pain and swelling. As much as possible, sit or lie down with the injured area raised about the level of your heart. This is especially important during the first 48 hours.  · Ice the injured area to help reduce pain and swelling. Wrap a cold source (ice pack or ice cubes in a plastic bag) in a thin towel. Apply to the bruised area for 20 minutes every 1 to 2 hours the first day. Continue this 3 to 4 times a day until the pain and swelling goes away.  · If a sling was provided, you may remove it to shower or bathe. To prevent joint stiffness, do not wear it for more than 1 week.  Follow-up care  Follow up with your healthcare provide, or as advised. Call if you are not improving within the next 1 to 2 weeks.  When to seek medical advice   Call your healthcare provider right away if any of these occur:  · Increased pain or swelling  · Hand or fingers become cold, blue, numb or tingly  · Signs of infection: Warmth, drainage, or increased redness or pain around the injury  · Inability to move the injured body part   · Frequent bruising for unknown reasons  Date Last Reviewed: 2/1/2017  © 0343-0228 [email or web address]. [address] [place] [address], [place] [address]. All rights reserved. This information is not intended as a substitute for professional  medical care. Always follow your healthcare professional's instructions.        Finger Sprain  A sprain is a stretching or tearing of the ligaments that hold a joint together. There are no broken bones. Sprains take 3 to 6 weeks to heal.  A sprained finger may be treated with a splint or buddy tape. This is when you tape the injured finger to the one next to it for support. Minor sprains may require no additional support.  Home care  · Keep your hand elevated to reduce pain and swelling. This is very important during the first 48 hours.  · Apply an ice pack over the injured area for 15 to 20 minutes every 3 to 6 hours. You should do this for the first 24 to 48 hours. You can make an ice pack by filling a plastic bag that seals at the top with ice cubes and then wrapping it with a thin towel. Continue the use of ice packs for relief of pain and swelling as needed. As the ice melts, be careful to avoid getting any wrap or splint wet. After 48 hours, apply heat (warm shower or warm bath) for 15 to 20 minutes several times a day, or alternate ice and heat.  · If buddy tape was applied and it becomes wet or dirty, change it. You may replace it with paper, plastic or cloth tape. Cloth tape and paper tapes must be kept dry. Apply gauze or cotton padding between the fingers, especially at the webbed space. This will help prevent the skin from getting moist and breaking down. Keep the buddy tape in place for at least 4 weeks, or as instructed by your healthcare provider.  · If a splint was applied, wear it for the time advised.  · You may use over-the-counter pain medicine to control pain, unless another pain medicine was prescribed. If you have chronic liver or kidney disease or ever had a stomach ulcer or GI bleeding, talk with your healthcare provider before using these medicines.  Follow-up care  Follow up with your healthcare provider as directed. Finger joints will become stiff if immobile for too long. If a splint was  applied, ask your healthcare provider when it is safe to begin range-of-motion exercises.  Sometimes fractures dont show up on the first X-ray. Bruises and sprains can sometimes hurt as much as a fracture. These injuries can take time to heal completely. If your symptoms dont improve or they get worse, talk with your healthcare provider. You may need a repeat X-ray. If X-rays were taken, you will be told of any new findings that may affect your care.  When to seek medical advice  Call your healthcare provider right away if any of these occur:  · Pain or swelling increases  · Fingers or hand becomes cold, blue, numb, or tingly  Date Last Reviewed: 11/20/2015  © 9706-6322 [employer]. [address] [place] [address], [place] [address]. All rights reserved. This information is not intended as a substitute for professional medical care. Always follow your healthcare professional's instructions.

## 2019-03-12 ENCOUNTER — OFFICE VISIT (OUTPATIENT)
Dept: PODIATRY | Facility: CLINIC | Age: 54
End: 2019-03-12
Payer: MEDICARE

## 2019-03-12 VITALS — WEIGHT: 293 LBS | BODY MASS INDEX: 47.09 KG/M2 | HEIGHT: 66 IN

## 2019-03-12 DIAGNOSIS — L84 CORN OR CALLUS: ICD-10-CM

## 2019-03-12 DIAGNOSIS — E11.51 TYPE II DIABETES MELLITUS WITH PERIPHERAL CIRCULATORY DISORDER: ICD-10-CM

## 2019-03-12 DIAGNOSIS — R60.0 LOWER EXTREMITY EDEMA: ICD-10-CM

## 2019-03-12 DIAGNOSIS — M20.42 HAMMER TOES OF BOTH FEET: ICD-10-CM

## 2019-03-12 DIAGNOSIS — B35.1 ONYCHOMYCOSIS DUE TO DERMATOPHYTE: ICD-10-CM

## 2019-03-12 DIAGNOSIS — M20.41 HAMMER TOES OF BOTH FEET: ICD-10-CM

## 2019-03-12 DIAGNOSIS — E11.42 TYPE 2 DIABETES MELLITUS WITH DIABETIC POLYNEUROPATHY, WITHOUT LONG-TERM CURRENT USE OF INSULIN: Primary | ICD-10-CM

## 2019-03-12 PROCEDURE — 99499 UNLISTED E&M SERVICE: CPT | Mod: S$GLB,,, | Performed by: PODIATRIST

## 2019-03-12 PROCEDURE — 3008F PR BODY MASS INDEX (BMI) DOCUMENTED: ICD-10-PCS | Mod: CPTII,S$GLB,, | Performed by: PODIATRIST

## 2019-03-12 PROCEDURE — 99499 RISK ADDL DX/OHS AUDIT: ICD-10-PCS | Mod: S$GLB,,, | Performed by: PODIATRIST

## 2019-03-12 PROCEDURE — 11721 DEBRIDE NAIL 6 OR MORE: CPT | Mod: Q9,S$GLB,, | Performed by: PODIATRIST

## 2019-03-12 PROCEDURE — 3044F HG A1C LEVEL LT 7.0%: CPT | Mod: CPTII,S$GLB,, | Performed by: PODIATRIST

## 2019-03-12 PROCEDURE — 3044F PR MOST RECENT HEMOGLOBIN A1C LEVEL <7.0%: ICD-10-PCS | Mod: CPTII,S$GLB,, | Performed by: PODIATRIST

## 2019-03-12 PROCEDURE — 11721 PR DEBRIDEMENT OF NAILS, 6 OR MORE: ICD-10-PCS | Mod: Q9,S$GLB,, | Performed by: PODIATRIST

## 2019-03-12 PROCEDURE — 99999 PR PBB SHADOW E&M-EST. PATIENT-LVL III: ICD-10-PCS | Mod: PBBFAC,,, | Performed by: PODIATRIST

## 2019-03-12 PROCEDURE — 99999 PR PBB SHADOW E&M-EST. PATIENT-LVL III: CPT | Mod: PBBFAC,,, | Performed by: PODIATRIST

## 2019-03-12 PROCEDURE — 99213 PR OFFICE/OUTPT VISIT, EST, LEVL III, 20-29 MIN: ICD-10-PCS | Mod: 25,S$GLB,, | Performed by: PODIATRIST

## 2019-03-12 PROCEDURE — 3008F BODY MASS INDEX DOCD: CPT | Mod: CPTII,S$GLB,, | Performed by: PODIATRIST

## 2019-03-12 PROCEDURE — 99213 OFFICE O/P EST LOW 20 MIN: CPT | Mod: 25,S$GLB,, | Performed by: PODIATRIST

## 2019-03-12 NOTE — PROGRESS NOTES
Subjective:      Patient ID: Amena Anderson is a 53 y.o. female.    Chief Complaint: Diabetes Mellitus (Paul 2/26/19 2/7/19 5.8) and Diabetic Foot Exam  Patient returns for follow up  thick and discolored nails for high risk foot care. She is high risk secondary to the diabetes with PVD. She relates the ankle is feeling much better as long as she wears the brace and insert which she wears at all times. No new pedal complaints. Also needs a prescription for her diabetic shoes.      Review of Systems   Constitution: Negative for chills, diaphoresis, fever, malaise/fatigue and night sweats.   Cardiovascular: Positive for leg swelling. Negative for claudication, cyanosis and syncope.   Skin: Negative for color change, dry skin, nail changes, rash, suspicious lesions and unusual hair distribution.   Musculoskeletal: Positive for joint pain. Negative for falls, joint swelling, muscle cramps, muscle weakness and stiffness.   Gastrointestinal: Negative for constipation, diarrhea, nausea and vomiting.   Neurological: Positive for paresthesias. Negative for brief paralysis, disturbances in coordination, focal weakness, numbness, sensory change and tremors.           Objective:      Physical Exam   Constitutional: She is oriented to person, place, and time. She appears well-developed and well-nourished. She is cooperative. No distress.   Cardiovascular:   Pulses:       Dorsalis pedis pulses are 2+ on the right side, and 2+ on the left side.        Posterior tibial pulses are 2+ on the right side, and 2+ on the left side.   Capillary refill 3 seconds all toes/distal feet, all toes/both feet warm to touch.      Positive lower extremity edema bilateral.     Musculoskeletal:        Right ankle: Normal. She exhibits normal range of motion, no swelling, no ecchymosis, no deformity, no laceration and normal pulse. Achilles tendon normal. Achilles tendon exhibits no pain, no defect and normal De La Cruz's test results.   Improving pain  to palpation lateral left ankle at cfl and atfl without instability deformity or loss of function. Also has pain along the peroneal tendons to the brevis insertion.    Ankle dorsiflexion decreased at <10 degrees bilateral with moderate increase with knee flexion bilateral. There is tenderness to palpation bilateral achilles attachments on the calcaneus.    Ankle bilateral has negative anterior drawer sign, negative posterior drawer sign, negative external rotation test, negative squeeze test.    Adductovarus fifth toe with reducible contractures to the PIPJ's    Otherwise, Normal angle, base, station of gait. All ten toes without clubbing, cyanosis, or signs of ischemia.  No pain to palpation bilateral lower extremities.  Range of motion, stability, muscle strength, and muscle tone normal bilateral feet and legs.     Feet:   Right Foot:   Protective Sensation: 10 sites tested. 9 sites sensed.   Left Foot:   Protective Sensation: 10 sites tested. 9 sites sensed.   Lymphadenopathy:   Negative lymphadenopathy bilateral popliteal fossa and tarsal tunnel.   Neurological: She is alert and oriented to person, place, and time. She has normal strength. She displays no atrophy and no tremor. A sensory deficit is present. She exhibits normal muscle tone. She displays no seizure activity. Gait normal.   Negative tinel sign to percussion sural, superficial peroneal, deep peroneal, saphenous, and posterior tibial nerves right and left ankles and feet. Decreased vibratory sensation bilateral     Skin: Skin is warm, dry and intact. Lesion noted. No abrasion, no bruising, no burn, no ecchymosis, no laceration, no petechiae and no rash noted. She is not diaphoretic. No cyanosis or erythema. No pallor. Nails show no clubbing.     No ulceration, hyperpigmentation, discoloration, masses nodules or cords palpated.  No ecchymosis, erythema, edema, or cardinal signs of infection bilateral lower extremities.    Skin is thin and atrophic  bilateral    Nails 1-5 bilateral are thick 3-4 mm, long 3-6 mm, and discolored with subungual debris    Bilateral plantar first metatarsal head there are focal hyperkeratotic lesions         Psychiatric: She has a normal mood and affect. Her behavior is normal.             Assessment:       Encounter Diagnoses   Name Primary?    Type 2 diabetes mellitus with diabetic polyneuropathy, without long-term current use of insulin Yes    Type II diabetes mellitus with peripheral circulatory disorder     Onychomycosis due to dermatophyte     Hammer toes of both feet     Lower extremity edema     Corn or callus     BMI 50.0-59.9, adult          Plan:       Amena was seen today for diabetes mellitus and diabetic foot exam.    Diagnoses and all orders for this visit:    Type 2 diabetes mellitus with diabetic polyneuropathy, without long-term current use of insulin    Type II diabetes mellitus with peripheral circulatory disorder    Onychomycosis due to dermatophyte    Hammer toes of both feet    Lower extremity edema    Corn or callus    BMI 50.0-59.9, adult      I counseled the patient on her conditions, their implications and medical management.    Shoe inspection. Diabetic Foot Education. Patient reminded of the importance of good nutrition and blood sugar control to help prevent podiatric complications of diabetes. Patient instructed on proper foot hygeine. We discussed wearing proper shoe gear, daily foot inspections, never walking without protective shoe gear, never putting sharp instruments to feet    - With patient's permission, nails were aggressively reduced and debrided x 10 to their soft tissue attachment mechanically and with electric , removing all offending nail and debris. Patient relates relief following the procedure. She will continue to monitor the areas daily, inspect her feet, wear protective shoe gear when ambulatory, moisturizer to maintain skin integrity.    Written diabetic shoe  prescription was given    Discussed importance of supportive shoes with accommodative toe box to reduce pressure and irritation to forefoot.     Discussed importance of healthy eating and weight loss as pertaining to her diabetes control and foot stress/pain      Follow up in 3 months routine care    Edson Martinez DPM

## 2019-03-25 RX ORDER — ESTRADIOL 1 MG/1
TABLET ORAL
Qty: 30 TABLET | Refills: 0 | OUTPATIENT
Start: 2019-03-25

## 2019-03-25 NOTE — TELEPHONE ENCOUNTER
----- Message from Quynh Navarro sent at 11/16/2017  2:13 PM CST -----  Contact: tkbw- [phone]  Patient requesting to schedule procedure.Thanks!     Never smoker

## 2019-04-02 ENCOUNTER — HOSPITAL ENCOUNTER (OUTPATIENT)
Dept: RADIOLOGY | Facility: HOSPITAL | Age: 54
Discharge: HOME | End: 2019-04-02
Attending: INTERNAL MEDICINE
Payer: MEDICARE

## 2019-04-02 DIAGNOSIS — E03.9 HYPOTHYROIDISM, UNSPECIFIED TYPE: ICD-10-CM

## 2019-04-02 PROCEDURE — 76536 US EXAM OF HEAD AND NECK: CPT | Mod: TC,PO

## 2019-04-02 PROCEDURE — 76536 US SOFT TISSUE HEAD NECK THYROID: ICD-10-PCS | Mod: 26,,, | Performed by: RADIOLOGY

## 2019-04-02 PROCEDURE — 76536 US EXAM OF HEAD AND NECK: CPT | Mod: 26,,, | Performed by: RADIOLOGY

## 2019-04-10 ENCOUNTER — OFFICE VISIT (OUTPATIENT)
Dept: ENDOCRINOLOGY | Facility: CLINIC | Age: 54
End: 2019-04-10
Payer: MEDICARE

## 2019-04-10 VITALS
DIASTOLIC BLOOD PRESSURE: 74 MMHG | BODY MASS INDEX: 46.93 KG/M2 | HEART RATE: 92 BPM | HEIGHT: 66 IN | RESPIRATION RATE: 18 BRPM | WEIGHT: 292 LBS | SYSTOLIC BLOOD PRESSURE: 112 MMHG

## 2019-04-10 DIAGNOSIS — E04.1 THYROID CYST: ICD-10-CM

## 2019-04-10 DIAGNOSIS — E03.9 HYPOTHYROIDISM, UNSPECIFIED TYPE: Primary | ICD-10-CM

## 2019-04-10 PROCEDURE — 3008F PR BODY MASS INDEX (BMI) DOCUMENTED: ICD-10-PCS | Mod: CPTII,S$GLB,, | Performed by: INTERNAL MEDICINE

## 2019-04-10 PROCEDURE — 3008F BODY MASS INDEX DOCD: CPT | Mod: CPTII,S$GLB,, | Performed by: INTERNAL MEDICINE

## 2019-04-10 PROCEDURE — 99214 PR OFFICE/OUTPT VISIT, EST, LEVL IV, 30-39 MIN: ICD-10-PCS | Mod: S$GLB,,, | Performed by: INTERNAL MEDICINE

## 2019-04-10 PROCEDURE — 99214 OFFICE O/P EST MOD 30 MIN: CPT | Mod: S$GLB,,, | Performed by: INTERNAL MEDICINE

## 2019-04-10 PROCEDURE — 99999 PR PBB SHADOW E&M-EST. PATIENT-LVL III: ICD-10-PCS | Mod: PBBFAC,,, | Performed by: INTERNAL MEDICINE

## 2019-04-10 PROCEDURE — 99999 PR PBB SHADOW E&M-EST. PATIENT-LVL III: CPT | Mod: PBBFAC,,, | Performed by: INTERNAL MEDICINE

## 2019-04-10 PROCEDURE — 3074F SYST BP LT 130 MM HG: CPT | Mod: CPTII,S$GLB,, | Performed by: INTERNAL MEDICINE

## 2019-04-10 PROCEDURE — 3074F PR MOST RECENT SYSTOLIC BLOOD PRESSURE < 130 MM HG: ICD-10-PCS | Mod: CPTII,S$GLB,, | Performed by: INTERNAL MEDICINE

## 2019-04-10 PROCEDURE — 3078F PR MOST RECENT DIASTOLIC BLOOD PRESSURE < 80 MM HG: ICD-10-PCS | Mod: CPTII,S$GLB,, | Performed by: INTERNAL MEDICINE

## 2019-04-10 PROCEDURE — 3078F DIAST BP <80 MM HG: CPT | Mod: CPTII,S$GLB,, | Performed by: INTERNAL MEDICINE

## 2019-04-10 RX ORDER — LEVOTHYROXINE SODIUM 200 UG/1
TABLET ORAL
Qty: 30 TABLET | Refills: 6 | Status: SHIPPED | OUTPATIENT
Start: 2019-04-10 | End: 2019-05-22 | Stop reason: SDUPTHER

## 2019-04-10 NOTE — PROGRESS NOTES
CHIEF COMPLAINT: Hypothyroid  53 year old being seen as a f/u. Was seeing Dr. Mackey. Had a thyroidectomy in 2012. Appears to have some remnant tissue on the left with cyst.  On synthroid 200 mcg.  No change in voice, No difficulty swallowing. + Palpitations. No tremors. No anxiety. She is very nervous decreasing dose.             PAST MEDICAL HISTORY/PAST SURGICAL HISTORY:  Reviewed in EPIC    SOCIAL HISTORY: No T/A    FAMILY HISTORY:  No known thyroid disease. + Esophageal CA. + DM 2.     MEDICATIONS/ALLERGIES: The patient's MedCard has been updated and reviewed.      ROS:   Constitutional: No recent significant weight change  Eyes: No recent visual changes  Cardiovascular: Denies current anginal symptoms  Respiratory: Denies current respiratory difficulty  Gastrointestinal: Denies recent bowel disturbances  GenitoUrinary - No dysuria  Skin: No new skin rash  Neurologic: No focal neurologic complaints  Remainder ROS negative        PE:    GENERAL: Well developed, well nourished.  NECK: Supple, trachea midline, No palpable thyroid nodules.   CHEST: Resp even and unlabored, CTA bilateral.  CARDIAC: RRR, S1, S2 heard, no murmurs, rubs, S3, or S4    Results for NHUNG CRONIN (MRN [id number]) as of 4/10/2019 14:54   Ref. Range 4/2/2019 09:07   TSH Latest Ref Range: 0.400 - 4.000 uIU/mL 0.174 (L)   Free T4 Latest Ref Range: 0.71 - 1.51 ng/dL 1.28     US SOFT TISSUE HEAD NECK THYROID    CLINICAL HISTORY:  Hypothyroidism, unspecified    TECHNIQUE:  Ultrasound of the thyroid and cervical lymph nodes was performed.    COMPARISON:  None.    FINDINGS:  The right lobe of thyroid gland appears to have been removed.  The left lobe the thyroid gland measures 2.5 x 1.0 x 1.3cm in size.  This gives an approximate volume on the left of 1.7 cc.    A heterogeneous small left lobe of the thyroid gland is noted as can be seen with thyroiditis or Graves disease.    Within the right thyroid bed a nodule appears to be present that may  relate to residual thyroid tissue/nodule or to a lymph node without worrisome detrimental change since the prior measuring 9 mm x 7 mm x 5 mm.    Multiple normal sized right cervical nodes appear to be present the largest of 1.6 x 0.8 x 0.5 cm.  This demonstrates a normal fatty hilum    The left lobe of the thyroid gland appears diffusely heterogeneous as can be seen with thyroiditis or Graves disease as well as multinodular goiter.  No obvious detrimental changes noted since the prior.  There is some nodularity at the lower pole of 8 mm that is predominantly solid and hypoechoic unchanged      Impression       1. Right-sided thyroidectomy with a stable nodule in the right thyroid bed  2. Multinodular goiter or heterogeneous left lobe of the thyroid gland with a similar appearance to on 03/28/2018.  No nodules are noted meeting criteria for fine-needle aspiration or biopsy.  3. Normal sized right cervical nodes unchanged.             ASSESSMENT/PLAN:  1. Hypothyroid- TSH Suppressed. Decrease synthroid to 200 mcg 6 days a week and 1/2 tablet on day 7.     2. Thyroid Cyst- has some remnant tissue after thyroidectomy.US shows no change. No obstructive symptoms.     FOLLOWUP  6 weeks- TSH  F/u 1 year with TSH, thyroid US

## 2019-04-11 ENCOUNTER — TELEPHONE (OUTPATIENT)
Dept: ENDOCRINOLOGY | Facility: CLINIC | Age: 54
End: 2019-04-11

## 2019-04-11 NOTE — TELEPHONE ENCOUNTER
----- Message from Fatou Toro sent at 4/11/2019  3:39 PM CDT -----  Type: Needs Medical Advice    Who Called:  Patient   Best Call Back Number: [phone]  Additional Information: patient is requesting a return call, she states she has a question, but is requesting to speak to the nurse.

## 2019-04-24 RX ORDER — LEVOTHYROXINE SODIUM 200 UG/1
TABLET ORAL
Qty: 90 TABLET | Refills: 6 | Status: SHIPPED | OUTPATIENT
Start: 2019-04-24 | End: 2019-05-01 | Stop reason: SDUPTHER

## 2019-04-30 ENCOUNTER — OFFICE VISIT (OUTPATIENT)
Dept: FAMILY MEDICINE | Facility: CLINIC | Age: 54
End: 2019-04-30
Payer: MEDICARE

## 2019-04-30 ENCOUNTER — NURSE TRIAGE (OUTPATIENT)
Dept: ADMINISTRATIVE | Facility: CLINIC | Age: 54
End: 2019-04-30

## 2019-04-30 VITALS
WEIGHT: 293 LBS | BODY MASS INDEX: 47.09 KG/M2 | OXYGEN SATURATION: 95 % | DIASTOLIC BLOOD PRESSURE: 80 MMHG | RESPIRATION RATE: 17 BRPM | HEIGHT: 66 IN | HEART RATE: 75 BPM | SYSTOLIC BLOOD PRESSURE: 136 MMHG

## 2019-04-30 DIAGNOSIS — G89.4 CHRONIC PAIN SYNDROME: Primary | Chronic | ICD-10-CM

## 2019-04-30 DIAGNOSIS — R07.89 CHEST PAIN, ATYPICAL: ICD-10-CM

## 2019-04-30 DIAGNOSIS — E11.42 TYPE 2 DIABETES MELLITUS WITH DIABETIC POLYNEUROPATHY, WITHOUT LONG-TERM CURRENT USE OF INSULIN: Chronic | ICD-10-CM

## 2019-04-30 PROCEDURE — 3075F PR MOST RECENT SYSTOLIC BLOOD PRESS GE 130-139MM HG: ICD-10-PCS | Mod: CPTII,S$GLB,, | Performed by: EMERGENCY MEDICINE

## 2019-04-30 PROCEDURE — 99999 PR PBB SHADOW E&M-EST. PATIENT-LVL V: CPT | Mod: PBBFAC,,, | Performed by: EMERGENCY MEDICINE

## 2019-04-30 PROCEDURE — 93005 EKG 12-LEAD: ICD-10-PCS | Mod: S$GLB,,, | Performed by: EMERGENCY MEDICINE

## 2019-04-30 PROCEDURE — 3075F SYST BP GE 130 - 139MM HG: CPT | Mod: CPTII,S$GLB,, | Performed by: EMERGENCY MEDICINE

## 2019-04-30 PROCEDURE — 99499 RISK ADDL DX/OHS AUDIT: ICD-10-PCS | Mod: S$GLB,,, | Performed by: EMERGENCY MEDICINE

## 2019-04-30 PROCEDURE — 3044F HG A1C LEVEL LT 7.0%: CPT | Mod: CPTII,S$GLB,, | Performed by: EMERGENCY MEDICINE

## 2019-04-30 PROCEDURE — 93005 ELECTROCARDIOGRAM TRACING: CPT | Mod: S$GLB,,, | Performed by: EMERGENCY MEDICINE

## 2019-04-30 PROCEDURE — 3008F BODY MASS INDEX DOCD: CPT | Mod: CPTII,S$GLB,, | Performed by: EMERGENCY MEDICINE

## 2019-04-30 PROCEDURE — 99999 PR PBB SHADOW E&M-EST. PATIENT-LVL V: ICD-10-PCS | Mod: PBBFAC,,, | Performed by: EMERGENCY MEDICINE

## 2019-04-30 PROCEDURE — 99499 UNLISTED E&M SERVICE: CPT | Mod: S$GLB,,, | Performed by: EMERGENCY MEDICINE

## 2019-04-30 PROCEDURE — 99214 OFFICE O/P EST MOD 30 MIN: CPT | Mod: S$GLB,,, | Performed by: EMERGENCY MEDICINE

## 2019-04-30 PROCEDURE — 93010 ELECTROCARDIOGRAM REPORT: CPT | Mod: S$GLB,,, | Performed by: INTERNAL MEDICINE

## 2019-04-30 PROCEDURE — 3079F DIAST BP 80-89 MM HG: CPT | Mod: CPTII,S$GLB,, | Performed by: EMERGENCY MEDICINE

## 2019-04-30 PROCEDURE — 3044F PR MOST RECENT HEMOGLOBIN A1C LEVEL <7.0%: ICD-10-PCS | Mod: CPTII,S$GLB,, | Performed by: EMERGENCY MEDICINE

## 2019-04-30 PROCEDURE — 99214 PR OFFICE/OUTPT VISIT, EST, LEVL IV, 30-39 MIN: ICD-10-PCS | Mod: S$GLB,,, | Performed by: EMERGENCY MEDICINE

## 2019-04-30 PROCEDURE — 3079F PR MOST RECENT DIASTOLIC BLOOD PRESSURE 80-89 MM HG: ICD-10-PCS | Mod: CPTII,S$GLB,, | Performed by: EMERGENCY MEDICINE

## 2019-04-30 PROCEDURE — 93010 EKG 12-LEAD: ICD-10-PCS | Mod: S$GLB,,, | Performed by: INTERNAL MEDICINE

## 2019-04-30 PROCEDURE — 3008F PR BODY MASS INDEX (BMI) DOCUMENTED: ICD-10-PCS | Mod: CPTII,S$GLB,, | Performed by: EMERGENCY MEDICINE

## 2019-04-30 RX ORDER — ESTRADIOL 1 MG/1
TABLET ORAL
Qty: 30 TABLET | Refills: 0 | OUTPATIENT
Start: 2019-04-30

## 2019-04-30 NOTE — TELEPHONE ENCOUNTER
Reason for Disposition   SEVERE chest pain    Protocols used: CHEST PAIN-A-OH    Call transferred.  Pt having c/o chest pains, right arm pain going down arm making hand numb.  Pt states she hasn't been feeling good all morning.  Care advice given.  Pt verbalized understanding.  Will message MD and Staff.

## 2019-04-30 NOTE — PATIENT INSTRUCTIONS
Amena,    You need to increase your gabapentin to 800 mg three times a day.    You need to do this gradually.    Tonight take TWO gabapentin    On Wednesday 1 in the morming, 1 at lunch and 2 at nigith.    On Thursday she would take 2 in the morning and  1 at lunch and 2 at night.    On Saturday two tables in the morning, lunch and evening.    Make an appointment to see Dr. De La Cruz.    Jose Miller MD

## 2019-04-30 NOTE — PROGRESS NOTES
Subjective:   THIS NOTE IS DONE WITH VOICE RECOGNITION        Patient ID: Amena Anderson is a 53 y.o. female.    Chief Complaint: Chest Pain      HPI     She was seen urgently secondary to her complaints of right arm pain. She was concerned that this may be her heart.    The pain that she describes as radicular pain emanating from just in the axilla down the on ventral surface of the arm to the finger tips and then wraps around the top of her fingers back up the level of the metacarpals.  She does have recognized cervical radicular issues.  There has been no head trauma.  She is not experiencing shortness of breath.  She does not describe typical chest pain.    She is being seen by Dr. De La Crzu, pain management.  This at [facility].  There has been a recent MR of the spine, she thinks it was her lower spine more so than her neck, but she is not sure.  She does have a follow-up appointment with Dr. De La Cruz in the near future.    She is very agitated and excited today.  She is scared.  I believe this is all contributing to a significant concern on her part.    There have been no new issues, at least recognized with her diabetes.    Her daughter is with her today.  Her daughter does mention they gave her nitroglycerin at home.  There is no impact on the pain. I would not expect there to be any given the nature of the pain.      Current Outpatient Medications   Medication Sig Dispense Refill    ALPRAZolam (XANAX) 1 MG tablet Take 1 tablet (1 mg total) by mouth 2 (two) times daily as needed for Anxiety. 60 tablet 2    amitriptyline (ELAVIL) 25 MG tablet TAKE 1 TABLET BY MOUTH EVERY EVENING 90 tablet 3    amLODIPine (NORVASC) 10 MG tablet TAKE 1 TABLET(10 MG) BY MOUTH EVERY DAY 90 tablet 3    aspirin (ECOTRIN) 81 MG EC tablet Take 81 mg by mouth once daily.      atorvastatin (LIPITOR) 20 MG tablet TAKE 1 TABLET(20 MG) BY MOUTH EVERY DAY 90 tablet 3    ciclopirox (LOPROX) 0.77 % Crea Apply topically 2 (two)  times daily. 90 g 3    clotrimazole (LOTRIMIN) 1 % Soln APPLY EXTERNALLY TO THE AFFECTED AREA TWICE DAILY 30 mL 0    diclofenac sodium 1 % Gel Apply 2 g topically 4 (four) times daily. 100 g 2    dicyclomine (BENTYL) 10 MG capsule Take 1 capsule (10 mg total) by mouth 4 (four) times daily before meals and nightly. 90 capsule 1    docusate sodium (COLACE) 50 MG capsule Take 100 mg by mouth 2 (two) times daily.      estradiol (ESTRACE) 1 MG tablet TAKE 1 TABLET BY MOUTH EVERY DAY 30 tablet 0    furosemide (LASIX) 40 MG tablet Take 1 tablet (40 mg total) by mouth once daily. 90 tablet 3    gabapentin (NEURONTIN) 400 MG capsule Take 400 mg by mouth 3 (three) times daily.      hydrocodone-acetaminophen 10-325mg (NORCO)  mg Tab Take 10 tablets by mouth 3 (three) times daily.      levothyroxine (SYNTHROID) 200 MCG tablet 1 tablet 6 days a week and a 1/2 tablet on day 7 30 tablet 6    levothyroxine (SYNTHROID) 200 MCG tablet TAKE 1 TABLET(200 MCG) BY MOUTH EVERY DAY 90 tablet 6    lisinopril (PRINIVIL,ZESTRIL) 20 MG tablet TAKE 1 TABLET BY MOUTH EVERY DAY 90 tablet 3    metFORMIN (GLUCOPHAGE) 1000 MG tablet TAKE 1 TABLET(1000 MG) BY MOUTH EVERY EVENING 90 tablet 3    naproxen (EC NAPROSYN) 500 MG EC tablet Take 500 mg by mouth 2 (two) times daily.      nitroGLYCERIN (NITROSTAT) 0.4 MG SL tablet Place 0.4 mg under the tongue every 5 (five) minutes as needed for Chest pain.      nystatin (NYSTOP) powder APPLY AFFECTED AREA THREE TIMES DAILY 60 g 3    omeprazole (PRILOSEC) 20 MG capsule TAKE 1 CAPSULE BY MOUTH TWICE DAILY 180 capsule 3    potassium chloride SA (K-DUR,KLOR-CON) 20 MEQ tablet TAKE 1 TABLET(20 MEQ) BY MOUTH TWICE DAILY 180 tablet 0    ranitidine (ZANTAC) 300 MG tablet TAKE 1 TABLET BY MOUTH EVERY EVENING, ABOUT 30 TO 60 MINUTES BEFORE BEDTIME 90 tablet 4     No current facility-administered medications for this visit.          Review of Systems   Respiratory: Negative.    Cardiovascular:  Positive for chest pain (Atypical, likely radicular arm pain). Negative for palpitations.   Gastrointestinal: Negative.    Endocrine: Negative for polydipsia, polyphagia and polyuria.   Genitourinary: Negative.    Musculoskeletal: Positive for back pain and neck pain.   Skin: Negative.    Neurological:        Radicular pain, right arm   Psychiatric/Behavioral: Positive for agitation. The patient is nervous/anxious.        Objective:      Physical Exam   Constitutional: She appears well-developed and well-nourished. She appears distressed (Secondary to arm pain).   HENT:   Head: Normocephalic.   Eyes: Pupils are equal, round, and reactive to light.   Neck: Normal carotid pulses present. Muscular tenderness present. No spinous process tenderness present. Carotid bruit is not present. No neck rigidity. Decreased range of motion present.   She does not have acute bony tenderness.  She does have pain with rotation of the neck.  This causes the pain that she is experiencing down her right arm.  Gentle axial compression did not cause significant pain hyperextension of the neck and compression did cause discomfort.   Cardiovascular: Normal rate, regular rhythm and normal heart sounds.   No murmur heard.  Pulmonary/Chest: Effort normal.   Abdominal: Soft.   Musculoskeletal: She exhibits no edema.   Neurological: She is alert.   Skin: Capillary refill takes less than 2 seconds.   Psychiatric:   Very upset about the arm pain.   Vitals reviewed.      EKG was obtained prior to my seeing the patient.  My interpretation is no acute changes.    Assessment:       1. Chronic pain syndrome    2. Type 2 diabetes mellitus with diabetic polyneuropathy, without long-term current use of insulin        Plan:       1. Chronic pain syndrome  Right radicular pain  Will increase her gabapentin to 800 milligrams in a stepwise fashion over the next week.  She is reminded as is her daughter that this medication does need to be tapered gently if  she chooses to stop it.  She should keep her appointment with her pain management physician, Dr. De La Cruz    2. Type 2 diabetes mellitus with diabetic polyneuropathy, without long-term current use of insulin  Lab Results   Component Value Date    HGBA1C 5.8 (H) 02/07/2019     Her diabetes is doing quite well.  No changes recommended.

## 2019-05-01 ENCOUNTER — TELEPHONE (OUTPATIENT)
Dept: ENDOCRINOLOGY | Facility: CLINIC | Age: 54
End: 2019-05-01

## 2019-05-01 NOTE — TELEPHONE ENCOUNTER
----- Message from Criss Wright sent at 5/1/2019  8:48 AM CDT -----  Type:  RX Refill Request    Who Called:  self  Refill or New Rx:     RX Name and Strength :levothyroxine (SYNTHROID) 200 MCG tablet   t? (ex. 1XDay):  1 x d  Is this a 30 day or 90 day RX:     Preferred Pharmacy with phone number:    [facility] [address] [place] [address] [id number] [place] [address] AT [facility] [address]  [address] [place] [id number]  Phone: [phone] Fax: [phone]    Local or Mail Order:     Ordering Provider:  Dr Amador 04/24/19  Best Call Back Number:  [phone] (home)     Additional Information:  Pt called pharmacy / advised to check with Dr / she is out of meds

## 2019-05-01 NOTE — TELEPHONE ENCOUNTER
----- Message from Lizbeth Hernandez RT sent at 5/1/2019  8:58 AM CDT -----  Contact: pt    pt , requesting Rx refill: Estradiol 1 mg , thanks

## 2019-05-01 NOTE — TELEPHONE ENCOUNTER
Spoke with Areli and clarified verbal order for Rx for levothyroxine 200mcg 6 days a week and 1/2 tablet on day 7.  Pt notified of current Rx sent

## 2019-05-02 ENCOUNTER — PATIENT MESSAGE (OUTPATIENT)
Dept: OBSTETRICS AND GYNECOLOGY | Facility: CLINIC | Age: 54
End: 2019-05-02

## 2019-05-03 RX ORDER — ESTRADIOL 1 MG/1
TABLET ORAL
Qty: 30 TABLET | Refills: 0 | OUTPATIENT
Start: 2019-05-03

## 2019-05-06 RX ORDER — ESTRADIOL 1 MG/1
TABLET ORAL
Qty: 30 TABLET | Refills: 0 | OUTPATIENT
Start: 2019-05-06

## 2019-05-14 ENCOUNTER — HOSPITAL ENCOUNTER (OUTPATIENT)
Dept: RADIOLOGY | Facility: HOSPITAL | Age: 54
Discharge: HOME | End: 2019-05-14
Attending: OBSTETRICS & GYNECOLOGY
Payer: MEDICARE

## 2019-05-14 ENCOUNTER — OFFICE VISIT (OUTPATIENT)
Dept: OBSTETRICS AND GYNECOLOGY | Facility: CLINIC | Age: 54
End: 2019-05-14
Payer: MEDICARE

## 2019-05-14 VITALS — WEIGHT: 293 LBS | HEIGHT: 66 IN | BODY MASS INDEX: 47.09 KG/M2

## 2019-05-14 VITALS
SYSTOLIC BLOOD PRESSURE: 126 MMHG | BODY MASS INDEX: 47.09 KG/M2 | HEIGHT: 66 IN | DIASTOLIC BLOOD PRESSURE: 84 MMHG | WEIGHT: 293 LBS

## 2019-05-14 DIAGNOSIS — Z12.31 SCREENING MAMMOGRAM, ENCOUNTER FOR: ICD-10-CM

## 2019-05-14 DIAGNOSIS — Z12.31 SCREENING MAMMOGRAM, ENCOUNTER FOR: Primary | ICD-10-CM

## 2019-05-14 PROCEDURE — 77067 SCR MAMMO BI INCL CAD: CPT | Mod: 26,,, | Performed by: RADIOLOGY

## 2019-05-14 PROCEDURE — 3008F BODY MASS INDEX DOCD: CPT | Mod: CPTII,S$GLB,, | Performed by: OBSTETRICS & GYNECOLOGY

## 2019-05-14 PROCEDURE — 77067 MAMMO DIGITAL SCREENING BILAT WITH TOMOSYNTHESIS_CAD: ICD-10-PCS | Mod: 26,,, | Performed by: RADIOLOGY

## 2019-05-14 PROCEDURE — 3079F PR MOST RECENT DIASTOLIC BLOOD PRESSURE 80-89 MM HG: ICD-10-PCS | Mod: CPTII,S$GLB,, | Performed by: OBSTETRICS & GYNECOLOGY

## 2019-05-14 PROCEDURE — 3008F PR BODY MASS INDEX (BMI) DOCUMENTED: ICD-10-PCS | Mod: CPTII,S$GLB,, | Performed by: OBSTETRICS & GYNECOLOGY

## 2019-05-14 PROCEDURE — 3074F PR MOST RECENT SYSTOLIC BLOOD PRESSURE < 130 MM HG: ICD-10-PCS | Mod: CPTII,S$GLB,, | Performed by: OBSTETRICS & GYNECOLOGY

## 2019-05-14 PROCEDURE — 99213 OFFICE O/P EST LOW 20 MIN: CPT | Mod: S$GLB,,, | Performed by: OBSTETRICS & GYNECOLOGY

## 2019-05-14 PROCEDURE — 77063 MAMMO DIGITAL SCREENING BILAT WITH TOMOSYNTHESIS_CAD: ICD-10-PCS | Mod: 26,,, | Performed by: RADIOLOGY

## 2019-05-14 PROCEDURE — 99999 PR PBB SHADOW E&M-EST. PATIENT-LVL IV: CPT | Mod: PBBFAC,,, | Performed by: OBSTETRICS & GYNECOLOGY

## 2019-05-14 PROCEDURE — 77067 SCR MAMMO BI INCL CAD: CPT | Mod: TC,PN

## 2019-05-14 PROCEDURE — 99999 PR PBB SHADOW E&M-EST. PATIENT-LVL IV: ICD-10-PCS | Mod: PBBFAC,,, | Performed by: OBSTETRICS & GYNECOLOGY

## 2019-05-14 PROCEDURE — 77063 BREAST TOMOSYNTHESIS BI: CPT | Mod: 26,,, | Performed by: RADIOLOGY

## 2019-05-14 PROCEDURE — 3079F DIAST BP 80-89 MM HG: CPT | Mod: CPTII,S$GLB,, | Performed by: OBSTETRICS & GYNECOLOGY

## 2019-05-14 PROCEDURE — 3074F SYST BP LT 130 MM HG: CPT | Mod: CPTII,S$GLB,, | Performed by: OBSTETRICS & GYNECOLOGY

## 2019-05-14 PROCEDURE — 99213 PR OFFICE/OUTPT VISIT, EST, LEVL III, 20-29 MIN: ICD-10-PCS | Mod: S$GLB,,, | Performed by: OBSTETRICS & GYNECOLOGY

## 2019-05-14 RX ORDER — NYSTATIN 100000 [USP'U]/G
POWDER TOPICAL
Qty: 60 G | Refills: 3 | Status: SHIPPED | OUTPATIENT
Start: 2019-05-14 | End: 2020-09-21

## 2019-05-14 RX ORDER — ESTRADIOL 1 MG/1
TABLET ORAL
Qty: 90 TABLET | Refills: 3 | Status: SHIPPED | OUTPATIENT
Start: 2019-05-14 | End: 2020-04-21 | Stop reason: SDUPTHER

## 2019-05-14 NOTE — PROGRESS NOTES
Chief Complaint   Patient presents with    Medication Refill       History and Physical:  No LMP recorded. Patient has had a hysterectomy.       Amena Anderson is a 53 y.o. -American Female who presents today for her routine annual GYN exam. The patient has no Gynecology complaints today. No bowel or bladder complaints. Rash improving on nystatin, request to continue Hormone Replacement Therapy -counseled.       Allergies: Review of patient's allergies indicates:  No Known Allergies    Past Medical History:   Diagnosis Date    AC (acromioclavicular) joint bone spurs, unspecified laterality     bilateral    Anticoagulant long-term use     aspirin    Anxiety     Back pain     BMI 50.0-59.9, adult 2017    CHF (congestive heart failure)     Diabetes mellitus, type 2     Difficulty swallowing 10/18/2017    Dysphagia     Fatty liver     H. pylori infection     Hepatomegaly     Hoarseness 10/18/2017    Hormone replacement therapy (HRT)     Hypertension     Hypothyroidism     Insomnia     Mitral valve prolapse     per pt    Multinodular goiter 10/18/2017    MANUEL on CPAP        Past Surgical History:   Procedure Laterality Date     SECTION      Three times    COLONOSCOPY  2017    Dr. Benitez, repeat in 10 years    COLONOSCOPY N/A 2017    Performed by [facility] at [facility]    ESOPHAGOGASTRODUODENOSCOPY (EGD) N/A 2017    Performed by [facility] Jr[facility] at [facility]    HYSTERECTOMY      total    LASIK      LUMBAR EPIDURAL INJECTION      OOPHORECTOMY      THYROIDECTOMY, PARTIAL      UPPER GASTROINTESTINAL ENDOSCOPY  2017    Dr. Benitez       MEDS:   Current Outpatient Medications on File Prior to Visit   Medication Sig Dispense Refill    ALPRAZolam (XANAX) 1 MG tablet Take 1 tablet (1 mg total) by mouth 2 (two) times daily as needed for Anxiety. 60 tablet 2    amitriptyline (ELAVIL) 25 MG tablet TAKE 1 TABLET BY MOUTH EVERY EVENING 90  tablet 3    amLODIPine (NORVASC) 10 MG tablet TAKE 1 TABLET(10 MG) BY MOUTH EVERY DAY 90 tablet 3    aspirin (ECOTRIN) 81 MG EC tablet Take 81 mg by mouth once daily.      atorvastatin (LIPITOR) 20 MG tablet TAKE 1 TABLET(20 MG) BY MOUTH EVERY DAY 90 tablet 3    ciclopirox (LOPROX) 0.77 % Crea Apply topically 2 (two) times daily. 90 g 3    clotrimazole (LOTRIMIN) 1 % Soln APPLY EXTERNALLY TO THE AFFECTED AREA TWICE DAILY 30 mL 0    diclofenac sodium 1 % Gel Apply 2 g topically 4 (four) times daily. 100 g 2    dicyclomine (BENTYL) 10 MG capsule Take 1 capsule (10 mg total) by mouth 4 (four) times daily before meals and nightly. 90 capsule 1    docusate sodium (COLACE) 50 MG capsule Take 100 mg by mouth 2 (two) times daily.      furosemide (LASIX) 40 MG tablet Take 1 tablet (40 mg total) by mouth once daily. 90 tablet 3    gabapentin (NEURONTIN) 400 MG capsule Take 400 mg by mouth 3 (three) times daily.      hydrocodone-acetaminophen 10-325mg (NORCO)  mg Tab Take 10 tablets by mouth 3 (three) times daily.      levothyroxine (SYNTHROID) 200 MCG tablet 1 tablet 6 days a week and a 1/2 tablet on day 7 30 tablet 6    lisinopril (PRINIVIL,ZESTRIL) 20 MG tablet TAKE 1 TABLET BY MOUTH EVERY DAY 90 tablet 3    metFORMIN (GLUCOPHAGE) 1000 MG tablet TAKE 1 TABLET(1000 MG) BY MOUTH EVERY EVENING 90 tablet 3    naproxen (EC NAPROSYN) 500 MG EC tablet Take 500 mg by mouth 2 (two) times daily.      nitroGLYCERIN (NITROSTAT) 0.4 MG SL tablet Place 0.4 mg under the tongue every 5 (five) minutes as needed for Chest pain.      omeprazole (PRILOSEC) 20 MG capsule TAKE 1 CAPSULE BY MOUTH TWICE DAILY 180 capsule 3    potassium chloride SA (K-DUR,KLOR-CON) 20 MEQ tablet TAKE 1 TABLET(20 MEQ) BY MOUTH TWICE DAILY 180 tablet 0    ranitidine (ZANTAC) 300 MG tablet TAKE 1 TABLET BY MOUTH EVERY EVENING, ABOUT 30 TO 60 MINUTES BEFORE BEDTIME 90 tablet 4    [DISCONTINUED] estradiol (ESTRACE) 1 MG tablet TAKE 1 TABLET BY  MOUTH EVERY DAY 30 tablet 0    [DISCONTINUED] nystatin (NYSTOP) powder APPLY AFFECTED AREA THREE TIMES DAILY 60 g 3     No current facility-administered medications on file prior to visit.        OB History        4    Para   4    Term   4            AB        Living           SAB        TAB        Ectopic        Multiple        Live Births                     Social History     Socioeconomic History    Marital status: Single     Spouse name: Not on file    Number of children: Not on file    Years of education: Not on file    Highest education level: Not on file   Occupational History    Not on file   Social Needs    Financial resource strain: Not on file    Food insecurity:     Worry: Not on file     Inability: Not on file    Transportation needs:     Medical: Not on file     Non-medical: Not on file   Tobacco Use    Smoking status: Never Smoker    Smokeless tobacco: Never Used   Substance and Sexual Activity    Alcohol use: No    Drug use: Yes     Types: Benzodiazepines, Hydrocodone    Sexual activity: Not Currently   Lifestyle    Physical activity:     Days per week: Not on file     Minutes per session: Not on file    Stress: Not on file   Relationships    Social connections:     Talks on phone: Not on file     Gets together: Not on file     Attends Scientologist service: Not on file     Active member of club or organization: Not on file     Attends meetings of clubs or organizations: Not on file     Relationship status: Not on file   Other Topics Concern    Not on file   Social History Narrative    Not on file       Family History   Problem Relation Age of Onset    Throat cancer Maternal Uncle     Breast cancer Other     Colon cancer Neg Hx     Colon polyps Neg Hx     Crohn's disease Neg Hx     Ulcerative colitis Neg Hx     Stomach cancer Neg Hx     Esophageal cancer Neg Hx     Ovarian cancer Neg Hx          Past medical and surgical history reviewed.   I have reviewed the  "patient's medical history in detail and updated the computerized patient record.        Review of System:  General: no chills, fever, night sweats, weight gain or weight loss  Psychological: no depression or suicidal ideation  Breasts: no new or changing breast lumps, nipple discharge or masses.  Respiratory: no cough, shortness of breath, or wheezing  Cardiovascular: no chest pain or dyspnea on exertion  Gastrointestinal: no abdominal pain, change in bowel habits, or black or bloody stools  Genito-Urinary: no incontinence, urinary frequency/urgency or vulvar/vaginal symptoms, pelvic pain or abnormal vaginal bleeding.  Musculoskeletal: no gait disturbance or muscular weakness      Physical Exam:   /84   Ht 5' 6" (1.676 m)   Wt (!) 143.8 kg (317 lb 0.3 oz)   BMI 51.17 kg/m²   Constitutional: She is oriented to person, place, and time. She appears well-developed and well-nourished. No distress. overweight  HENT:   Head: Normocephalic and atraumatic.   Eyes: Conjunctivae and EOM are normal. No scleral icterus.   Neck: Normal range of motion. Neck supple. No tracheal deviation present.   Cardiovascular: Normal rate.    Pulmonary/Chest: Effort normal. No respiratory distress. She exhibits no tenderness.  Breasts: are symmetrical.   Right breast exhibits no inverted nipple, no mass, no nipple discharge, no skin change and no tenderness.   Left breast exhibits no inverted nipple, no mass, no nipple discharge, no skin change and no tenderness.  Abdominal: Soft. She exhibits no distension and no mass. There is no tenderness. There is no rebound and no guarding.   Genitourinary: deferred  Musculoskeletal: Normal range of motion.   Lymphadenopathy: No inguinal adenopathy present.   Neurological: She is alert and oriented to person, place, and time. Coordination normal.   Skin: Skin is warm and dry. She is not diaphoretic.   Psychiatric: She has a normal mood and affect.        Assessment:   Normal annual GYN exam  1. " Screening mammogram, encounter for  Mammo Digital Screening Bilat With CAD   Hormone Replacement Therapy - doing well,wish to continue- counseled.     Plan:   PAP Not Needed  Mammogram today  Refill Hormone Replacement Therapy and nystatin powder.   Follow up in 1 year.

## 2019-05-15 ENCOUNTER — PATIENT MESSAGE (OUTPATIENT)
Dept: OBSTETRICS AND GYNECOLOGY | Facility: CLINIC | Age: 54
End: 2019-05-15

## 2019-05-20 RX ORDER — LANCETS
EACH MISCELLANEOUS
Qty: 60 EACH | Refills: 11 | Status: SHIPPED | OUTPATIENT
Start: 2019-05-20 | End: 2021-10-29 | Stop reason: SDUPTHER

## 2019-05-20 RX ORDER — INSULIN PUMP SYRINGE, 3 ML
EACH MISCELLANEOUS
Qty: 100 EACH | Refills: 11 | Status: SHIPPED | OUTPATIENT
Start: 2019-05-20 | End: 2021-10-29 | Stop reason: SDUPTHER

## 2019-05-20 NOTE — TELEPHONE ENCOUNTER
----- Message from Rosina Bianca sent at 5/20/2019  9:55 AM CDT -----  Contact: Amena  Type:  RX Refill Request    Who Called:  patient  Refill or New Rx:  refill  RX Name and Strength:    1. TrueDraw lancets  2. Strips   How is the patient currently taking it? (ex. 1XDay):    1. 30G   2. Not sure  Is this a 30 day or 90 day RX:  90  Preferred Pharmacy with phone number:    [facility] for authorization   Local or Mail Order:  local  Ordering Provider:  Dr Jose Miller  Best Call Back Number:  [phone]  Additional Information:   Thinks her Rx is mailed to her but only pharmacy on file is local. Thanks!

## 2019-05-21 ENCOUNTER — LAB VISIT (OUTPATIENT)
Dept: LAB | Facility: HOSPITAL | Age: 54
End: 2019-05-21
Attending: INTERNAL MEDICINE
Payer: MEDICARE

## 2019-05-21 ENCOUNTER — CLINICAL SUPPORT (OUTPATIENT)
Dept: CARDIOLOGY | Facility: CLINIC | Age: 54
End: 2019-05-21
Attending: INTERNAL MEDICINE
Payer: MEDICARE

## 2019-05-21 VITALS
SYSTOLIC BLOOD PRESSURE: 150 MMHG | HEART RATE: 91 BPM | HEIGHT: 66 IN | DIASTOLIC BLOOD PRESSURE: 90 MMHG | BODY MASS INDEX: 47.09 KG/M2 | WEIGHT: 293 LBS

## 2019-05-21 DIAGNOSIS — I10 ESSENTIAL HYPERTENSION: ICD-10-CM

## 2019-05-21 DIAGNOSIS — E78.5 DYSLIPIDEMIA: ICD-10-CM

## 2019-05-21 DIAGNOSIS — E04.1 THYROID CYST: ICD-10-CM

## 2019-05-21 DIAGNOSIS — R00.2 PALPITATIONS: ICD-10-CM

## 2019-05-21 DIAGNOSIS — G47.33 OSA (OBSTRUCTIVE SLEEP APNEA): Chronic | ICD-10-CM

## 2019-05-21 DIAGNOSIS — I10 ESSENTIAL HYPERTENSION: Chronic | ICD-10-CM

## 2019-05-21 DIAGNOSIS — E03.9 HYPOTHYROIDISM, UNSPECIFIED TYPE: ICD-10-CM

## 2019-05-21 DIAGNOSIS — G47.33 OSA (OBSTRUCTIVE SLEEP APNEA): ICD-10-CM

## 2019-05-21 LAB
ASCENDING AORTA: 3.17 CM
AV INDEX (PROSTH): 0.9
AV MEAN GRADIENT: 4.42 MMHG
AV PEAK GRADIENT: 7.08 MMHG
AV VALVE AREA: 2.83 CM2
AV VELOCITY RATIO: 0.93
BSA FOR ECHO PROCEDURE: 2.59 M2
CV ECHO LV RWT: 0.49 CM
DOP CALC AO PEAK VEL: 1.33 M/S
DOP CALC AO VTI: 27.66 CM
DOP CALC LVOT AREA: 3.14 CM2
DOP CALC LVOT DIAMETER: 2 CM
DOP CALC LVOT PEAK VEL: 1.23 M/S
DOP CALC LVOT STROKE VOLUME: 78.41 CM3
DOP CALCLVOT PEAK VEL VTI: 24.97 CM
E WAVE DECELERATION TIME: 157.3 MSEC
E/A RATIO: 0.62
E/E' RATIO: 6.63
ECHO LV POSTERIOR WALL: 1.04 CM (ref 0.6–1.1)
FRACTIONAL SHORTENING: 32 % (ref 28–44)
INTERVENTRICULAR SEPTUM: 1.07 CM (ref 0.6–1.1)
LA MAJOR: 5.23 CM
LA MINOR: 4.94 CM
LA WIDTH: 4.25 CM
LEFT ATRIUM SIZE: 4.3 CM
LEFT ATRIUM VOLUME INDEX: 32.4 ML/M2
LEFT ATRIUM VOLUME: 78.92 CM3
LEFT INTERNAL DIMENSION IN SYSTOLE: 2.91 CM (ref 2.1–4)
LEFT VENTRICLE DIASTOLIC VOLUME INDEX: 33.4 ML/M2
LEFT VENTRICLE DIASTOLIC VOLUME: 81.24 ML
LEFT VENTRICLE MASS INDEX: 62.2 G/M2
LEFT VENTRICLE SYSTOLIC VOLUME INDEX: 13.4 ML/M2
LEFT VENTRICLE SYSTOLIC VOLUME: 32.6 ML
LEFT VENTRICULAR INTERNAL DIMENSION IN DIASTOLE: 4.26 CM (ref 3.5–6)
LEFT VENTRICULAR MASS: 151.33 G
LV LATERAL E/E' RATIO: 6.3
LV SEPTAL E/E' RATIO: 7
MV PEAK A VEL: 1.01 M/S
MV PEAK E VEL: 0.63 M/S
PISA TR MAX VEL: 2.81 M/S
RA MAJOR: 5.11 CM
RA PRESSURE: 3 MMHG
RA WIDTH: 3.33 CM
SINUS: 2.58 CM
STJ: 2.52 CM
T4 FREE SERPL-MCNC: 1.2 NG/DL (ref 0.71–1.51)
TDI LATERAL: 0.1
TDI SEPTAL: 0.09
TDI: 0.1
TR MAX PG: 31.58 MMHG
TRICUSPID ANNULAR PLANE SYSTOLIC EXCURSION: 2.97 CM
TSH SERPL DL<=0.005 MIU/L-ACNC: 0.31 UIU/ML (ref 0.4–4)
TSH SERPL DL<=0.005 MIU/L-ACNC: 0.31 UIU/ML (ref 0.4–4)
TV REST PULMONARY ARTERY PRESSURE: 35 MMHG

## 2019-05-21 PROCEDURE — 93306 TRANSTHORACIC ECHO (TTE) COMPLETE (CUPID ONLY): ICD-10-PCS | Mod: S$GLB,,, | Performed by: INTERNAL MEDICINE

## 2019-05-21 PROCEDURE — 36415 COLL VENOUS BLD VENIPUNCTURE: CPT | Mod: PO

## 2019-05-21 PROCEDURE — 93306 TTE W/DOPPLER COMPLETE: CPT | Mod: S$GLB,,, | Performed by: INTERNAL MEDICINE

## 2019-05-21 PROCEDURE — 99999 PR PBB SHADOW E&M-EST. PATIENT-LVL II: ICD-10-PCS | Mod: PBBFAC,,,

## 2019-05-21 PROCEDURE — 84443 ASSAY THYROID STIM HORMONE: CPT

## 2019-05-21 PROCEDURE — 84439 ASSAY OF FREE THYROXINE: CPT

## 2019-05-21 PROCEDURE — 99999 PR PBB SHADOW E&M-EST. PATIENT-LVL II: CPT | Mod: PBBFAC,,,

## 2019-05-22 ENCOUNTER — TELEPHONE (OUTPATIENT)
Dept: ENDOCRINOLOGY | Facility: CLINIC | Age: 54
End: 2019-05-22

## 2019-05-22 DIAGNOSIS — E03.9 HYPOTHYROIDISM, UNSPECIFIED TYPE: Primary | ICD-10-CM

## 2019-05-22 RX ORDER — LEVOTHYROXINE SODIUM 200 UG/1
TABLET ORAL
Qty: 30 TABLET | Refills: 6 | Status: SHIPPED | OUTPATIENT
Start: 2019-05-22 | End: 2019-07-20

## 2019-05-22 NOTE — TELEPHONE ENCOUNTER
Let her know that TFT suppressed but better. Decrease synthroid to 200 mcg 6 days a week.   TSH in 6 weeks

## 2019-05-23 DIAGNOSIS — I10 ESSENTIAL HYPERTENSION: Chronic | ICD-10-CM

## 2019-05-23 RX ORDER — FUROSEMIDE 40 MG/1
TABLET ORAL
Qty: 90 TABLET | Refills: 0 | Status: SHIPPED | OUTPATIENT
Start: 2019-05-23 | End: 2019-08-19 | Stop reason: SDUPTHER

## 2019-05-24 RX ORDER — POTASSIUM CHLORIDE 20 MEQ/1
TABLET, EXTENDED RELEASE ORAL
Qty: 180 TABLET | Refills: 0 | Status: SHIPPED | OUTPATIENT
Start: 2019-05-24 | End: 2019-09-07 | Stop reason: SDUPTHER

## 2019-05-28 ENCOUNTER — OFFICE VISIT (OUTPATIENT)
Dept: CARDIOLOGY | Facility: CLINIC | Age: 54
End: 2019-05-28
Attending: INTERNAL MEDICINE
Payer: MEDICARE

## 2019-05-28 VITALS
HEIGHT: 66 IN | WEIGHT: 293 LBS | HEART RATE: 87 BPM | DIASTOLIC BLOOD PRESSURE: 91 MMHG | BODY MASS INDEX: 47.09 KG/M2 | SYSTOLIC BLOOD PRESSURE: 146 MMHG

## 2019-05-28 DIAGNOSIS — E78.5 DYSLIPIDEMIA: Primary | ICD-10-CM

## 2019-05-28 DIAGNOSIS — G47.33 OSA (OBSTRUCTIVE SLEEP APNEA): Chronic | ICD-10-CM

## 2019-05-28 DIAGNOSIS — I10 ESSENTIAL HYPERTENSION: Chronic | ICD-10-CM

## 2019-05-28 DIAGNOSIS — R00.2 PALPITATIONS: ICD-10-CM

## 2019-05-28 DIAGNOSIS — K76.0 FATTY LIVER: Chronic | ICD-10-CM

## 2019-05-28 PROCEDURE — 3008F BODY MASS INDEX DOCD: CPT | Mod: CPTII,S$GLB,, | Performed by: INTERNAL MEDICINE

## 2019-05-28 PROCEDURE — 99999 PR PBB SHADOW E&M-EST. PATIENT-LVL III: ICD-10-PCS | Mod: PBBFAC,,, | Performed by: INTERNAL MEDICINE

## 2019-05-28 PROCEDURE — 99213 PR OFFICE/OUTPT VISIT, EST, LEVL III, 20-29 MIN: ICD-10-PCS | Mod: S$GLB,,, | Performed by: INTERNAL MEDICINE

## 2019-05-28 PROCEDURE — 3008F PR BODY MASS INDEX (BMI) DOCUMENTED: ICD-10-PCS | Mod: CPTII,S$GLB,, | Performed by: INTERNAL MEDICINE

## 2019-05-28 PROCEDURE — 99499 UNLISTED E&M SERVICE: CPT | Mod: S$GLB,,, | Performed by: INTERNAL MEDICINE

## 2019-05-28 PROCEDURE — 3080F PR MOST RECENT DIASTOLIC BLOOD PRESSURE >= 90 MM HG: ICD-10-PCS | Mod: CPTII,S$GLB,, | Performed by: INTERNAL MEDICINE

## 2019-05-28 PROCEDURE — 3080F DIAST BP >= 90 MM HG: CPT | Mod: CPTII,S$GLB,, | Performed by: INTERNAL MEDICINE

## 2019-05-28 PROCEDURE — 99999 PR PBB SHADOW E&M-EST. PATIENT-LVL III: CPT | Mod: PBBFAC,,, | Performed by: INTERNAL MEDICINE

## 2019-05-28 PROCEDURE — 3077F SYST BP >= 140 MM HG: CPT | Mod: CPTII,S$GLB,, | Performed by: INTERNAL MEDICINE

## 2019-05-28 PROCEDURE — 99213 OFFICE O/P EST LOW 20 MIN: CPT | Mod: S$GLB,,, | Performed by: INTERNAL MEDICINE

## 2019-05-28 PROCEDURE — 99499 RISK ADDL DX/OHS AUDIT: ICD-10-PCS | Mod: S$GLB,,, | Performed by: INTERNAL MEDICINE

## 2019-05-28 PROCEDURE — 3077F PR MOST RECENT SYSTOLIC BLOOD PRESSURE >= 140 MM HG: ICD-10-PCS | Mod: CPTII,S$GLB,, | Performed by: INTERNAL MEDICINE

## 2019-05-28 NOTE — PROGRESS NOTES
Subjective:    Patient ID:  Amena Anderson is a 53 y.o. female who presents for follow-up of Palpitation f/u and Results      HPI  Here for follow up of palpitations/HTN/DLP.Patient does not exercise but remains very active with out change in exertional tolerance or chest pain. Patients states is doing well no chest pain, SOB or change in exertional tolerence. Patient denies palpitations, syncope, presyncope, lightheadedness or dizziness.    Review of Systems   Constitution: Negative for malaise/fatigue.   Eyes: Negative for blurred vision.   Cardiovascular: Negative for chest pain, claudication, cyanosis, dyspnea on exertion, irregular heartbeat, leg swelling, near-syncope, orthopnea, palpitations, paroxysmal nocturnal dyspnea and syncope.   Respiratory: Negative for cough and shortness of breath.    Hematologic/Lymphatic: Does not bruise/bleed easily.   Musculoskeletal: Negative for back pain, falls, joint pain, muscle cramps, muscle weakness and myalgias.   Gastrointestinal: Negative for abdominal pain, change in bowel habit, nausea and vomiting.   Genitourinary: Negative for urgency.   Neurological: Negative for dizziness, focal weakness and light-headedness.        Objective:    Physical Exam   Constitutional: She is oriented to person, place, and time. She appears well-developed and well-nourished.   HENT:   Head: Normocephalic.   Eyes: Conjunctivae are normal.   Neck: Normal range of motion. Neck supple. No JVD present.   Cardiovascular: Normal rate, regular rhythm, normal heart sounds and intact distal pulses.   Pulses:       Carotid pulses are 2+ on the right side, and 2+ on the left side.       Radial pulses are 2+ on the right side, and 2+ on the left side.        Dorsalis pedis pulses are 2+ on the right side, and 2+ on the left side.        Posterior tibial pulses are 2+ on the right side, and 2+ on the left side.   Pulmonary/Chest: Effort normal and breath sounds normal.   Abdominal: Soft. Bowel sounds  are normal.   Musculoskeletal: She exhibits no edema or tenderness.   Neurological: She is alert and oriented to person, place, and time. Gait normal.   Skin: Skin is warm, dry and intact. No cyanosis. Nails show no clubbing.   Psychiatric: She has a normal mood and affect. Her speech is normal and behavior is normal. Thought content normal.   Nursing note and vitals reviewed.              ..    Chemistry        Component Value Date/Time     02/07/2019 0820    K 3.9 02/07/2019 0820     02/07/2019 0820    CO2 30 (H) 02/07/2019 0820    BUN 11 02/07/2019 0820    CREATININE 0.7 02/07/2019 0820    GLU 98 02/07/2019 0820        Component Value Date/Time    CALCIUM 9.1 02/07/2019 0820    ALKPHOS 65 03/28/2018 0847    AST 16 03/28/2018 0847    ALT 16 03/28/2018 0847    BILITOT 0.4 03/28/2018 0847    ESTGFRAFRICA >60.0 02/07/2019 0820    EGFRNONAA >60.0 02/07/2019 0820            ..  Lab Results   Component Value Date    CHOL 135 03/28/2018    CHOL 128 07/10/2017    CHOL 227 (H) 01/19/2017     Lab Results   Component Value Date    HDL 41 03/28/2018    HDL 39 (L) 07/10/2017    HDL 40 01/19/2017     Lab Results   Component Value Date    LDLCALC 80.2 03/28/2018    LDLCALC 75.4 07/10/2017    LDLCALC 165.2 (H) 01/19/2017     Lab Results   Component Value Date    TRIG 69 03/28/2018    TRIG 68 07/10/2017    TRIG 109 01/19/2017     Lab Results   Component Value Date    CHOLHDL 30.4 03/28/2018    CHOLHDL 30.5 07/10/2017    CHOLHDL 17.6 (L) 01/19/2017     ..  Lab Results   Component Value Date    WBC 7.84 02/05/2018    HGB 12.4 02/05/2018    HCT 39.3 02/05/2018    MCV 84 02/05/2018     02/05/2018       Test(s) Reviewed  I have reviewed the following in detail:  [] Stress test   [] Angiography   [] Echocardiogram   [] Labs   [] Other:       Assessment:         ICD-10-CM ICD-9-CM   1. Dyslipidemia E78.5 272.4   2. Essential hypertension I10 401.9   3. Fatty liver K76.0 571.8   4. MANUEL (obstructive sleep apnea) G47.33  327.23   5. Palpitations R00.2 785.1   6. BMI 50.0-59.9, adult Z68.43 V85.43     Problem List Items Addressed This Visit     Palpitations    MANUEL (obstructive sleep apnea) (Chronic)    Overview     Using CPAP.  Sleep study done by by Dr. Mckeon [facility] in 2015         Fatty liver (Chronic)    Essential hypertension (Chronic)    Dyslipidemia - Primary    BMI 50.0-59.9, adult (Chronic)           Plan:           .    Return to clinic 1 year   Aerobic exercise 5x's/wk. Heart healthy diet and risk factor modification.    See labs and med orders.      Portions of this note may have been created with voice recognition software.  Grammatical, syntax and spelling errors may be inevitable.

## 2019-05-29 ENCOUNTER — OFFICE VISIT (OUTPATIENT)
Dept: PODIATRY | Facility: CLINIC | Age: 54
End: 2019-05-29
Payer: MEDICARE

## 2019-05-29 VITALS
HEART RATE: 79 BPM | WEIGHT: 293 LBS | DIASTOLIC BLOOD PRESSURE: 84 MMHG | SYSTOLIC BLOOD PRESSURE: 128 MMHG | HEIGHT: 66 IN | BODY MASS INDEX: 47.09 KG/M2

## 2019-05-29 DIAGNOSIS — E11.42 TYPE 2 DIABETES MELLITUS WITH DIABETIC POLYNEUROPATHY, WITHOUT LONG-TERM CURRENT USE OF INSULIN: Chronic | ICD-10-CM

## 2019-05-29 DIAGNOSIS — B35.1 ONYCHOMYCOSIS DUE TO DERMATOPHYTE: ICD-10-CM

## 2019-05-29 DIAGNOSIS — E11.51 TYPE II DIABETES MELLITUS WITH PERIPHERAL CIRCULATORY DISORDER: ICD-10-CM

## 2019-05-29 PROCEDURE — 99499 UNLISTED E&M SERVICE: CPT | Mod: S$GLB,,, | Performed by: PODIATRIST

## 2019-05-29 PROCEDURE — 99499 NO LOS: ICD-10-PCS | Mod: S$GLB,,, | Performed by: PODIATRIST

## 2019-05-29 PROCEDURE — 99999 PR PBB SHADOW E&M-EST. PATIENT-LVL III: CPT | Mod: PBBFAC,,, | Performed by: PODIATRIST

## 2019-05-29 PROCEDURE — 99999 PR PBB SHADOW E&M-EST. PATIENT-LVL III: ICD-10-PCS | Mod: PBBFAC,,, | Performed by: PODIATRIST

## 2019-05-29 PROCEDURE — 11721 DEBRIDE NAIL 6 OR MORE: CPT | Mod: Q9,S$GLB,, | Performed by: PODIATRIST

## 2019-05-29 PROCEDURE — 11721 PR DEBRIDEMENT OF NAILS, 6 OR MORE: ICD-10-PCS | Mod: Q9,S$GLB,, | Performed by: PODIATRIST

## 2019-05-29 NOTE — PROGRESS NOTES
Subjective:      Patient ID: Amena Anderson is a 53 y.o. female.    Chief Complaint: Nail Care and Diabetes Mellitus (Last A1C was 5.8 dated 2/7/2019)  Patient returns for follow up  thick and discolored nails for high risk foot care. She is high risk secondary to the diabetes with PVD.  No new pedal complaints. Recently got her new diabetic shoes which are helping a lot with her feet and ambulation.    PCP: Dr. Miller  Last seen: 4/30/19    Review of Systems   Constitution: Negative for chills, diaphoresis, fever, malaise/fatigue and night sweats.   Cardiovascular: Positive for leg swelling. Negative for claudication, cyanosis and syncope.   Skin: Negative for color change, dry skin, nail changes, rash, suspicious lesions and unusual hair distribution.   Musculoskeletal: Positive for joint pain. Negative for falls, joint swelling, muscle cramps, muscle weakness and stiffness.   Gastrointestinal: Negative for constipation, diarrhea, nausea and vomiting.   Neurological: Positive for paresthesias. Negative for brief paralysis, disturbances in coordination, focal weakness, numbness, sensory change and tremors.           Objective:      Physical Exam   Constitutional: She is oriented to person, place, and time. She appears well-developed and well-nourished. She is cooperative. No distress.   Cardiovascular:   Pulses:       Dorsalis pedis pulses are 2+ on the right side, and 2+ on the left side.        Posterior tibial pulses are 2+ on the right side, and 2+ on the left side.   Capillary refill 3 seconds all toes/distal feet, all toes/both feet warm to touch.      Positive lower extremity edema bilateral.     Musculoskeletal:        Right ankle: Normal. She exhibits normal range of motion, no swelling, no ecchymosis, no deformity, no laceration and normal pulse. Achilles tendon normal. Achilles tendon exhibits no pain, no defect and normal De La Cruz's test results.   Improving pain to palpation lateral left ankle at cfl  and atfl without instability deformity or loss of function. Also has pain along the peroneal tendons to the brevis insertion.    Ankle dorsiflexion decreased at <10 degrees bilateral with moderate increase with knee flexion bilateral. There is tenderness to palpation bilateral achilles attachments on the calcaneus.    Ankle bilateral has negative anterior drawer sign, negative posterior drawer sign, negative external rotation test, negative squeeze test.    Adductovarus fifth toe with reducible contractures to the PIPJ's    Otherwise, Normal angle, base, station of gait. All ten toes without clubbing, cyanosis, or signs of ischemia.  No pain to palpation bilateral lower extremities.  Range of motion, stability, muscle strength, and muscle tone normal bilateral feet and legs.     Feet:   Right Foot:   Protective Sensation: 10 sites tested. 9 sites sensed.   Left Foot:   Protective Sensation: 10 sites tested. 9 sites sensed.   Lymphadenopathy:   Negative lymphadenopathy bilateral popliteal fossa and tarsal tunnel.   Neurological: She is alert and oriented to person, place, and time. She has normal strength. She displays no atrophy and no tremor. A sensory deficit is present. She exhibits normal muscle tone. She displays no seizure activity. Gait normal.   Negative tinel sign to percussion sural, superficial peroneal, deep peroneal, saphenous, and posterior tibial nerves right and left ankles and feet. Decreased vibratory sensation bilateral     Skin: Skin is warm, dry and intact. No abrasion, no bruising, no burn, no ecchymosis, no laceration, no petechiae and no rash noted. She is not diaphoretic. No cyanosis or erythema. No pallor. Nails show no clubbing.     No ulceration, hyperpigmentation, discoloration, masses nodules or cords palpated.  No ecchymosis, erythema, edema, or cardinal signs of infection bilateral lower extremities.    Skin is thin and atrophic bilateral    Nails 1-5 bilateral are thick 3-4 mm, long  3-6 mm, and discolored with subungual debris           Psychiatric: She has a normal mood and affect. Her behavior is normal.             Assessment:       Encounter Diagnoses   Name Primary?    BMI 50.0-59.9, adult Yes    Type 2 diabetes mellitus with diabetic polyneuropathy, without long-term current use of insulin     Type II diabetes mellitus with peripheral circulatory disorder     Onychomycosis due to dermatophyte          Plan:       Amena was seen today for nail care and diabetes mellitus.    Diagnoses and all orders for this visit:    BMI 50.0-59.9, adult    Type 2 diabetes mellitus with diabetic polyneuropathy, without long-term current use of insulin    Type II diabetes mellitus with peripheral circulatory disorder    Onychomycosis due to dermatophyte      I counseled the patient on her conditions, their implications and medical management.    Shoe inspection. Diabetic Foot Education. Patient reminded of the importance of good nutrition and blood sugar control to help prevent podiatric complications of diabetes. Patient instructed on proper foot hygeine. We discussed wearing proper shoe gear, daily foot inspections, never walking without protective shoe gear, never putting sharp instruments to feet    - With patient's permission, nails were aggressively reduced and debrided x 10 to their soft tissue attachment mechanically and with electric , removing all offending nail and debris. Patient relates relief following the procedure. She will continue to monitor the areas daily, inspect her feet, wear protective shoe gear when ambulatory, moisturizer to maintain skin integrity.    Discussed importance of supportive shoes with accommodative toe box to reduce pressure and irritation to forefoot.     Discussed importance of healthy eating and weight loss as pertaining to her diabetes control and foot stress/pain      Follow up in 3 months routine care    Edson Martinez DPM

## 2019-07-05 ENCOUNTER — LAB VISIT (OUTPATIENT)
Dept: LAB | Facility: HOSPITAL | Age: 54
End: 2019-07-05
Attending: INTERNAL MEDICINE
Payer: MEDICARE

## 2019-07-05 DIAGNOSIS — E04.1 THYROID CYST: ICD-10-CM

## 2019-07-05 DIAGNOSIS — E03.9 HYPOTHYROIDISM, UNSPECIFIED TYPE: ICD-10-CM

## 2019-07-05 LAB
T4 FREE SERPL-MCNC: 1.42 NG/DL (ref 0.71–1.51)
TSH SERPL DL<=0.005 MIU/L-ACNC: 0.15 UIU/ML (ref 0.4–4)

## 2019-07-05 PROCEDURE — 84439 ASSAY OF FREE THYROXINE: CPT

## 2019-07-05 PROCEDURE — 36415 COLL VENOUS BLD VENIPUNCTURE: CPT | Mod: PO

## 2019-07-05 PROCEDURE — 84443 ASSAY THYROID STIM HORMONE: CPT

## 2019-07-20 ENCOUNTER — TELEPHONE (OUTPATIENT)
Dept: ENDOCRINOLOGY | Facility: CLINIC | Age: 54
End: 2019-07-20

## 2019-07-20 DIAGNOSIS — E03.9 HYPOTHYROIDISM, UNSPECIFIED TYPE: Primary | ICD-10-CM

## 2019-07-20 RX ORDER — LEVOTHYROXINE SODIUM 150 UG/1
150 TABLET ORAL DAILY
Qty: 30 TABLET | Refills: 11 | Status: SHIPPED | OUTPATIENT
Start: 2019-07-20 | End: 2020-04-29

## 2019-07-20 NOTE — TELEPHONE ENCOUNTER
Let her know on too much synthroid   Decrease from 200 mcg 6 days a week to   150 mcg once daily  Check TSH 6 weeks

## 2019-07-22 NOTE — TELEPHONE ENCOUNTER
Spoke to pt and adv of Dr Amador's previous message, voiced understanding. Scheduled labs, adv date and time.

## 2019-07-28 DIAGNOSIS — I10 ESSENTIAL HYPERTENSION: ICD-10-CM

## 2019-07-28 DIAGNOSIS — E11.9 TYPE 2 DIABETES MELLITUS WITHOUT COMPLICATION, WITHOUT LONG-TERM CURRENT USE OF INSULIN: ICD-10-CM

## 2019-07-29 RX ORDER — ATORVASTATIN CALCIUM 20 MG/1
TABLET, FILM COATED ORAL
Qty: 90 TABLET | Refills: 0 | Status: SHIPPED | OUTPATIENT
Start: 2019-07-29 | End: 2019-10-03 | Stop reason: SDUPTHER

## 2019-07-29 RX ORDER — LISINOPRIL 20 MG/1
TABLET ORAL
Qty: 90 TABLET | Refills: 0 | Status: SHIPPED | OUTPATIENT
Start: 2019-07-29 | End: 2019-10-28 | Stop reason: SDUPTHER

## 2019-08-13 ENCOUNTER — PATIENT OUTREACH (OUTPATIENT)
Dept: ADMINISTRATIVE | Facility: HOSPITAL | Age: 54
End: 2019-08-13

## 2019-08-13 NOTE — PROGRESS NOTES
Health Maintenance Due   Topic Date Due    TETANUS VACCINE  10[birth year]11/1981    Foot Exam  01/08/2019    Lipid Panel  03/28/2019    Hemoglobin A1c  08/07/2019       Chart review completed 08/13/2019

## 2019-08-19 DIAGNOSIS — I10 ESSENTIAL HYPERTENSION: Chronic | ICD-10-CM

## 2019-08-19 RX ORDER — FUROSEMIDE 40 MG/1
TABLET ORAL
Qty: 90 TABLET | Refills: 0 | Status: SHIPPED | OUTPATIENT
Start: 2019-08-19 | End: 2019-10-30 | Stop reason: SDUPTHER

## 2019-08-20 ENCOUNTER — LAB VISIT (OUTPATIENT)
Dept: LAB | Facility: HOSPITAL | Age: 54
End: 2019-08-20
Attending: EMERGENCY MEDICINE
Payer: MEDICARE

## 2019-08-20 DIAGNOSIS — I10 ESSENTIAL HYPERTENSION: Chronic | ICD-10-CM

## 2019-08-20 DIAGNOSIS — E11.42 TYPE 2 DIABETES MELLITUS WITH DIABETIC POLYNEUROPATHY, WITHOUT LONG-TERM CURRENT USE OF INSULIN: Chronic | ICD-10-CM

## 2019-08-20 LAB
ALBUMIN SERPL BCP-MCNC: 3.2 G/DL (ref 3.5–5.2)
ALP SERPL-CCNC: 74 U/L (ref 55–135)
ALT SERPL W/O P-5'-P-CCNC: 20 U/L (ref 10–44)
ANION GAP SERPL CALC-SCNC: 6 MMOL/L (ref 8–16)
AST SERPL-CCNC: 18 U/L (ref 10–40)
BILIRUB SERPL-MCNC: 0.2 MG/DL (ref 0.1–1)
BUN SERPL-MCNC: 7 MG/DL (ref 6–20)
CALCIUM SERPL-MCNC: 8.6 MG/DL (ref 8.7–10.5)
CHLORIDE SERPL-SCNC: 107 MMOL/L (ref 95–110)
CHOLEST SERPL-MCNC: 159 MG/DL (ref 120–199)
CHOLEST/HDLC SERPL: 3.6 {RATIO} (ref 2–5)
CO2 SERPL-SCNC: 28 MMOL/L (ref 23–29)
CREAT SERPL-MCNC: 0.7 MG/DL (ref 0.5–1.4)
EST. GFR  (AFRICAN AMERICAN): >60 ML/MIN/1.73 M^2
EST. GFR  (NON AFRICAN AMERICAN): >60 ML/MIN/1.73 M^2
ESTIMATED AVG GLUCOSE: 128 MG/DL (ref 68–131)
GLUCOSE SERPL-MCNC: 101 MG/DL (ref 70–110)
HBA1C MFR BLD HPLC: 6.1 % (ref 4–5.6)
HDLC SERPL-MCNC: 44 MG/DL (ref 40–75)
HDLC SERPL: 27.7 % (ref 20–50)
LDLC SERPL CALC-MCNC: 93 MG/DL (ref 63–159)
NONHDLC SERPL-MCNC: 115 MG/DL
POTASSIUM SERPL-SCNC: 4.5 MMOL/L (ref 3.5–5.1)
PROT SERPL-MCNC: 7 G/DL (ref 6–8.4)
SODIUM SERPL-SCNC: 141 MMOL/L (ref 136–145)
TRIGL SERPL-MCNC: 110 MG/DL (ref 30–150)

## 2019-08-20 PROCEDURE — 83036 HEMOGLOBIN GLYCOSYLATED A1C: CPT

## 2019-08-20 PROCEDURE — 80061 LIPID PANEL: CPT

## 2019-08-20 PROCEDURE — 36415 COLL VENOUS BLD VENIPUNCTURE: CPT | Mod: PO

## 2019-08-20 PROCEDURE — 80053 COMPREHEN METABOLIC PANEL: CPT

## 2019-09-03 ENCOUNTER — TELEPHONE (OUTPATIENT)
Dept: ENDOCRINOLOGY | Facility: CLINIC | Age: 54
End: 2019-09-03

## 2019-09-03 ENCOUNTER — LAB VISIT (OUTPATIENT)
Dept: LAB | Facility: HOSPITAL | Age: 54
End: 2019-09-03
Attending: INTERNAL MEDICINE
Payer: MEDICARE

## 2019-09-03 DIAGNOSIS — E03.9 HYPOTHYROIDISM, UNSPECIFIED TYPE: ICD-10-CM

## 2019-09-03 LAB — TSH SERPL DL<=0.005 MIU/L-ACNC: 0.88 UIU/ML (ref 0.4–4)

## 2019-09-03 PROCEDURE — 84443 ASSAY THYROID STIM HORMONE: CPT

## 2019-09-03 PROCEDURE — 36415 COLL VENOUS BLD VENIPUNCTURE: CPT | Mod: PO

## 2019-09-09 ENCOUNTER — OFFICE VISIT (OUTPATIENT)
Dept: PODIATRY | Facility: CLINIC | Age: 54
End: 2019-09-09
Payer: MEDICARE

## 2019-09-09 VITALS
HEART RATE: 75 BPM | DIASTOLIC BLOOD PRESSURE: 90 MMHG | BODY MASS INDEX: 47.09 KG/M2 | HEIGHT: 66 IN | SYSTOLIC BLOOD PRESSURE: 147 MMHG | WEIGHT: 293 LBS

## 2019-09-09 DIAGNOSIS — E11.51 TYPE II DIABETES MELLITUS WITH PERIPHERAL CIRCULATORY DISORDER: ICD-10-CM

## 2019-09-09 DIAGNOSIS — B35.1 ONYCHOMYCOSIS DUE TO DERMATOPHYTE: ICD-10-CM

## 2019-09-09 DIAGNOSIS — M72.2 PLANTAR FASCIITIS OF RIGHT FOOT: Primary | ICD-10-CM

## 2019-09-09 DIAGNOSIS — E11.42 TYPE 2 DIABETES MELLITUS WITH DIABETIC POLYNEUROPATHY, WITHOUT LONG-TERM CURRENT USE OF INSULIN: ICD-10-CM

## 2019-09-09 PROCEDURE — 3044F PR MOST RECENT HEMOGLOBIN A1C LEVEL <7.0%: ICD-10-PCS | Mod: CPTII,S$GLB,, | Performed by: PODIATRIST

## 2019-09-09 PROCEDURE — 99213 PR OFFICE/OUTPT VISIT, EST, LEVL III, 20-29 MIN: ICD-10-PCS | Mod: 25,S$GLB,, | Performed by: PODIATRIST

## 2019-09-09 PROCEDURE — 99213 OFFICE O/P EST LOW 20 MIN: CPT | Mod: 25,S$GLB,, | Performed by: PODIATRIST

## 2019-09-09 PROCEDURE — 3080F DIAST BP >= 90 MM HG: CPT | Mod: CPTII,S$GLB,, | Performed by: PODIATRIST

## 2019-09-09 PROCEDURE — 3080F PR MOST RECENT DIASTOLIC BLOOD PRESSURE >= 90 MM HG: ICD-10-PCS | Mod: CPTII,S$GLB,, | Performed by: PODIATRIST

## 2019-09-09 PROCEDURE — 99499 UNLISTED E&M SERVICE: CPT | Mod: S$GLB,,, | Performed by: PODIATRIST

## 2019-09-09 PROCEDURE — 11721 DEBRIDE NAIL 6 OR MORE: CPT | Mod: Q9,S$GLB,, | Performed by: PODIATRIST

## 2019-09-09 PROCEDURE — 11721 PR DEBRIDEMENT OF NAILS, 6 OR MORE: ICD-10-PCS | Mod: Q9,S$GLB,, | Performed by: PODIATRIST

## 2019-09-09 PROCEDURE — 3008F BODY MASS INDEX DOCD: CPT | Mod: CPTII,S$GLB,, | Performed by: PODIATRIST

## 2019-09-09 PROCEDURE — 3008F PR BODY MASS INDEX (BMI) DOCUMENTED: ICD-10-PCS | Mod: CPTII,S$GLB,, | Performed by: PODIATRIST

## 2019-09-09 PROCEDURE — 3077F SYST BP >= 140 MM HG: CPT | Mod: CPTII,S$GLB,, | Performed by: PODIATRIST

## 2019-09-09 PROCEDURE — 99999 PR PBB SHADOW E&M-EST. PATIENT-LVL III: ICD-10-PCS | Mod: PBBFAC,,, | Performed by: PODIATRIST

## 2019-09-09 PROCEDURE — 99499 RISK ADDL DX/OHS AUDIT: ICD-10-PCS | Mod: S$GLB,,, | Performed by: PODIATRIST

## 2019-09-09 PROCEDURE — 3044F HG A1C LEVEL LT 7.0%: CPT | Mod: CPTII,S$GLB,, | Performed by: PODIATRIST

## 2019-09-09 PROCEDURE — 3077F PR MOST RECENT SYSTOLIC BLOOD PRESSURE >= 140 MM HG: ICD-10-PCS | Mod: CPTII,S$GLB,, | Performed by: PODIATRIST

## 2019-09-09 PROCEDURE — 99999 PR PBB SHADOW E&M-EST. PATIENT-LVL III: CPT | Mod: PBBFAC,,, | Performed by: PODIATRIST

## 2019-09-09 RX ORDER — POTASSIUM CHLORIDE 20 MEQ/1
TABLET, EXTENDED RELEASE ORAL
Qty: 180 TABLET | Refills: 0 | Status: SHIPPED | OUTPATIENT
Start: 2019-09-09 | End: 2019-11-29 | Stop reason: SDUPTHER

## 2019-09-09 RX ORDER — LEVOTHYROXINE SODIUM 200 UG/1
TABLET ORAL
Refills: 6 | COMMUNITY
Start: 2019-07-20 | End: 2019-10-03

## 2019-09-09 RX ORDER — AMLODIPINE BESYLATE 5 MG/1
TABLET ORAL
Refills: 2 | COMMUNITY
Start: 2019-07-28 | End: 2019-10-30 | Stop reason: DRUGHIGH

## 2019-09-09 NOTE — PROGRESS NOTES
"Subjective:      Patient ID: Amena Anderson is a 53 y.o. female.    Chief Complaint: Diabetes Mellitus (PCP:  Dr Miller  4/30/19; HgbA1c: 8/20/19  6.1); Follow-up (3 mo ck); Foot Problem ("lead" feeling to bottom of feet); and Nail Care  Patient returns for follow up  thick and discolored nails for high risk foot care. She is high risk secondary to the diabetes with PVD. Recently got her new diabetic shoes which helped a lot with her feet and ambulation at first but recently started getting some pain to the right plantar heel. Pain with ambulation, especially when first walking after rest. No treatments to date.     PCP: Dr. Miller  Last seen: 4/30/19    Review of Systems   Constitution: Negative for chills, diaphoresis, fever, malaise/fatigue and night sweats.   Cardiovascular: Positive for leg swelling. Negative for claudication, cyanosis and syncope.   Skin: Negative for color change, dry skin, nail changes, rash, suspicious lesions and unusual hair distribution.   Musculoskeletal: Positive for joint pain. Negative for falls, joint swelling, muscle cramps, muscle weakness and stiffness.   Gastrointestinal: Negative for constipation, diarrhea, nausea and vomiting.   Neurological: Positive for paresthesias. Negative for brief paralysis, disturbances in coordination, focal weakness, numbness, sensory change and tremors.           Objective:      Physical Exam   Constitutional: She is oriented to person, place, and time. She appears well-developed and well-nourished. She is cooperative. No distress.   Cardiovascular:   Pulses:       Dorsalis pedis pulses are 2+ on the right side, and 2+ on the left side.        Posterior tibial pulses are 2+ on the right side, and 2+ on the left side.   Capillary refill 3 seconds all toes/distal feet, all toes/both feet warm to touch.      Positive lower extremity edema bilateral.     Musculoskeletal:        Right ankle: Normal. She exhibits normal range of motion, no swelling, no " ecchymosis, no deformity, no laceration and normal pulse. Achilles tendon normal. Achilles tendon exhibits no pain, no defect and normal De La Cruz's test results.   Improving pain to palpation lateral left ankle at cfl and atfl without instability deformity or loss of function. Also has pain along the peroneal tendons to the brevis insertion.    Ankle dorsiflexion decreased at <10 degrees bilateral with moderate increase with knee flexion bilateral. There is tenderness to palpation bilateral achilles attachments on the calcaneus.    Mild pain on palpation plantar medial and central heel right foot. No pain with ROM or MMT. No pain with medial and lateral compression of heel.     Feet:   Right Foot:   Protective Sensation: 10 sites tested. 9 sites sensed.   Left Foot:   Protective Sensation: 10 sites tested. 9 sites sensed.   Lymphadenopathy:   Negative lymphadenopathy bilateral popliteal fossa and tarsal tunnel.   Neurological: She is alert and oriented to person, place, and time. She has normal strength. She displays no atrophy and no tremor. A sensory deficit is present. She exhibits normal muscle tone. She displays no seizure activity. Gait normal.   Negative tinel sign to percussion sural, superficial peroneal, deep peroneal, saphenous, and posterior tibial nerves right and left ankles and feet. Decreased vibratory sensation bilateral     Skin: Skin is warm, dry and intact. No abrasion, no bruising, no burn, no ecchymosis, no laceration, no petechiae and no rash noted. She is not diaphoretic. No cyanosis or erythema. No pallor. Nails show no clubbing.     No ulceration, hyperpigmentation, discoloration, masses nodules or cords palpated.  No ecchymosis, erythema, edema, or cardinal signs of infection bilateral lower extremities.    Skin is thin and atrophic bilateral    Nails 1-5 bilateral are thick 3-4 mm, long 3-6 mm, and discolored with subungual debris           Psychiatric: She has a normal mood and affect.  Her behavior is normal.             Assessment:       Encounter Diagnoses   Name Primary?    Plantar fasciitis of right foot Yes    Type 2 diabetes mellitus with diabetic polyneuropathy, without long-term current use of insulin     Type II diabetes mellitus with peripheral circulatory disorder     Onychomycosis due to dermatophyte          Plan:       Amena was seen today for diabetes mellitus, follow-up, foot problem and nail care.    Diagnoses and all orders for this visit:    Plantar fasciitis of right foot    Type 2 diabetes mellitus with diabetic polyneuropathy, without long-term current use of insulin    Type II diabetes mellitus with peripheral circulatory disorder    Onychomycosis due to dermatophyte      I counseled the patient on her conditions, their implications and medical management.    Shoe inspection. Diabetic Foot Education. Patient reminded of the importance of good nutrition and blood sugar control to help prevent podiatric complications of diabetes. Patient instructed on proper foot hygeine. We discussed wearing proper shoe gear, daily foot inspections, never walking without protective shoe gear, never putting sharp instruments to feet    - With patient's permission, nails were aggressively reduced and debrided x 10 to their soft tissue attachment mechanically and with electric , removing all offending nail and debris. Patient relates relief following the procedure. She will continue to monitor the areas daily, inspect her feet, wear protective shoe gear when ambulatory, moisturizer to maintain skin integrity.    Discussed importance of supportive shoes with accommodative toe box to reduce pressure and irritation to forefoot.     Discussed importance of healthy eating and weight loss as pertaining to her diabetes control and foot stress/pain    Patient will stretch the tendo achilles complex three times daily as demonstrated in the office.  Literature was dispensed illustrating proper  stretching technique.    Will take her shoes back to see about having the inserts adjusted as she is having heel pain since getting the new shoes      Follow up in 3 months routine care    Edson Martinez DPM

## 2019-09-22 DIAGNOSIS — E11.9 TYPE 2 DIABETES MELLITUS WITHOUT COMPLICATION, WITHOUT LONG-TERM CURRENT USE OF INSULIN: ICD-10-CM

## 2019-09-22 RX ORDER — METFORMIN HYDROCHLORIDE 1000 MG/1
TABLET ORAL
Qty: 90 TABLET | Refills: 0 | Status: SHIPPED | OUTPATIENT
Start: 2019-09-22 | End: 2019-12-10 | Stop reason: SDUPTHER

## 2019-09-23 DIAGNOSIS — G89.4 CHRONIC PAIN SYNDROME: Primary | Chronic | ICD-10-CM

## 2019-09-23 RX ORDER — AMITRIPTYLINE HYDROCHLORIDE 25 MG/1
TABLET, FILM COATED ORAL
Qty: 90 TABLET | Refills: 1 | Status: SHIPPED | OUTPATIENT
Start: 2019-09-23 | End: 2019-12-29

## 2019-09-30 ENCOUNTER — TELEPHONE (OUTPATIENT)
Dept: PODIATRY | Facility: CLINIC | Age: 54
End: 2019-09-30

## 2019-09-30 NOTE — TELEPHONE ENCOUNTER
----- Message from Jacob Lin sent at 9/30/2019  1:32 PM CDT -----  Contact: Patient  Type: Needs Medical Advice    Who Called:  Patient  Best Call Back Number: [phone]  Additional Information: Patient would like to discuss foot issue. Please call to advise. Thanks!

## 2019-10-02 ENCOUNTER — OFFICE VISIT (OUTPATIENT)
Dept: PODIATRY | Facility: CLINIC | Age: 54
End: 2019-10-02
Payer: MEDICARE

## 2019-10-02 ENCOUNTER — HOSPITAL ENCOUNTER (OUTPATIENT)
Dept: RADIOLOGY | Facility: HOSPITAL | Age: 54
Discharge: HOME | End: 2019-10-02
Attending: PODIATRIST
Payer: MEDICARE

## 2019-10-02 VITALS
HEIGHT: 66 IN | SYSTOLIC BLOOD PRESSURE: 148 MMHG | DIASTOLIC BLOOD PRESSURE: 87 MMHG | BODY MASS INDEX: 47.09 KG/M2 | WEIGHT: 293 LBS

## 2019-10-02 DIAGNOSIS — M72.2 PLANTAR FASCIITIS OF RIGHT FOOT: Primary | ICD-10-CM

## 2019-10-02 DIAGNOSIS — M72.2 PLANTAR FASCIITIS OF RIGHT FOOT: ICD-10-CM

## 2019-10-02 PROCEDURE — 99213 PR OFFICE/OUTPT VISIT, EST, LEVL III, 20-29 MIN: ICD-10-PCS | Mod: S$GLB,,, | Performed by: PODIATRIST

## 2019-10-02 PROCEDURE — 3077F SYST BP >= 140 MM HG: CPT | Mod: CPTII,S$GLB,, | Performed by: PODIATRIST

## 2019-10-02 PROCEDURE — 73650 XR CALCANEUS 2 VIEW RIGHT: ICD-10-PCS | Mod: 26,RT,, | Performed by: RADIOLOGY

## 2019-10-02 PROCEDURE — 73650 X-RAY EXAM OF HEEL: CPT | Mod: 26,RT,, | Performed by: RADIOLOGY

## 2019-10-02 PROCEDURE — 3079F PR MOST RECENT DIASTOLIC BLOOD PRESSURE 80-89 MM HG: ICD-10-PCS | Mod: CPTII,S$GLB,, | Performed by: PODIATRIST

## 2019-10-02 PROCEDURE — 99213 OFFICE O/P EST LOW 20 MIN: CPT | Mod: S$GLB,,, | Performed by: PODIATRIST

## 2019-10-02 PROCEDURE — 3077F PR MOST RECENT SYSTOLIC BLOOD PRESSURE >= 140 MM HG: ICD-10-PCS | Mod: CPTII,S$GLB,, | Performed by: PODIATRIST

## 2019-10-02 PROCEDURE — 3079F DIAST BP 80-89 MM HG: CPT | Mod: CPTII,S$GLB,, | Performed by: PODIATRIST

## 2019-10-02 PROCEDURE — 3008F PR BODY MASS INDEX (BMI) DOCUMENTED: ICD-10-PCS | Mod: CPTII,S$GLB,, | Performed by: PODIATRIST

## 2019-10-02 PROCEDURE — 73650 X-RAY EXAM OF HEEL: CPT | Mod: TC,PO,RT

## 2019-10-02 PROCEDURE — 3008F BODY MASS INDEX DOCD: CPT | Mod: CPTII,S$GLB,, | Performed by: PODIATRIST

## 2019-10-02 PROCEDURE — 99999 PR PBB SHADOW E&M-EST. PATIENT-LVL III: CPT | Mod: PBBFAC,,, | Performed by: PODIATRIST

## 2019-10-02 PROCEDURE — 99999 PR PBB SHADOW E&M-EST. PATIENT-LVL III: ICD-10-PCS | Mod: PBBFAC,,, | Performed by: PODIATRIST

## 2019-10-02 NOTE — PROGRESS NOTES
Subjective:      Patient ID: Amena Anderson is a 53 y.o. female.    Chief Complaint: Foot Pain (right) and Diabetes Mellitus (PCP:  Dr Miller 4/30/19; HgbA1c: 8/20/19  6.1)  Patient returns for follow up  thick and discolored nails for high risk foot care. She is high risk secondary to the diabetes with PVD. Recently got her new diabetic shoes which helped a lot with her feet and ambulation at first but recently started getting some pain to the right plantar heel. Pain with ambulation, especially when first walking after rest. No treatments to date.     10/2/19: Patient returns for follow up right plantar fasciitis, the pain is still 8/10 despite having her shoes and inserts adjusted. She relates pain with ambulating especially first few steps after rest.     PCP: Dr. Miller  Last seen: 4/30/19    Review of Systems   Constitution: Negative for chills, diaphoresis, fever, malaise/fatigue and night sweats.   Cardiovascular: Positive for leg swelling. Negative for claudication, cyanosis and syncope.   Skin: Negative for color change, dry skin, nail changes, rash, suspicious lesions and unusual hair distribution.   Musculoskeletal: Positive for joint pain. Negative for falls, joint swelling, muscle cramps, muscle weakness and stiffness.   Gastrointestinal: Negative for constipation, diarrhea, nausea and vomiting.   Neurological: Positive for paresthesias. Negative for brief paralysis, disturbances in coordination, focal weakness, numbness, sensory change and tremors.           Objective:      Physical Exam   Constitutional: She is oriented to person, place, and time. She appears well-developed and well-nourished. She is cooperative. No distress.   Cardiovascular:   Pulses:       Dorsalis pedis pulses are 2+ on the right side, and 2+ on the left side.        Posterior tibial pulses are 2+ on the right side, and 2+ on the left side.   Capillary refill 3 seconds all toes/distal feet, all toes/both feet warm to touch.       Positive lower extremity edema bilateral.     Musculoskeletal:        Right ankle: Normal. She exhibits normal range of motion, no swelling, no ecchymosis, no deformity, no laceration and normal pulse. Achilles tendon normal. Achilles tendon exhibits no pain, no defect and normal De La Cruz's test results.   Improving pain to palpation lateral left ankle at cfl and atfl without instability deformity or loss of function. Also has pain along the peroneal tendons to the brevis insertion.    Ankle dorsiflexion decreased at <10 degrees bilateral with moderate increase with knee flexion bilateral. There is tenderness to palpation bilateral achilles attachments on the calcaneus.    Pain on palpation plantar medial and central heel right foot. No pain with ROM or MMT. No pain with medial and lateral compression of heel.     Feet:   Right Foot:   Protective Sensation: 10 sites tested. 9 sites sensed.   Left Foot:   Protective Sensation: 10 sites tested. 9 sites sensed.   Lymphadenopathy:   Negative lymphadenopathy bilateral popliteal fossa and tarsal tunnel.   Neurological: She is alert and oriented to person, place, and time. She has normal strength. She displays no atrophy and no tremor. A sensory deficit is present. She exhibits normal muscle tone. She displays no seizure activity. Gait normal.   Negative tinel sign to percussion sural, superficial peroneal, deep peroneal, saphenous, and posterior tibial nerves right and left ankles and feet. Decreased vibratory sensation bilateral     Skin: Skin is warm, dry and intact. No abrasion, no bruising, no burn, no ecchymosis, no laceration, no petechiae and no rash noted. She is not diaphoretic. No cyanosis or erythema. No pallor. Nails show no clubbing.     No ulceration, hyperpigmentation, discoloration, masses nodules or cords palpated.  No ecchymosis, erythema, edema, or cardinal signs of infection bilateral lower extremities.    Skin is thin and atrophic bilateral    Nails  1-5 bilateral are thick 3-4 mm, long 3-6 mm, and discolored with subungual debris           Psychiatric: She has a normal mood and affect. Her behavior is normal.             Assessment:       Encounter Diagnosis   Name Primary?    Plantar fasciitis of right foot Yes         Plan:       Amena was seen today for foot pain and diabetes mellitus.    Diagnoses and all orders for this visit:    Plantar fasciitis of right foot  -     X-Ray Calcaneus 2 View Right; Future      I counseled the patient on her conditions, their implications and medical management.    Shoe inspection. Diabetic Foot Education. Patient reminded of the importance of good nutrition and blood sugar control to help prevent podiatric complications of diabetes. Patient instructed on proper foot hygeine. We discussed wearing proper shoe gear, daily foot inspections, never walking without protective shoe gear, never putting sharp instruments to feet  Discussed importance of supportive shoes with accommodative toe box to reduce pressure and irritation to forefoot.     Patient will stretch the tendo achilles complex three times daily as demonstrated in the office.  Literature was dispensed illustrating proper stretching technique.    Will stop wearing the memory foam slippers at home and wear the orthotics or recommended slippers at all times.      Follow up in 3 months routine care and possible injection vs starting PT    Edson Martinez DPM

## 2019-10-03 ENCOUNTER — TELEPHONE (OUTPATIENT)
Dept: ENDOCRINOLOGY | Facility: CLINIC | Age: 54
End: 2019-10-03

## 2019-10-03 DIAGNOSIS — E78.5 DYSLIPIDEMIA: Primary | ICD-10-CM

## 2019-10-03 RX ORDER — ATORVASTATIN CALCIUM 20 MG/1
TABLET, FILM COATED ORAL
Qty: 90 TABLET | Refills: 1 | Status: SHIPPED | OUTPATIENT
Start: 2019-10-03 | End: 2019-10-28 | Stop reason: SDUPTHER

## 2019-10-03 NOTE — PROGRESS NOTES
Refill Authorization Note     is requesting a refill authorization.    Brief assessment and rationale for refill: ROUTE: npp  Name and strength of medication: atorvastatin (LIPITOR) 20 MG tablet       Medication Therapy Plan: NCO by Babatunde; route to pcp JANE Miller    Medication reconciliation completed: No   Pharmacist Review Requested: Yes          How patient will take medication: t1t po qd          Comments:     Appointments past 12m or future 3m    Date Provider   Last Visit   Visit date not found Rashad Fine MD   Next Visit   Visit date not found Rashad Fine MD

## 2019-10-03 NOTE — TELEPHONE ENCOUNTER
Please see the following assessment. This refill request still requires some action on your part. Currently, you are not participating in the [facility] , so this request is being routed to you. Atorvastatin pended for 6 more.     Also of note, patient last filled both levothyroxine 150 mcg and levothyroxine 200 mcg. Her dose was changed to 150 mcg by Dr. Amador on 7/20/19. Defer to you. Thank you.

## 2019-10-03 NOTE — TELEPHONE ENCOUNTER
----- Message from Dennis Sneed sent at 10/3/2019  1:23 PM CDT -----  Type:  Sooner Apoointment Request    Caller is requesting a sooner appointment.  Caller declined first available appointment listed below.  Caller will not accept being placed on the waitlist and is requesting a message be sent to doctor.    Name of Caller:  Patient  When is the first available appointment?  Out of template  Symptoms:  Thyroid  Best Call Back Number:  [phone]  Additional Information:

## 2019-10-14 ENCOUNTER — PATIENT OUTREACH (OUTPATIENT)
Dept: ADMINISTRATIVE | Facility: HOSPITAL | Age: 54
End: 2019-10-14

## 2019-10-28 ENCOUNTER — OFFICE VISIT (OUTPATIENT)
Dept: FAMILY MEDICINE | Facility: CLINIC | Age: 54
End: 2019-10-28
Payer: MEDICARE

## 2019-10-28 VITALS
SYSTOLIC BLOOD PRESSURE: 130 MMHG | DIASTOLIC BLOOD PRESSURE: 84 MMHG | HEART RATE: 87 BPM | HEIGHT: 66 IN | BODY MASS INDEX: 47.09 KG/M2 | OXYGEN SATURATION: 94 % | RESPIRATION RATE: 17 BRPM | WEIGHT: 293 LBS

## 2019-10-28 DIAGNOSIS — I10 ESSENTIAL HYPERTENSION: ICD-10-CM

## 2019-10-28 DIAGNOSIS — E78.5 DYSLIPIDEMIA: ICD-10-CM

## 2019-10-28 DIAGNOSIS — E11.9 TYPE 2 DIABETES MELLITUS WITHOUT COMPLICATION, WITHOUT LONG-TERM CURRENT USE OF INSULIN: ICD-10-CM

## 2019-10-28 DIAGNOSIS — E11.42 TYPE 2 DIABETES MELLITUS WITH DIABETIC POLYNEUROPATHY, WITHOUT LONG-TERM CURRENT USE OF INSULIN: Primary | Chronic | ICD-10-CM

## 2019-10-28 PROCEDURE — 3079F DIAST BP 80-89 MM HG: CPT | Mod: CPTII,S$GLB,, | Performed by: EMERGENCY MEDICINE

## 2019-10-28 PROCEDURE — 99214 PR OFFICE/OUTPT VISIT, EST, LEVL IV, 30-39 MIN: ICD-10-PCS | Mod: 25,S$GLB,, | Performed by: EMERGENCY MEDICINE

## 2019-10-28 PROCEDURE — 99999 PR PBB SHADOW E&M-EST. PATIENT-LVL V: ICD-10-PCS | Mod: PBBFAC,,, | Performed by: EMERGENCY MEDICINE

## 2019-10-28 PROCEDURE — 3079F PR MOST RECENT DIASTOLIC BLOOD PRESSURE 80-89 MM HG: ICD-10-PCS | Mod: CPTII,S$GLB,, | Performed by: EMERGENCY MEDICINE

## 2019-10-28 PROCEDURE — 3075F PR MOST RECENT SYSTOLIC BLOOD PRESS GE 130-139MM HG: ICD-10-PCS | Mod: CPTII,S$GLB,, | Performed by: EMERGENCY MEDICINE

## 2019-10-28 PROCEDURE — 90686 IIV4 VACC NO PRSV 0.5 ML IM: CPT | Mod: S$GLB,,, | Performed by: EMERGENCY MEDICINE

## 2019-10-28 PROCEDURE — G0008 ADMIN INFLUENZA VIRUS VAC: HCPCS | Mod: S$GLB,,, | Performed by: EMERGENCY MEDICINE

## 2019-10-28 PROCEDURE — 99499 RISK ADDL DX/OHS AUDIT: ICD-10-PCS | Mod: S$GLB,,, | Performed by: EMERGENCY MEDICINE

## 2019-10-28 PROCEDURE — 99999 PR PBB SHADOW E&M-EST. PATIENT-LVL V: CPT | Mod: PBBFAC,,, | Performed by: EMERGENCY MEDICINE

## 2019-10-28 PROCEDURE — 99214 OFFICE O/P EST MOD 30 MIN: CPT | Mod: 25,S$GLB,, | Performed by: EMERGENCY MEDICINE

## 2019-10-28 PROCEDURE — 90686 FLU VACCINE (QUAD) GREATER THAN OR EQUAL TO 3YO PRESERVATIVE FREE IM: ICD-10-PCS | Mod: S$GLB,,, | Performed by: EMERGENCY MEDICINE

## 2019-10-28 PROCEDURE — 3075F SYST BP GE 130 - 139MM HG: CPT | Mod: CPTII,S$GLB,, | Performed by: EMERGENCY MEDICINE

## 2019-10-28 PROCEDURE — G0008 FLU VACCINE (QUAD) GREATER THAN OR EQUAL TO 3YO PRESERVATIVE FREE IM: ICD-10-PCS | Mod: S$GLB,,, | Performed by: EMERGENCY MEDICINE

## 2019-10-28 PROCEDURE — 3044F HG A1C LEVEL LT 7.0%: CPT | Mod: CPTII,S$GLB,, | Performed by: EMERGENCY MEDICINE

## 2019-10-28 PROCEDURE — 3008F PR BODY MASS INDEX (BMI) DOCUMENTED: ICD-10-PCS | Mod: CPTII,S$GLB,, | Performed by: EMERGENCY MEDICINE

## 2019-10-28 PROCEDURE — 99499 UNLISTED E&M SERVICE: CPT | Mod: S$GLB,,, | Performed by: EMERGENCY MEDICINE

## 2019-10-28 PROCEDURE — 3008F BODY MASS INDEX DOCD: CPT | Mod: CPTII,S$GLB,, | Performed by: EMERGENCY MEDICINE

## 2019-10-28 PROCEDURE — 3044F PR MOST RECENT HEMOGLOBIN A1C LEVEL <7.0%: ICD-10-PCS | Mod: CPTII,S$GLB,, | Performed by: EMERGENCY MEDICINE

## 2019-10-28 RX ORDER — ATORVASTATIN CALCIUM 20 MG/1
TABLET, FILM COATED ORAL
Qty: 90 TABLET | Refills: 3 | Status: SHIPPED | OUTPATIENT
Start: 2019-10-28 | End: 2020-05-26 | Stop reason: SDUPTHER

## 2019-10-28 RX ORDER — LISINOPRIL 20 MG/1
20 TABLET ORAL DAILY
Qty: 90 TABLET | Refills: 3 | Status: SHIPPED | OUTPATIENT
Start: 2019-10-28 | End: 2020-11-17 | Stop reason: SDUPTHER

## 2019-10-28 NOTE — PROGRESS NOTES
Subjective:   THIS NOTE IS DONE WITH VOICE RECOGNITION        Patient ID: Amena Anderson is a 54 y.o. female.    Chief Complaint: Type 2 diabetes mellitus with diabetic polneuropathy, withou      HPI     She is in today to follow-up on her diabetes and other medical conditions.    Her daughter has been actively working with her, in his put her on a diet that is very appropriate.  The patient is unhappy about eating rapid food, but she has lost 6 pounds, and her diabetes is improving.  The appropriateness of this diet was reviewed with her.  She plans to continue to work with her daughter.    She does have plantar side fasciitis.  She has seen Podiatry.  She is continuing the home physical therapy maneuvers.  This is right-sided only.  There is no active skin lesion.    She is working with her pain management doctor regards to her back pain. He recently stopped her alprazolam secondary to potentially changing her narcotic medications.  I wholeheartedly support the reduction of the Xanax.    Her diabetes has been stable.  She is taking her medications as ordered.    Drinking lots of juice.  Mouth is dry    Lab Results   Component Value Date    HGBA1C 6.1 (H) 08/20/2019    HGBA1C 5.8 (H) 02/07/2019    HGBA1C 5.6 10/03/2018     Lab Results   Component Value Date    LDLCALC 93.0 08/20/2019    LDLCALC 80.2 03/28/2018    LDLCALC 75.4 07/10/2017     Lab Results   Component Value Date    CREATININE 0.7 08/20/2019    CREATININE 0.7 02/07/2019    CREATININE 0.7 03/28/2018     Lab Results   Component Value Date    ESTGFRAFRICA >60.0 08/20/2019    ESTGFRAFRICA >60.0 02/07/2019    ESTGFRAFRICA >60.0 03/28/2018     Here to follow up on blood pressure.  Taking current medications.  She has been taking both 5 milligram and 10 milligram amlodipine.  Will stop 5 milligram of amlodipine.    BP Readings from Last 3 Encounters:   10/28/19 130/84   10/02/19 (!) 148/87   09/09/19 (!) 147/90     No symptoms of thyroid mediated disease. She  is taking it regularly.  Her last TSH was acceptable.    Lab Results   Component Value Date    TSH 0.883 09/03/2019     Immunization History   Administered Date(s) Administered    Influenza - Quadrivalent - PF (6 months and older) 11/27/2017, 10/11/2018, 10/28/2019    Pneumococcal Conjugate - 13 Valent 05/22/2017    Pneumococcal Polysaccharide - 23 Valent 10/11/2018     Influenza vaccine administered today.    Current Outpatient Medications   Medication Sig Dispense Refill    amitriptyline (ELAVIL) 25 MG tablet TAKE 1 TABLET BY MOUTH EVERY EVENING 90 tablet 1    amLODIPine (NORVASC) 10 MG tablet TAKE 1 TABLET(10 MG) BY MOUTH EVERY DAY 90 tablet 3    amLODIPine (NORVASC) 5 MG tablet TK 1 T PO QD  2    aspirin (ECOTRIN) 81 MG EC tablet Take 81 mg by mouth once daily.      atorvastatin (LIPITOR) 20 MG tablet TAKE 1 TABLET(20 MG) BY MOUTH EVERY DAY 90 tablet 1    blood sugar diagnostic Strp To check BG 2 times daily, to use with insurance preferred meter DX E11.42 100 strip 11    blood-glucose meter kit To check BG 2 times daily, to use with insurance preferred meter 100 each 11    ciclopirox (LOPROX) 0.77 % Crea Apply topically 2 (two) times daily. 90 g 3    diclofenac sodium 1 % Gel Apply 2 g topically 4 (four) times daily. 100 g 2    dicyclomine (BENTYL) 10 MG capsule Take 1 capsule (10 mg total) by mouth 4 (four) times daily before meals and nightly. 90 capsule 1    docusate sodium (COLACE) 50 MG capsule Take 100 mg by mouth 2 (two) times daily.      estradiol (ESTRACE) 1 MG tablet TAKE 1 TABLET BY MOUTH EVERY DAY 90 tablet 3    furosemide (LASIX) 40 MG tablet TAKE 1 TABLET(40 MG) BY MOUTH EVERY DAY 90 tablet 0    gabapentin (NEURONTIN) 400 MG capsule Take 400 mg by mouth 3 (three) times daily.      hydrocodone-acetaminophen 10-325mg (NORCO)  mg Tab Take 10 tablets by mouth 3 (three) times daily.      lancets Misc To check BG 2 times daily, to use with insurance preferred meter 60 each 11     levothyroxine (SYNTHROID) 150 MCG tablet Take 1 tablet (150 mcg total) by mouth once daily. 30 tablet 11    lisinopril (PRINIVIL,ZESTRIL) 20 MG tablet TAKE 1 TABLET BY MOUTH EVERY DAY 90 tablet 0    metFORMIN (GLUCOPHAGE) 1000 MG tablet TAKE 1 TABLET(1000 MG) BY MOUTH EVERY EVENING 90 tablet 0    naproxen (EC NAPROSYN) 500 MG EC tablet Take 500 mg by mouth 2 (two) times daily.      nitroGLYCERIN (NITROSTAT) 0.4 MG SL tablet Place 0.4 mg under the tongue every 5 (five) minutes as needed for Chest pain.      nystatin (NYSTOP) powder APPLY AFFECTED AREA THREE TIMES DAILY 60 g 3    omeprazole (PRILOSEC) 20 MG capsule TAKE 1 CAPSULE BY MOUTH TWICE DAILY 180 capsule 3    potassium chloride SA (K-DUR,KLOR-CON) 20 MEQ tablet TAKE 1 TABLET(20 MEQ) BY MOUTH TWICE DAILY 180 tablet 0    ranitidine (ZANTAC) 300 MG tablet TAKE 1 TABLET BY MOUTH EVERY EVENING, ABOUT 30 TO 60 MINUTES BEFORE BEDTIME 90 tablet 4    ALPRAZolam (XANAX) 1 MG tablet Take 1 tablet (1 mg total) by mouth 2 (two) times daily as needed for Anxiety. (Patient not taking: Reported on 10/2/2019) 60 tablet 2     No current facility-administered medications for this visit.          Review of Systems   Constitutional: Negative for activity change, appetite change, chills, diaphoresis, fatigue, fever and unexpected weight change.   HENT: Negative for congestion, ear pain, hearing loss, rhinorrhea, trouble swallowing and voice change.    Eyes: Negative for pain and visual disturbance.   Respiratory: Positive for shortness of breath. Negative for cough and chest tightness. Stridor: Chronic.    Cardiovascular: Negative for chest pain, palpitations and leg swelling.   Gastrointestinal: Negative for abdominal distention, abdominal pain and blood in stool.   Genitourinary: Negative for difficulty urinating, flank pain, frequency and urgency.   Musculoskeletal: Positive for arthralgias and back pain. Negative for joint swelling, myalgias, neck pain and neck  stiffness.        Plantar fascial pain is noted above, right.   Skin: Negative for pallor, rash and wound.   Neurological: Negative for dizziness, tremors, syncope, weakness and headaches.   Hematological: Negative for adenopathy. Bruises/bleeds easily.   Psychiatric/Behavioral: Positive for dysphoric mood ( stable with medicines). Negative for agitation, confusion and sleep disturbance. The patient is nervous/anxious.        Objective:      Physical Exam   Constitutional: She is oriented to person, place, and time. She appears well-developed and well-nourished. No distress.   Pleasant, oriented.         HENT:   Head: Normocephalic and atraumatic.   Nose: Nose normal.   Mouth/Throat: Oropharynx is clear and moist.   Eyes: Pupils are equal, round, and reactive to light. Conjunctivae and EOM are normal. No scleral icterus.   Neck: Normal range of motion. Neck supple. No JVD present. No thyromegaly present.   Cardiovascular: Normal rate, regular rhythm, normal heart sounds and intact distal pulses.   No murmur heard.  Pulses:       Dorsalis pedis pulses are 1+ on the right side, and 1+ on the left side.   Pulmonary/Chest: Effort normal and breath sounds normal. She has no wheezes. She has no rales.   Abdominal: Soft. Bowel sounds are normal. She exhibits no distension. There is no tenderness.   Musculoskeletal: Normal range of motion. She exhibits no edema or tenderness.        Right foot: There is normal range of motion (Walking to avoid stretching the plantar fascia on the right.) and no deformity.        Left foot: There is normal range of motion and no deformity.   Feet:   Right Foot:   Protective Sensation: 5 sites tested. 4 sites sensed.   Skin Integrity: Negative for ulcer.   Left Foot:   Protective Sensation: 5 sites tested. 4 sites sensed.   Skin Integrity: Positive for ulcer.   Lymphadenopathy:     She has no cervical adenopathy.   Neurological: She is alert and oriented to person, place, and time. She has  normal reflexes. No cranial nerve deficit. Coordination normal.   Skin: Skin is warm and dry. No rash noted. No erythema.   Psychiatric: She has a normal mood and affect. Her behavior is normal.       Assessment:       1. Type 2 diabetes mellitus with diabetic polyneuropathy, without long-term current use of insulin    2. Dyslipidemia    3. Essential hypertension    4. Type 2 diabetes mellitus without complication, without long-term current use of insulin    5. BMI 50.0-59.9, adult        Plan:       1. Dyslipidemia  At goal  Continue current medications     - atorvastatin (LIPITOR) 20 MG tablet; TAKE 1 TABLET(20 MG) BY MOUTH EVERY DAY  Dispense: 90 tablet; Refill: 3    2. Essential hypertension  Over utilizing amlodipine  Decrease to 10 milligrams a day  Follow-up blood pressures recommended  Continue lisinopril  - lisinopril (PRINIVIL,ZESTRIL) 20 MG tablet; Take 1 tablet (20 mg total) by mouth once daily.  Dispense: 90 tablet; Refill: 3    3. Type 2 diabetes mellitus without complication, without long-term current use of insulin  Controlled  Annual foot exam recommended  Annual retinal exam recommended.   Hemoglobin A1c at 6 month intervals  Annual lipid profile recommended  - lisinopril (PRINIVIL,ZESTRIL) 20 MG tablet; Take 1 tablet (20 mg total) by mouth once daily.  Dispense: 90 tablet; Refill: 3    4. Type 2 diabetes mellitus with diabetic polyneuropathy, without long-term current use of insulin  See above  Repeat hemoglobin A1c in 3 months  - Hemoglobin A1c; Future    5. BMI 50.0-59.9, adult  She has lost 6 pounds, improved  Continue current dietary patterns  For the weight loss encouraged.

## 2019-10-28 NOTE — PATIENT INSTRUCTIONS
Dasha.    Stop the 5 mg amlodipine.  Continue to the take the 10 mg amlodipine.  You should continue all of your other medications.    I agree with stopping the Xanax.    I would suggest eating the fruit, not drinking juice.    Flu shot done today.      I would like to see you in four months.    Jose Miller MD

## 2019-10-30 DIAGNOSIS — E11.9 TYPE 2 DIABETES MELLITUS WITHOUT COMPLICATION, WITHOUT LONG-TERM CURRENT USE OF INSULIN: ICD-10-CM

## 2019-10-30 DIAGNOSIS — I10 ESSENTIAL HYPERTENSION: ICD-10-CM

## 2019-10-30 DIAGNOSIS — K21.9 GASTROESOPHAGEAL REFLUX DISEASE WITHOUT ESOPHAGITIS: ICD-10-CM

## 2019-10-30 DIAGNOSIS — I10 ESSENTIAL HYPERTENSION: Chronic | ICD-10-CM

## 2019-10-30 RX ORDER — AMLODIPINE BESYLATE 10 MG/1
TABLET ORAL
Qty: 90 TABLET | Refills: 0 | Status: SHIPPED | OUTPATIENT
Start: 2019-10-30 | End: 2020-01-31

## 2019-10-30 RX ORDER — FUROSEMIDE 40 MG/1
TABLET ORAL
Qty: 90 TABLET | Refills: 0 | Status: SHIPPED | OUTPATIENT
Start: 2019-10-30 | End: 2020-01-31

## 2019-10-30 RX ORDER — OMEPRAZOLE 20 MG/1
CAPSULE, DELAYED RELEASE ORAL
Qty: 180 CAPSULE | Refills: 0 | Status: SHIPPED | OUTPATIENT
Start: 2019-10-30 | End: 2020-01-31

## 2019-10-30 NOTE — PROGRESS NOTES
Refill Authorization Note     is requesting a refill authorization.    Brief assessment and rationale for refill: QUICK DC: rts (10/20)   Name and strength of medication: lisinopril (PRINIVIL,ZESTRIL) 20 MG tablet       Medication Therapy Plan: e-prescribed (10/19); rts(10/20); quick dc    Medication reconciliation completed: No              How patient will take medication: t1t po qd          Comments:   Appointments past 12m or future 3m    Date Provider   Last Visit   10/28/2019 Jose Miller Jr, MD   Next Visit   2/28/2020 Jose Miller Jr, MD     Last Prescribed Info:     Ordering Provider: -- GOLDIE #:  -- NPI:  --    Authorizing Provider: Jose Miller Jr., MD GOLDIE #:  [id number] NPI:  [id number]    Ordering User:  Jose Miller Jr., MD            Diagnosis Association: Essential hypertension (I10); Type 2 diabetes mellitus without complication, without long-term current use of insulin (E11.9)      Original Order:  lisinopril (PRINIVIL,ZESTRIL) 20 MG tablet [[id number]]      Pharmacy:  [id number] [address] [address] AT [address] [address] [address] GOLDIE #:  [id number]     Pharmacy Comments:  --          Fill quantity remaining:  -- Fill quantity used:  -- Next fill due: --       Outpatient Medication Detail      Disp Refills Start End    lisinopril (PRINIVIL,ZESTRIL) 20 MG tablet 90 tablet 3 10/28/2019     Sig - Route: Take 1 tablet (20 mg total) by mouth once daily. - Oral    Sent to pharmacy as: lisinopril (PRINIVIL,ZESTRIL) 20 MG tablet    E-Prescribing Status: Receipt confirmed by pharmacy (10/28/2019 10:45 AM CDT)

## 2019-10-31 RX ORDER — LISINOPRIL 20 MG/1
TABLET ORAL
Qty: 90 TABLET | Refills: 0 | OUTPATIENT
Start: 2019-10-31

## 2019-11-29 DIAGNOSIS — I10 ESSENTIAL HYPERTENSION: Primary | Chronic | ICD-10-CM

## 2019-12-02 RX ORDER — POTASSIUM CHLORIDE 20 MEQ/1
TABLET, EXTENDED RELEASE ORAL
Qty: 180 TABLET | Refills: 0 | Status: SHIPPED | OUTPATIENT
Start: 2019-12-02 | End: 2020-03-25

## 2019-12-02 NOTE — PROGRESS NOTES
Refill Authorization Note     is requesting a refill authorization.    Brief assessment and rationale for refill: APPROVE: prr          Medication Therapy Plan: approve 3 more    Medication reconciliation completed: No     Comments:   Requested Prescriptions   Pending Prescriptions Disp Refills    potassium chloride SA (K-DUR,KLOR-CON) 20 MEQ tablet [Pharmacy Med Name: POTASSIUM CL 20MEQ ER TABLETS] 180 tablet 0     Sig: TAKE 1 TABLET(20 MEQ) BY MOUTH TWICE DAILY       Endocrinology:  Minerals - Potassium Supplementation Passed - 11/29/2019  5:03 AM        Passed - Patient is at least 18 years old        Passed - Office visit in past 12 months or future 90 days     Recent Outpatient Visits            1 month ago Type 2 diabetes mellitus with diabetic polyneuropathy, without long-term current use of insulin    [facility] Jose Miller Jr., MD    2 months ago Plantar fasciitis of right foot    [place] [facility] Edson Martinez DPM    2 months ago Plantar fasciitis of right foot    [place] [facility] Edson Martinez DPM    6 months ago BMI 50.0-59.9, adult    [place] [facility] Edson Martinez DPM    6 months ago Dyslipidemia    [place] [facility] Mau Mendoza MD          Future Appointments              In 1 week [facility]ds[facility] D[facility]    In 2 months [facility], [place] [facility] [place]    In 2 months Jose Miller Jr., MD [facility]    In 4 months [facility] [place]    In 4 months [facility], [place] [facility] [place]    In 4 months Donovan Amador DO [facility]    In 5 months [facility], [place] [facility] [place]    In 6 months Mau Mendoza MD [facility]                Passed - K in normal range and within 180 days     Potassium   Date Value Ref Range Status    08/20/2019 4.5 3.5 - 5.1 mmol/L Final   02/07/2019 3.9 3.5 - 5.1 mmol/L Final   03/28/2018 3.9 3.5 - 5.1 mmol/L Final              Passed - Cr is 1.4 or below and within 180 days     Creatinine   Date Value Ref Range Status   08/20/2019 0.7 0.5 - 1.4 mg/dL Final   02/07/2019 0.7 0.5 - 1.4 mg/dL Final   03/28/2018 0.7 0.5 - 1.4 mg/dL Final              Passed - eGFR within 180 days     eGFR if non    Date Value Ref Range Status   08/20/2019 >60.0 >60 mL/min/1.73 m^2 Final     Comment:     Calculation used to obtain the estimated glomerular filtration  rate (eGFR) is the CKD-EPI equation.      02/07/2019 >60.0 >60 mL/min/1.73 m^2 Final     Comment:     Calculation used to obtain the estimated glomerular filtration  rate (eGFR) is the CKD-EPI equation.      03/28/2018 >60.0 >60 mL/min/1.73 m^2 Final     Comment:     Calculation used to obtain the estimated glomerular filtration  rate (eGFR) is the CKD-EPI equation.                 Appointments past 12m or future 3m    Date Provider   Last Visit   10/28/2019 Jose Miller Jr., MD   Next Visit   2/28/2020 Jose Miller Jr., MD

## 2019-12-09 ENCOUNTER — OFFICE VISIT (OUTPATIENT)
Dept: PODIATRY | Facility: CLINIC | Age: 54
End: 2019-12-09
Payer: MEDICARE

## 2019-12-09 VITALS — HEIGHT: 66 IN | WEIGHT: 293 LBS | BODY MASS INDEX: 47.09 KG/M2

## 2019-12-09 DIAGNOSIS — B35.1 ONYCHOMYCOSIS DUE TO DERMATOPHYTE: ICD-10-CM

## 2019-12-09 DIAGNOSIS — E11.42 TYPE 2 DIABETES MELLITUS WITH DIABETIC POLYNEUROPATHY, WITHOUT LONG-TERM CURRENT USE OF INSULIN: Primary | ICD-10-CM

## 2019-12-09 DIAGNOSIS — E11.51 TYPE II DIABETES MELLITUS WITH PERIPHERAL CIRCULATORY DISORDER: ICD-10-CM

## 2019-12-09 PROCEDURE — 11721 DEBRIDE NAIL 6 OR MORE: CPT | Mod: Q9,S$GLB,, | Performed by: PODIATRIST

## 2019-12-09 PROCEDURE — 99499 UNLISTED E&M SERVICE: CPT | Mod: S$GLB,,, | Performed by: PODIATRIST

## 2019-12-09 PROCEDURE — 99499 RISK ADDL DX/OHS AUDIT: ICD-10-PCS | Mod: S$GLB,,, | Performed by: PODIATRIST

## 2019-12-09 PROCEDURE — 11721 PR DEBRIDEMENT OF NAILS, 6 OR MORE: ICD-10-PCS | Mod: Q9,S$GLB,, | Performed by: PODIATRIST

## 2019-12-09 PROCEDURE — 99999 PR PBB SHADOW E&M-EST. PATIENT-LVL III: CPT | Mod: PBBFAC,,, | Performed by: PODIATRIST

## 2019-12-09 PROCEDURE — 99999 PR PBB SHADOW E&M-EST. PATIENT-LVL III: ICD-10-PCS | Mod: PBBFAC,,, | Performed by: PODIATRIST

## 2019-12-09 NOTE — PROGRESS NOTES
Subjective:      Patient ID: Amena Anderson is a 54 y.o. female.    Chief Complaint: Follow-up (3 mo ck); Routine Foot Care; and Diabetes Mellitus (PCP:  Dr Miller 10/28/19; HgbA1c: 8/20/19  6.1)  Patient returns for follow up  thick and discolored nails for high risk foot care. She is high risk secondary to the diabetes with PVD. Wearing her new diabetic shoes and inserts and doing better    PCP: Dr. Miller  Last seen: 10/28/19    Review of Systems   Constitution: Negative for chills, diaphoresis, fever, malaise/fatigue and night sweats.   Cardiovascular: Positive for leg swelling. Negative for claudication, cyanosis and syncope.   Skin: Negative for color change, dry skin, nail changes, rash, suspicious lesions and unusual hair distribution.   Musculoskeletal: Positive for joint pain. Negative for falls, joint swelling, muscle cramps, muscle weakness and stiffness.   Gastrointestinal: Negative for constipation, diarrhea, nausea and vomiting.   Neurological: Positive for paresthesias. Negative for brief paralysis, disturbances in coordination, focal weakness, numbness, sensory change and tremors.           Objective:      Physical Exam   Constitutional: She is oriented to person, place, and time. She appears well-developed and well-nourished. She is cooperative. No distress.   Cardiovascular:   Pulses:       Dorsalis pedis pulses are 2+ on the right side, and 2+ on the left side.        Posterior tibial pulses are 2+ on the right side, and 2+ on the left side.   Capillary refill 3 seconds all toes/distal feet, all toes/both feet warm to touch.      Positive lower extremity edema bilateral.     Musculoskeletal:        Right ankle: Normal. She exhibits normal range of motion, no swelling, no ecchymosis, no deformity, no laceration and normal pulse. Achilles tendon normal. Achilles tendon exhibits no pain, no defect and normal De La Cruz's test results.   Improving pain to palpation lateral left ankle at cfl and atfl  without instability deformity or loss of function. Also has pain along the peroneal tendons to the brevis insertion.    Ankle dorsiflexion decreased at <10 degrees bilateral with moderate increase with knee flexion bilateral. There is tenderness to palpation bilateral achilles attachments on the calcaneus.    Pain on palpation plantar medial and central heel right foot. No pain with ROM or MMT. No pain with medial and lateral compression of heel.     Feet:   Right Foot:   Protective Sensation: 10 sites tested. 9 sites sensed.   Left Foot:   Protective Sensation: 10 sites tested. 9 sites sensed.   Lymphadenopathy:   Negative lymphadenopathy bilateral popliteal fossa and tarsal tunnel.   Neurological: She is alert and oriented to person, place, and time. She has normal strength. She displays no atrophy and no tremor. A sensory deficit is present. She exhibits normal muscle tone. She displays no seizure activity. Gait normal.   Negative tinel sign to percussion sural, superficial peroneal, deep peroneal, saphenous, and posterior tibial nerves right and left ankles and feet. Decreased vibratory sensation bilateral     Skin: Skin is warm, dry and intact. No abrasion, no bruising, no burn, no ecchymosis, no laceration, no petechiae and no rash noted. She is not diaphoretic. No cyanosis or erythema. No pallor. Nails show no clubbing.     No ulceration, hyperpigmentation, discoloration, masses nodules or cords palpated.  No ecchymosis, erythema, edema, or cardinal signs of infection bilateral lower extremities.    Skin is thin and atrophic bilateral    Nails 1-5 bilateral are thick 3-4 mm, long 3-6 mm, and discolored with subungual debris           Psychiatric: She has a normal mood and affect. Her behavior is normal.             Assessment:       Encounter Diagnoses   Name Primary?    Type 2 diabetes mellitus with diabetic polyneuropathy, without long-term current use of insulin Yes    Type II diabetes mellitus with  peripheral circulatory disorder     Onychomycosis due to dermatophyte          Plan:       Amena was seen today for follow-up, routine foot care and diabetes mellitus.    Diagnoses and all orders for this visit:    Type 2 diabetes mellitus with diabetic polyneuropathy, without long-term current use of insulin    Type II diabetes mellitus with peripheral circulatory disorder    Onychomycosis due to dermatophyte      I counseled the patient on her conditions, their implications and medical management.    Shoe inspection. Diabetic Foot Education. Patient reminded of the importance of good nutrition and blood sugar control to help prevent podiatric complications of diabetes. Patient instructed on proper foot hygeine. We discussed wearing proper shoe gear, daily foot inspections, never walking without protective shoe gear, never putting sharp instruments to feet  Discussed importance of supportive shoes with accommodative toe box to reduce pressure and irritation to forefoot.     With patient's verbal consent, nails were aggressively reduced and debrided x 10 to their soft tissue attachment mechanically removing all offending nail and debris. Patient relates relief following the procedure. No anesthesia or hemostasis required. No blood loss.       Follow up in 3 months routine care.    Edson Martinez DPM

## 2019-12-10 DIAGNOSIS — E11.9 TYPE 2 DIABETES MELLITUS WITHOUT COMPLICATION, WITHOUT LONG-TERM CURRENT USE OF INSULIN: ICD-10-CM

## 2019-12-11 NOTE — PROGRESS NOTES
Refill Authorization Note     is requesting a refill authorization.    Brief assessment and rationale for refill: APPROVE: prr  Name and strength of medication: metFORMIN (GLUCOPHAGE) 1000 MG tablet            Medication reconciliation completed: No              How patient will take medication: t1t po qpm          Comments:   Requested Prescriptions   Pending Prescriptions Disp Refills    metFORMIN (GLUCOPHAGE) 1000 MG tablet [Pharmacy Med Name: METFORMIN 1000MG TABLETS] 90 tablet 0     Sig: TAKE 1 TABLET(1000 MG) BY MOUTH EVERY EVENING       Endocrinology:  Diabetes - Biguanides Passed - 12/11/2019  5:33 PM        Passed - Patient is at least 18 years old        Passed - Office visit in past 12 months or future 90 days     Recent Outpatient Visits            2 days ago Type 2 diabetes mellitus with diabetic polyneuropathy, without long-term current use of insulin    [place]  Podjanel Martinez DPM    1 month ago Type 2 diabetes mellitus with diabetic polyneuropathy, without long-term current use of insulin    [facility] Jose Miller Jr., MD    2 months ago Plantar fasciitis of right foot    [place] - Podjanel Martinez DPM    3 months ago Plantar fasciitis of right foot    [place] - Podiatrvalentin Martinez DPM    6 months ago BMI 50.0-59.9, adult    [place] [facility] Edson Martinez DPM          Future Appointments              In 2 months [facility], [place] [facility], [place]    In 2 months Jose Miller Jr., MD [facility]    In 2 months Edson Martinez DPM [facility]    In 4 months [facility] [place]    In 4 months [facility], [place] [facility] [place]    In 4 months Donovan Amador DO [facility]    In 5 months [facility], [place] [facility] [place]    In 5 months aMu Mendoza MD  [place] [facility]                Passed - Cr is 1.4 or below and within 360 days     Creatinine   Date Value Ref Range Status   08/20/2019 0.7 0.5 - 1.4 mg/dL Final   02/07/2019 0.7 0.5 - 1.4 mg/dL Final   03/28/2018 0.7 0.5 - 1.4 mg/dL Final              Passed - HBA1C is 7.9 or below and within 180 days     Hemoglobin A1C   Date Value Ref Range Status   08/20/2019 6.1 (H) 4.0 - 5.6 % Final     Comment:     ADA Screening Guidelines:  5.7-6.4%  Consistent with prediabetes  >or=6.5%  Consistent with diabetes  High levels of fetal hemoglobin interfere with the HbA1C  assay. Heterozygous hemoglobin variants (HbS, HgC, etc)do  not significantly interfere with this assay.   However, presence of multiple variants may affect accuracy.     02/07/2019 5.8 (H) 4.0 - 5.6 % Final     Comment:     ADA Screening Guidelines:  5.7-6.4%  Consistent with prediabetes  >or=6.5%  Consistent with diabetes  High levels of fetal hemoglobin interfere with the HbA1C  assay. Heterozygous hemoglobin variants (HbS, HgC, etc)do  not significantly interfere with this assay.   However, presence of multiple variants may affect accuracy.     10/03/2018 5.6 4.0 - 5.6 % Final     Comment:     ADA Screening Guidelines:  5.7-6.4%  Consistent with prediabetes  >or=6.5%  Consistent with diabetes  High levels of fetal hemoglobin interfere with the HbA1C  assay. Heterozygous hemoglobin variants (HbS, HgC, etc)do  not significantly interfere with this assay.   However, presence of multiple variants may affect accuracy.                Passed - eGFR is 30 or above and within 360 days     eGFR if non    Date Value Ref Range Status   08/20/2019 >60.0 >60 mL/min/1.73 m^2 Final     Comment:     Calculation used to obtain the estimated glomerular filtration  rate (eGFR) is the CKD-EPI equation.      02/07/2019 >60.0 >60 mL/min/1.73 m^2 Final     Comment:     Calculation used to obtain the estimated glomerular filtration  rate (eGFR)  is the CKD-EPI equation.      03/28/2018 >60.0 >60 mL/min/1.73 m^2 Final     Comment:     Calculation used to obtain the estimated glomerular filtration  rate (eGFR) is the CKD-EPI equation.                   Appointments  past 15m or future 3m with PCP    Date Provider   Last Visit   10/28/2019 Jose Miller Jr., MD   Next Visit   2/28/2020 Jose Miller Jr., MD

## 2019-12-12 RX ORDER — METFORMIN HYDROCHLORIDE 1000 MG/1
TABLET ORAL
Qty: 90 TABLET | Refills: 0 | Status: SHIPPED | OUTPATIENT
Start: 2019-12-12 | End: 2019-12-29

## 2019-12-29 DIAGNOSIS — E11.9 TYPE 2 DIABETES MELLITUS WITHOUT COMPLICATION, WITHOUT LONG-TERM CURRENT USE OF INSULIN: ICD-10-CM

## 2019-12-29 DIAGNOSIS — G89.4 CHRONIC PAIN SYNDROME: Chronic | ICD-10-CM

## 2019-12-29 RX ORDER — AMITRIPTYLINE HYDROCHLORIDE 25 MG/1
TABLET, FILM COATED ORAL
Qty: 90 TABLET | Refills: 1 | Status: SHIPPED | OUTPATIENT
Start: 2019-12-29 | End: 2020-06-03

## 2019-12-29 RX ORDER — METFORMIN HYDROCHLORIDE 1000 MG/1
TABLET ORAL
Qty: 90 TABLET | Refills: 3 | Status: SHIPPED | OUTPATIENT
Start: 2019-12-29 | End: 2020-03-25

## 2020-01-30 DIAGNOSIS — I10 ESSENTIAL HYPERTENSION: Chronic | ICD-10-CM

## 2020-01-30 DIAGNOSIS — K21.9 GASTROESOPHAGEAL REFLUX DISEASE WITHOUT ESOPHAGITIS: ICD-10-CM

## 2020-01-31 RX ORDER — AMLODIPINE BESYLATE 10 MG/1
TABLET ORAL
Qty: 90 TABLET | Refills: 2 | Status: SHIPPED | OUTPATIENT
Start: 2020-01-31 | End: 2020-09-30

## 2020-01-31 RX ORDER — OMEPRAZOLE 20 MG/1
CAPSULE, DELAYED RELEASE ORAL
Qty: 180 CAPSULE | Refills: 0 | Status: SHIPPED | OUTPATIENT
Start: 2020-01-31 | End: 2020-03-25

## 2020-01-31 RX ORDER — FUROSEMIDE 40 MG/1
TABLET ORAL
Qty: 90 TABLET | Refills: 0 | Status: SHIPPED | OUTPATIENT
Start: 2020-01-31 | End: 2020-04-22

## 2020-01-31 NOTE — PROGRESS NOTES
Refill Authorization Note     is requesting a refill authorization.    Brief assessment and rationale for refill: REVIEW: amlodipine - new start // APPROVE: furosemide and omeprazole -prr                                         Comments:   Requested Prescriptions   Pending Prescriptions Disp Refills    omeprazole (PRILOSEC) 20 MG capsule [Pharmacy Med Name: OMEPRAZOLE 20MG CAPSULES] 180 capsule 0     Sig: TAKE 1 CAPSULE BY MOUTH TWICE DAILY       Gastroenterology: Proton Pump Inhibitors Passed - 1/30/2020  7:24 AM        Passed - Patient is at least 18 years old        Passed - Osteoporosis is not on problem list        Passed - Plavix is not on active medication list        Passed - An appropriate indication is on the problem list        Passed - Office visit in past 6 months or future 90 days.     Recent Outpatient Visits            1 month ago Type 2 diabetes mellitus with diabetic polyneuropathy, without long-term current use of insulin    [place] - Podjanel Martinez DPM    3 months ago Type 2 diabetes mellitus with diabetic polyneuropathy, without long-term current use of insulin    [facility] Jose Miller Jr., MD    4 months ago Plantar fasciitis of right foot    [place] - Podiatrvalentin Martinez DPM    4 months ago Plantar fasciitis of right foot    [place] - Podjanel Martinez DPM    8 months ago BMI 50.0-59.9, adult    [place] - Podiatrvalentin Martinez DPM          Future Appointments              In 3 weeks [facility], [place] [facility] [place]    In 4 weeks Jose Miller Jr., MD [facility]    In 1 month [facility] [place] [facility]    In 2 months [facility] [place]    In 2 months [facility], [place] [facility] [place]    In 2 months Donovan [facility]    In 3 months [facility] [place]    In 4 months [facility] [place] [facility]               amLODIPine (NORVASC) 10 MG tablet [Pharmacy Med Name: AMLODIPINE BESYLATE 10MG TABLETS] 90 tablet 0     Sig: TAKE 1 TABLET(10 MG) BY MOUTH EVERY DAY       Cardiovascular:  Calcium Channel Blockers Passed - 1/30/2020  7:24 AM        Passed - Patient is at least 18 years old        Passed - Last BP in normal range within 360 days     BP Readings from Last 3 Encounters:   10/28/19 130/84   10/02/19 (!) 148/87   09/09/19 (!) 147/90              Passed - Office visit in past 12 months or future 90 days     Recent Outpatient Visits            1 month ago Type 2 diabetes mellitus with diabetic polyneuropathy, without long-term current use of insulin    [place] - Podiatrvalentin Martinez DPM    3 months ago Type 2 diabetes mellitus with diabetic polyneuropathy, without long-term current use of insulin    [facility] Jose Miller Jr., MD    4 months ago Plantar fasciitis of right foot    [place] - Podiatrvalentin Martinez DPM    4 months ago Plantar fasciitis of right foot    [place] - Podiatrvalentin Martinez DPM    8 months ago BMI 50.0-59.9, adult    [place] [facility] Edson Martinez DPM          Future Appointments              In 3 weeks [facility], [place] [facility] [place]    In 4 weeks Jose Miller Jr., MD [facility]    In 1 month [facility] Martinez DPM [facility]    In 2 months [facility] [place]    In 2 months [facility], [place] [facility] [place]    In 2 months Donovan [facility]ador [facility]    In 3 months [facility], [place] [facility] [place]    In 4 months [facility]endoza [facility] [place] [facility]               furosemide (LASIX) 40 MG tablet [Pharmacy Med Name: FUROSEMIDE 40MG  TABLETS] 90 tablet 0     Sig: TAKE 1 TABLET(40 MG) BY MOUTH EVERY DAY       Cardiovascular:  Diuretics - Loop Passed - 1/30/2020  7:24 AM        Passed - Patient is at least 18 years old        Passed - Last BP in normal range within 360 days     BP Readings from Last 3 Encounters:   10/28/19 130/84   10/02/19 (!) 148/87   09/09/19 (!) 147/90              Passed - Office visit in past 12 months or future 90 days     Recent Outpatient Visits            1 month ago Type 2 diabetes mellitus with diabetic polyneuropathy, without long-term current use of insulin    [place]  Podjanel Martinez DPM    3 months ago Type 2 diabetes mellitus with diabetic polyneuropathy, without long-term current use of insulin    [facility] Jose Miller Jr., MD    4 months ago Plantar fasciitis of right foot    [place]  Podjanel Martinez DPM    4 months ago Plantar fasciitis of right foot    [place]  Podjanel Martinez DPM    8 months ago BMI 50.0-59.9, adult    [place] [facility] Edson Martinez DPM          Future Appointments              In 3 weeks [facility], [place] [facility] [place]    In 4 weeks Jose Miller Jr., MD [facility]    In 1 month [facility]ds[facility] DPM [facility]    In 2 months [facility] [place]    In 2 months [place], [place] [facility] [place]    In 2 months [facility] [place] [facility]    In 3 months [facility], [place] [facility] [place]    In 4 months [facility]endoza [facility] [place] [facility]                Passed - K in normal range and within 180 days     Potassium   Date Value Ref Range Status   08/20/2019 4.5 3.5 - 5.1 mmol/L Final   02/07/2019 3.9 3.5 - 5.1 mmol/L Final   03/28/2018 3.9 3.5 - 5.1 mmol/L Final              Passed - Na in normal range and within 180 days      Sodium   Date Value Ref Range Status   08/20/2019 141 136 - 145 mmol/L Final   02/07/2019 142 136 - 145 mmol/L Final   03/28/2018 143 136 - 145 mmol/L Final              Passed - Cr is 1.4 or below and within 180 days     Creatinine   Date Value Ref Range Status   08/20/2019 0.7 0.5 - 1.4 mg/dL Final   02/07/2019 0.7 0.5 - 1.4 mg/dL Final   03/28/2018 0.7 0.5 - 1.4 mg/dL Final              Passed - eGFR in normal range and within 180 days     eGFR if non    Date Value Ref Range Status   08/20/2019 >60.0 >60 mL/min/1.73 m^2 Final     Comment:     Calculation used to obtain the estimated glomerular filtration  rate (eGFR) is the CKD-EPI equation.      02/07/2019 >60.0 >60 mL/min/1.73 m^2 Final     Comment:     Calculation used to obtain the estimated glomerular filtration  rate (eGFR) is the CKD-EPI equation.      03/28/2018 >60.0 >60 mL/min/1.73 m^2 Final     Comment:     Calculation used to obtain the estimated glomerular filtration  rate (eGFR) is the CKD-EPI equation.        eGFR if    Date Value Ref Range Status   08/20/2019 >60.0 >60 mL/min/1.73 m^2 Final   02/07/2019 >60.0 >60 mL/min/1.73 m^2 Final   03/28/2018 >60.0 >60 mL/min/1.73 m^2 Final

## 2020-01-31 NOTE — TELEPHONE ENCOUNTER
I have reviewed and agree with the assessment below with changes. Amlodipine 10 mg started 11/18. Approve 9 more.

## 2020-02-21 ENCOUNTER — LAB VISIT (OUTPATIENT)
Dept: LAB | Facility: HOSPITAL | Age: 55
End: 2020-02-21
Attending: EMERGENCY MEDICINE
Payer: MEDICARE

## 2020-02-21 DIAGNOSIS — E11.42 TYPE 2 DIABETES MELLITUS WITH DIABETIC POLYNEUROPATHY, WITHOUT LONG-TERM CURRENT USE OF INSULIN: Chronic | ICD-10-CM

## 2020-02-21 LAB
ESTIMATED AVG GLUCOSE: 126 MG/DL (ref 68–131)
HBA1C MFR BLD HPLC: 6 % (ref 4–5.6)

## 2020-02-21 PROCEDURE — 36415 COLL VENOUS BLD VENIPUNCTURE: CPT | Mod: PO

## 2020-02-21 PROCEDURE — 83036 HEMOGLOBIN GLYCOSYLATED A1C: CPT

## 2020-02-28 ENCOUNTER — OFFICE VISIT (OUTPATIENT)
Dept: FAMILY MEDICINE | Facility: CLINIC | Age: 55
End: 2020-02-28
Payer: MEDICARE

## 2020-02-28 VITALS
HEART RATE: 91 BPM | BODY MASS INDEX: 47.09 KG/M2 | SYSTOLIC BLOOD PRESSURE: 137 MMHG | WEIGHT: 293 LBS | HEIGHT: 66 IN | OXYGEN SATURATION: 98 % | DIASTOLIC BLOOD PRESSURE: 70 MMHG

## 2020-02-28 DIAGNOSIS — Z12.31 ENCOUNTER FOR SCREENING MAMMOGRAM FOR BREAST CANCER: ICD-10-CM

## 2020-02-28 DIAGNOSIS — G47.33 OSA (OBSTRUCTIVE SLEEP APNEA): Chronic | ICD-10-CM

## 2020-02-28 DIAGNOSIS — G89.4 CHRONIC PAIN SYNDROME: Chronic | ICD-10-CM

## 2020-02-28 DIAGNOSIS — E03.4 HYPOTHYROIDISM DUE TO ACQUIRED ATROPHY OF THYROID: Chronic | ICD-10-CM

## 2020-02-28 DIAGNOSIS — E11.42 TYPE 2 DIABETES MELLITUS WITH DIABETIC POLYNEUROPATHY, WITHOUT LONG-TERM CURRENT USE OF INSULIN: Primary | Chronic | ICD-10-CM

## 2020-02-28 DIAGNOSIS — I10 ESSENTIAL HYPERTENSION: Chronic | ICD-10-CM

## 2020-02-28 DIAGNOSIS — E78.2 MIXED HYPERLIPIDEMIA: Chronic | ICD-10-CM

## 2020-02-28 DIAGNOSIS — Z11.4 ENCOUNTER FOR SCREENING FOR HIV: ICD-10-CM

## 2020-02-28 PROBLEM — B35.4 TINEA CORPORIS: Status: RESOLVED | Noted: 2017-02-13 | Resolved: 2020-02-28

## 2020-02-28 PROBLEM — E78.5 DYSLIPIDEMIA: Status: RESOLVED | Noted: 2019-02-19 | Resolved: 2020-02-28

## 2020-02-28 PROBLEM — R00.2 PALPITATIONS: Status: RESOLVED | Noted: 2019-02-19 | Resolved: 2020-02-28

## 2020-02-28 PROBLEM — E89.40 SURGICAL MENOPAUSE: Status: RESOLVED | Noted: 2017-02-13 | Resolved: 2020-02-28

## 2020-02-28 PROCEDURE — 3078F PR MOST RECENT DIASTOLIC BLOOD PRESSURE < 80 MM HG: ICD-10-PCS | Mod: CPTII,S$GLB,, | Performed by: EMERGENCY MEDICINE

## 2020-02-28 PROCEDURE — 3008F BODY MASS INDEX DOCD: CPT | Mod: CPTII,S$GLB,, | Performed by: EMERGENCY MEDICINE

## 2020-02-28 PROCEDURE — 3044F PR MOST RECENT HEMOGLOBIN A1C LEVEL <7.0%: ICD-10-PCS | Mod: CPTII,S$GLB,, | Performed by: EMERGENCY MEDICINE

## 2020-02-28 PROCEDURE — 3078F DIAST BP <80 MM HG: CPT | Mod: CPTII,S$GLB,, | Performed by: EMERGENCY MEDICINE

## 2020-02-28 PROCEDURE — 3075F SYST BP GE 130 - 139MM HG: CPT | Mod: CPTII,S$GLB,, | Performed by: EMERGENCY MEDICINE

## 2020-02-28 PROCEDURE — 99999 PR PBB SHADOW E&M-EST. PATIENT-LVL V: CPT | Mod: PBBFAC,,, | Performed by: EMERGENCY MEDICINE

## 2020-02-28 PROCEDURE — 99214 PR OFFICE/OUTPT VISIT, EST, LEVL IV, 30-39 MIN: ICD-10-PCS | Mod: S$GLB,,, | Performed by: EMERGENCY MEDICINE

## 2020-02-28 PROCEDURE — 3008F PR BODY MASS INDEX (BMI) DOCUMENTED: ICD-10-PCS | Mod: CPTII,S$GLB,, | Performed by: EMERGENCY MEDICINE

## 2020-02-28 PROCEDURE — 3044F HG A1C LEVEL LT 7.0%: CPT | Mod: CPTII,S$GLB,, | Performed by: EMERGENCY MEDICINE

## 2020-02-28 PROCEDURE — 99214 OFFICE O/P EST MOD 30 MIN: CPT | Mod: S$GLB,,, | Performed by: EMERGENCY MEDICINE

## 2020-02-28 PROCEDURE — 3075F PR MOST RECENT SYSTOLIC BLOOD PRESS GE 130-139MM HG: ICD-10-PCS | Mod: CPTII,S$GLB,, | Performed by: EMERGENCY MEDICINE

## 2020-02-28 PROCEDURE — 99999 PR PBB SHADOW E&M-EST. PATIENT-LVL V: ICD-10-PCS | Mod: PBBFAC,,, | Performed by: EMERGENCY MEDICINE

## 2020-02-28 NOTE — PROGRESS NOTES
Subjective:   THIS NOTE IS DONE WITH VOICE RECOGNITION        Patient ID: Amena Anderson is a 54 y.o. female.    Chief Complaint: Follow-up (4 month discuss recent blood work )      HPI     She is continuing to loose weight, slow, but consistent.  She is taking her diet seriously.  She has increased knowledge around what's carbohydrate and was not.  She is very motivated to lose weight, is I did link her diabetes, her back pain, in her blood pressure all to her existing weight problem.    Her diabetes is controlled.  No issues with her medications.  She compliant with her medications.    Lab Results   Component Value Date    HGBA1C 6.0 (H) 02/21/2020    HGBA1C 6.1 (H) 08/20/2019    HGBA1C 5.8 (H) 02/07/2019     Lab Results   Component Value Date    LDLCALC 93.0 08/20/2019    LDLCALC 80.2 03/28/2018    LDLCALC 75.4 07/10/2017     Lab Results   Component Value Date    CREATININE 0.7 08/20/2019    CREATININE 0.7 02/07/2019    CREATININE 0.7 03/28/2018     Lab Results   Component Value Date    ESTGFRAFRICA >60.0 08/20/2019    ESTGFRAFRICA >60.0 02/07/2019    ESTGFRAFRICA >60.0 03/28/2018     She will be seeing podiatry next month.  She refuses to allow me to exam her feet.  She is embarrassed because she has cracked/fissure feet.  She is following the podiatric prescriptions.    Here to follow up on blood pressure.  Taking current medications.  She is not experiencing chest pain.  She has not recognized any rhythm related issues.    BP Readings from Last 3 Encounters:   02/28/20 137/70   10/28/19 130/84   10/02/19 (!) 148/87     She is taking her thyroid medication regularly.    Lab Results   Component Value Date    TSH 0.883 09/03/2019     She is having minimal gastroesophageal reflux.  She uses occasional ranitidine.  No suspicious symptoms.    Immunization History   Administered Date(s) Administered    Influenza - Quadrivalent - PF (6 months and older) 11/27/2017, 10/11/2018, 10/28/2019    Pneumococcal Conjugate -  13 Valent 05/22/2017    Pneumococcal Polysaccharide - 23 Valent 10/11/2018     Recombinant shingles vaccine encouraged.    Current Outpatient Medications   Medication Sig Dispense Refill    amitriptyline (ELAVIL) 25 MG tablet TAKE 1 TABLET BY MOUTH EVERY EVENING 90 tablet 1    amLODIPine (NORVASC) 10 MG tablet TAKE 1 TABLET(10 MG) BY MOUTH EVERY DAY 90 tablet 2    aspirin (ECOTRIN) 81 MG EC tablet Take 81 mg by mouth once daily.      atorvastatin (LIPITOR) 20 MG tablet TAKE 1 TABLET(20 MG) BY MOUTH EVERY DAY 90 tablet 3    blood sugar diagnostic Strp To check BG 2 times daily, to use with insurance preferred meter DX E11.42 100 strip 11    blood-glucose meter kit To check BG 2 times daily, to use with insurance preferred meter 100 each 11    ciclopirox (LOPROX) 0.77 % Crea Apply topically 2 (two) times daily. 90 g 3    diclofenac sodium 1 % Gel Apply 2 g topically 4 (four) times daily. 100 g 2    dicyclomine (BENTYL) 10 MG capsule Take 1 capsule (10 mg total) by mouth 4 (four) times daily before meals and nightly. 90 capsule 1    docusate sodium (COLACE) 50 MG capsule Take 100 mg by mouth 2 (two) times daily.      estradiol (ESTRACE) 1 MG tablet TAKE 1 TABLET BY MOUTH EVERY DAY 90 tablet 3    furosemide (LASIX) 40 MG tablet TAKE 1 TABLET(40 MG) BY MOUTH EVERY DAY 90 tablet 0    gabapentin (NEURONTIN) 400 MG capsule Take 400 mg by mouth 3 (three) times daily.      hydrocodone-acetaminophen 10-325mg (NORCO)  mg Tab Take 10 tablets by mouth 3 (three) times daily.      lancets Misc To check BG 2 times daily, to use with insurance preferred meter 60 each 11    levothyroxine (SYNTHROID) 150 MCG tablet Take 1 tablet (150 mcg total) by mouth once daily. 30 tablet 11    lisinopril (PRINIVIL,ZESTRIL) 20 MG tablet Take 1 tablet (20 mg total) by mouth once daily. 90 tablet 3    metFORMIN (GLUCOPHAGE) 1000 MG tablet TAKE 1 TABLET(1000 MG) BY MOUTH EVERY EVENING 90 tablet 3    naproxen (EC NAPROSYN) 500  MG EC tablet Take 500 mg by mouth 2 (two) times daily.      nitroGLYCERIN (NITROSTAT) 0.4 MG SL tablet Place 0.4 mg under the tongue every 5 (five) minutes as needed for Chest pain.      nystatin (NYSTOP) powder APPLY AFFECTED AREA THREE TIMES DAILY 60 g 3    omeprazole (PRILOSEC) 20 MG capsule TAKE 1 CAPSULE BY MOUTH TWICE DAILY 180 capsule 0    potassium chloride SA (K-DUR,KLOR-CON) 20 MEQ tablet TAKE 1 TABLET(20 MEQ) BY MOUTH TWICE DAILY 180 tablet 0    ranitidine (ZANTAC) 300 MG tablet TAKE 1 TABLET BY MOUTH EVERY EVENING, ABOUT 30 TO 60 MINUTES BEFORE BEDTIME 90 tablet 4     No current facility-administered medications for this visit.          Review of Systems   Constitutional: Negative for activity change, appetite change, chills, diaphoresis, fatigue, fever and unexpected weight change.   HENT: Negative for congestion, ear pain, hearing loss, rhinorrhea, trouble swallowing and voice change.    Eyes: Negative for pain and visual disturbance.   Respiratory: Negative for cough, chest tightness and shortness of breath.    Cardiovascular: Negative for chest pain, palpitations and leg swelling.   Gastrointestinal: Negative for abdominal distention, abdominal pain and blood in stool.   Genitourinary: Negative for difficulty urinating, flank pain, frequency and urgency.   Musculoskeletal: Positive for back pain. Negative for arthralgias, joint swelling, myalgias, neck pain and neck stiffness.   Skin: Negative for pallor and rash.        Cracks on her feet.   Neurological: Negative for dizziness, tremors, syncope, weakness and headaches.   Hematological: Negative for adenopathy.   Psychiatric/Behavioral: Negative for dysphoric mood and sleep disturbance. The patient is not nervous/anxious.        Objective:      Physical Exam   Constitutional: She is oriented to person, place, and time. She appears well-developed and well-nourished. No distress.   HENT:   Head: Normocephalic and atraumatic.   Nose: Nose normal.    Mouth/Throat: Oropharynx is clear and moist.   Eyes: Pupils are equal, round, and reactive to light. Conjunctivae and EOM are normal. No scleral icterus.   Neck: Normal range of motion. Neck supple. No JVD present. No thyromegaly present.   Cardiovascular: Normal rate, regular rhythm, normal heart sounds and intact distal pulses.  Occasional extrasystoles are present.   No murmur heard.  Pulses:       Carotid pulses are 2+ on the right side, and 2+ on the left side.       Radial pulses are 2+ on the right side, and 2+ on the left side.        Femoral pulses are 2+ on the right side, and 2+ on the left side.       Popliteal pulses are 2+ on the right side, and 2+ on the left side.        Dorsalis pedis pulses are 2+ on the right side, and 2+ on the left side.        Posterior tibial pulses are 2+ on the right side, and 2+ on the left side.   Pulmonary/Chest: Effort normal and breath sounds normal. She has no wheezes. She has no rales.   Abdominal: Soft. Bowel sounds are normal. She exhibits no distension. There is no tenderness.   Musculoskeletal: Normal range of motion. She exhibits no edema or tenderness.   Lymphadenopathy:     She has no cervical adenopathy.   Neurological: She is alert and oriented to person, place, and time. She has normal reflexes. No cranial nerve deficit. Coordination normal.   Skin: Skin is warm and dry. Capillary refill takes less than 2 seconds. No rash noted. No erythema.   Psychiatric: She has a normal mood and affect. Her behavior is normal.   Vitals reviewed.      Assessment:       1. Type 2 diabetes mellitus with diabetic polyneuropathy, without long-term current use of insulin    2. Essential hypertension    3. MANUEL (obstructive sleep apnea)    4. Chronic pain syndrome    5. BMI 50.0-59.9, adult    6. Hypothyroidism due to acquired atrophy of thyroid    7. Mixed hyperlipidemia    8. Encounter for screening for HIV        Plan:       1. Type 2 diabetes mellitus with diabetic  polyneuropathy, without long-term current use of insulin  Insert diabetes control    - Comprehensive metabolic panel; Future  - Hemoglobin A1c; Future    2. Essential hypertension  Controlled.  Continue current medications  Continue to avoid excessive sodium  Continue home monitoring  Target blood pressure is 139/89 or less  - Comprehensive metabolic panel; Future    3. MANUEL (obstructive sleep apnea)  Continue CPAP  Continue weight loss    4. Chronic pain syndrome  She is followed by pain management  She will continue met therapies through them.    5. BMI 50.0-59.9, adult  Discussed  She is motivated to continue to lose weight.    6. Hypothyroidism due to acquired atrophy of thyroid  Stable  Controlled  No change in medicines    - TSH; Future    7. Mixed hyperlipidemia  Lipids controlled  Continue current medications  Annual lipid profile and comprehensive metabolic panel  If complications developed such as myalgia or arthralgia, contact me.  - Lipid panel; Future    8. Encounter for screening for HIV  Routine screening  Discussed with patient    - HIV 1/2 Ag/Ab (4th Gen); Future

## 2020-02-28 NOTE — PATIENT INSTRUCTIONS
Amena,    Your diabetes is doing great.  Your weight loss is helping EVERYTHING.    We do need to do more blood work in four months.    No new medications.    Your mammogram is due in May.    We need to see you in four months.  Please get your blood work done before that visit.    Respectfully,      Jose Miller MD

## 2020-03-08 ENCOUNTER — PATIENT OUTREACH (OUTPATIENT)
Dept: ADMINISTRATIVE | Facility: OTHER | Age: 55
End: 2020-03-08

## 2020-03-09 ENCOUNTER — OFFICE VISIT (OUTPATIENT)
Dept: PODIATRY | Facility: CLINIC | Age: 55
End: 2020-03-09
Payer: MEDICARE

## 2020-03-09 VITALS
WEIGHT: 293 LBS | BODY MASS INDEX: 47.09 KG/M2 | SYSTOLIC BLOOD PRESSURE: 145 MMHG | DIASTOLIC BLOOD PRESSURE: 89 MMHG | HEART RATE: 91 BPM | HEIGHT: 66 IN

## 2020-03-09 DIAGNOSIS — E11.42 TYPE 2 DIABETES MELLITUS WITH DIABETIC POLYNEUROPATHY, WITHOUT LONG-TERM CURRENT USE OF INSULIN: Primary | ICD-10-CM

## 2020-03-09 DIAGNOSIS — B35.3 TINEA PEDIS OF LEFT FOOT: ICD-10-CM

## 2020-03-09 DIAGNOSIS — E11.51 TYPE II DIABETES MELLITUS WITH PERIPHERAL CIRCULATORY DISORDER: ICD-10-CM

## 2020-03-09 DIAGNOSIS — B35.1 ONYCHOMYCOSIS DUE TO DERMATOPHYTE: ICD-10-CM

## 2020-03-09 PROCEDURE — 99499 UNLISTED E&M SERVICE: CPT | Mod: S$GLB,,, | Performed by: PODIATRIST

## 2020-03-09 PROCEDURE — 3077F PR MOST RECENT SYSTOLIC BLOOD PRESSURE >= 140 MM HG: ICD-10-PCS | Mod: CPTII,S$GLB,, | Performed by: PODIATRIST

## 2020-03-09 PROCEDURE — 99999 PR PBB SHADOW E&M-EST. PATIENT-LVL III: CPT | Mod: PBBFAC,,, | Performed by: PODIATRIST

## 2020-03-09 PROCEDURE — 3044F HG A1C LEVEL LT 7.0%: CPT | Mod: CPTII,S$GLB,, | Performed by: PODIATRIST

## 2020-03-09 PROCEDURE — 11721 PR DEBRIDEMENT OF NAILS, 6 OR MORE: ICD-10-PCS | Mod: Q9,S$GLB,, | Performed by: PODIATRIST

## 2020-03-09 PROCEDURE — 3008F PR BODY MASS INDEX (BMI) DOCUMENTED: ICD-10-PCS | Mod: CPTII,S$GLB,, | Performed by: PODIATRIST

## 2020-03-09 PROCEDURE — 3044F PR MOST RECENT HEMOGLOBIN A1C LEVEL <7.0%: ICD-10-PCS | Mod: CPTII,S$GLB,, | Performed by: PODIATRIST

## 2020-03-09 PROCEDURE — 99213 OFFICE O/P EST LOW 20 MIN: CPT | Mod: 25,S$GLB,, | Performed by: PODIATRIST

## 2020-03-09 PROCEDURE — 3008F BODY MASS INDEX DOCD: CPT | Mod: CPTII,S$GLB,, | Performed by: PODIATRIST

## 2020-03-09 PROCEDURE — 3079F DIAST BP 80-89 MM HG: CPT | Mod: CPTII,S$GLB,, | Performed by: PODIATRIST

## 2020-03-09 PROCEDURE — 99213 PR OFFICE/OUTPT VISIT, EST, LEVL III, 20-29 MIN: ICD-10-PCS | Mod: 25,S$GLB,, | Performed by: PODIATRIST

## 2020-03-09 PROCEDURE — 99999 PR PBB SHADOW E&M-EST. PATIENT-LVL III: ICD-10-PCS | Mod: PBBFAC,,, | Performed by: PODIATRIST

## 2020-03-09 PROCEDURE — 3079F PR MOST RECENT DIASTOLIC BLOOD PRESSURE 80-89 MM HG: ICD-10-PCS | Mod: CPTII,S$GLB,, | Performed by: PODIATRIST

## 2020-03-09 PROCEDURE — 11721 DEBRIDE NAIL 6 OR MORE: CPT | Mod: Q9,S$GLB,, | Performed by: PODIATRIST

## 2020-03-09 PROCEDURE — 99499 RISK ADDL DX/OHS AUDIT: ICD-10-PCS | Mod: S$GLB,,, | Performed by: PODIATRIST

## 2020-03-09 PROCEDURE — 3077F SYST BP >= 140 MM HG: CPT | Mod: CPTII,S$GLB,, | Performed by: PODIATRIST

## 2020-03-09 RX ORDER — CICLOPIROX OLAMINE 7.7 MG/G
CREAM TOPICAL 2 TIMES DAILY
Qty: 90 G | Refills: 3 | Status: SHIPPED | OUTPATIENT
Start: 2020-03-09 | End: 2020-11-12 | Stop reason: SDUPTHER

## 2020-03-09 NOTE — PROGRESS NOTES
Subjective:      Patient ID: Amena Anderson is a 54 y.o. female.    Chief Complaint: Routine Foot Care (3 month f/u, PCP--02/28/2020) and Diabetes Mellitus (A1c-6.0-02/21/2020)  Patient returns for follow up  thick and discolored nails for high risk foot care. She is high risk secondary to the diabetes with PVD. Wearing her new diabetic shoes and inserts and doing better. Also here for her annual diabetic foot check up    PCP: Dr. Miller  Last seen: 2/28/20    Review of Systems   Constitution: Negative for chills, diaphoresis, fever, malaise/fatigue and night sweats.   Cardiovascular: Positive for leg swelling. Negative for claudication, cyanosis and syncope.   Skin: Negative for color change, dry skin, nail changes, rash, suspicious lesions and unusual hair distribution.   Musculoskeletal: Positive for joint pain. Negative for falls, joint swelling, muscle cramps, muscle weakness and stiffness.   Gastrointestinal: Negative for constipation, diarrhea, nausea and vomiting.   Neurological: Positive for paresthesias. Negative for brief paralysis, disturbances in coordination, focal weakness, numbness, sensory change and tremors.           Objective:      Physical Exam   Constitutional: She is oriented to person, place, and time. She appears well-developed and well-nourished. She is cooperative. No distress.   Cardiovascular:   Pulses:       Dorsalis pedis pulses are 2+ on the right side, and 2+ on the left side.        Posterior tibial pulses are 2+ on the right side, and 2+ on the left side.   Capillary refill 3 seconds all toes/distal feet, all toes/both feet warm to touch.      Positive lower extremity edema bilateral.     Musculoskeletal:        Right ankle: Normal. She exhibits normal range of motion, no swelling, no ecchymosis, no deformity, no laceration and normal pulse. Achilles tendon normal. Achilles tendon exhibits no pain, no defect and normal De La Cruz's test results.   Improving pain to palpation  lateral left ankle at cfl and atfl without instability deformity or loss of function. Also has pain along the peroneal tendons to the brevis insertion.    Ankle dorsiflexion decreased at <10 degrees bilateral with moderate increase with knee flexion bilateral. There is tenderness to palpation bilateral achilles attachments on the calcaneus.    Pain on palpation plantar medial and central heel right foot. No pain with ROM or MMT. No pain with medial and lateral compression of heel.     Feet:   Right Foot:   Protective Sensation: 10 sites tested. 9 sites sensed.   Left Foot:   Protective Sensation: 10 sites tested. 9 sites sensed.   Lymphadenopathy:   Negative lymphadenopathy bilateral popliteal fossa and tarsal tunnel.   Neurological: She is alert and oriented to person, place, and time. She has normal strength. She displays no atrophy and no tremor. A sensory deficit is present. She exhibits normal muscle tone. She displays no seizure activity. Gait normal.   Negative tinel sign to percussion sural, superficial peroneal, deep peroneal, saphenous, and posterior tibial nerves right and left ankles and feet. Decreased vibratory sensation bilateral     Skin: Skin is warm, dry and intact. No abrasion, no bruising, no burn, no ecchymosis, no laceration, no petechiae and no rash noted. She is not diaphoretic. No cyanosis or erythema. No pallor. Nails show no clubbing.     No ulceration, hyperpigmentation, discoloration, masses nodules or cords palpated.  No ecchymosis, erythema, edema, or cardinal signs of infection bilateral lower extremities.    Skin is thin and atrophic bilateral    Nails 1-5 bilateral are thick 3-4 mm, long 3-6 mm, and discolored with subungual debris           Psychiatric: She has a normal mood and affect. Her behavior is normal.             Assessment:       Encounter Diagnoses   Name Primary?    Type 2 diabetes mellitus with diabetic polyneuropathy, without long-term current use of insulin Yes     Type II diabetes mellitus with peripheral circulatory disorder     Onychomycosis due to dermatophyte     BMI 50.0-59.9, adult     Tinea pedis of left foot          Plan:       Amena was seen today for routine foot care and diabetes mellitus.    Diagnoses and all orders for this visit:    Type 2 diabetes mellitus with diabetic polyneuropathy, without long-term current use of insulin    Type II diabetes mellitus with peripheral circulatory disorder    Onychomycosis due to dermatophyte    BMI 50.0-59.9, adult    Tinea pedis of left foot    Other orders  -     ciclopirox (LOPROX) 0.77 % Crea; Apply topically 2 (two) times daily.      I counseled the patient on her conditions, their implications and medical management.    Shoe inspection. Diabetic Foot Education. Patient reminded of the importance of good nutrition and blood sugar control to help prevent podiatric complications of diabetes. Patient instructed on proper foot hygeine. We discussed wearing proper shoe gear, daily foot inspections, never walking without protective shoe gear, never putting sharp instruments to feet  Discussed importance of supportive shoes with accommodative toe box to reduce pressure and irritation to forefoot.     With patient's verbal consent, nails were aggressively reduced and debrided x 10 to their soft tissue attachment mechanically removing all offending nail and debris. Patient relates relief following the procedure. No anesthesia or hemostasis required. No blood loss.       Follow up in 3 months routine care.    Edson Martinez DPM

## 2020-03-16 ENCOUNTER — TELEPHONE (OUTPATIENT)
Dept: FAMILY MEDICINE | Facility: CLINIC | Age: 55
End: 2020-03-16

## 2020-03-16 NOTE — TELEPHONE ENCOUNTER
Type: Flu Symptoms/URI/Cold/Sinus    Patient has been experiencing Flu Symptoms/URI/Cold/Sinus for approx 2 weeks. Patient has not been seen previously. The patient has taken the following medications: benadryl. Patient is experiencing cough, chest congestion runny nose  Patient does not have a fever. Patient does not have shortness of breath or wheezing. Patient does have a cough. Patient is congested. Patient does  have mucous. The mucous color is clear .     Advised home care. Mucinex, increase fluids, nasal saline, salt water gargles.    Advised appt if no improvement.

## 2020-03-16 NOTE — TELEPHONE ENCOUNTER
----- Message from Fatou Toro sent at 3/16/2020 11:33 AM CDT -----  Type: Needs Medical Advice    Who Called:  Patient   Symptoms (please be specific):  Sinus issues, no fever, congestion, cough  How long has patient had these symptoms:  2 weeks  Pharmacy name and phone #:    [id number] - [facility] [address] [facility] [address]  [id number] [place] [id number]  Phone: [phone] Fax: [phone]  Best Call Back Number: [phone]  Additional Information: over the counter medication not working

## 2020-03-25 DIAGNOSIS — E11.9 TYPE 2 DIABETES MELLITUS WITHOUT COMPLICATION, WITHOUT LONG-TERM CURRENT USE OF INSULIN: ICD-10-CM

## 2020-03-25 DIAGNOSIS — K21.9 GASTROESOPHAGEAL REFLUX DISEASE WITHOUT ESOPHAGITIS: ICD-10-CM

## 2020-03-25 DIAGNOSIS — I10 ESSENTIAL HYPERTENSION: Chronic | ICD-10-CM

## 2020-03-25 RX ORDER — POTASSIUM CHLORIDE 20 MEQ/1
TABLET, EXTENDED RELEASE ORAL
Qty: 180 TABLET | Refills: 1 | Status: SHIPPED | OUTPATIENT
Start: 2020-03-25 | End: 2020-09-06

## 2020-03-25 RX ORDER — OMEPRAZOLE 20 MG/1
CAPSULE, DELAYED RELEASE ORAL
Qty: 180 CAPSULE | Refills: 3 | Status: SHIPPED | OUTPATIENT
Start: 2020-03-25 | End: 2021-02-15

## 2020-03-25 RX ORDER — METFORMIN HYDROCHLORIDE 1000 MG/1
TABLET ORAL
Qty: 90 TABLET | Refills: 3 | Status: SHIPPED | OUTPATIENT
Start: 2020-03-25 | End: 2021-02-15

## 2020-04-09 ENCOUNTER — TELEPHONE (OUTPATIENT)
Dept: FAMILY MEDICINE | Facility: CLINIC | Age: 55
End: 2020-04-09

## 2020-04-09 NOTE — TELEPHONE ENCOUNTER
I spoke with [facility] they need and order Hand written to do c-pap eval for repair/ replacement supplies. It is in your box for you to sign.

## 2020-04-09 NOTE — TELEPHONE ENCOUNTER
----- Message from Princess KATHY Bone sent at 4/9/2020  4:20 PM CDT -----  Contact: Priscilla w/ [employer]  Type: Needs Medical Advice  Who Called:  Priscilla gunn/ [employer]  Best Call Back Number:   option 2  Fax: [phone]  Additional Information:requesting a call in regards to getting a Cpap machine reading repair evalut / replacement supplies. Patient states it is broken.

## 2020-04-13 ENCOUNTER — TELEPHONE (OUTPATIENT)
Dept: FAMILY MEDICINE | Facility: CLINIC | Age: 55
End: 2020-04-13

## 2020-04-13 NOTE — TELEPHONE ENCOUNTER
----- Message from Parviz Madden sent at 4/13/2020 10:17 AM CDT -----  Contact: pt  Type: Needs Medical Advice    Who Called:  pt    Best Call Back Number: [phone]  Additional Information: pt sent over forms to office for CPAP that need to be faxed to [employer]. Checking to see if faxed to [employer]. Please call to advise.

## 2020-04-18 DIAGNOSIS — I10 ESSENTIAL HYPERTENSION: Chronic | ICD-10-CM

## 2020-04-22 RX ORDER — ESTRADIOL 1 MG/1
TABLET ORAL
Qty: 90 TABLET | Refills: 0 | Status: SHIPPED | OUTPATIENT
Start: 2020-04-22 | End: 2020-07-10

## 2020-04-22 RX ORDER — FUROSEMIDE 40 MG/1
TABLET ORAL
Qty: 90 TABLET | Refills: 1 | Status: SHIPPED | OUTPATIENT
Start: 2020-04-22 | End: 2020-09-30

## 2020-04-22 NOTE — TELEPHONE ENCOUNTER
Refill Authorization Note     is requesting a refill authorization.    Brief assessment and rationale for refill: APPROVE: prr          Medication Therapy Plan: 6-month diuretic protocol currrently suspended    Medication reconciliation completed: No                         Comments:   Requested Prescriptions   Signed Prescriptions Disp Refills    furosemide (LASIX) 40 MG tablet 90 tablet 1     Sig: TAKE 1 TABLET(40 MG) BY MOUTH EVERY DAY       Cardiovascular:  Diuretics - Loop Failed - 4/18/2020  5:11 AM        Failed - Last BP in normal range within 360 days.     BP Readings from Last 3 Encounters:   03/09/20 (!) 145/89   02/28/20 137/70   10/28/19 130/84              Failed - K in normal range and within 180 days     Potassium   Date Value Ref Range Status   08/20/2019 4.5 3.5 - 5.1 mmol/L Final   02/07/2019 3.9 3.5 - 5.1 mmol/L Final   03/28/2018 3.9 3.5 - 5.1 mmol/L Final              Failed - Na is between 130 and 148 and within 180 days     Sodium   Date Value Ref Range Status   08/20/2019 141 136 - 145 mmol/L Final   02/07/2019 142 136 - 145 mmol/L Final   03/28/2018 143 136 - 145 mmol/L Final              Failed - Cr is 1.3 or below and within 180 days     Creatinine   Date Value Ref Range Status   08/20/2019 0.7 0.5 - 1.4 mg/dL Final   02/07/2019 0.7 0.5 - 1.4 mg/dL Final   03/28/2018 0.7 0.5 - 1.4 mg/dL Final              Failed - eGFR in normal range and within 180 days     eGFR if non    Date Value Ref Range Status   08/20/2019 >60.0 >60 mL/min/1.73 m^2 Final     Comment:     Calculation used to obtain the estimated glomerular filtration  rate (eGFR) is the CKD-EPI equation.      02/07/2019 >60.0 >60 mL/min/1.73 m^2 Final     Comment:     Calculation used to obtain the estimated glomerular filtration  rate (eGFR) is the CKD-EPI equation.      03/28/2018 >60.0 >60 mL/min/1.73 m^2 Final     Comment:     Calculation used to obtain the estimated glomerular filtration  rate (eGFR) is  the CKD-EPI equation.        eGFR if    Date Value Ref Range Status   08/20/2019 >60.0 >60 mL/min/1.73 m^2 Final   02/07/2019 >60.0 >60 mL/min/1.73 m^2 Final   03/28/2018 >60.0 >60 mL/min/1.73 m^2 Final              Passed - Patient is at least 18 years old        Passed - Office visit in past 12 months or future 90 days.     Recent Outpatient Visits            1 month ago Type 2 diabetes mellitus with diabetic polyneuropathy, without long-term current use of insulin    [facility] - Podiatrvalentin Martinez DPM    1 month ago Type 2 diabetes mellitus with diabetic polyneuropathy, without long-term current use of insulin    [facility] Jose Miller Jr., MD    4 months ago Type 2 diabetes mellitus with diabetic polyneuropathy, without long-term current use of insulin    [facility] - Saida Martinez DPM    5 months ago Type 2 diabetes mellitus with diabetic polyneuropathy, without long-term current use of insulin    [facility] Casey Miller Jr., MD    6 months ago Plantar fasciitis of right foot    [facility] - Podiatrvalentin Martinez DPM          Future Appointments              In 1 week [facility] [place] [facility]    In 3 weeks [facility] [place]    In 1 month [facility], [place] [facility] [place]    In 1 month [facility] [place]    In 1 month [facility] D[facility]    In 2 months LAB, [place] [facility] [place]    In 2 months Jose Miller Jr., MD [facility]                 Appointments  past 12m or future 3m with PCP    Date Provider   Last Visit   2/28/2020 Jose Miller Jr., MD   Next Visit   6/29/2020 Jose Miller Jr., MD   .  ED visits in past 90 days: [unfilled]       Note composed:3:24 PM 04/22/2020

## 2020-04-27 ENCOUNTER — TELEPHONE (OUTPATIENT)
Dept: FAMILY MEDICINE | Facility: CLINIC | Age: 55
End: 2020-04-27

## 2020-04-27 NOTE — TELEPHONE ENCOUNTER
----- Message from Francy Browne MA sent at 4/27/2020 10:01 AM CDT -----  Contact: self  Patient requesting to speak to nurse regarding she need form completed to get breathing machine expedited per patient    for new c-pap machine     Please call to advice [phone] (home)

## 2020-04-27 NOTE — TELEPHONE ENCOUNTER
Pt stated that paperwork for the supplies for her Cpap machine were faxed over on 4/22/20, but it was missing a signature for one of the pages.   She was given the fax # [phone] and told to call the company that handles her supplies and have them refax it.

## 2020-04-29 ENCOUNTER — PATIENT OUTREACH (OUTPATIENT)
Dept: ADMINISTRATIVE | Facility: OTHER | Age: 55
End: 2020-04-29

## 2020-04-29 RX ORDER — LEVOTHYROXINE SODIUM 150 UG/1
TABLET ORAL
Qty: 30 TABLET | Refills: 11 | Status: SHIPPED | OUTPATIENT
Start: 2020-04-29 | End: 2021-04-07 | Stop reason: SDUPTHER

## 2020-04-30 ENCOUNTER — OFFICE VISIT (OUTPATIENT)
Dept: ENDOCRINOLOGY | Facility: CLINIC | Age: 55
End: 2020-04-30
Payer: MEDICARE

## 2020-04-30 DIAGNOSIS — E04.1 THYROID CYST: ICD-10-CM

## 2020-04-30 DIAGNOSIS — E03.9 HYPOTHYROIDISM, UNSPECIFIED TYPE: Primary | ICD-10-CM

## 2020-04-30 PROCEDURE — 99441 PR PHYSICIAN TELEPHONE EVALUATION 5-10 MIN: ICD-10-PCS | Mod: 95,,, | Performed by: INTERNAL MEDICINE

## 2020-04-30 PROCEDURE — 99441 PR PHYSICIAN TELEPHONE EVALUATION 5-10 MIN: CPT | Mod: 95,,, | Performed by: INTERNAL MEDICINE

## 2020-04-30 NOTE — PROGRESS NOTES
Established Patient - Audio Only Telehealth Visit     The patient location is: [place]    Visit type: Virtual visit with audio only (telephone)   Time 7 min  The reason for the audio only service rather than synchronous audio and video virtual visit was related to technical difficulties or patient preference/necessity.     Each patient to whom I provide medical services by telemedicine is:  (1) informed of the relationship between the physician and patient and the respective role of any other health care provider with respect to management of the patient; and (2) notified that they may decline to receive medical services by telemedicine and may withdraw from such care at any time. Patient verbally consented to receive this service via voice-only telephone call.        CHIEF COMPLAINT: Hypothyroid  54 year old being seen as a f/u. Was seeing Dr. Mackey. Had a thyroidectomy in 2012. Appears to have some remnant tissue on the left with cyst.  On synthroid 150 mcg. NO palpitations. No tremors.             PAST MEDICAL HISTORY/PAST SURGICAL HISTORY:  Reviewed in [employer]    SOCIAL HISTORY: No T/A    FAMILY HISTORY:  No known thyroid disease. + Esophageal CA. + DM 2.     MEDICATIONS/ALLERGIES: The patient's MedCard has been updated and reviewed.      ROS:   Constitutional: weight stable per patient  Eyes: No recent visual changes  Cardiovascular: No CP  Respiratory: No SOB or no cough  Gastrointestinal: Denies recent bowel disturbances  Remainder ROS negative        PE:  Audio Visit        ASSESSMENT/PLAN:  1. Hypothyroid- Symptomatically doing well. Will add TSH to next labs.      2. Thyroid Cyst- has some remnant tissue after thyroidectomy.US shows no change. No obstructive symptoms.     FOLLOWUP  Add TSH to next labs.   F/u 1 year with TSH, thyroid US            This service was not originating from a related E/M service provided within the previous 7 days nor will  to an E/M service or procedure within the next 24  hours or my soonest available appointment.  Prevailing standard of care was able to be met in this audio-only visit.

## 2020-05-04 ENCOUNTER — TELEPHONE (OUTPATIENT)
Dept: FAMILY MEDICINE | Facility: CLINIC | Age: 55
End: 2020-05-04

## 2020-05-04 NOTE — TELEPHONE ENCOUNTER
----- Message from Fatou Zaldivar sent at 5/4/2020 12:00 PM CDT -----  Type: Needs Medical Advice    Who Called:  Patient  Best Call Back Number: [phone]  Additional Information: Patient requesting to speak with nurse (Only information given)please call back.

## 2020-05-05 ENCOUNTER — TELEPHONE (OUTPATIENT)
Dept: FAMILY MEDICINE | Facility: CLINIC | Age: 55
End: 2020-05-05

## 2020-05-05 NOTE — TELEPHONE ENCOUNTER
----- Message from Dennis Sneed sent at 5/5/2020 10:17 AM CDT -----  Type: Needs Medical Advice  Who Called:  Patient    Best Call Back Number:  [phone]  Additional Information: Patient states that she would like a callback regarding having her paperwork signed for her C-Pap machine

## 2020-05-06 ENCOUNTER — PATIENT MESSAGE (OUTPATIENT)
Dept: ADMINISTRATIVE | Facility: HOSPITAL | Age: 55
End: 2020-05-06

## 2020-05-19 ENCOUNTER — TELEPHONE (OUTPATIENT)
Dept: FAMILY MEDICINE | Facility: CLINIC | Age: 55
End: 2020-05-19

## 2020-05-19 NOTE — TELEPHONE ENCOUNTER
----- Message from Claribel Moss sent at 5/19/2020  1:49 PM CDT -----  Contact: pt [phone]  Patient called for advise   Patient called she needs to speak to the office to find out if you filled out the   Papers for her to get a new C- PAP machine.   Please call back [phone]

## 2020-05-26 ENCOUNTER — OFFICE VISIT (OUTPATIENT)
Dept: FAMILY MEDICINE | Facility: CLINIC | Age: 55
End: 2020-05-26
Payer: MEDICARE

## 2020-05-26 VITALS
DIASTOLIC BLOOD PRESSURE: 88 MMHG | BODY MASS INDEX: 51.03 KG/M2 | SYSTOLIC BLOOD PRESSURE: 130 MMHG | HEART RATE: 87 BPM | WEIGHT: 293 LBS | OXYGEN SATURATION: 97 %

## 2020-05-26 DIAGNOSIS — E78.5 DYSLIPIDEMIA: ICD-10-CM

## 2020-05-26 DIAGNOSIS — I10 ESSENTIAL HYPERTENSION: Primary | Chronic | ICD-10-CM

## 2020-05-26 DIAGNOSIS — G89.4 CHRONIC PAIN SYNDROME: Chronic | ICD-10-CM

## 2020-05-26 DIAGNOSIS — E89.0 POSTABLATIVE HYPOTHYROIDISM: Chronic | ICD-10-CM

## 2020-05-26 DIAGNOSIS — E11.42 TYPE 2 DIABETES MELLITUS WITH DIABETIC POLYNEUROPATHY, WITHOUT LONG-TERM CURRENT USE OF INSULIN: Chronic | ICD-10-CM

## 2020-05-26 DIAGNOSIS — G47.33 OSA (OBSTRUCTIVE SLEEP APNEA): Chronic | ICD-10-CM

## 2020-05-26 PROCEDURE — 99999 PR PBB SHADOW E&M-EST. PATIENT-LVL III: ICD-10-PCS | Mod: PBBFAC,,, | Performed by: EMERGENCY MEDICINE

## 2020-05-26 PROCEDURE — 99213 OFFICE O/P EST LOW 20 MIN: CPT | Mod: S$GLB,,, | Performed by: EMERGENCY MEDICINE

## 2020-05-26 PROCEDURE — 99213 PR OFFICE/OUTPT VISIT, EST, LEVL III, 20-29 MIN: ICD-10-PCS | Mod: S$GLB,,, | Performed by: EMERGENCY MEDICINE

## 2020-05-26 PROCEDURE — 3008F PR BODY MASS INDEX (BMI) DOCUMENTED: ICD-10-PCS | Mod: CPTII,S$GLB,, | Performed by: EMERGENCY MEDICINE

## 2020-05-26 PROCEDURE — 99499 RISK ADDL DX/OHS AUDIT: ICD-10-PCS | Mod: S$GLB,,, | Performed by: EMERGENCY MEDICINE

## 2020-05-26 PROCEDURE — 99999 PR PBB SHADOW E&M-EST. PATIENT-LVL III: CPT | Mod: PBBFAC,,, | Performed by: EMERGENCY MEDICINE

## 2020-05-26 PROCEDURE — 3075F SYST BP GE 130 - 139MM HG: CPT | Mod: CPTII,S$GLB,, | Performed by: EMERGENCY MEDICINE

## 2020-05-26 PROCEDURE — 99499 UNLISTED E&M SERVICE: CPT | Mod: S$GLB,,, | Performed by: EMERGENCY MEDICINE

## 2020-05-26 PROCEDURE — 3075F PR MOST RECENT SYSTOLIC BLOOD PRESS GE 130-139MM HG: ICD-10-PCS | Mod: CPTII,S$GLB,, | Performed by: EMERGENCY MEDICINE

## 2020-05-26 PROCEDURE — 3079F DIAST BP 80-89 MM HG: CPT | Mod: CPTII,S$GLB,, | Performed by: EMERGENCY MEDICINE

## 2020-05-26 PROCEDURE — 3044F HG A1C LEVEL LT 7.0%: CPT | Mod: CPTII,S$GLB,, | Performed by: EMERGENCY MEDICINE

## 2020-05-26 PROCEDURE — 3008F BODY MASS INDEX DOCD: CPT | Mod: CPTII,S$GLB,, | Performed by: EMERGENCY MEDICINE

## 2020-05-26 PROCEDURE — 3044F PR MOST RECENT HEMOGLOBIN A1C LEVEL <7.0%: ICD-10-PCS | Mod: CPTII,S$GLB,, | Performed by: EMERGENCY MEDICINE

## 2020-05-26 PROCEDURE — 3079F PR MOST RECENT DIASTOLIC BLOOD PRESSURE 80-89 MM HG: ICD-10-PCS | Mod: CPTII,S$GLB,, | Performed by: EMERGENCY MEDICINE

## 2020-05-26 RX ORDER — ATORVASTATIN CALCIUM 20 MG/1
TABLET, FILM COATED ORAL
Qty: 90 TABLET | Refills: 3 | Status: SHIPPED | OUTPATIENT
Start: 2020-05-26 | End: 2021-06-08 | Stop reason: SDUPTHER

## 2020-05-26 RX ORDER — AZELASTINE 1 MG/ML
1 SPRAY, METERED NASAL 2 TIMES DAILY
COMMUNITY
End: 2020-05-26 | Stop reason: SDUPTHER

## 2020-05-26 RX ORDER — AZELASTINE 1 MG/ML
1 SPRAY, METERED NASAL 2 TIMES DAILY
Qty: 30 ML | Refills: 6 | Status: SHIPPED | OUTPATIENT
Start: 2020-05-26 | End: 2021-06-21 | Stop reason: SDUPTHER

## 2020-05-26 NOTE — PATIENT INSTRUCTIONS
Amena,    You do need a flu shot this fall.    Your diabetes and blood pressure are doing well.    Please remember to see Dr. Mckeon.    I would like to see you in four months.    No change in your medications.    Respectfully,    Jose Miller MD

## 2020-05-26 NOTE — PROGRESS NOTES
Subjective:   THIS NOTE IS DONE WITH VOICE RECOGNITION        Patient ID: Amena Anderson is a 54 y.o. female.    Chief Complaint: Diabetes      HPI     She is in today to follow-up on her diabetes.  We also want to close the loop on her CPAP machine.  She does have an appointment to follow-up with Pulmonary, who ordered the original CPAP machine.  This is in the next week or so.    Her diabetes continues to be very well controlled.  It is a challenge for her from a dietary perspective.  Her daughter helps a great deal in this area.  She does not have any new complaints of numbness or tingling.  She has not had any chest pain.  She has been unsuccessful in reducing her weight, but she does try hard to eat more appropriately.  No new ocular symptoms.    Lab Results   Component Value Date    HGBA1C 6.0 (H) 02/21/2020    HGBA1C 6.1 (H) 08/20/2019    HGBA1C 5.8 (H) 02/07/2019       Lab Results   Component Value Date    LDLCALC 93.0 08/20/2019    LDLCALC 80.2 03/28/2018    LDLCALC 75.4 07/10/2017       Lab Results   Component Value Date    CREATININE 0.7 08/20/2019    CREATININE 0.7 02/07/2019    CREATININE 0.7 03/28/2018       Lab Results   Component Value Date    EGFRNONAA >60.0 08/20/2019    EGFRNONAA >60.0 02/07/2019    EGFRNONAA >60.0 03/28/2018     Here to follow up on blood pressure.  Taking current medications.  She does monitor her blood pressure at home.  For the most part has been very well controlled.  Her family helps her with this.    BP Readings from Last 3 Encounters:   05/26/20 130/88   03/09/20 (!) 145/89   02/28/20 137/70     Need to refill Astelin.  The Astelin has been helpful, and she is comfortable using a period other strategies to reduce allergies exposure reviewed.    Immunization History   Administered Date(s) Administered    Influenza - Quadrivalent - PF (6 months and older) 11/27/2017, 10/11/2018, 10/28/2019    Pneumococcal Conjugate - 13 Valent 05/22/2017    Pneumococcal Polysaccharide -  23 Valent 10/11/2018     Recommendation for influenza vaccine this fall.    Current Outpatient Medications   Medication Sig Dispense Refill    amitriptyline (ELAVIL) 25 MG tablet TAKE 1 TABLET BY MOUTH EVERY EVENING 90 tablet 1    amLODIPine (NORVASC) 10 MG tablet TAKE 1 TABLET(10 MG) BY MOUTH EVERY DAY 90 tablet 2    aspirin (ECOTRIN) 81 MG EC tablet Take 81 mg by mouth once daily.      atorvastatin (LIPITOR) 20 MG tablet TAKE 1 TABLET(20 MG) BY MOUTH EVERY DAY 90 tablet 3    azelastine (ASTELIN) 137 mcg (0.1 %) nasal spray 1 spray by Nasal route 2 (two) times daily.      blood sugar diagnostic Strp To check BG 2 times daily, to use with insurance preferred meter DX E11.42 100 strip 11    ciclopirox (LOPROX) 0.77 % Crea Apply topically 2 (two) times daily. 90 g 3    diclofenac sodium 1 % Gel Apply 2 g topically 4 (four) times daily. 100 g 2    dicyclomine (BENTYL) 10 MG capsule Take 1 capsule (10 mg total) by mouth 4 (four) times daily before meals and nightly. 90 capsule 1    docusate sodium (COLACE) 50 MG capsule Take 100 mg by mouth 2 (two) times daily.      estradioL (ESTRACE) 1 MG tablet TAKE 1 TABLET BY MOUTH EVERY DAY 90 tablet 0    furosemide (LASIX) 40 MG tablet TAKE 1 TABLET(40 MG) BY MOUTH EVERY DAY 90 tablet 1    gabapentin (NEURONTIN) 400 MG capsule Take 400 mg by mouth 3 (three) times daily.      hydrocodone-acetaminophen 10-325mg (NORCO)  mg Tab Take 10 tablets by mouth 3 (three) times daily.      lancets Misc To check BG 2 times daily, to use with insurance preferred meter 60 each 11    levothyroxine (SYNTHROID) 150 MCG tablet TAKE 1 TABLET(150 MCG) BY MOUTH EVERY DAY 30 tablet 11    lisinopril (PRINIVIL,ZESTRIL) 20 MG tablet Take 1 tablet (20 mg total) by mouth once daily. 90 tablet 3    metFORMIN (GLUCOPHAGE) 1000 MG tablet TAKE 1 TABLET(1000 MG) BY MOUTH EVERY EVENING 90 tablet 3    naproxen (EC NAPROSYN) 500 MG EC tablet Take 500 mg by mouth 2 (two) times daily.       nitroGLYCERIN (NITROSTAT) 0.4 MG SL tablet Place 0.4 mg under the tongue every 5 (five) minutes as needed for Chest pain.      omeprazole (PRILOSEC) 20 MG capsule TAKE 1 CAPSULE BY MOUTH TWICE DAILY 180 capsule 3    potassium chloride SA (K-DUR,KLOR-CON) 20 MEQ tablet TAKE 1 TABLET(20 MEQ) BY MOUTH TWICE DAILY 180 tablet 1    blood-glucose meter kit To check BG 2 times daily, to use with insurance preferred meter 100 each 11    nystatin (NYSTOP) powder APPLY AFFECTED AREA THREE TIMES DAILY (Patient not taking: Reported on 5/26/2020) 60 g 3     No current facility-administered medications for this visit.          Review of Systems   Constitutional: Negative for activity change, appetite change, chills, diaphoresis, fatigue, fever and unexpected weight change.   HENT: Negative for congestion, ear pain, hearing loss, rhinorrhea, trouble swallowing and voice change.    Eyes: Negative for pain and visual disturbance.   Respiratory: Negative for cough, chest tightness and shortness of breath.    Cardiovascular: Negative for chest pain, palpitations and leg swelling.   Gastrointestinal: Negative for abdominal distention, abdominal pain and blood in stool.   Genitourinary: Negative for difficulty urinating, flank pain, frequency and urgency.   Musculoskeletal: Negative for arthralgias, back pain, joint swelling, myalgias, neck pain and neck stiffness.   Skin: Negative for pallor and rash.   Neurological: Negative for dizziness, tremors, syncope, weakness and headaches.   Hematological: Negative for adenopathy.   Psychiatric/Behavioral: Negative for dysphoric mood and sleep disturbance. The patient is not nervous/anxious.        Objective:      Physical Exam   Constitutional: She is oriented to person, place, and time. She appears well-nourished. No distress.   She is very pleasant.  She appears comfortable.   HENT:   Head: Normocephalic.   Mouth/Throat: Oropharynx is clear and moist.   Eyes: Pupils are equal, round, and  reactive to light. Conjunctivae are normal.   Neck: No JVD present. No thyromegaly present.   Cardiovascular: Normal rate, regular rhythm, normal heart sounds and intact distal pulses.   No murmur heard.  Pulmonary/Chest: Effort normal and breath sounds normal.   Abdominal:   Significant central obesity, but nontender.  Bowel sounds active.  No organomegaly appreciated   Musculoskeletal: Normal range of motion. She exhibits edema (Distal only, 1+.  Symmetrical.).   Neurological: She is alert and oriented to person, place, and time.   Skin: Capillary refill takes less than 2 seconds. No rash noted.   Psychiatric: She has a normal mood and affect.   Vitals reviewed.      Assessment:       1. Essential hypertension    2. Type 2 diabetes mellitus with diabetic polyneuropathy, without long-term current use of insulin    3. BMI 50.0-59.9, adult    4. MANUEL (obstructive sleep apnea)    5. Postablative hypothyroidism    6. Chronic pain syndrome    7. Dyslipidemia        Plan:       1. Essential hypertension  Controlled.  Continue current medications  Continue to avoid excessive sodium  Continue home monitoring  Target blood pressure is 139/89 or less    2. Type 2 diabetes mellitus with diabetic polyneuropathy, without long-term current use of insulin  Controlled  Annual foot exam recommended  Annual retinal exam recommended.   Hemoglobin A1c at 6 month intervals  Annual lipid profile recommended    3. BMI 50.0-59.9, adult  Minimal change  Continued encouragement  Challenge acknowledged.    4. MANUEL (obstructive sleep apnea)  She clearly needs a new machine.  She has had documented sleep apnea done through Pulmonary.  Does have an appointment with pulmonary next week.    5. Postablative hypothyroidism  Lab Results   Component Value Date    TSH 0.883 09/03/2019     Stable, continue current medications    6. Chronic pain syndrome  She continues to work with community pain medicine doctor.   All controlled substances going that  source.    7. Hyperlipidemia  At target  Continue current medications    - atorvastatin (LIPITOR) 20 MG tablet; TAKE 1 TABLET(20 MG) BY MOUTH EVERY DAY  Dispense: 90 tablet; Refill: 3

## 2020-06-02 DIAGNOSIS — G89.4 CHRONIC PAIN SYNDROME: Chronic | ICD-10-CM

## 2020-06-02 NOTE — TELEPHONE ENCOUNTER
----- Message from [facility] sent at 6/2/2020  9:53 AM CDT -----  Contact: pt   Pt called about a refill on amitriptyline (ELAVIL) 25 MG tablet to be sent to [facility] [phone] Fax [phone]     Pt can be reached at [phone]

## 2020-06-03 RX ORDER — AMITRIPTYLINE HYDROCHLORIDE 25 MG/1
TABLET, FILM COATED ORAL
Qty: 90 TABLET | Refills: 3 | Status: SHIPPED | OUTPATIENT
Start: 2020-06-03 | End: 2021-04-05 | Stop reason: SDUPTHER

## 2020-06-03 NOTE — PROGRESS NOTES
Refill Authorization Note    is requesting a refill authorization.    Brief assessment and rationale for refill: APPROVE: prr               Medication reconciliation completed: No                         Comments:      Requested Prescriptions   Signed Prescriptions Disp Refills    amitriptyline (ELAVIL) 25 MG tablet 90 tablet 3     Sig: TAKE 1 TABLET BY MOUTH EVERY EVENING       Psychiatry:  Antidepressants - Heterocyclics (TCAs) Passed - 6/2/2020  9:58 AM        Passed - Patient is at least 18 years old        Passed - Office visit in past 6 months or future 90 days.     Recent Outpatient Visits            1 week ago Essential hypertension    [place] [facility] Jose Miller Jr., MD    1 month ago Hypothyroidism, unspecified type    [place] - Endocrinology Donovan Amador DO    2 months ago Type 2 diabetes mellitus with diabetic polyneuropathy, without long-term current use of insulin    [place] - Podiatrvalentin Martinez DPM    3 months ago Type 2 diabetes mellitus with diabetic polyneuropathy, without long-term current use of insulin    [facility] Jose Miller Jr., MD    5 months ago Type 2 diabetes mellitus with diabetic polyneuropathy, without long-term current use of insulin    [place] - Podiatry Edson Martinez DPM          Future Appointments              Tomorrow Jose Mckeon MD [facility], [facility]    In 6 days [facility]    In 2 weeks [facility], [place] [facility]    In 1 month [facility]    In 3 months Jose Miller Jr., MD [facility]    In 4 months [facility]                 Appointments  past 12m or future 3m with PCP    Date Provider   Last Visit   5/26/2020 Jose Miller Jr., MD   Next Visit   9/28/2020 Jose Miller  MD [place]   ED visits in past 90 days: 0     Note composed:9:56 AM 06/03/2020

## 2020-06-22 ENCOUNTER — LAB VISIT (OUTPATIENT)
Dept: LAB | Facility: HOSPITAL | Age: 55
End: 2020-06-22
Attending: EMERGENCY MEDICINE
Payer: MEDICARE

## 2020-06-22 DIAGNOSIS — E03.4 HYPOTHYROIDISM DUE TO ACQUIRED ATROPHY OF THYROID: Chronic | ICD-10-CM

## 2020-06-22 DIAGNOSIS — E78.2 MIXED HYPERLIPIDEMIA: Chronic | ICD-10-CM

## 2020-06-22 DIAGNOSIS — I10 ESSENTIAL HYPERTENSION: Chronic | ICD-10-CM

## 2020-06-22 DIAGNOSIS — Z11.4 ENCOUNTER FOR SCREENING FOR HIV: ICD-10-CM

## 2020-06-22 DIAGNOSIS — E11.42 TYPE 2 DIABETES MELLITUS WITH DIABETIC POLYNEUROPATHY, WITHOUT LONG-TERM CURRENT USE OF INSULIN: Chronic | ICD-10-CM

## 2020-06-22 LAB
ALBUMIN SERPL BCP-MCNC: 3.7 G/DL (ref 3.5–5.2)
ALP SERPL-CCNC: 74 U/L (ref 55–135)
ALT SERPL W/O P-5'-P-CCNC: 26 U/L (ref 10–44)
ANION GAP SERPL CALC-SCNC: 11 MMOL/L (ref 8–16)
AST SERPL-CCNC: 23 U/L (ref 10–40)
BILIRUB SERPL-MCNC: 0.2 MG/DL (ref 0.1–1)
BUN SERPL-MCNC: 11 MG/DL (ref 6–20)
CALCIUM SERPL-MCNC: 8.7 MG/DL (ref 8.7–10.5)
CHLORIDE SERPL-SCNC: 106 MMOL/L (ref 95–110)
CHOLEST SERPL-MCNC: 147 MG/DL (ref 120–199)
CHOLEST/HDLC SERPL: 3.7 {RATIO} (ref 2–5)
CO2 SERPL-SCNC: 25 MMOL/L (ref 23–29)
CREAT SERPL-MCNC: 0.7 MG/DL (ref 0.5–1.4)
EST. GFR  (AFRICAN AMERICAN): >60 ML/MIN/1.73 M^2
EST. GFR  (NON AFRICAN AMERICAN): >60 ML/MIN/1.73 M^2
ESTIMATED AVG GLUCOSE: 117 MG/DL (ref 68–131)
GLUCOSE SERPL-MCNC: 104 MG/DL (ref 70–110)
HBA1C MFR BLD HPLC: 5.7 % (ref 4–5.6)
HDLC SERPL-MCNC: 40 MG/DL (ref 40–75)
HDLC SERPL: 27.2 % (ref 20–50)
LDLC SERPL CALC-MCNC: 83.4 MG/DL (ref 63–159)
NONHDLC SERPL-MCNC: 107 MG/DL
POTASSIUM SERPL-SCNC: 3.7 MMOL/L (ref 3.5–5.1)
PROT SERPL-MCNC: 7.3 G/DL (ref 6–8.4)
SODIUM SERPL-SCNC: 142 MMOL/L (ref 136–145)
TRIGL SERPL-MCNC: 118 MG/DL (ref 30–150)
TSH SERPL DL<=0.005 MIU/L-ACNC: 1.25 UIU/ML (ref 0.4–4)

## 2020-06-22 PROCEDURE — 83036 HEMOGLOBIN GLYCOSYLATED A1C: CPT

## 2020-06-22 PROCEDURE — 86703 HIV-1/HIV-2 1 RESULT ANTBDY: CPT

## 2020-06-22 PROCEDURE — 80061 LIPID PANEL: CPT

## 2020-06-22 PROCEDURE — 36415 COLL VENOUS BLD VENIPUNCTURE: CPT | Mod: PO

## 2020-06-22 PROCEDURE — 80053 COMPREHEN METABOLIC PANEL: CPT

## 2020-06-22 PROCEDURE — 84443 ASSAY THYROID STIM HORMONE: CPT

## 2020-06-23 LAB — HIV 1+2 AB+HIV1 P24 AG SERPL QL IA: NEGATIVE

## 2020-07-20 ENCOUNTER — HOSPITAL ENCOUNTER (OUTPATIENT)
Dept: RADIOLOGY | Facility: HOSPITAL | Age: 55
Discharge: HOME | End: 2020-07-20
Attending: EMERGENCY MEDICINE
Payer: MEDICARE

## 2020-07-20 DIAGNOSIS — Z12.31 BREAST CANCER SCREENING BY MAMMOGRAM: ICD-10-CM

## 2020-07-20 PROCEDURE — 77067 SCR MAMMO BI INCL CAD: CPT | Mod: 26,,, | Performed by: RADIOLOGY

## 2020-07-20 PROCEDURE — 77067 MAMMO DIGITAL SCREENING BILAT WITH TOMOSYNTHESIS_CAD: ICD-10-PCS | Mod: 26,,, | Performed by: RADIOLOGY

## 2020-07-20 PROCEDURE — 77067 SCR MAMMO BI INCL CAD: CPT | Mod: TC,PO

## 2020-07-20 PROCEDURE — 77063 MAMMO DIGITAL SCREENING BILAT WITH TOMOSYNTHESIS_CAD: ICD-10-PCS | Mod: 26,,, | Performed by: RADIOLOGY

## 2020-07-20 PROCEDURE — 77063 BREAST TOMOSYNTHESIS BI: CPT | Mod: 26,,, | Performed by: RADIOLOGY

## 2020-07-29 ENCOUNTER — TELEPHONE (OUTPATIENT)
Dept: PODIATRY | Facility: CLINIC | Age: 55
End: 2020-07-29

## 2020-08-10 ENCOUNTER — PATIENT OUTREACH (OUTPATIENT)
Dept: ADMINISTRATIVE | Facility: OTHER | Age: 55
End: 2020-08-10

## 2020-08-10 NOTE — PROGRESS NOTES
Requested updates within Care Everywhere.  Patient's chart was reviewed for overdue ZAC topics.  Immunizations reconciled.

## 2020-08-12 ENCOUNTER — TELEPHONE (OUTPATIENT)
Dept: FAMILY MEDICINE | Facility: CLINIC | Age: 55
End: 2020-08-12

## 2020-08-12 ENCOUNTER — OFFICE VISIT (OUTPATIENT)
Dept: PODIATRY | Facility: CLINIC | Age: 55
End: 2020-08-12
Payer: MEDICARE

## 2020-08-12 VITALS — WEIGHT: 293 LBS | BODY MASS INDEX: 47.09 KG/M2 | HEIGHT: 66 IN

## 2020-08-12 DIAGNOSIS — B35.1 ONYCHOMYCOSIS DUE TO DERMATOPHYTE: ICD-10-CM

## 2020-08-12 DIAGNOSIS — E11.51 TYPE II DIABETES MELLITUS WITH PERIPHERAL CIRCULATORY DISORDER: ICD-10-CM

## 2020-08-12 DIAGNOSIS — E11.42 TYPE 2 DIABETES MELLITUS WITH DIABETIC POLYNEUROPATHY, WITHOUT LONG-TERM CURRENT USE OF INSULIN: Primary | ICD-10-CM

## 2020-08-12 DIAGNOSIS — L84 CORN OR CALLUS: ICD-10-CM

## 2020-08-12 PROCEDURE — 11721 DEBRIDE NAIL 6 OR MORE: CPT | Mod: Q9,59,S$GLB, | Performed by: PODIATRIST

## 2020-08-12 PROCEDURE — 11721 PR DEBRIDEMENT OF NAILS, 6 OR MORE: ICD-10-PCS | Mod: Q9,59,S$GLB, | Performed by: PODIATRIST

## 2020-08-12 PROCEDURE — 11056 PR TRIM BENIGN HYPERKERATOTIC SKIN LESION,2-4: ICD-10-PCS | Mod: Q9,S$GLB,, | Performed by: PODIATRIST

## 2020-08-12 PROCEDURE — 99499 UNLISTED E&M SERVICE: CPT | Mod: S$GLB,,, | Performed by: PODIATRIST

## 2020-08-12 PROCEDURE — 99999 PR PBB SHADOW E&M-EST. PATIENT-LVL IV: ICD-10-PCS | Mod: PBBFAC,,, | Performed by: PODIATRIST

## 2020-08-12 PROCEDURE — 99499 RISK ADDL DX/OHS AUDIT: ICD-10-PCS | Mod: S$GLB,,, | Performed by: PODIATRIST

## 2020-08-12 PROCEDURE — 99999 PR PBB SHADOW E&M-EST. PATIENT-LVL IV: CPT | Mod: PBBFAC,,, | Performed by: PODIATRIST

## 2020-08-12 PROCEDURE — 11056 PARNG/CUTG B9 HYPRKR LES 2-4: CPT | Mod: Q9,S$GLB,, | Performed by: PODIATRIST

## 2020-08-12 RX ORDER — LIDOCAINE AND PRILOCAINE 25; 25 MG/G; MG/G
CREAM TOPICAL
COMMUNITY
Start: 2020-07-02 | End: 2024-02-14

## 2020-08-12 NOTE — PROGRESS NOTES
"Subjective:      Patient ID: Amena Anderson is a 54 y.o. female.    Chief Complaint: Diabetes Mellitus (PCP:  Dr Miller 5/26/20; HgbA1c:  6/22/20  5.7), Nail Care, and Foot Problem (dry, "cracking" skin)  Patient returns for follow up  thick and discolored nails for high risk foot care. She is high risk secondary to the diabetes with PVD.     PCP: Dr. Miller  Last seen: 2/28/20    Review of Systems   Constitution: Negative for chills, diaphoresis, fever, malaise/fatigue and night sweats.   Cardiovascular: Positive for leg swelling. Negative for claudication, cyanosis and syncope.   Skin: Negative for color change, dry skin, nail changes, rash, suspicious lesions and unusual hair distribution.   Musculoskeletal: Positive for joint pain. Negative for falls, joint swelling, muscle cramps, muscle weakness and stiffness.   Gastrointestinal: Negative for constipation, diarrhea, nausea and vomiting.   Neurological: Positive for paresthesias. Negative for brief paralysis, disturbances in coordination, focal weakness, numbness, sensory change and tremors.           Objective:      Physical Exam  Constitutional:       General: She is not in acute distress.     Appearance: She is well-developed. She is not diaphoretic.   Cardiovascular:      Pulses:           Dorsalis pedis pulses are 2+ on the right side and 2+ on the left side.        Posterior tibial pulses are 2+ on the right side and 2+ on the left side.      Comments: Capillary refill 3 seconds all toes/distal feet, all toes/both feet warm to touch.      Positive lower extremity edema bilateral.    Musculoskeletal:      Right ankle: Normal. She exhibits normal range of motion, no swelling, no ecchymosis, no deformity, no laceration and normal pulse. Achilles tendon normal. Achilles tendon exhibits no pain, no defect and normal De La Cruz's test results.      Comments: Improving pain to palpation lateral left ankle at cfl and atfl without instability deformity or loss of " function. Also has pain along the peroneal tendons to the brevis insertion.    Ankle dorsiflexion decreased at <10 degrees bilateral with moderate increase with knee flexion bilateral. There is tenderness to palpation bilateral achilles attachments on the calcaneus.    Pain on palpation plantar medial and central heel right foot. No pain with ROM or MMT. No pain with medial and lateral compression of heel.     Feet:      Right foot:      Protective Sensation: 10 sites tested. 9 sites sensed.      Left foot:      Protective Sensation: 10 sites tested. 9 sites sensed.   Lymphadenopathy:      Comments: Negative lymphadenopathy bilateral popliteal fossa and tarsal tunnel.   Skin:     General: Skin is warm and dry.      Coloration: Skin is not pale.      Findings: Lesion present. No abrasion, bruising, burn, ecchymosis, erythema, laceration, petechiae or rash.      Nails: There is no clubbing.        Comments:   No ulceration, hyperpigmentation, discoloration, masses nodules or cords palpated.  No ecchymosis, erythema, edema, or cardinal signs of infection bilateral lower extremities.    Skin is thin and atrophic bilateral    Nails 1-5 bilateral are thick 3-4 mm, long 3-6 mm, and discolored with subungual debris    Hyperkeratotic lesions right plantar hallux sulcus and plantar lateral heel         Neurological:      Mental Status: She is alert and oriented to person, place, and time.      Sensory: Sensory deficit present.      Motor: No tremor, atrophy, abnormal muscle tone or seizure activity.      Gait: Gait normal.      Comments: Negative tinel sign to percussion sural, superficial peroneal, deep peroneal, saphenous, and posterior tibial nerves right and left ankles and feet. Decreased vibratory sensation bilateral     Psychiatric:         Behavior: Behavior normal. Behavior is cooperative.               Assessment:       Encounter Diagnoses   Name Primary?    Type 2 diabetes mellitus with diabetic polyneuropathy,  without long-term current use of insulin Yes    Type II diabetes mellitus with peripheral circulatory disorder     Onychomycosis due to dermatophyte     BMI 50.0-59.9, adult     Corn or callus          Plan:       Amena was seen today for diabetes mellitus, nail care and foot problem.    Diagnoses and all orders for this visit:    Type 2 diabetes mellitus with diabetic polyneuropathy, without long-term current use of insulin    Type II diabetes mellitus with peripheral circulatory disorder    Onychomycosis due to dermatophyte    BMI 50.0-59.9, adult    Corn or callus      I counseled the patient on her conditions, their implications and medical management.    Shoe inspection. Diabetic Foot Education. Patient reminded of the importance of good nutrition and blood sugar control to help prevent podiatric complications of diabetes. Patient instructed on proper foot hygeine. We discussed wearing proper shoe gear, daily foot inspections, never walking without protective shoe gear, never putting sharp instruments to feet  Discussed importance of supportive shoes with accommodative toe box to reduce pressure and irritation to forefoot.     With patient's verbal consent, nails were aggressively reduced and debrided x 10 to their soft tissue attachment mechanically removing all offending nail and debris. Patient relates relief following the procedure. No anesthesia or hemostasis required. No blood loss.     With patient's verbal consent the hyperkeratotic lesions x2 right foot were trimmed to healthy appearing skin using a # 15 scalpel. Patient relates relief of pain following the procedure. Patient tolerated the procedure well without complications. No anesthesia or hemostasis required. No blood loss.      Follow up in 3 months routine care.    Edson Martinez DPM

## 2020-08-12 NOTE — TELEPHONE ENCOUNTER
I spoke with pt and she is aware we have the shoe request. She said she went to podiatry today and he checked her out also.

## 2020-08-12 NOTE — TELEPHONE ENCOUNTER
----- Message from Fely Bush sent at 8/12/2020 11:50 AM CDT -----  Regarding: Pt Message Shoe Order  Contact: [phone]  Type: Needs Medical Advice  Who Called:  Patient   Best Call Back Number: [phone]  Additional Information: patient is requesting a call back in regards to orders for her to  shoes from Orthopedics, please and thank you      [phone] [facility]

## 2020-08-13 NOTE — TELEPHONE ENCOUNTER
Dr barragan note is in the chart. From yesterday. Forms is in your box. Pt is aware you are out of clinic until monday

## 2020-08-17 ENCOUNTER — TELEPHONE (OUTPATIENT)
Dept: FAMILY MEDICINE | Facility: CLINIC | Age: 55
End: 2020-08-17

## 2020-08-17 NOTE — TELEPHONE ENCOUNTER
----- Message from Davi Shah sent at 8/17/2020  2:03 PM CDT -----  Regarding: Paperwork for Diabetic Shoes  Contact: patient  Patient called in and stated she was to call office to remind nurse to get Dr. Miller to sign the paperwork so patient can get her diabetic shoes.    Patient call back number is [phone]

## 2020-08-17 NOTE — TELEPHONE ENCOUNTER
Spoke to pt to let her know that Dr. Miller has not signed the form yet but should be able to by tn or tomorrow and then we will fax it over as soon as it is signed. Pt verbalized understanding.

## 2020-08-18 ENCOUNTER — TELEPHONE (OUTPATIENT)
Dept: FAMILY MEDICINE | Facility: CLINIC | Age: 55
End: 2020-08-18

## 2020-08-18 NOTE — TELEPHONE ENCOUNTER
----- Message from Kassandra Wright sent at 8/18/2020  2:31 PM CDT -----  Contact: call  pt [phone]  Type: Needs Medical Advice  Who Called:  pt  Best Call Back Number: call  pt [phone]  Additional Information: pt is  calling  about another  fax  has   been  sent and  needs to be  filled and  clinic  notes  from  last  visit // please call  pt   when  completed  ty  the   other   part  was   received // please call

## 2020-08-18 NOTE — TELEPHONE ENCOUNTER
----- Message from Mary Felix sent at 8/18/2020 10:32 AM CDT -----  Regarding: fax for diabetic shoes  Contact: Amena mills  Type: Needs Medical Advice  Who Called:  Amena mills  Best Call Back Number: [phone]  Additional Information: Pls call regarding fax that was sent for pt's diabetic shoes. She needs to have this done today. Company is waiting on the fax

## 2020-09-04 DIAGNOSIS — I10 ESSENTIAL HYPERTENSION: Chronic | ICD-10-CM

## 2020-09-04 NOTE — TELEPHONE ENCOUNTER
No new care gaps identified.  Powered by [employer]. Reference number: [id number]. 9/04/2020 5:21:28 AM CDT

## 2020-09-06 RX ORDER — POTASSIUM CHLORIDE 20 MEQ/1
TABLET, EXTENDED RELEASE ORAL
Qty: 180 TABLET | Refills: 1 | Status: SHIPPED | OUTPATIENT
Start: 2020-09-06 | End: 2020-12-02 | Stop reason: SDUPTHER

## 2020-09-06 NOTE — PROGRESS NOTES
Refill Authorization Note    is requesting a refill authorization.    Brief assessment and rationale for refill: APPROVE: PRR          Medication Therapy Plan: CDMR; APPROVE PER PROTOCOL    Medication reconciliation completed: No                    Comments:          Requested Prescriptions   Pending Prescriptions Disp Refills    potassium chloride SA (K-DUR,KLOR-CON) 20 MEQ tablet [Pharmacy Med Name: POTASSIUM CL 20MEQ ER TABLETS] 180 tablet 1     Sig: TAKE 1 TABLET(20 MEQ) BY MOUTH TWICE DAILY       Endocrinology:  Minerals - Potassium Supplementation Passed - 9/4/2020  5:20 AM        Passed - Patient is at least 18 years old        Passed - Office visit in past 12 months or future 90 days.     Recent Outpatient Visits            3 weeks ago Type 2 diabetes mellitus with diabetic polyneuropathy, without long-term current use of insulin    [place]  Podjanel Martinez DPM    3 months ago BMI 50.0-59.9, adult    [facility] Jose Mckeon MD    3 months ago Essential hypertension    [facility] Jose Miller Jr., MD    4 months ago Hypothyroidism, unspecified type    [place] - Endocrinology Donovan Amador DO    6 months ago Type 2 diabetes mellitus with diabetic polyneuropathy, without long-term current use of insulin    [place]  Podjanel Martinez DPM          Future Appointments              In 2 weeks [facility] Miller Jr., MD [facility] [place]    In 1 month [facility] [place]  [facility]    In 2 months [facility] D[facility] [place]  [facility]                Passed - K in normal range and within 180 days     Potassium   Date Value Ref Range Status   06/22/2020 3.7 3.5 - 5.1 mmol/L Final   08/20/2019 4.5 3.5 - 5.1 mmol/L Final   02/07/2019 3.9 3.5 - 5.1 mmol/L Final              Passed - Cr is 1.3 or below and within 180 days     Creatinine   Date Value Ref  Range Status   06/22/2020 0.7 0.5 - 1.4 mg/dL Final   08/20/2019 0.7 0.5 - 1.4 mg/dL Final   02/07/2019 0.7 0.5 - 1.4 mg/dL Final              Passed - eGFR within 180 days     eGFR if non    Date Value Ref Range Status   06/22/2020 >60.0 >60 mL/min/1.73 m^2 Final     Comment:     Calculation used to obtain the estimated glomerular filtration  rate (eGFR) is the CKD-EPI equation.      08/20/2019 >60.0 >60 mL/min/1.73 m^2 Final     Comment:     Calculation used to obtain the estimated glomerular filtration  rate (eGFR) is the CKD-EPI equation.      02/07/2019 >60.0 >60 mL/min/1.73 m^2 Final     Comment:     Calculation used to obtain the estimated glomerular filtration  rate (eGFR) is the CKD-EPI equation.        eGFR if    Date Value Ref Range Status   06/22/2020 >60.0 >60 mL/min/1.73 m^2 Final   08/20/2019 >60.0 >60 mL/min/1.73 m^2 Final   02/07/2019 >60.0 >60 mL/min/1.73 m^2 Final                  Appointments  past 12m or future 3m with PCP    Date Provider   Last Visit   5/26/2020 Jose Miller Jr., MD   Next Visit   9/21/2020 Jose Miller Jr., MD   ED visits in past 90 days: 0     Note composed:12:11 PM 09/06/2020

## 2020-09-09 ENCOUNTER — TELEPHONE (OUTPATIENT)
Dept: CARDIOLOGY | Facility: CLINIC | Age: 55
End: 2020-09-09

## 2020-09-09 NOTE — TELEPHONE ENCOUNTER
Spoke with pt and advised no lab orders are in . Pt understood. Pt asked to be added to waitlist.

## 2020-09-09 NOTE — TELEPHONE ENCOUNTER
----- Message from Katie Wright sent at 9/9/2020 12:52 PM CDT -----  Contact: self/ [phone]  Patient would like a call back to discuss what blood work she need ordered.

## 2020-09-20 NOTE — PROGRESS NOTES
Subjective:   THIS NOTE IS DONE WITH VOICE RECOGNITION        Patient ID: Amena Anderson is a 54 y.o. female.    Chief Complaint: Diabetes and Anxiety      HPI     She is feeling more stress with COVID. No suggestion of infection.    She has not been in the hospital.  No urgent care visits.    Her social situation remains stable in spite of her increased COVID stress.    Her diabetes remains stable.  Her exercise is limited by arthralgias and her weight.  Foot exam with podiatry in August.    Lab Results   Component Value Date    HGBA1C 5.7 (H) 06/22/2020    HGBA1C 6.0 (H) 02/21/2020    HGBA1C 6.1 (H) 08/20/2019     Lab Results   Component Value Date    LDLCALC 83.4 06/22/2020    LDLCALC 93.0 08/20/2019    LDLCALC 80.2 03/28/2018     Lab Results   Component Value Date    CREATININE 0.7 06/22/2020    CREATININE 0.7 08/20/2019    CREATININE 0.7 02/07/2019     Lab Results   Component Value Date    ESTGFRAFRICA >60.0 06/22/2020    ESTGFRAFRICA >60.0 08/20/2019    ESTGFRAFRICA >60.0 02/07/2019     Here to follow up on blood pressure.  Taking current medications.  No chest pain.  No rhythm complaints.  Minimal exercise.  She is wearing compression stockings when sleeping.      BP Readings from Last 3 Encounters:   09/21/20 138/82   05/26/20 130/88   03/09/20 (!) 145/89       Vitals - 1 value per visit 3/9/2020 5/26/2020 8/12/2020   BODY MASS INDEX 50.81 51.03 51.36     Vitals - 1 value per visit 3/9/2020 5/26/2020 8/12/2020   Weight (lb) 314.82 316.14 318.23     Hypothyroidism is stable.  No new symptoms.    Lab Results   Component Value Date    TSH 1.245 06/22/2020     She does have chronic pain syndrome.  She does see pain management.  This is secondary to her spine issues.    She has seen GYN.  She is having yeast like rash under breast and groin.  HIV testing was negative.    Immunization History   Administered Date(s) Administered    Influenza - Quadrivalent - PF *Preferred* (6 months and older) 11/27/2017,  10/11/2018, 10/28/2019, 09/21/2020    Pneumococcal Conjugate - 13 Valent 05/22/2017    Pneumococcal Polysaccharide - 23 Valent 10/11/2018     Influenza vaccine recommended this fall.  Given today.  Shingrix (recombinant shingles vaccine) has been discussed and recommended.    Current Outpatient Medications   Medication Sig Dispense Refill    amitriptyline (ELAVIL) 25 MG tablet TAKE 1 TABLET BY MOUTH EVERY EVENING 90 tablet 3    amLODIPine (NORVASC) 10 MG tablet TAKE 1 TABLET(10 MG) BY MOUTH EVERY DAY 90 tablet 2    aspirin (ECOTRIN) 81 MG EC tablet Take 81 mg by mouth once daily.      atorvastatin (LIPITOR) 20 MG tablet TAKE 1 TABLET(20 MG) BY MOUTH EVERY DAY 90 tablet 3    azelastine (ASTELIN) 137 mcg (0.1 %) nasal spray 1 spray (137 mcg total) by Nasal route 2 (two) times daily. 30 mL 6    blood sugar diagnostic Strp To check BG 2 times daily, to use with insurance preferred meter DX E11.42 100 strip 11    blood-glucose meter kit To check BG 2 times daily, to use with insurance preferred meter 100 each 11    ciclopirox (LOPROX) 0.77 % Crea Apply topically 2 (two) times daily. 90 g 3    diclofenac sodium 1 % Gel Apply 2 g topically 4 (four) times daily. 100 g 2    dicyclomine (BENTYL) 10 MG capsule Take 1 capsule (10 mg total) by mouth 4 (four) times daily before meals and nightly. 90 capsule 1    docusate sodium (COLACE) 50 MG capsule Take 100 mg by mouth 2 (two) times daily.      estradioL (ESTRACE) 1 MG tablet TAKE 1 TABLET BY MOUTH EVERY DAY 90 tablet 0    furosemide (LASIX) 40 MG tablet TAKE 1 TABLET(40 MG) BY MOUTH EVERY DAY 90 tablet 1    gabapentin (NEURONTIN) 400 MG capsule Take 400 mg by mouth 3 (three) times daily.      hydrocodone-acetaminophen 10-325mg (NORCO)  mg Tab Take 10 tablets by mouth 3 (three) times daily.      lancets Misc To check BG 2 times daily, to use with insurance preferred meter 60 each 11    levothyroxine (SYNTHROID) 150 MCG tablet TAKE 1 TABLET(150 MCG) BY  MOUTH EVERY DAY 30 tablet 11    lidocaine-prilocaine (EMLA) cream APPLY UP TO 3.2 GRAMS TO PAINFUL AREAS UP TO FIVE TIMES DAILY. RUB IN WELL      lisinopril (PRINIVIL,ZESTRIL) 20 MG tablet Take 1 tablet (20 mg total) by mouth once daily. 90 tablet 3    metFORMIN (GLUCOPHAGE) 1000 MG tablet TAKE 1 TABLET(1000 MG) BY MOUTH EVERY EVENING 90 tablet 3    naproxen (EC NAPROSYN) 500 MG EC tablet Take 500 mg by mouth 2 (two) times daily.      nitroGLYCERIN (NITROSTAT) 0.4 MG SL tablet Place 0.4 mg under the tongue every 5 (five) minutes as needed for Chest pain.      nystatin (NYSTOP) powder APPLY AFFECTED AREA THREE TIMES DAILY (Patient not taking: Reported on 5/26/2020) 60 g 3    omeprazole (PRILOSEC) 20 MG capsule TAKE 1 CAPSULE BY MOUTH TWICE DAILY 180 capsule 3    potassium chloride SA (K-DUR,KLOR-CON) 20 MEQ tablet TAKE 1 TABLET(20 MEQ) BY MOUTH TWICE DAILY 180 tablet 1     No current facility-administered medications for this visit.          Review of Systems   Constitutional: Negative for activity change, appetite change, chills, diaphoresis, fatigue, fever and unexpected weight change.   HENT: Negative for congestion, ear pain, hearing loss, rhinorrhea, trouble swallowing and voice change.    Eyes: Negative for pain and visual disturbance.   Respiratory: Negative for cough, chest tightness and shortness of breath.    Cardiovascular: Negative for chest pain, palpitations and leg swelling.   Gastrointestinal: Negative for abdominal distention, abdominal pain and blood in stool.   Genitourinary: Negative for difficulty urinating, flank pain, frequency and urgency.   Musculoskeletal: Positive for arthralgias and back pain (continues to see pain mangement). Negative for joint swelling, myalgias, neck pain and neck stiffness.   Skin: Positive for rash (under breast and pannus). Negative for pallor.   Neurological: Negative for dizziness, tremors, syncope, weakness and headaches.   Hematological: Negative for  adenopathy.   Psychiatric/Behavioral: Negative for dysphoric mood and sleep disturbance. The patient is nervous/anxious.        Objective:      Physical Exam  Vitals signs reviewed.   Constitutional:       General: She is not in acute distress.     Appearance: She is obese. She is not toxic-appearing.   HENT:      Head: Normocephalic.      Right Ear: Tympanic membrane normal.      Left Ear: Tympanic membrane normal.      Mouth/Throat:      Mouth: Mucous membranes are moist.      Pharynx: Oropharynx is clear.   Eyes:      Extraocular Movements: Extraocular movements intact.      Conjunctiva/sclera: Conjunctivae normal.      Pupils: Pupils are equal, round, and reactive to light.   Neck:      Musculoskeletal: Normal range of motion.   Cardiovascular:      Rate and Rhythm: Normal rate and regular rhythm.      Pulses: Normal pulses.      Heart sounds: No murmur.   Pulmonary:      Effort: Pulmonary effort is normal.      Breath sounds: Normal breath sounds.   Abdominal:      General: Bowel sounds are normal. There is no distension.      Palpations: Abdomen is soft.   Skin:     General: Skin is warm and dry.      Capillary Refill: Capillary refill takes less than 2 seconds.      Findings: Rash (Yeast like, under breast and pannus.) present.   Neurological:      General: No focal deficit present.      Mental Status: She is alert and oriented to person, place, and time.   Psychiatric:         Mood and Affect: Mood normal.         Assessment:       1. Essential hypertension    2. Type 2 diabetes mellitus with diabetic polyneuropathy, without long-term current use of insulin    3. Postablative hypothyroidism    4. Gastroesophageal reflux disease without esophagitis    5. Mixed hyperlipidemia    6. BMI 50.0-59.9, adult    7. Chronic pain syndrome        Plan:       1. Essential hypertension  Controlled.  Continue current medications  Continue to avoid excessive sodium  Continue home monitoring  Target blood pressure is 139/89  or less    2. Type 2 diabetes mellitus with diabetic polyneuropathy, without long-term current use of insulin  Controlled  Annual foot exam recommended  Annual retinal exam recommended.   Hemoglobin A1c at 6 month intervals  Annual lipid profile recommended  - Hemoglobin A1C; Future    3. Postablative hypothyroidism  Stable  No new medications  Annual TSH recommended      4. Gastroesophageal reflux disease without esophagitis  Well controlled  Weight loss encouraged    5. Mixed hyperlipidemia  Lipids controlled  Continue current medications  Annual lipid profile and comprehensive metabolic panel  If complications developed such as myalgia or arthralgia, contact me.    6. BMI 50.0-59.9, adult  Discussed  She understands link with her diabetes, her joint pain, and her diabetes.  Substantial behavior change is encouraged    7. Chronic pain syndrome  She will continue to follow-up with her pain management physician.  Will avoid any narcotic prescriptions from my practice.

## 2020-09-21 ENCOUNTER — OFFICE VISIT (OUTPATIENT)
Dept: FAMILY MEDICINE | Facility: CLINIC | Age: 55
End: 2020-09-21
Payer: MEDICARE

## 2020-09-21 VITALS
SYSTOLIC BLOOD PRESSURE: 138 MMHG | HEART RATE: 88 BPM | RESPIRATION RATE: 16 BRPM | OXYGEN SATURATION: 97 % | WEIGHT: 293 LBS | DIASTOLIC BLOOD PRESSURE: 82 MMHG | BODY MASS INDEX: 51.52 KG/M2

## 2020-09-21 DIAGNOSIS — G89.4 CHRONIC PAIN SYNDROME: Chronic | ICD-10-CM

## 2020-09-21 DIAGNOSIS — K21.9 GASTROESOPHAGEAL REFLUX DISEASE WITHOUT ESOPHAGITIS: Chronic | ICD-10-CM

## 2020-09-21 DIAGNOSIS — E78.2 MIXED HYPERLIPIDEMIA: Chronic | ICD-10-CM

## 2020-09-21 DIAGNOSIS — I10 ESSENTIAL HYPERTENSION: Primary | Chronic | ICD-10-CM

## 2020-09-21 DIAGNOSIS — E11.42 TYPE 2 DIABETES MELLITUS WITH DIABETIC POLYNEUROPATHY, WITHOUT LONG-TERM CURRENT USE OF INSULIN: Chronic | ICD-10-CM

## 2020-09-21 DIAGNOSIS — E89.0 POSTABLATIVE HYPOTHYROIDISM: Chronic | ICD-10-CM

## 2020-09-21 PROCEDURE — 99999 PR PBB SHADOW E&M-EST. PATIENT-LVL III: CPT | Mod: PBBFAC,,, | Performed by: EMERGENCY MEDICINE

## 2020-09-21 PROCEDURE — 3008F PR BODY MASS INDEX (BMI) DOCUMENTED: ICD-10-PCS | Mod: CPTII,S$GLB,, | Performed by: EMERGENCY MEDICINE

## 2020-09-21 PROCEDURE — 3079F DIAST BP 80-89 MM HG: CPT | Mod: CPTII,S$GLB,, | Performed by: EMERGENCY MEDICINE

## 2020-09-21 PROCEDURE — 3008F BODY MASS INDEX DOCD: CPT | Mod: CPTII,S$GLB,, | Performed by: EMERGENCY MEDICINE

## 2020-09-21 PROCEDURE — 90686 IIV4 VACC NO PRSV 0.5 ML IM: CPT | Mod: S$GLB,,, | Performed by: EMERGENCY MEDICINE

## 2020-09-21 PROCEDURE — 99214 PR OFFICE/OUTPT VISIT, EST, LEVL IV, 30-39 MIN: ICD-10-PCS | Mod: 25,S$GLB,, | Performed by: EMERGENCY MEDICINE

## 2020-09-21 PROCEDURE — 99999 PR PBB SHADOW E&M-EST. PATIENT-LVL III: ICD-10-PCS | Mod: PBBFAC,,, | Performed by: EMERGENCY MEDICINE

## 2020-09-21 PROCEDURE — 3075F PR MOST RECENT SYSTOLIC BLOOD PRESS GE 130-139MM HG: ICD-10-PCS | Mod: CPTII,S$GLB,, | Performed by: EMERGENCY MEDICINE

## 2020-09-21 PROCEDURE — G0008 FLU VACCINE (QUAD) GREATER THAN OR EQUAL TO 3YO PRESERVATIVE FREE IM: ICD-10-PCS | Mod: S$GLB,,, | Performed by: EMERGENCY MEDICINE

## 2020-09-21 PROCEDURE — 3075F SYST BP GE 130 - 139MM HG: CPT | Mod: CPTII,S$GLB,, | Performed by: EMERGENCY MEDICINE

## 2020-09-21 PROCEDURE — 3044F PR MOST RECENT HEMOGLOBIN A1C LEVEL <7.0%: ICD-10-PCS | Mod: CPTII,S$GLB,, | Performed by: EMERGENCY MEDICINE

## 2020-09-21 PROCEDURE — 3044F HG A1C LEVEL LT 7.0%: CPT | Mod: CPTII,S$GLB,, | Performed by: EMERGENCY MEDICINE

## 2020-09-21 PROCEDURE — 99214 OFFICE O/P EST MOD 30 MIN: CPT | Mod: 25,S$GLB,, | Performed by: EMERGENCY MEDICINE

## 2020-09-21 PROCEDURE — 3079F PR MOST RECENT DIASTOLIC BLOOD PRESSURE 80-89 MM HG: ICD-10-PCS | Mod: CPTII,S$GLB,, | Performed by: EMERGENCY MEDICINE

## 2020-09-21 PROCEDURE — G0008 ADMIN INFLUENZA VIRUS VAC: HCPCS | Mod: S$GLB,,, | Performed by: EMERGENCY MEDICINE

## 2020-09-21 PROCEDURE — 90686 FLU VACCINE (QUAD) GREATER THAN OR EQUAL TO 3YO PRESERVATIVE FREE IM: ICD-10-PCS | Mod: S$GLB,,, | Performed by: EMERGENCY MEDICINE

## 2020-09-21 NOTE — PATIENT INSTRUCTIONS
Amena,    We gave you a flu shot this morning.    Your blood pressure is doing well.    Your diabetes is controlled.  We will recheck in three months.    Please continue your current medications.    Respectfully,    Jose Miller MD

## 2020-09-22 ENCOUNTER — TELEPHONE (OUTPATIENT)
Dept: FAMILY MEDICINE | Facility: CLINIC | Age: 55
End: 2020-09-22

## 2020-09-22 NOTE — TELEPHONE ENCOUNTER
Yesterday when she was checking out. She was told that Dr. Miller is retiring. I explained not yet.

## 2020-09-22 NOTE — TELEPHONE ENCOUNTER
----- Message from Donita Velazquez sent at 9/22/2020  2:57 PM CDT -----  Contact: self   Pt states she would like to speak to the nurse. Pt states she has a few questions to ask. Please call and advise. Thank you

## 2020-09-29 DIAGNOSIS — I10 ESSENTIAL HYPERTENSION: Chronic | ICD-10-CM

## 2020-09-29 NOTE — TELEPHONE ENCOUNTER
Care Due:                  Date            Visit Type   Department     Provider  --------------------------------------------------------------------------------                                ESTABLISHED   [facility] FAMILY  Last Visit: 09-      PATIENT      MEDICINE       MIKE FRANCIS  Next Visit: None Scheduled  None         None Found                                                            Last  Test          Frequency    Reason                     Performed    Due Date  --------------------------------------------------------------------------------    HBA1C.......  6 months...  metFORMIN................  06- 12-    Powered by [email or web address]. Reference number: [id number]. 9/29/2020 5:20:15 AM CDT

## 2020-09-30 RX ORDER — AMLODIPINE BESYLATE 10 MG/1
TABLET ORAL
Qty: 90 TABLET | Refills: 3 | Status: SHIPPED | OUTPATIENT
Start: 2020-09-30 | End: 2021-10-28

## 2020-09-30 RX ORDER — FUROSEMIDE 40 MG/1
TABLET ORAL
Qty: 90 TABLET | Refills: 2 | Status: SHIPPED | OUTPATIENT
Start: 2020-09-30 | End: 2021-06-08

## 2020-09-30 NOTE — PROGRESS NOTES
Refill Authorization Note   Amena Anderson is requesting a refill authorization.     Brief assessment and rationale for refill: APPROVE          Medication Therapy Plan: CDMR; Labs WNL 6/20; A1c due 12/20; Defer to future encounter; Approved    Medication reconciliation completed: No                    Comments:      Orders Placed This Encounter    amLODIPine (NORVASC) 10 MG tablet    furosemide (LASIX) 40 MG tablet      Requested Prescriptions   Signed Prescriptions Disp Refills    amLODIPine (NORVASC) 10 MG tablet 90 tablet 3     Sig: TAKE 1 TABLET(10 MG) BY MOUTH EVERY DAY       Cardiovascular:  Calcium Channel Blockers Passed - 9/29/2020  5:19 AM        Passed - Patient is at least 18 years old        Passed - Last BP in normal range within 360 days     BP Readings from Last 3 Encounters:   09/21/20 138/82   05/26/20 130/88   03/09/20 (!) 145/89              Passed - Office Visit within last 12 months or future 90 days.     Recent Outpatient Visits            1 week ago Essential hypertension    [facility] Jose Miller Jr., MD    1 month ago Type 2 diabetes mellitus with diabetic polyneuropathy, without long-term current use of insulin    [place] - Podiatry Edson Martinez DPM    3 months ago BMI 50.0-59.9, adult    [facility] Jose Mckeon MD    4 months ago Essential hypertension    [facility] Jose Miller Jr., MD    5 months ago Hypothyroidism, unspecified type    [facility] Donovan Amador, DO          Future Appointments              In 1 week Mau Mendoza MD [place] - Cardiology, [place]    In 1 month Edson Martinez DPM [facility] [place]                  furosemide (LASIX) 40 MG tablet 90 tablet 2     Sig: TAKE 1 TABLET(40 MG) BY MOUTH EVERY DAY       Cardiovascular:  Diuretics - Loop Failed - 9/29/2020  5:19 AM        Failed - K in normal range and within 180 days      Potassium   Date Value Ref Range Status   06/22/2020 3.7 3.5 - 5.1 mmol/L Final   08/20/2019 4.5 3.5 - 5.1 mmol/L Final   02/07/2019 3.9 3.5 - 5.1 mmol/L Final              Passed - Patient is at least 18 years old        Passed - Last BP in normal range within 360 days     BP Readings from Last 3 Encounters:   09/21/20 138/82   05/26/20 130/88   03/09/20 (!) 145/89              Passed - Office Visit within last 12 months or future 90 days.     Recent Outpatient Visits            1 week ago Essential hypertension    [facility] Medicine Jose Miller Jr., MD    1 month ago Type 2 diabetes mellitus with diabetic polyneuropathy, without long-term current use of insulin    [place] - Podiatry Edson Martinez DPM    3 months ago BMI 50.0-59.9, adult    [facility] Jose Mckeon MD    4 months ago Essential hypertension    [facility] Jose Miller Jr., MD    5 months ago Hypothyroidism, unspecified type    [place] - Endocrinology Donovan Amador, DO          Future Appointments              In 1 week Mau Mendoza MD [place] - Cardiology, [place]    In 1 month [facility] - Podiatry, [place]                Passed - Na is between 130 and 148 and within 180 days     Sodium   Date Value Ref Range Status   06/22/2020 142 136 - 145 mmol/L Final   08/20/2019 141 136 - 145 mmol/L Final   02/07/2019 142 136 - 145 mmol/L Final              Passed - Cr is 1.3 or below and within 180 days       Creatinine   Date Value Ref Range Status   06/22/2020 0.7 0.5 - 1.4 mg/dL Final   08/20/2019 0.7 0.5 - 1.4 mg/dL Final   02/07/2019 0.7 0.5 - 1.4 mg/dL Final                Passed - eGFR within 180 days     eGFR if non    Date Value Ref Range Status   06/22/2020 >60.0 >60 mL/min/1.73 m^2 Final     Comment:     Calculation used to obtain the estimated glomerular filtration  rate (eGFR) is the CKD-EPI equation.       08/20/2019 >60.0 >60 mL/min/1.73 m^2 Final     Comment:     Calculation used to obtain the estimated glomerular filtration  rate (eGFR) is the CKD-EPI equation.      02/07/2019 >60.0 >60 mL/min/1.73 m^2 Final     Comment:     Calculation used to obtain the estimated glomerular filtration  rate (eGFR) is the CKD-EPI equation.        eGFR if    Date Value Ref Range Status   06/22/2020 >60.0 >60 mL/min/1.73 m^2 Final   08/20/2019 >60.0 >60 mL/min/1.73 m^2 Final   02/07/2019 >60.0 >60 mL/min/1.73 m^2 Final                  Appointments  past 12m or future 3m with PCP    Date Provider   Last Visit   9/21/2020 Jose Miller Jr., MD   Next Visit   Visit date not found Jose Miller Jr., MD   ED visits in past 90 days: 0     Note composed:10:43 AM 09/30/2020

## 2020-10-13 ENCOUNTER — PATIENT OUTREACH (OUTPATIENT)
Dept: ADMINISTRATIVE | Facility: OTHER | Age: 55
End: 2020-10-13

## 2020-10-13 NOTE — PROGRESS NOTES
LINKS immunization registry not responding  Care Everywhere updated  Health Maintenance updated  Chart reviewed for overdue Proactive [facility] Encounters (ZAC) health maintenance testing (CRS, Breast Ca, Diabetic Eye Exam)   Orders entered:N/A

## 2020-10-14 ENCOUNTER — OFFICE VISIT (OUTPATIENT)
Dept: CARDIOLOGY | Facility: CLINIC | Age: 55
End: 2020-10-14
Payer: MEDICARE

## 2020-10-14 VITALS
SYSTOLIC BLOOD PRESSURE: 143 MMHG | HEIGHT: 66 IN | DIASTOLIC BLOOD PRESSURE: 86 MMHG | HEART RATE: 92 BPM | WEIGHT: 293 LBS | BODY MASS INDEX: 47.09 KG/M2

## 2020-10-14 DIAGNOSIS — G47.33 OSA (OBSTRUCTIVE SLEEP APNEA): Chronic | ICD-10-CM

## 2020-10-14 DIAGNOSIS — E11.42 TYPE 2 DIABETES MELLITUS WITH DIABETIC POLYNEUROPATHY, WITHOUT LONG-TERM CURRENT USE OF INSULIN: Chronic | ICD-10-CM

## 2020-10-14 DIAGNOSIS — E78.2 MIXED HYPERLIPIDEMIA: Chronic | ICD-10-CM

## 2020-10-14 DIAGNOSIS — I10 ESSENTIAL HYPERTENSION: Primary | Chronic | ICD-10-CM

## 2020-10-14 PROCEDURE — 99214 OFFICE O/P EST MOD 30 MIN: CPT | Mod: S$GLB,,, | Performed by: INTERNAL MEDICINE

## 2020-10-14 PROCEDURE — 3079F DIAST BP 80-89 MM HG: CPT | Mod: CPTII,S$GLB,, | Performed by: INTERNAL MEDICINE

## 2020-10-14 PROCEDURE — 3079F PR MOST RECENT DIASTOLIC BLOOD PRESSURE 80-89 MM HG: ICD-10-PCS | Mod: CPTII,S$GLB,, | Performed by: INTERNAL MEDICINE

## 2020-10-14 PROCEDURE — 3008F PR BODY MASS INDEX (BMI) DOCUMENTED: ICD-10-PCS | Mod: CPTII,S$GLB,, | Performed by: INTERNAL MEDICINE

## 2020-10-14 PROCEDURE — 3008F BODY MASS INDEX DOCD: CPT | Mod: CPTII,S$GLB,, | Performed by: INTERNAL MEDICINE

## 2020-10-14 PROCEDURE — 3077F SYST BP >= 140 MM HG: CPT | Mod: CPTII,S$GLB,, | Performed by: INTERNAL MEDICINE

## 2020-10-14 PROCEDURE — 99999 PR PBB SHADOW E&M-EST. PATIENT-LVL III: ICD-10-PCS | Mod: PBBFAC,,, | Performed by: INTERNAL MEDICINE

## 2020-10-14 PROCEDURE — 3077F PR MOST RECENT SYSTOLIC BLOOD PRESSURE >= 140 MM HG: ICD-10-PCS | Mod: CPTII,S$GLB,, | Performed by: INTERNAL MEDICINE

## 2020-10-14 PROCEDURE — 99214 PR OFFICE/OUTPT VISIT, EST, LEVL IV, 30-39 MIN: ICD-10-PCS | Mod: S$GLB,,, | Performed by: INTERNAL MEDICINE

## 2020-10-14 PROCEDURE — 3044F PR MOST RECENT HEMOGLOBIN A1C LEVEL <7.0%: ICD-10-PCS | Mod: CPTII,S$GLB,, | Performed by: INTERNAL MEDICINE

## 2020-10-14 PROCEDURE — 99999 PR PBB SHADOW E&M-EST. PATIENT-LVL III: CPT | Mod: PBBFAC,,, | Performed by: INTERNAL MEDICINE

## 2020-10-14 PROCEDURE — 3044F HG A1C LEVEL LT 7.0%: CPT | Mod: CPTII,S$GLB,, | Performed by: INTERNAL MEDICINE

## 2020-11-12 ENCOUNTER — OFFICE VISIT (OUTPATIENT)
Dept: PODIATRY | Facility: CLINIC | Age: 55
End: 2020-11-12
Payer: MEDICARE

## 2020-11-12 VITALS — BODY MASS INDEX: 47.09 KG/M2 | HEIGHT: 66 IN | WEIGHT: 293 LBS

## 2020-11-12 DIAGNOSIS — E11.51 TYPE II DIABETES MELLITUS WITH PERIPHERAL CIRCULATORY DISORDER: ICD-10-CM

## 2020-11-12 DIAGNOSIS — E11.42 TYPE 2 DIABETES MELLITUS WITH DIABETIC POLYNEUROPATHY, WITHOUT LONG-TERM CURRENT USE OF INSULIN: Primary | ICD-10-CM

## 2020-11-12 DIAGNOSIS — B35.1 ONYCHOMYCOSIS DUE TO DERMATOPHYTE: ICD-10-CM

## 2020-11-12 DIAGNOSIS — L84 CORN OR CALLUS: ICD-10-CM

## 2020-11-12 DIAGNOSIS — B35.3 TINEA PEDIS OF LEFT FOOT: ICD-10-CM

## 2020-11-12 PROCEDURE — 99999 PR PBB SHADOW E&M-EST. PATIENT-LVL IV: ICD-10-PCS | Mod: PBBFAC,,, | Performed by: PODIATRIST

## 2020-11-12 PROCEDURE — 11721 DEBRIDE NAIL 6 OR MORE: CPT | Mod: 59,Q9,S$GLB, | Performed by: PODIATRIST

## 2020-11-12 PROCEDURE — 11055 PR TRIM HYPERKERATOTIC SKIN LESION, ONE: ICD-10-PCS | Mod: Q9,S$GLB,, | Performed by: PODIATRIST

## 2020-11-12 PROCEDURE — 99213 OFFICE O/P EST LOW 20 MIN: CPT | Mod: 25,S$GLB,, | Performed by: PODIATRIST

## 2020-11-12 PROCEDURE — 1126F PR PAIN SEVERITY QUANTIFIED, NO PAIN PRESENT: ICD-10-PCS | Mod: S$GLB,,, | Performed by: PODIATRIST

## 2020-11-12 PROCEDURE — 3008F BODY MASS INDEX DOCD: CPT | Mod: CPTII,S$GLB,, | Performed by: PODIATRIST

## 2020-11-12 PROCEDURE — 3044F PR MOST RECENT HEMOGLOBIN A1C LEVEL <7.0%: ICD-10-PCS | Mod: CPTII,S$GLB,, | Performed by: PODIATRIST

## 2020-11-12 PROCEDURE — 99999 PR PBB SHADOW E&M-EST. PATIENT-LVL IV: CPT | Mod: PBBFAC,,, | Performed by: PODIATRIST

## 2020-11-12 PROCEDURE — 3044F HG A1C LEVEL LT 7.0%: CPT | Mod: CPTII,S$GLB,, | Performed by: PODIATRIST

## 2020-11-12 PROCEDURE — 3008F PR BODY MASS INDEX (BMI) DOCUMENTED: ICD-10-PCS | Mod: CPTII,S$GLB,, | Performed by: PODIATRIST

## 2020-11-12 PROCEDURE — 11055 PARING/CUTG B9 HYPRKER LES 1: CPT | Mod: Q9,S$GLB,, | Performed by: PODIATRIST

## 2020-11-12 PROCEDURE — 11721 PR DEBRIDEMENT OF NAILS, 6 OR MORE: ICD-10-PCS | Mod: 59,Q9,S$GLB, | Performed by: PODIATRIST

## 2020-11-12 PROCEDURE — 1126F AMNT PAIN NOTED NONE PRSNT: CPT | Mod: S$GLB,,, | Performed by: PODIATRIST

## 2020-11-12 PROCEDURE — 99213 PR OFFICE/OUTPT VISIT, EST, LEVL III, 20-29 MIN: ICD-10-PCS | Mod: 25,S$GLB,, | Performed by: PODIATRIST

## 2020-11-12 RX ORDER — CICLOPIROX OLAMINE 7.7 MG/G
CREAM TOPICAL 2 TIMES DAILY
Qty: 90 G | Refills: 3 | Status: SHIPPED | OUTPATIENT
Start: 2020-11-12 | End: 2023-01-24 | Stop reason: SDUPTHER

## 2020-11-12 RX ORDER — CICLOPIROX 7.7 MG/G
GEL TOPICAL 2 TIMES DAILY
Qty: 1 TUBE | Refills: 6 | Status: SHIPPED | OUTPATIENT
Start: 2020-11-12 | End: 2022-05-30 | Stop reason: SDUPTHER

## 2020-11-12 RX ORDER — NAPROXEN 500 MG/1
TABLET ORAL
COMMUNITY
Start: 2020-10-22 | End: 2021-06-20 | Stop reason: SDUPTHER

## 2020-11-12 NOTE — PROGRESS NOTES
Subjective:      Patient ID: Amena Anderson is a 55 y.o. female.    Chief Complaint: Diabetes Mellitus (3 mo check; PCP Dr. Miller 9/21/20; A1c 5.7 6/22/20)  Patient returns for follow up  thick and discolored nails for high risk foot care. She is high risk secondary to the diabetes with PVD. Also asking for a refill on her antifungal creams as she still has some flare up of her tinea pedis at times and the creams help.    PCP: Dr. Miller  Last seen: 9/21/20    Review of Systems   Constitution: Negative for chills, diaphoresis, fever, malaise/fatigue and night sweats.   Cardiovascular: Positive for leg swelling. Negative for claudication, cyanosis and syncope.   Skin: Negative for color change, dry skin, nail changes, rash, suspicious lesions and unusual hair distribution.   Musculoskeletal: Positive for joint pain. Negative for falls, joint swelling, muscle cramps, muscle weakness and stiffness.   Gastrointestinal: Negative for constipation, diarrhea, nausea and vomiting.   Neurological: Positive for paresthesias. Negative for brief paralysis, disturbances in coordination, focal weakness, numbness, sensory change and tremors.           Objective:      Physical Exam  Constitutional:       General: She is not in acute distress.     Appearance: She is well-developed. She is not diaphoretic.   Cardiovascular:      Pulses:           Dorsalis pedis pulses are 2+ on the right side and 2+ on the left side.        Posterior tibial pulses are 2+ on the right side and 2+ on the left side.      Comments: Capillary refill 3 seconds all toes/distal feet, all toes/both feet warm to touch.      Positive lower extremity edema bilateral.    Musculoskeletal:      Right ankle: Normal. She exhibits normal range of motion, no swelling, no ecchymosis, no deformity, no laceration and normal pulse. Achilles tendon normal. Achilles tendon exhibits no pain, no defect and normal De La Cruz's test results.      Comments: Improving pain to  palpation lateral left ankle at cfl and atfl without instability deformity or loss of function. Also has pain along the peroneal tendons to the brevis insertion.    Ankle dorsiflexion decreased at <10 degrees bilateral with moderate increase with knee flexion bilateral. There is tenderness to palpation bilateral achilles attachments on the calcaneus.    Pain on palpation plantar medial and central heel right foot. No pain with ROM or MMT. No pain with medial and lateral compression of heel.     Feet:      Right foot:      Protective Sensation: 10 sites tested. 9 sites sensed.      Left foot:      Protective Sensation: 10 sites tested. 9 sites sensed.   Lymphadenopathy:      Comments: Negative lymphadenopathy bilateral popliteal fossa and tarsal tunnel.   Skin:     General: Skin is warm and dry.      Coloration: Skin is not pale.      Findings: Lesion present. No abrasion, bruising, burn, ecchymosis, erythema, laceration, petechiae or rash.      Nails: There is no clubbing.        Comments:   Dry scale with superficial flakes over an erythematous base more to the left foot without ulceration, drainage, pus, tracking, fluctuance, malodor, or cardinal signs infection.      Skin is thin and atrophic bilateral    Nails 1-5 bilateral are thick 3-4 mm, long 3-6 mm, and discolored with subungual debris    Hyperkeratotic lesions right plantar hallux sulcus          Neurological:      Mental Status: She is alert and oriented to person, place, and time.      Sensory: Sensory deficit present.      Motor: No tremor, atrophy, abnormal muscle tone or seizure activity.      Gait: Gait normal.      Comments: Negative tinel sign to percussion sural, superficial peroneal, deep peroneal, saphenous, and posterior tibial nerves right and left ankles and feet. Decreased vibratory sensation bilateral     Psychiatric:         Behavior: Behavior normal. Behavior is cooperative.               Assessment:       Encounter Diagnoses   Name  Primary?    Type 2 diabetes mellitus with diabetic polyneuropathy, without long-term current use of insulin Yes    Type II diabetes mellitus with peripheral circulatory disorder     Onychomycosis due to dermatophyte     Tinea pedis of left foot          Plan:       Amena was seen today for diabetes mellitus.    Diagnoses and all orders for this visit:    Type 2 diabetes mellitus with diabetic polyneuropathy, without long-term current use of insulin    Type II diabetes mellitus with peripheral circulatory disorder    Onychomycosis due to dermatophyte    Tinea pedis of left foot    Other orders  -     ciclopirox (LOPROX) 0.77 % Crea; Apply topically 2 (two) times daily.  -     ciclopirox 0.77 % Gel; Apply topically 2 (two) times daily.      I counseled the patient on her conditions, their implications and medical management.    Shoe inspection. Diabetic Foot Education. Patient reminded of the importance of good nutrition and blood sugar control to help prevent podiatric complications of diabetes. Patient instructed on proper foot hygeine. We discussed wearing proper shoe gear, daily foot inspections, never walking without protective shoe gear, never putting sharp instruments to feet  Discussed importance of supportive shoes with accommodative toe box to reduce pressure and irritation to forefoot.     With patient's verbal consent, nails were aggressively reduced and debrided x 10 to their soft tissue attachment mechanically removing all offending nail and debris. Patient relates relief following the procedure. No anesthesia or hemostasis required. No blood loss.     With patient's verbal consent the hyperkeratotic lesions x1 right foot were trimmed to healthy appearing skin using a # 15 scalpel. Patient relates relief of pain following the procedure. Patient tolerated the procedure well without complications. No anesthesia or hemostasis required. No blood loss.    Refilled prescriptions for tinea      Follow up  in 3 months routine care.    Edson Martinez DPM

## 2020-11-17 DIAGNOSIS — E11.9 TYPE 2 DIABETES MELLITUS WITHOUT COMPLICATION, WITHOUT LONG-TERM CURRENT USE OF INSULIN: ICD-10-CM

## 2020-11-17 DIAGNOSIS — I10 ESSENTIAL HYPERTENSION: ICD-10-CM

## 2020-11-17 RX ORDER — LISINOPRIL 20 MG/1
20 TABLET ORAL DAILY
Qty: 90 TABLET | Refills: 3 | Status: SHIPPED | OUTPATIENT
Start: 2020-11-17 | End: 2021-10-28

## 2020-12-02 DIAGNOSIS — I10 ESSENTIAL HYPERTENSION: Chronic | ICD-10-CM

## 2020-12-02 RX ORDER — POTASSIUM CHLORIDE 20 MEQ/1
20 TABLET, EXTENDED RELEASE ORAL 2 TIMES DAILY
Qty: 180 TABLET | Refills: 1 | Status: SHIPPED | OUTPATIENT
Start: 2020-12-02 | End: 2021-11-02

## 2021-01-21 ENCOUNTER — LAB VISIT (OUTPATIENT)
Dept: LAB | Facility: HOSPITAL | Age: 56
End: 2021-01-21
Attending: EMERGENCY MEDICINE
Payer: MEDICARE

## 2021-01-21 DIAGNOSIS — E11.42 TYPE 2 DIABETES MELLITUS WITH DIABETIC POLYNEUROPATHY, WITHOUT LONG-TERM CURRENT USE OF INSULIN: Chronic | ICD-10-CM

## 2021-01-21 LAB
ESTIMATED AVG GLUCOSE: 120 MG/DL (ref 68–131)
HBA1C MFR BLD HPLC: 5.8 % (ref 4–5.6)

## 2021-01-21 PROCEDURE — 36415 COLL VENOUS BLD VENIPUNCTURE: CPT | Mod: PO

## 2021-01-21 PROCEDURE — 83036 HEMOGLOBIN GLYCOSYLATED A1C: CPT

## 2021-01-26 ENCOUNTER — OFFICE VISIT (OUTPATIENT)
Dept: FAMILY MEDICINE | Facility: CLINIC | Age: 56
End: 2021-01-26
Payer: MEDICARE

## 2021-01-26 VITALS — OXYGEN SATURATION: 97 % | WEIGHT: 293 LBS | HEART RATE: 81 BPM | BODY MASS INDEX: 50.96 KG/M2

## 2021-01-26 DIAGNOSIS — I10 ESSENTIAL HYPERTENSION: Primary | Chronic | ICD-10-CM

## 2021-01-26 DIAGNOSIS — K76.0 FATTY LIVER: Chronic | ICD-10-CM

## 2021-01-26 DIAGNOSIS — E11.42 TYPE 2 DIABETES MELLITUS WITH DIABETIC POLYNEUROPATHY, WITHOUT LONG-TERM CURRENT USE OF INSULIN: Chronic | ICD-10-CM

## 2021-01-26 DIAGNOSIS — G89.4 CHRONIC PAIN SYNDROME: Chronic | ICD-10-CM

## 2021-01-26 DIAGNOSIS — G47.33 OSA (OBSTRUCTIVE SLEEP APNEA): Chronic | ICD-10-CM

## 2021-01-26 DIAGNOSIS — E89.0 POSTABLATIVE HYPOTHYROIDISM: ICD-10-CM

## 2021-01-26 PROCEDURE — 99214 PR OFFICE/OUTPT VISIT, EST, LEVL IV, 30-39 MIN: ICD-10-PCS | Mod: S$GLB,,, | Performed by: EMERGENCY MEDICINE

## 2021-01-26 PROCEDURE — 1126F PR PAIN SEVERITY QUANTIFIED, NO PAIN PRESENT: ICD-10-PCS | Mod: S$GLB,,, | Performed by: EMERGENCY MEDICINE

## 2021-01-26 PROCEDURE — 99499 RISK ADDL DX/OHS AUDIT: ICD-10-PCS | Mod: S$GLB,,, | Performed by: EMERGENCY MEDICINE

## 2021-01-26 PROCEDURE — 99999 PR PBB SHADOW E&M-EST. PATIENT-LVL III: ICD-10-PCS | Mod: PBBFAC,,, | Performed by: EMERGENCY MEDICINE

## 2021-01-26 PROCEDURE — 3008F BODY MASS INDEX DOCD: CPT | Mod: CPTII,S$GLB,, | Performed by: EMERGENCY MEDICINE

## 2021-01-26 PROCEDURE — 3044F HG A1C LEVEL LT 7.0%: CPT | Mod: CPTII,S$GLB,, | Performed by: EMERGENCY MEDICINE

## 2021-01-26 PROCEDURE — 3008F PR BODY MASS INDEX (BMI) DOCUMENTED: ICD-10-PCS | Mod: CPTII,S$GLB,, | Performed by: EMERGENCY MEDICINE

## 2021-01-26 PROCEDURE — 99999 PR PBB SHADOW E&M-EST. PATIENT-LVL III: CPT | Mod: PBBFAC,,, | Performed by: EMERGENCY MEDICINE

## 2021-01-26 PROCEDURE — 99499 UNLISTED E&M SERVICE: CPT | Mod: S$GLB,,, | Performed by: EMERGENCY MEDICINE

## 2021-01-26 PROCEDURE — 99214 OFFICE O/P EST MOD 30 MIN: CPT | Mod: S$GLB,,, | Performed by: EMERGENCY MEDICINE

## 2021-01-26 PROCEDURE — 1126F AMNT PAIN NOTED NONE PRSNT: CPT | Mod: S$GLB,,, | Performed by: EMERGENCY MEDICINE

## 2021-01-26 PROCEDURE — 3044F PR MOST RECENT HEMOGLOBIN A1C LEVEL <7.0%: ICD-10-PCS | Mod: CPTII,S$GLB,, | Performed by: EMERGENCY MEDICINE

## 2021-02-13 ENCOUNTER — PATIENT OUTREACH (OUTPATIENT)
Dept: ADMINISTRATIVE | Facility: OTHER | Age: 56
End: 2021-02-13

## 2021-02-14 DIAGNOSIS — E11.9 TYPE 2 DIABETES MELLITUS WITHOUT COMPLICATION, WITHOUT LONG-TERM CURRENT USE OF INSULIN: ICD-10-CM

## 2021-02-14 DIAGNOSIS — K21.9 GASTROESOPHAGEAL REFLUX DISEASE WITHOUT ESOPHAGITIS: ICD-10-CM

## 2021-02-15 RX ORDER — OMEPRAZOLE 20 MG/1
CAPSULE, DELAYED RELEASE ORAL
Qty: 180 CAPSULE | Refills: 3 | Status: SHIPPED | OUTPATIENT
Start: 2021-02-15 | End: 2022-04-29 | Stop reason: SDUPTHER

## 2021-02-17 RX ORDER — METFORMIN HYDROCHLORIDE 1000 MG/1
TABLET ORAL
Qty: 90 TABLET | Refills: 1 | Status: SHIPPED | OUTPATIENT
Start: 2021-02-17 | End: 2021-08-06

## 2021-02-24 ENCOUNTER — OFFICE VISIT (OUTPATIENT)
Dept: PODIATRY | Facility: CLINIC | Age: 56
End: 2021-02-24
Payer: MEDICARE

## 2021-02-24 VITALS — HEIGHT: 66 IN | WEIGHT: 293 LBS | BODY MASS INDEX: 47.09 KG/M2

## 2021-02-24 DIAGNOSIS — L84 CORN OR CALLUS: ICD-10-CM

## 2021-02-24 DIAGNOSIS — E11.51 TYPE II DIABETES MELLITUS WITH PERIPHERAL CIRCULATORY DISORDER: ICD-10-CM

## 2021-02-24 DIAGNOSIS — E11.42 TYPE 2 DIABETES MELLITUS WITH DIABETIC POLYNEUROPATHY, WITHOUT LONG-TERM CURRENT USE OF INSULIN: Primary | ICD-10-CM

## 2021-02-24 DIAGNOSIS — B35.1 ONYCHOMYCOSIS DUE TO DERMATOPHYTE: ICD-10-CM

## 2021-02-24 PROCEDURE — 3008F PR BODY MASS INDEX (BMI) DOCUMENTED: ICD-10-PCS | Mod: CPTII,S$GLB,, | Performed by: PODIATRIST

## 2021-02-24 PROCEDURE — 99999 PR PBB SHADOW E&M-EST. PATIENT-LVL IV: CPT | Mod: PBBFAC,,, | Performed by: PODIATRIST

## 2021-02-24 PROCEDURE — 11056 PARNG/CUTG B9 HYPRKR LES 2-4: CPT | Mod: Q9,S$GLB,, | Performed by: PODIATRIST

## 2021-02-24 PROCEDURE — 3008F BODY MASS INDEX DOCD: CPT | Mod: CPTII,S$GLB,, | Performed by: PODIATRIST

## 2021-02-24 PROCEDURE — 99999 PR PBB SHADOW E&M-EST. PATIENT-LVL IV: ICD-10-PCS | Mod: PBBFAC,,, | Performed by: PODIATRIST

## 2021-02-24 PROCEDURE — 11721 PR DEBRIDEMENT OF NAILS, 6 OR MORE: ICD-10-PCS | Mod: 59,Q9,S$GLB, | Performed by: PODIATRIST

## 2021-02-24 PROCEDURE — 11056 PR TRIM BENIGN HYPERKERATOTIC SKIN LESION,2-4: ICD-10-PCS | Mod: Q9,S$GLB,, | Performed by: PODIATRIST

## 2021-02-24 PROCEDURE — 3044F PR MOST RECENT HEMOGLOBIN A1C LEVEL <7.0%: ICD-10-PCS | Mod: CPTII,S$GLB,, | Performed by: PODIATRIST

## 2021-02-24 PROCEDURE — 11721 DEBRIDE NAIL 6 OR MORE: CPT | Mod: 59,Q9,S$GLB, | Performed by: PODIATRIST

## 2021-02-24 PROCEDURE — 99213 PR OFFICE/OUTPT VISIT, EST, LEVL III, 20-29 MIN: ICD-10-PCS | Mod: 25,S$GLB,, | Performed by: PODIATRIST

## 2021-02-24 PROCEDURE — 1126F PR PAIN SEVERITY QUANTIFIED, NO PAIN PRESENT: ICD-10-PCS | Mod: S$GLB,,, | Performed by: PODIATRIST

## 2021-02-24 PROCEDURE — 99213 OFFICE O/P EST LOW 20 MIN: CPT | Mod: 25,S$GLB,, | Performed by: PODIATRIST

## 2021-02-24 PROCEDURE — 1126F AMNT PAIN NOTED NONE PRSNT: CPT | Mod: S$GLB,,, | Performed by: PODIATRIST

## 2021-02-24 PROCEDURE — 3044F HG A1C LEVEL LT 7.0%: CPT | Mod: CPTII,S$GLB,, | Performed by: PODIATRIST

## 2021-04-05 DIAGNOSIS — G89.4 CHRONIC PAIN SYNDROME: Chronic | ICD-10-CM

## 2021-04-05 RX ORDER — AMITRIPTYLINE HYDROCHLORIDE 25 MG/1
25 TABLET, FILM COATED ORAL NIGHTLY
Qty: 90 TABLET | Refills: 3 | Status: SHIPPED | OUTPATIENT
Start: 2021-04-05 | End: 2022-03-21 | Stop reason: SDUPTHER

## 2021-04-07 RX ORDER — LEVOTHYROXINE SODIUM 150 UG/1
150 TABLET ORAL DAILY
Qty: 30 TABLET | Refills: 5 | Status: SHIPPED | OUTPATIENT
Start: 2021-04-07 | End: 2021-10-08

## 2021-04-12 ENCOUNTER — IMMUNIZATION (OUTPATIENT)
Dept: FAMILY MEDICINE | Facility: CLINIC | Age: 56
End: 2021-04-12
Payer: MEDICARE

## 2021-04-12 DIAGNOSIS — Z23 NEED FOR VACCINATION: Primary | ICD-10-CM

## 2021-04-12 PROCEDURE — 91300 COVID-19, MRNA, LNP-S, PF, 30 MCG/0.3 ML DOSE VACCINE: CPT | Mod: PBBFAC | Performed by: FAMILY MEDICINE

## 2021-05-03 ENCOUNTER — IMMUNIZATION (OUTPATIENT)
Dept: FAMILY MEDICINE | Facility: CLINIC | Age: 56
End: 2021-05-03
Payer: MEDICARE

## 2021-05-03 DIAGNOSIS — Z23 NEED FOR VACCINATION: Primary | ICD-10-CM

## 2021-05-03 PROCEDURE — 91300 COVID-19, MRNA, LNP-S, PF, 30 MCG/0.3 ML DOSE VACCINE: CPT | Mod: PBBFAC | Performed by: FAMILY MEDICINE

## 2021-05-03 PROCEDURE — 0002A COVID-19, MRNA, LNP-S, PF, 30 MCG/0.3 ML DOSE VACCINE: CPT | Mod: PBBFAC | Performed by: FAMILY MEDICINE

## 2021-05-04 ENCOUNTER — TELEPHONE (OUTPATIENT)
Dept: FAMILY MEDICINE | Facility: CLINIC | Age: 56
End: 2021-05-04

## 2021-05-04 DIAGNOSIS — Z12.31 ENCOUNTER FOR SCREENING MAMMOGRAM FOR MALIGNANT NEOPLASM OF BREAST: Primary | ICD-10-CM

## 2021-05-13 DIAGNOSIS — E11.9 TYPE 2 DIABETES MELLITUS WITHOUT COMPLICATION: ICD-10-CM

## 2021-05-23 ENCOUNTER — PATIENT OUTREACH (OUTPATIENT)
Dept: ADMINISTRATIVE | Facility: OTHER | Age: 56
End: 2021-05-23

## 2021-05-24 ENCOUNTER — OFFICE VISIT (OUTPATIENT)
Dept: PODIATRY | Facility: CLINIC | Age: 56
End: 2021-05-24
Payer: MEDICARE

## 2021-05-24 VITALS — HEART RATE: 80 BPM | DIASTOLIC BLOOD PRESSURE: 83 MMHG | SYSTOLIC BLOOD PRESSURE: 150 MMHG

## 2021-05-24 DIAGNOSIS — E11.51 TYPE II DIABETES MELLITUS WITH PERIPHERAL CIRCULATORY DISORDER: ICD-10-CM

## 2021-05-24 DIAGNOSIS — B35.1 ONYCHOMYCOSIS DUE TO DERMATOPHYTE: ICD-10-CM

## 2021-05-24 DIAGNOSIS — E11.42 TYPE 2 DIABETES MELLITUS WITH DIABETIC POLYNEUROPATHY, WITHOUT LONG-TERM CURRENT USE OF INSULIN: Primary | ICD-10-CM

## 2021-05-24 PROCEDURE — 99999 PR PBB SHADOW E&M-EST. PATIENT-LVL IV: CPT | Mod: PBBFAC,,, | Performed by: PODIATRIST

## 2021-05-24 PROCEDURE — 3044F HG A1C LEVEL LT 7.0%: CPT | Mod: CPTII,S$GLB,, | Performed by: PODIATRIST

## 2021-05-24 PROCEDURE — 99999 PR PBB SHADOW E&M-EST. PATIENT-LVL IV: ICD-10-PCS | Mod: PBBFAC,,, | Performed by: PODIATRIST

## 2021-05-24 PROCEDURE — 99499 NO LOS: ICD-10-PCS | Mod: S$GLB,,, | Performed by: PODIATRIST

## 2021-05-24 PROCEDURE — 1126F AMNT PAIN NOTED NONE PRSNT: CPT | Mod: S$GLB,,, | Performed by: PODIATRIST

## 2021-05-24 PROCEDURE — 99499 UNLISTED E&M SERVICE: CPT | Mod: S$GLB,,, | Performed by: PODIATRIST

## 2021-05-24 PROCEDURE — 11721 PR DEBRIDEMENT OF NAILS, 6 OR MORE: ICD-10-PCS | Mod: Q9,S$GLB,, | Performed by: PODIATRIST

## 2021-05-24 PROCEDURE — 11721 DEBRIDE NAIL 6 OR MORE: CPT | Mod: Q9,S$GLB,, | Performed by: PODIATRIST

## 2021-05-24 PROCEDURE — 1126F PR PAIN SEVERITY QUANTIFIED, NO PAIN PRESENT: ICD-10-PCS | Mod: S$GLB,,, | Performed by: PODIATRIST

## 2021-05-24 PROCEDURE — 3044F PR MOST RECENT HEMOGLOBIN A1C LEVEL <7.0%: ICD-10-PCS | Mod: CPTII,S$GLB,, | Performed by: PODIATRIST

## 2021-06-06 DIAGNOSIS — I10 ESSENTIAL HYPERTENSION: Chronic | ICD-10-CM

## 2021-06-08 DIAGNOSIS — E78.5 DYSLIPIDEMIA: ICD-10-CM

## 2021-06-08 RX ORDER — FUROSEMIDE 40 MG/1
TABLET ORAL
Qty: 90 TABLET | Refills: 3 | Status: SHIPPED | OUTPATIENT
Start: 2021-06-08 | End: 2022-04-29 | Stop reason: SDUPTHER

## 2021-06-09 RX ORDER — ATORVASTATIN CALCIUM 20 MG/1
TABLET, FILM COATED ORAL
Qty: 90 TABLET | Refills: 0 | Status: SHIPPED | OUTPATIENT
Start: 2021-06-09 | End: 2021-08-16

## 2021-06-14 ENCOUNTER — LAB VISIT (OUTPATIENT)
Dept: LAB | Facility: HOSPITAL | Age: 56
End: 2021-06-14
Attending: EMERGENCY MEDICINE
Payer: MEDICARE

## 2021-06-14 DIAGNOSIS — E11.42 TYPE 2 DIABETES MELLITUS WITH DIABETIC POLYNEUROPATHY, WITHOUT LONG-TERM CURRENT USE OF INSULIN: Chronic | ICD-10-CM

## 2021-06-14 DIAGNOSIS — E78.2 MIXED HYPERLIPIDEMIA: Chronic | ICD-10-CM

## 2021-06-14 DIAGNOSIS — E89.0 POSTABLATIVE HYPOTHYROIDISM: ICD-10-CM

## 2021-06-14 DIAGNOSIS — G47.33 OSA (OBSTRUCTIVE SLEEP APNEA): Chronic | ICD-10-CM

## 2021-06-14 DIAGNOSIS — I10 ESSENTIAL HYPERTENSION: Chronic | ICD-10-CM

## 2021-06-14 LAB
ALBUMIN SERPL BCP-MCNC: 3.3 G/DL (ref 3.5–5.2)
ALBUMIN SERPL BCP-MCNC: 3.3 G/DL (ref 3.5–5.2)
ALP SERPL-CCNC: 73 U/L (ref 55–135)
ALP SERPL-CCNC: 73 U/L (ref 55–135)
ALT SERPL W/O P-5'-P-CCNC: 15 U/L (ref 10–44)
ALT SERPL W/O P-5'-P-CCNC: 15 U/L (ref 10–44)
ANION GAP SERPL CALC-SCNC: 9 MMOL/L (ref 8–16)
ANION GAP SERPL CALC-SCNC: 9 MMOL/L (ref 8–16)
AST SERPL-CCNC: 17 U/L (ref 10–40)
AST SERPL-CCNC: 17 U/L (ref 10–40)
BILIRUB SERPL-MCNC: 0.3 MG/DL (ref 0.1–1)
BILIRUB SERPL-MCNC: 0.3 MG/DL (ref 0.1–1)
BUN SERPL-MCNC: 8 MG/DL (ref 6–20)
BUN SERPL-MCNC: 8 MG/DL (ref 6–20)
CALCIUM SERPL-MCNC: 9.2 MG/DL (ref 8.7–10.5)
CALCIUM SERPL-MCNC: 9.2 MG/DL (ref 8.7–10.5)
CHLORIDE SERPL-SCNC: 104 MMOL/L (ref 95–110)
CHLORIDE SERPL-SCNC: 104 MMOL/L (ref 95–110)
CHOLEST SERPL-MCNC: 148 MG/DL (ref 120–199)
CHOLEST SERPL-MCNC: 148 MG/DL (ref 120–199)
CHOLEST/HDLC SERPL: 3.7 {RATIO} (ref 2–5)
CHOLEST/HDLC SERPL: 3.7 {RATIO} (ref 2–5)
CO2 SERPL-SCNC: 26 MMOL/L (ref 23–29)
CO2 SERPL-SCNC: 26 MMOL/L (ref 23–29)
CREAT SERPL-MCNC: 0.7 MG/DL (ref 0.5–1.4)
CREAT SERPL-MCNC: 0.7 MG/DL (ref 0.5–1.4)
EST. GFR  (AFRICAN AMERICAN): >60 ML/MIN/1.73 M^2
EST. GFR  (AFRICAN AMERICAN): >60 ML/MIN/1.73 M^2
EST. GFR  (NON AFRICAN AMERICAN): >60 ML/MIN/1.73 M^2
EST. GFR  (NON AFRICAN AMERICAN): >60 ML/MIN/1.73 M^2
ESTIMATED AVG GLUCOSE: 123 MG/DL (ref 68–131)
GLUCOSE SERPL-MCNC: 111 MG/DL (ref 70–110)
GLUCOSE SERPL-MCNC: 111 MG/DL (ref 70–110)
HBA1C MFR BLD: 5.9 % (ref 4–5.6)
HDLC SERPL-MCNC: 40 MG/DL (ref 40–75)
HDLC SERPL-MCNC: 40 MG/DL (ref 40–75)
HDLC SERPL: 27 % (ref 20–50)
HDLC SERPL: 27 % (ref 20–50)
LDLC SERPL CALC-MCNC: 100 MG/DL (ref 63–159)
LDLC SERPL CALC-MCNC: 100 MG/DL (ref 63–159)
NONHDLC SERPL-MCNC: 108 MG/DL
NONHDLC SERPL-MCNC: 108 MG/DL
POTASSIUM SERPL-SCNC: 4.1 MMOL/L (ref 3.5–5.1)
POTASSIUM SERPL-SCNC: 4.1 MMOL/L (ref 3.5–5.1)
PROT SERPL-MCNC: 7.1 G/DL (ref 6–8.4)
PROT SERPL-MCNC: 7.1 G/DL (ref 6–8.4)
SODIUM SERPL-SCNC: 139 MMOL/L (ref 136–145)
SODIUM SERPL-SCNC: 139 MMOL/L (ref 136–145)
T4 FREE SERPL-MCNC: 0.98 NG/DL (ref 0.71–1.51)
TRIGL SERPL-MCNC: 40 MG/DL (ref 30–150)
TRIGL SERPL-MCNC: 40 MG/DL (ref 30–150)
TSH SERPL DL<=0.005 MIU/L-ACNC: 1.79 UIU/ML (ref 0.4–4)
TSH SERPL DL<=0.005 MIU/L-ACNC: 1.79 UIU/ML (ref 0.4–4)

## 2021-06-14 PROCEDURE — 80061 LIPID PANEL: CPT | Performed by: INTERNAL MEDICINE

## 2021-06-14 PROCEDURE — 36415 COLL VENOUS BLD VENIPUNCTURE: CPT | Mod: PO | Performed by: INTERNAL MEDICINE

## 2021-06-14 PROCEDURE — 84439 ASSAY OF FREE THYROXINE: CPT | Performed by: INTERNAL MEDICINE

## 2021-06-14 PROCEDURE — 84443 ASSAY THYROID STIM HORMONE: CPT | Performed by: INTERNAL MEDICINE

## 2021-06-14 PROCEDURE — 80053 COMPREHEN METABOLIC PANEL: CPT | Performed by: INTERNAL MEDICINE

## 2021-06-14 PROCEDURE — 83036 HEMOGLOBIN GLYCOSYLATED A1C: CPT | Performed by: EMERGENCY MEDICINE

## 2021-06-21 ENCOUNTER — OFFICE VISIT (OUTPATIENT)
Dept: FAMILY MEDICINE | Facility: CLINIC | Age: 56
End: 2021-06-21
Payer: MEDICARE

## 2021-06-21 VITALS
OXYGEN SATURATION: 98 % | WEIGHT: 293 LBS | BODY MASS INDEX: 47.09 KG/M2 | DIASTOLIC BLOOD PRESSURE: 72 MMHG | SYSTOLIC BLOOD PRESSURE: 128 MMHG | HEART RATE: 92 BPM | HEIGHT: 66 IN

## 2021-06-21 DIAGNOSIS — I10 ESSENTIAL HYPERTENSION: Primary | Chronic | ICD-10-CM

## 2021-06-21 DIAGNOSIS — E11.42 TYPE 2 DIABETES MELLITUS WITH DIABETIC POLYNEUROPATHY, WITHOUT LONG-TERM CURRENT USE OF INSULIN: Chronic | ICD-10-CM

## 2021-06-21 DIAGNOSIS — G89.4 CHRONIC PAIN SYNDROME: Chronic | ICD-10-CM

## 2021-06-21 DIAGNOSIS — E78.2 MIXED HYPERLIPIDEMIA: Chronic | ICD-10-CM

## 2021-06-21 DIAGNOSIS — E89.0 POSTABLATIVE HYPOTHYROIDISM: Chronic | ICD-10-CM

## 2021-06-21 DIAGNOSIS — G47.33 OSA (OBSTRUCTIVE SLEEP APNEA): Chronic | ICD-10-CM

## 2021-06-21 PROCEDURE — 99499 UNLISTED E&M SERVICE: CPT | Mod: S$GLB,,, | Performed by: EMERGENCY MEDICINE

## 2021-06-21 PROCEDURE — 3074F PR MOST RECENT SYSTOLIC BLOOD PRESSURE < 130 MM HG: ICD-10-PCS | Mod: CPTII,S$GLB,, | Performed by: EMERGENCY MEDICINE

## 2021-06-21 PROCEDURE — 99214 PR OFFICE/OUTPT VISIT, EST, LEVL IV, 30-39 MIN: ICD-10-PCS | Mod: S$GLB,,, | Performed by: EMERGENCY MEDICINE

## 2021-06-21 PROCEDURE — 3078F DIAST BP <80 MM HG: CPT | Mod: CPTII,S$GLB,, | Performed by: EMERGENCY MEDICINE

## 2021-06-21 PROCEDURE — 3008F PR BODY MASS INDEX (BMI) DOCUMENTED: ICD-10-PCS | Mod: CPTII,S$GLB,, | Performed by: EMERGENCY MEDICINE

## 2021-06-21 PROCEDURE — 3078F PR MOST RECENT DIASTOLIC BLOOD PRESSURE < 80 MM HG: ICD-10-PCS | Mod: CPTII,S$GLB,, | Performed by: EMERGENCY MEDICINE

## 2021-06-21 PROCEDURE — 99214 OFFICE O/P EST MOD 30 MIN: CPT | Mod: S$GLB,,, | Performed by: EMERGENCY MEDICINE

## 2021-06-21 PROCEDURE — 99999 PR PBB SHADOW E&M-EST. PATIENT-LVL IV: CPT | Mod: PBBFAC,,, | Performed by: EMERGENCY MEDICINE

## 2021-06-21 PROCEDURE — 3044F PR MOST RECENT HEMOGLOBIN A1C LEVEL <7.0%: ICD-10-PCS | Mod: CPTII,S$GLB,, | Performed by: EMERGENCY MEDICINE

## 2021-06-21 PROCEDURE — 3008F BODY MASS INDEX DOCD: CPT | Mod: CPTII,S$GLB,, | Performed by: EMERGENCY MEDICINE

## 2021-06-21 PROCEDURE — 99499 RISK ADDL DX/OHS AUDIT: ICD-10-PCS | Mod: S$GLB,,, | Performed by: EMERGENCY MEDICINE

## 2021-06-21 PROCEDURE — 3044F HG A1C LEVEL LT 7.0%: CPT | Mod: CPTII,S$GLB,, | Performed by: EMERGENCY MEDICINE

## 2021-06-21 PROCEDURE — 3074F SYST BP LT 130 MM HG: CPT | Mod: CPTII,S$GLB,, | Performed by: EMERGENCY MEDICINE

## 2021-06-21 PROCEDURE — 99999 PR PBB SHADOW E&M-EST. PATIENT-LVL IV: ICD-10-PCS | Mod: PBBFAC,,, | Performed by: EMERGENCY MEDICINE

## 2021-06-21 RX ORDER — AZELASTINE 1 MG/ML
1 SPRAY, METERED NASAL 2 TIMES DAILY
Qty: 30 ML | Refills: 6 | Status: SHIPPED | OUTPATIENT
Start: 2021-06-21 | End: 2022-01-21 | Stop reason: SDUPTHER

## 2021-07-07 ENCOUNTER — PATIENT MESSAGE (OUTPATIENT)
Dept: ADMINISTRATIVE | Facility: HOSPITAL | Age: 56
End: 2021-07-07

## 2021-08-02 ENCOUNTER — CLINICAL SUPPORT (OUTPATIENT)
Dept: CARDIOLOGY | Facility: HOSPITAL | Age: 56
End: 2021-08-02
Attending: INTERNAL MEDICINE
Payer: MEDICARE

## 2021-08-02 VITALS — WEIGHT: 293 LBS | HEIGHT: 66 IN | BODY MASS INDEX: 47.09 KG/M2

## 2021-08-02 DIAGNOSIS — E11.42 TYPE 2 DIABETES MELLITUS WITH DIABETIC POLYNEUROPATHY, WITHOUT LONG-TERM CURRENT USE OF INSULIN: ICD-10-CM

## 2021-08-02 DIAGNOSIS — G47.33 OSA (OBSTRUCTIVE SLEEP APNEA): ICD-10-CM

## 2021-08-02 DIAGNOSIS — E78.2 MIXED HYPERLIPIDEMIA: ICD-10-CM

## 2021-08-02 DIAGNOSIS — I10 ESSENTIAL HYPERTENSION: ICD-10-CM

## 2021-08-02 PROCEDURE — 93306 ECHO (CUPID ONLY): ICD-10-PCS | Mod: 26,,, | Performed by: INTERNAL MEDICINE

## 2021-08-02 PROCEDURE — 93306 TTE W/DOPPLER COMPLETE: CPT | Mod: 26,,, | Performed by: INTERNAL MEDICINE

## 2021-08-02 PROCEDURE — 93306 TTE W/DOPPLER COMPLETE: CPT | Mod: PO

## 2021-08-03 LAB
ASCENDING AORTA: 3.14 CM
AV INDEX (PROSTH): 0.91
AV MEAN GRADIENT: 5 MMHG
AV PEAK GRADIENT: 9 MMHG
AV VALVE AREA: 3.47 CM2
AV VELOCITY RATIO: 0.85
BSA FOR ECHO PROCEDURE: 2.6 M2
CV ECHO LV RWT: 0.39 CM
DOP CALC AO PEAK VEL: 1.47 M/S
DOP CALC AO VTI: 32.45 CM
DOP CALC LVOT AREA: 3.8 CM2
DOP CALC LVOT DIAMETER: 2.21 CM
DOP CALC LVOT PEAK VEL: 1.25 M/S
DOP CALC LVOT STROKE VOLUME: 112.61 CM3
DOP CALCLVOT PEAK VEL VTI: 29.37 CM
E WAVE DECELERATION TIME: 128.38 MSEC
E/A RATIO: 0.68
E/E' RATIO: 7.73 M/S
ECHO LV POSTERIOR WALL: 1.01 CM (ref 0.6–1.1)
EJECTION FRACTION: 65 %
FRACTIONAL SHORTENING: 38 % (ref 28–44)
INTERVENTRICULAR SEPTUM: 1.13 CM (ref 0.6–1.1)
LA MAJOR: 5.48 CM
LA MINOR: 4.94 CM
LA WIDTH: 4.69 CM
LEFT ATRIUM SIZE: 4.37 CM
LEFT ATRIUM VOLUME INDEX: 37.1 ML/M2
LEFT ATRIUM VOLUME: 90.52 CM3
LEFT INTERNAL DIMENSION IN SYSTOLE: 3.16 CM (ref 2.1–4)
LEFT VENTRICLE DIASTOLIC VOLUME INDEX: 51.31 ML/M2
LEFT VENTRICLE DIASTOLIC VOLUME: 125.19 ML
LEFT VENTRICLE MASS INDEX: 85 G/M2
LEFT VENTRICLE SYSTOLIC VOLUME INDEX: 16.3 ML/M2
LEFT VENTRICLE SYSTOLIC VOLUME: 39.65 ML
LEFT VENTRICULAR INTERNAL DIMENSION IN DIASTOLE: 5.12 CM (ref 3.5–6)
LEFT VENTRICULAR MASS: 207.3 G
LV LATERAL E/E' RATIO: 6.44 M/S
LV SEPTAL E/E' RATIO: 9.67 M/S
MV PEAK A VEL: 0.85 M/S
MV PEAK E VEL: 0.58 M/S
MV STENOSIS PRESSURE HALF TIME: 37.23 MS
MV VALVE AREA P 1/2 METHOD: 5.91 CM2
PISA TR MAX VEL: 2.09 M/S
RA MAJOR: 4.27 CM
RA PRESSURE: 3 MMHG
RA WIDTH: 3.44 CM
RIGHT VENTRICULAR END-DIASTOLIC DIMENSION: 3.89 CM
RV TISSUE DOPPLER FREE WALL SYSTOLIC VELOCITY 1 (APICAL 4 CHAMBER VIEW): 14.51 CM/S
SINUS: 2.97 CM
STJ: 3.2 CM
TDI LATERAL: 0.09 M/S
TDI SEPTAL: 0.06 M/S
TDI: 0.08 M/S
TR MAX PG: 17 MMHG
TV REST PULMONARY ARTERY PRESSURE: 20 MMHG

## 2021-08-05 DIAGNOSIS — E11.9 TYPE 2 DIABETES MELLITUS WITHOUT COMPLICATION, WITHOUT LONG-TERM CURRENT USE OF INSULIN: ICD-10-CM

## 2021-08-06 RX ORDER — METFORMIN HYDROCHLORIDE 1000 MG/1
TABLET ORAL
Qty: 90 TABLET | Refills: 1 | Status: SHIPPED | OUTPATIENT
Start: 2021-08-06 | End: 2021-11-02

## 2021-08-13 DIAGNOSIS — E78.5 DYSLIPIDEMIA: ICD-10-CM

## 2021-08-14 RX ORDER — ESTRADIOL 1 MG/1
TABLET ORAL
Qty: 90 TABLET | Refills: 0 | Status: SHIPPED | OUTPATIENT
Start: 2021-08-14 | End: 2021-10-28

## 2021-08-14 RX ORDER — ESTRADIOL 1 MG/1
TABLET ORAL
Qty: 30 TABLET | Refills: 0 | Status: SHIPPED | OUTPATIENT
Start: 2021-08-14 | End: 2021-08-14

## 2021-08-16 RX ORDER — ATORVASTATIN CALCIUM 20 MG/1
TABLET, FILM COATED ORAL
Qty: 90 TABLET | Refills: 3 | Status: SHIPPED | OUTPATIENT
Start: 2021-08-16 | End: 2022-07-08 | Stop reason: SDUPTHER

## 2021-08-19 ENCOUNTER — HOSPITAL ENCOUNTER (OUTPATIENT)
Dept: RADIOLOGY | Facility: HOSPITAL | Age: 56
Discharge: HOME | End: 2021-08-19
Attending: EMERGENCY MEDICINE
Payer: MEDICARE

## 2021-08-19 DIAGNOSIS — Z12.31 ENCOUNTER FOR SCREENING MAMMOGRAM FOR MALIGNANT NEOPLASM OF BREAST: ICD-10-CM

## 2021-08-19 PROCEDURE — 77063 BREAST TOMOSYNTHESIS BI: CPT | Mod: 26,,, | Performed by: RADIOLOGY

## 2021-08-19 PROCEDURE — 77067 SCR MAMMO BI INCL CAD: CPT | Mod: 26,,, | Performed by: RADIOLOGY

## 2021-08-19 PROCEDURE — 77063 MAMMO DIGITAL SCREENING BILAT WITH TOMO: ICD-10-PCS | Mod: 26,,, | Performed by: RADIOLOGY

## 2021-08-19 PROCEDURE — 77067 SCR MAMMO BI INCL CAD: CPT | Mod: TC,PO

## 2021-08-19 PROCEDURE — 77067 MAMMO DIGITAL SCREENING BILAT WITH TOMO: ICD-10-PCS | Mod: 26,,, | Performed by: RADIOLOGY

## 2021-08-23 ENCOUNTER — OFFICE VISIT (OUTPATIENT)
Dept: PODIATRY | Facility: CLINIC | Age: 56
End: 2021-08-23
Payer: MEDICARE

## 2021-08-23 VITALS
SYSTOLIC BLOOD PRESSURE: 145 MMHG | WEIGHT: 293 LBS | HEIGHT: 66 IN | BODY MASS INDEX: 47.09 KG/M2 | HEART RATE: 88 BPM | DIASTOLIC BLOOD PRESSURE: 99 MMHG | RESPIRATION RATE: 20 BRPM

## 2021-08-23 DIAGNOSIS — B35.1 ONYCHOMYCOSIS DUE TO DERMATOPHYTE: ICD-10-CM

## 2021-08-23 DIAGNOSIS — E11.51 TYPE II DIABETES MELLITUS WITH PERIPHERAL CIRCULATORY DISORDER: ICD-10-CM

## 2021-08-23 DIAGNOSIS — E11.42 TYPE 2 DIABETES MELLITUS WITH DIABETIC POLYNEUROPATHY, WITHOUT LONG-TERM CURRENT USE OF INSULIN: Primary | ICD-10-CM

## 2021-08-23 DIAGNOSIS — L84 CORN OR CALLUS: ICD-10-CM

## 2021-08-23 PROCEDURE — 3008F BODY MASS INDEX DOCD: CPT | Mod: CPTII,S$GLB,, | Performed by: PODIATRIST

## 2021-08-23 PROCEDURE — 1125F AMNT PAIN NOTED PAIN PRSNT: CPT | Mod: CPTII,S$GLB,, | Performed by: PODIATRIST

## 2021-08-23 PROCEDURE — 3080F DIAST BP >= 90 MM HG: CPT | Mod: CPTII,S$GLB,, | Performed by: PODIATRIST

## 2021-08-23 PROCEDURE — 3077F PR MOST RECENT SYSTOLIC BLOOD PRESSURE >= 140 MM HG: ICD-10-PCS | Mod: CPTII,S$GLB,, | Performed by: PODIATRIST

## 2021-08-23 PROCEDURE — 11721 PR DEBRIDEMENT OF NAILS, 6 OR MORE: ICD-10-PCS | Mod: Q9,S$GLB,, | Performed by: PODIATRIST

## 2021-08-23 PROCEDURE — 3044F HG A1C LEVEL LT 7.0%: CPT | Mod: CPTII,S$GLB,, | Performed by: PODIATRIST

## 2021-08-23 PROCEDURE — 99499 UNLISTED E&M SERVICE: CPT | Mod: S$GLB,,, | Performed by: PODIATRIST

## 2021-08-23 PROCEDURE — 1160F PR REVIEW ALL MEDS BY PRESCRIBER/CLIN PHARMACIST DOCUMENTED: ICD-10-PCS | Mod: CPTII,S$GLB,, | Performed by: PODIATRIST

## 2021-08-23 PROCEDURE — 1125F PR PAIN SEVERITY QUANTIFIED, PAIN PRESENT: ICD-10-PCS | Mod: CPTII,S$GLB,, | Performed by: PODIATRIST

## 2021-08-23 PROCEDURE — 99999 PR PBB SHADOW E&M-EST. PATIENT-LVL V: ICD-10-PCS | Mod: PBBFAC,,, | Performed by: PODIATRIST

## 2021-08-23 PROCEDURE — 3077F SYST BP >= 140 MM HG: CPT | Mod: CPTII,S$GLB,, | Performed by: PODIATRIST

## 2021-08-23 PROCEDURE — 99999 PR PBB SHADOW E&M-EST. PATIENT-LVL V: CPT | Mod: PBBFAC,,, | Performed by: PODIATRIST

## 2021-08-23 PROCEDURE — 99499 NO LOS: ICD-10-PCS | Mod: S$GLB,,, | Performed by: PODIATRIST

## 2021-08-23 PROCEDURE — 1159F MED LIST DOCD IN RCRD: CPT | Mod: CPTII,S$GLB,, | Performed by: PODIATRIST

## 2021-08-23 PROCEDURE — 1159F PR MEDICATION LIST DOCUMENTED IN MEDICAL RECORD: ICD-10-PCS | Mod: CPTII,S$GLB,, | Performed by: PODIATRIST

## 2021-08-23 PROCEDURE — 3080F PR MOST RECENT DIASTOLIC BLOOD PRESSURE >= 90 MM HG: ICD-10-PCS | Mod: CPTII,S$GLB,, | Performed by: PODIATRIST

## 2021-08-23 PROCEDURE — 1160F RVW MEDS BY RX/DR IN RCRD: CPT | Mod: CPTII,S$GLB,, | Performed by: PODIATRIST

## 2021-08-23 PROCEDURE — 11721 DEBRIDE NAIL 6 OR MORE: CPT | Mod: Q9,S$GLB,, | Performed by: PODIATRIST

## 2021-08-23 PROCEDURE — 3008F PR BODY MASS INDEX (BMI) DOCUMENTED: ICD-10-PCS | Mod: CPTII,S$GLB,, | Performed by: PODIATRIST

## 2021-08-23 PROCEDURE — 3044F PR MOST RECENT HEMOGLOBIN A1C LEVEL <7.0%: ICD-10-PCS | Mod: CPTII,S$GLB,, | Performed by: PODIATRIST

## 2021-08-24 ENCOUNTER — OFFICE VISIT (OUTPATIENT)
Dept: CARDIOLOGY | Facility: CLINIC | Age: 56
End: 2021-08-24
Payer: MEDICARE

## 2021-08-24 VITALS
WEIGHT: 293 LBS | SYSTOLIC BLOOD PRESSURE: 150 MMHG | HEIGHT: 66 IN | HEART RATE: 82 BPM | DIASTOLIC BLOOD PRESSURE: 84 MMHG | BODY MASS INDEX: 47.09 KG/M2

## 2021-08-24 DIAGNOSIS — E11.42 TYPE 2 DIABETES MELLITUS WITH DIABETIC POLYNEUROPATHY, WITHOUT LONG-TERM CURRENT USE OF INSULIN: Chronic | ICD-10-CM

## 2021-08-24 DIAGNOSIS — E78.2 MIXED HYPERLIPIDEMIA: Primary | Chronic | ICD-10-CM

## 2021-08-24 DIAGNOSIS — I10 ESSENTIAL HYPERTENSION: Chronic | ICD-10-CM

## 2021-08-24 DIAGNOSIS — G47.33 OSA (OBSTRUCTIVE SLEEP APNEA): Chronic | ICD-10-CM

## 2021-08-24 PROCEDURE — 3008F PR BODY MASS INDEX (BMI) DOCUMENTED: ICD-10-PCS | Mod: CPTII,S$GLB,, | Performed by: INTERNAL MEDICINE

## 2021-08-24 PROCEDURE — 3079F DIAST BP 80-89 MM HG: CPT | Mod: CPTII,S$GLB,, | Performed by: INTERNAL MEDICINE

## 2021-08-24 PROCEDURE — 3008F BODY MASS INDEX DOCD: CPT | Mod: CPTII,S$GLB,, | Performed by: INTERNAL MEDICINE

## 2021-08-24 PROCEDURE — 1160F RVW MEDS BY RX/DR IN RCRD: CPT | Mod: CPTII,S$GLB,, | Performed by: INTERNAL MEDICINE

## 2021-08-24 PROCEDURE — 3077F SYST BP >= 140 MM HG: CPT | Mod: CPTII,S$GLB,, | Performed by: INTERNAL MEDICINE

## 2021-08-24 PROCEDURE — 1126F PR PAIN SEVERITY QUANTIFIED, NO PAIN PRESENT: ICD-10-PCS | Mod: CPTII,S$GLB,, | Performed by: INTERNAL MEDICINE

## 2021-08-24 PROCEDURE — 1159F MED LIST DOCD IN RCRD: CPT | Mod: CPTII,S$GLB,, | Performed by: INTERNAL MEDICINE

## 2021-08-24 PROCEDURE — 1126F AMNT PAIN NOTED NONE PRSNT: CPT | Mod: CPTII,S$GLB,, | Performed by: INTERNAL MEDICINE

## 2021-08-24 PROCEDURE — 3079F PR MOST RECENT DIASTOLIC BLOOD PRESSURE 80-89 MM HG: ICD-10-PCS | Mod: CPTII,S$GLB,, | Performed by: INTERNAL MEDICINE

## 2021-08-24 PROCEDURE — 99214 OFFICE O/P EST MOD 30 MIN: CPT | Mod: S$GLB,,, | Performed by: INTERNAL MEDICINE

## 2021-08-24 PROCEDURE — 99999 PR PBB SHADOW E&M-EST. PATIENT-LVL IV: CPT | Mod: PBBFAC,,, | Performed by: INTERNAL MEDICINE

## 2021-08-24 PROCEDURE — 3077F PR MOST RECENT SYSTOLIC BLOOD PRESSURE >= 140 MM HG: ICD-10-PCS | Mod: CPTII,S$GLB,, | Performed by: INTERNAL MEDICINE

## 2021-08-24 PROCEDURE — 3044F PR MOST RECENT HEMOGLOBIN A1C LEVEL <7.0%: ICD-10-PCS | Mod: CPTII,S$GLB,, | Performed by: INTERNAL MEDICINE

## 2021-08-24 PROCEDURE — 1160F PR REVIEW ALL MEDS BY PRESCRIBER/CLIN PHARMACIST DOCUMENTED: ICD-10-PCS | Mod: CPTII,S$GLB,, | Performed by: INTERNAL MEDICINE

## 2021-08-24 PROCEDURE — 99214 PR OFFICE/OUTPT VISIT, EST, LEVL IV, 30-39 MIN: ICD-10-PCS | Mod: S$GLB,,, | Performed by: INTERNAL MEDICINE

## 2021-08-24 PROCEDURE — 3044F HG A1C LEVEL LT 7.0%: CPT | Mod: CPTII,S$GLB,, | Performed by: INTERNAL MEDICINE

## 2021-08-24 PROCEDURE — 1159F PR MEDICATION LIST DOCUMENTED IN MEDICAL RECORD: ICD-10-PCS | Mod: CPTII,S$GLB,, | Performed by: INTERNAL MEDICINE

## 2021-08-24 PROCEDURE — 99999 PR PBB SHADOW E&M-EST. PATIENT-LVL IV: ICD-10-PCS | Mod: PBBFAC,,, | Performed by: INTERNAL MEDICINE

## 2021-10-08 RX ORDER — LEVOTHYROXINE SODIUM 150 UG/1
TABLET ORAL
Qty: 90 TABLET | Refills: 0 | Status: SHIPPED | OUTPATIENT
Start: 2021-10-08 | End: 2022-01-21

## 2021-10-27 DIAGNOSIS — E11.9 TYPE 2 DIABETES MELLITUS WITHOUT COMPLICATION, WITHOUT LONG-TERM CURRENT USE OF INSULIN: ICD-10-CM

## 2021-10-27 DIAGNOSIS — I10 ESSENTIAL HYPERTENSION: Chronic | ICD-10-CM

## 2021-10-28 RX ORDER — ESTRADIOL 1 MG/1
TABLET ORAL
Qty: 90 TABLET | Refills: 0 | Status: SHIPPED | OUTPATIENT
Start: 2021-10-28 | End: 2022-01-21

## 2021-10-28 RX ORDER — LISINOPRIL 20 MG/1
TABLET ORAL
Qty: 90 TABLET | Refills: 3 | Status: SHIPPED | OUTPATIENT
Start: 2021-10-28 | End: 2022-12-07

## 2021-10-28 RX ORDER — AMLODIPINE BESYLATE 10 MG/1
TABLET ORAL
Qty: 90 TABLET | Refills: 3 | Status: SHIPPED | OUTPATIENT
Start: 2021-10-28 | End: 2022-07-20

## 2021-10-29 ENCOUNTER — TELEPHONE (OUTPATIENT)
Dept: FAMILY MEDICINE | Facility: CLINIC | Age: 56
End: 2021-10-29
Payer: MEDICARE

## 2021-10-29 DIAGNOSIS — E11.42 TYPE 2 DIABETES MELLITUS WITH DIABETIC POLYNEUROPATHY, WITHOUT LONG-TERM CURRENT USE OF INSULIN: Primary | ICD-10-CM

## 2021-10-29 RX ORDER — INSULIN PUMP SYRINGE, 3 ML
EACH MISCELLANEOUS
Qty: 100 EACH | Refills: 11 | Status: SHIPPED | OUTPATIENT
Start: 2021-10-29 | End: 2023-12-11 | Stop reason: SDUPTHER

## 2021-10-29 RX ORDER — LANCETS
EACH MISCELLANEOUS
Qty: 60 EACH | Refills: 11 | Status: SHIPPED | OUTPATIENT
Start: 2021-10-29 | End: 2022-05-28 | Stop reason: SDUPTHER

## 2021-10-30 DIAGNOSIS — I10 ESSENTIAL HYPERTENSION: Chronic | ICD-10-CM

## 2021-10-30 DIAGNOSIS — E11.9 TYPE 2 DIABETES MELLITUS WITHOUT COMPLICATION, WITHOUT LONG-TERM CURRENT USE OF INSULIN: ICD-10-CM

## 2021-11-02 RX ORDER — POTASSIUM CHLORIDE 20 MEQ/1
TABLET, EXTENDED RELEASE ORAL
Qty: 180 TABLET | Refills: 2 | Status: SHIPPED | OUTPATIENT
Start: 2021-11-02 | End: 2022-07-20

## 2021-11-02 RX ORDER — METFORMIN HYDROCHLORIDE 1000 MG/1
TABLET ORAL
Qty: 90 TABLET | Refills: 0 | Status: SHIPPED | OUTPATIENT
Start: 2021-11-02 | End: 2022-04-29 | Stop reason: SDUPTHER

## 2021-12-06 ENCOUNTER — OFFICE VISIT (OUTPATIENT)
Dept: FAMILY MEDICINE | Facility: CLINIC | Age: 56
End: 2021-12-06
Payer: MEDICARE

## 2021-12-06 VITALS
HEIGHT: 66 IN | HEART RATE: 85 BPM | OXYGEN SATURATION: 96 % | BODY MASS INDEX: 47.09 KG/M2 | DIASTOLIC BLOOD PRESSURE: 82 MMHG | WEIGHT: 293 LBS | SYSTOLIC BLOOD PRESSURE: 124 MMHG

## 2021-12-06 DIAGNOSIS — G47.33 OSA (OBSTRUCTIVE SLEEP APNEA): Chronic | ICD-10-CM

## 2021-12-06 DIAGNOSIS — E89.0 POSTABLATIVE HYPOTHYROIDISM: Chronic | ICD-10-CM

## 2021-12-06 DIAGNOSIS — G89.4 CHRONIC PAIN SYNDROME: Chronic | ICD-10-CM

## 2021-12-06 DIAGNOSIS — E78.2 MIXED HYPERLIPIDEMIA: Chronic | ICD-10-CM

## 2021-12-06 DIAGNOSIS — I10 ESSENTIAL HYPERTENSION: Primary | Chronic | ICD-10-CM

## 2021-12-06 DIAGNOSIS — E11.42 TYPE 2 DIABETES MELLITUS WITH DIABETIC POLYNEUROPATHY, WITHOUT LONG-TERM CURRENT USE OF INSULIN: Chronic | ICD-10-CM

## 2021-12-06 DIAGNOSIS — K21.9 GASTROESOPHAGEAL REFLUX DISEASE WITHOUT ESOPHAGITIS: Chronic | ICD-10-CM

## 2021-12-06 PROCEDURE — 4010F ACE/ARB THERAPY RXD/TAKEN: CPT | Mod: CPTII,S$GLB,, | Performed by: EMERGENCY MEDICINE

## 2021-12-06 PROCEDURE — G0008 ADMIN INFLUENZA VIRUS VAC: HCPCS | Mod: S$GLB,,, | Performed by: EMERGENCY MEDICINE

## 2021-12-06 PROCEDURE — 99999 PR PBB SHADOW E&M-EST. PATIENT-LVL V: ICD-10-PCS | Mod: PBBFAC,,, | Performed by: EMERGENCY MEDICINE

## 2021-12-06 PROCEDURE — 90686 FLU VACCINE (QUAD) GREATER THAN OR EQUAL TO 3YO PRESERVATIVE FREE IM: ICD-10-PCS | Mod: S$GLB,,, | Performed by: EMERGENCY MEDICINE

## 2021-12-06 PROCEDURE — 99214 OFFICE O/P EST MOD 30 MIN: CPT | Mod: S$GLB,,, | Performed by: EMERGENCY MEDICINE

## 2021-12-06 PROCEDURE — 99999 PR PBB SHADOW E&M-EST. PATIENT-LVL V: CPT | Mod: PBBFAC,,, | Performed by: EMERGENCY MEDICINE

## 2021-12-06 PROCEDURE — 99214 PR OFFICE/OUTPT VISIT, EST, LEVL IV, 30-39 MIN: ICD-10-PCS | Mod: S$GLB,,, | Performed by: EMERGENCY MEDICINE

## 2021-12-06 PROCEDURE — 4010F PR ACE/ARB THEARPY RXD/TAKEN: ICD-10-PCS | Mod: CPTII,S$GLB,, | Performed by: EMERGENCY MEDICINE

## 2021-12-06 PROCEDURE — 90686 IIV4 VACC NO PRSV 0.5 ML IM: CPT | Mod: S$GLB,,, | Performed by: EMERGENCY MEDICINE

## 2021-12-06 PROCEDURE — 99499 RISK ADDL DX/OHS AUDIT: ICD-10-PCS | Mod: S$GLB,,, | Performed by: EMERGENCY MEDICINE

## 2021-12-06 PROCEDURE — 99499 UNLISTED E&M SERVICE: CPT | Mod: S$GLB,,, | Performed by: EMERGENCY MEDICINE

## 2021-12-06 PROCEDURE — G0008 FLU VACCINE (QUAD) GREATER THAN OR EQUAL TO 3YO PRESERVATIVE FREE IM: ICD-10-PCS | Mod: S$GLB,,, | Performed by: EMERGENCY MEDICINE

## 2021-12-14 ENCOUNTER — OFFICE VISIT (OUTPATIENT)
Dept: PODIATRY | Facility: CLINIC | Age: 56
End: 2021-12-14
Payer: MEDICARE

## 2021-12-14 ENCOUNTER — IMMUNIZATION (OUTPATIENT)
Dept: FAMILY MEDICINE | Facility: CLINIC | Age: 56
End: 2021-12-14
Payer: MEDICARE

## 2021-12-14 DIAGNOSIS — B35.1 ONYCHOMYCOSIS DUE TO DERMATOPHYTE: ICD-10-CM

## 2021-12-14 DIAGNOSIS — E11.42 TYPE 2 DIABETES MELLITUS WITH DIABETIC POLYNEUROPATHY, WITHOUT LONG-TERM CURRENT USE OF INSULIN: Primary | ICD-10-CM

## 2021-12-14 DIAGNOSIS — L84 CORN OR CALLUS: ICD-10-CM

## 2021-12-14 DIAGNOSIS — Z23 NEED FOR VACCINATION: Primary | ICD-10-CM

## 2021-12-14 DIAGNOSIS — E11.51 TYPE II DIABETES MELLITUS WITH PERIPHERAL CIRCULATORY DISORDER: ICD-10-CM

## 2021-12-14 PROCEDURE — 99499 NO LOS: ICD-10-PCS | Mod: S$GLB,,, | Performed by: PODIATRIST

## 2021-12-14 PROCEDURE — 1159F PR MEDICATION LIST DOCUMENTED IN MEDICAL RECORD: ICD-10-PCS | Mod: CPTII,S$GLB,, | Performed by: PODIATRIST

## 2021-12-14 PROCEDURE — 0004A COVID-19, MRNA, LNP-S, PF, 30 MCG/0.3 ML DOSE VACCINE: CPT | Mod: PBBFAC | Performed by: RADIOLOGY

## 2021-12-14 PROCEDURE — 3044F PR MOST RECENT HEMOGLOBIN A1C LEVEL <7.0%: ICD-10-PCS | Mod: CPTII,S$GLB,, | Performed by: PODIATRIST

## 2021-12-14 PROCEDURE — 4010F ACE/ARB THERAPY RXD/TAKEN: CPT | Mod: CPTII,S$GLB,, | Performed by: PODIATRIST

## 2021-12-14 PROCEDURE — 1160F RVW MEDS BY RX/DR IN RCRD: CPT | Mod: CPTII,S$GLB,, | Performed by: PODIATRIST

## 2021-12-14 PROCEDURE — 3044F HG A1C LEVEL LT 7.0%: CPT | Mod: CPTII,S$GLB,, | Performed by: PODIATRIST

## 2021-12-14 PROCEDURE — 99999 PR PBB SHADOW E&M-EST. PATIENT-LVL III: ICD-10-PCS | Mod: PBBFAC,,, | Performed by: PODIATRIST

## 2021-12-14 PROCEDURE — 99999 PR PBB SHADOW E&M-EST. PATIENT-LVL III: CPT | Mod: PBBFAC,,, | Performed by: PODIATRIST

## 2021-12-14 PROCEDURE — 11721 DEBRIDE NAIL 6 OR MORE: CPT | Mod: Q9,S$GLB,, | Performed by: PODIATRIST

## 2021-12-14 PROCEDURE — 99499 UNLISTED E&M SERVICE: CPT | Mod: S$GLB,,, | Performed by: PODIATRIST

## 2021-12-14 PROCEDURE — 11721 PR DEBRIDEMENT OF NAILS, 6 OR MORE: ICD-10-PCS | Mod: Q9,S$GLB,, | Performed by: PODIATRIST

## 2021-12-14 PROCEDURE — 1159F MED LIST DOCD IN RCRD: CPT | Mod: CPTII,S$GLB,, | Performed by: PODIATRIST

## 2021-12-14 PROCEDURE — 1160F PR REVIEW ALL MEDS BY PRESCRIBER/CLIN PHARMACIST DOCUMENTED: ICD-10-PCS | Mod: CPTII,S$GLB,, | Performed by: PODIATRIST

## 2021-12-14 PROCEDURE — 4010F PR ACE/ARB THEARPY RXD/TAKEN: ICD-10-PCS | Mod: CPTII,S$GLB,, | Performed by: PODIATRIST

## 2022-01-14 NOTE — TELEPHONE ENCOUNTER
----- Message from Annalise Ndiaye sent at 1/14/2022 10:07 AM CST -----  Contact: pt  Type:  RX Refill Request    Who Called:  pt  Refill or New Rx:  refill  RX Name and Strength:  azelastine (ASTELIN) 137 mcg (0.1 %) nasal spray  How is the patient currently taking it? (ex. 1XDay):  as directed  Is this a 30 day or 90 day RX:  30  Preferred Pharmacy with phone number:    [id number] - [facility] [address] [facility] [address]  [address] [place] [id number]  Phone: [phone] Fax: [phone]    Local or Mail Order:  local  Ordering Provider:  thais Watt Call Back Number:  [phone]  Additional Information:

## 2022-01-14 NOTE — TELEPHONE ENCOUNTER
Care Due:                  Date            Visit Type   Department     Provider  --------------------------------------------------------------------------------                                             [facility] FAMILY  Last Visit: 12-      None         MEDICINE       MIKE FRANCIS  Next Visit: None Scheduled  None         None Found                                                            Last  Test          Frequency    Reason                     Performed    Due Date  --------------------------------------------------------------------------------    HBA1C.......  6 months...  metFORMIN................  06- 12-    Powered by [employer]. Reference number: [id number].   1/14/2022 10:28:00 AM CST

## 2022-01-21 NOTE — TELEPHONE ENCOUNTER
No new care gaps identified.  Powered by [employer]. Reference number: [id number].   1/21/2022 9:48:05 AM CST

## 2022-01-25 NOTE — TELEPHONE ENCOUNTER
Jenni DC. Request already responded to by other means (e.g. phone or fax)   Refill Authorization Note   Amena Anderson  is requesting a refill authorization.  Brief Assessment and Rationale for Refill:  Quick Discontinue  Medication Therapy Plan:       Medication Reconciliation Completed:  No      Comments:   Pended Medication(s)       Requested Prescriptions     Pending Prescriptions Disp Refills    azelastine (ASTELIN) 137 mcg (0.1 %) nasal spray 30 mL 6     Si spray (137 mcg total) by Nasal route 2 (two) times daily.        Duplicate Pended Encounter(s)/ Last Prescribed Details: (includes pharmacy & prescriber details)   [facility] #[id number] - [place] [address] AT [facility] [address] [address]   Phone:  [phone]  Fax:  [phone]   GOLDIE #:  [id number]   LORENA Reason: --       Outpatient Medication Detail     Disp Refills Start End LORENA   azelastine (ASTELIN) 137 mcg (0.1 %) nasal spray 30 mL 6 2022  No   Sig - Route: 1 spray (137 mcg total) by Nasal route 2 (two) times daily. - Nasal   Sent to pharmacy as: azelastine (ASTELIN) 137 mcg (0.1 %) nasal spray   Class: Normal   Order: [id number]   Date/Time Signed: 2022 09:18       E-Prescribing Status: Receipt confirmed by pharmacy (2022  9:18 AM CST)     Ordering Encounter Report    Associated Reports   View Encounter                Note composed:1:47 PM 2022

## 2022-01-26 RX ORDER — AZELASTINE 1 MG/ML
1 SPRAY, METERED NASAL 2 TIMES DAILY
Qty: 30 ML | Refills: 6 | OUTPATIENT
Start: 2022-01-26

## 2022-01-26 RX ORDER — AZELASTINE 1 MG/ML
1 SPRAY, METERED NASAL 2 TIMES DAILY
Qty: 90 ML | Refills: 1 | Status: SHIPPED | OUTPATIENT
Start: 2022-01-26 | End: 2023-02-10

## 2022-01-26 NOTE — TELEPHONE ENCOUNTER
Refill Authorization Note   Amena Anderson  is requesting a refill authorization.  Brief Assessment and Rationale for Refill:  Approve     Medication Therapy Plan:       Medication Reconciliation Completed: No   Comments:   --->Care Gap information included below if applicable.   Orders Placed This Encounter    azelastine (ASTELIN) 137 mcg (0.1 %) nasal spray      Requested Prescriptions   Signed Prescriptions Disp Refills    azelastine (ASTELIN) 137 mcg (0.1 %) nasal spray 90 mL 1     Si spray (137 mcg total) by Nasal route 2 (two) times daily.       Ear, Nose, and Throat: Nasal Preparations - Antiallergy Passed - 2022  3:29 PM        Passed - Patient is at least 18 years old        Passed - Valid encounter within last 15 months     Recent Visits  Date Type Provider Dept   21 Office Visit Jose Miller Jr., MD [facility]   21 Office Visit Jose Miller Jr., MD [facility]   21 Office Visit Jose Miller Jr., MD [facility]   20 Office Visit Jose Miller Jr., MD [facility]   20 Office Visit Jose Miller Jr., MD [facility]   20 Office Visit Jose Miller Jr., MD [facility]   Showing recent visits within past 720 days and meeting all other requirements  Future Appointments  No visits were found meeting these conditions.  Showing future appointments within next 150 days and meeting all other requirements      Future Appointments              In 1 month ARELIS Gregory - Podiatry, [place]    In 7 months LAB, JO [facility] - [facility]    In 7 months MD Jo Garcia - Cardiology, [place]                    Appointments  past 12m or future 3m with PCP    Date Provider   Last Visit   2021 Jose Miller Jr., MD   Next Visit   Visit date not found Jose Miller Jr., MD   ED visits in past 90 days: 0     Note composed:10:06 AM 2022

## 2022-02-18 NOTE — TELEPHONE ENCOUNTER
Forgot to ask for refill of K+  I spoke with pt she needs a refill of potassium 20 meq tablet.     Flap Thinning Complex Repair Preamble Text (Leave Blank If You Do Not Want): An incision was made along the previous flap suture line. Undermining was performed beneath the flap and redundant tissue was removed to restore the normal contour of the skin.

## 2022-03-08 ENCOUNTER — PATIENT MESSAGE (OUTPATIENT)
Dept: PODIATRY | Facility: CLINIC | Age: 57
End: 2022-03-08
Payer: MEDICARE

## 2022-03-14 ENCOUNTER — PATIENT MESSAGE (OUTPATIENT)
Dept: ADMINISTRATIVE | Facility: HOSPITAL | Age: 57
End: 2022-03-14
Payer: MEDICARE

## 2022-03-21 DIAGNOSIS — G89.4 CHRONIC PAIN SYNDROME: Chronic | ICD-10-CM

## 2022-03-21 RX ORDER — AMITRIPTYLINE HYDROCHLORIDE 25 MG/1
25 TABLET, FILM COATED ORAL NIGHTLY
Qty: 90 TABLET | Refills: 3 | Status: SHIPPED | OUTPATIENT
Start: 2022-03-21 | End: 2022-12-07

## 2022-03-21 NOTE — TELEPHONE ENCOUNTER
----- Message from Laura Hernandez sent at 3/21/2022  9:43 AM CDT -----  Regarding: Refill  Type: RX Refill Request    Who Called:  NHUNG CRONIN [[id number]]  RX Name and Strength:amitriptyline (ELAVIL) 25 MG tablet    Preferred Pharmacy with phone number:[facility][id number] [place] [address] [facility] [address] [facility] [place] [address]    Would the patient rather a call back or a response via [facility]?call back    Best Call Back Number:[phone]    Additional Information:

## 2022-03-21 NOTE — TELEPHONE ENCOUNTER
Care Due:                  Date            Visit Type   Department     Provider  --------------------------------------------------------------------------------                                EP -                              PRIMARY      [facility] FAM[facility]  Last Visit: 12-      CARE ([facility])   MEDICINE       MIKE FRANCIS  Next Visit: None Scheduled  None         None Found                                                            Last  Test          Frequency    Reason                     Performed    Due Date  --------------------------------------------------------------------------------    CMP.........  12 months..  atorvastatin, lisinopriL,   06- 06-                             metFORMIN................    HBA1C.......  6 months...  metFORMIN................  06- 12-    Lipid Panel.  12 months..  atorvastatin.............  06- 06-    Powered by [employer]. Reference number: [id number].   3/21/2022 9:14:17 AM CDT

## 2022-04-01 ENCOUNTER — OFFICE VISIT (OUTPATIENT)
Dept: PODIATRY | Facility: CLINIC | Age: 57
End: 2022-04-01
Payer: MEDICARE

## 2022-04-01 VITALS — HEIGHT: 66 IN | WEIGHT: 293 LBS | BODY MASS INDEX: 47.09 KG/M2

## 2022-04-01 DIAGNOSIS — B35.1 ONYCHOMYCOSIS DUE TO DERMATOPHYTE: ICD-10-CM

## 2022-04-01 DIAGNOSIS — E11.51 TYPE II DIABETES MELLITUS WITH PERIPHERAL CIRCULATORY DISORDER: ICD-10-CM

## 2022-04-01 DIAGNOSIS — E11.42 TYPE 2 DIABETES MELLITUS WITH DIABETIC POLYNEUROPATHY, WITHOUT LONG-TERM CURRENT USE OF INSULIN: Primary | ICD-10-CM

## 2022-04-01 PROCEDURE — 11721 DEBRIDE NAIL 6 OR MORE: CPT | Mod: Q9,S$GLB,, | Performed by: PODIATRIST

## 2022-04-01 PROCEDURE — 11721 PR DEBRIDEMENT OF NAILS, 6 OR MORE: ICD-10-PCS | Mod: Q9,S$GLB,, | Performed by: PODIATRIST

## 2022-04-01 PROCEDURE — 1160F PR REVIEW ALL MEDS BY PRESCRIBER/CLIN PHARMACIST DOCUMENTED: ICD-10-PCS | Mod: CPTII,S$GLB,, | Performed by: PODIATRIST

## 2022-04-01 PROCEDURE — 3008F BODY MASS INDEX DOCD: CPT | Mod: CPTII,S$GLB,, | Performed by: PODIATRIST

## 2022-04-01 PROCEDURE — 99499 UNLISTED E&M SERVICE: CPT | Mod: S$GLB,,, | Performed by: PODIATRIST

## 2022-04-01 PROCEDURE — 1159F PR MEDICATION LIST DOCUMENTED IN MEDICAL RECORD: ICD-10-PCS | Mod: CPTII,S$GLB,, | Performed by: PODIATRIST

## 2022-04-01 PROCEDURE — 3008F PR BODY MASS INDEX (BMI) DOCUMENTED: ICD-10-PCS | Mod: CPTII,S$GLB,, | Performed by: PODIATRIST

## 2022-04-01 PROCEDURE — 1160F RVW MEDS BY RX/DR IN RCRD: CPT | Mod: CPTII,S$GLB,, | Performed by: PODIATRIST

## 2022-04-01 PROCEDURE — 1159F MED LIST DOCD IN RCRD: CPT | Mod: CPTII,S$GLB,, | Performed by: PODIATRIST

## 2022-04-01 PROCEDURE — 99999 PR PBB SHADOW E&M-EST. PATIENT-LVL IV: CPT | Mod: PBBFAC,,, | Performed by: PODIATRIST

## 2022-04-01 PROCEDURE — 4010F PR ACE/ARB THEARPY RXD/TAKEN: ICD-10-PCS | Mod: CPTII,S$GLB,, | Performed by: PODIATRIST

## 2022-04-01 PROCEDURE — 99213 OFFICE O/P EST LOW 20 MIN: CPT | Mod: 25,S$GLB,, | Performed by: PODIATRIST

## 2022-04-01 PROCEDURE — 99999 PR PBB SHADOW E&M-EST. PATIENT-LVL IV: ICD-10-PCS | Mod: PBBFAC,,, | Performed by: PODIATRIST

## 2022-04-01 PROCEDURE — 99213 PR OFFICE/OUTPT VISIT, EST, LEVL III, 20-29 MIN: ICD-10-PCS | Mod: 25,S$GLB,, | Performed by: PODIATRIST

## 2022-04-01 PROCEDURE — 99499 RISK ADDL DX/OHS AUDIT: ICD-10-PCS | Mod: S$GLB,,, | Performed by: PODIATRIST

## 2022-04-01 PROCEDURE — 4010F ACE/ARB THERAPY RXD/TAKEN: CPT | Mod: CPTII,S$GLB,, | Performed by: PODIATRIST

## 2022-04-01 NOTE — PROGRESS NOTES
Subjective:    Patient ID:  Amena Anderson is a 55 y.o. female who presents for follow-up of Hypertension f/u      HPI  Here for follow up of palpitations/HTN/DLP.Patient does not exercise but remains very active with out change in exertional tolerance or chest pain. Patients states is doing well no chest pain, SOB or change in exertional tolerence. Patient denies palpitations, syncope, presyncope, lightheadedness or dizziness.    Review of Systems   Constitution: Negative for malaise/fatigue.   Eyes: Negative for blurred vision.   Cardiovascular: Negative for chest pain, claudication, cyanosis, dyspnea on exertion, irregular heartbeat, leg swelling, near-syncope, orthopnea, palpitations, paroxysmal nocturnal dyspnea and syncope.   Respiratory: Negative for cough and shortness of breath.    Hematologic/Lymphatic: Does not bruise/bleed easily.   Musculoskeletal: Negative for back pain, falls, joint pain, muscle cramps, muscle weakness and myalgias.   Gastrointestinal: Negative for abdominal pain, change in bowel habit, nausea and vomiting.   Genitourinary: Negative for urgency.   Neurological: Negative for dizziness, focal weakness and light-headedness.       Past Medical History:   Diagnosis Date    AC (acromioclavicular) joint bone spurs, unspecified laterality     bilateral    Anticoagulant long-term use     aspirin    Anxiety     Back pain     BMI 50.0-59.9, adult 1/22/2017    CHF (congestive heart failure)     Diabetes mellitus, type 2     Difficulty swallowing 10/18/2017    Dysphagia     Fatty liver     H. pylori infection     Hepatomegaly     Hoarseness 10/18/2017    Hormone replacement therapy (HRT)     Hypertension     Hypothyroidism     Insomnia     Mitral valve prolapse     per pt    Multinodular goiter 10/18/2017    MANUEL on CPAP      There are no hospital problems to display for this patient.       Objective:     Vitals:    10/14/20 1352   BP: (!) 143/86   Pulse: 92        Physical Exam    Constitutional: She is oriented to person, place, and time. She appears well-developed and well-nourished.   HENT:   Head: Normocephalic.   Eyes: Conjunctivae are normal.   Neck: Normal range of motion. Neck supple. No JVD present.   Cardiovascular: Normal rate, regular rhythm, normal heart sounds and intact distal pulses.   Pulses:       Carotid pulses are 2+ on the right side and 2+ on the left side.       Radial pulses are 2+ on the right side and 2+ on the left side.        Dorsalis pedis pulses are 2+ on the right side and 2+ on the left side.        Posterior tibial pulses are 2+ on the right side and 2+ on the left side.   Pulmonary/Chest: Effort normal and breath sounds normal.   Abdominal: Soft. Bowel sounds are normal.   Musculoskeletal:         General: No tenderness or edema.   Neurological: She is alert and oriented to person, place, and time. Gait normal.   Skin: Skin is warm, dry and intact. No cyanosis. Nails show no clubbing.   Psychiatric: She has a normal mood and affect. Her speech is normal and behavior is normal. Thought content normal.   Nursing note and vitals reviewed.            ..    Chemistry        Component Value Date/Time     06/22/2020 0813    K 3.7 06/22/2020 0813     06/22/2020 0813    CO2 25 06/22/2020 0813    BUN 11 06/22/2020 0813    CREATININE 0.7 06/22/2020 0813     06/22/2020 0813        Component Value Date/Time    CALCIUM 8.7 06/22/2020 0813    ALKPHOS 74 06/22/2020 0813    AST 23 06/22/2020 0813    ALT 26 06/22/2020 0813    BILITOT 0.2 06/22/2020 0813    ESTGFRAFRICA >60.0 06/22/2020 0813    EGFRNONAA >60.0 06/22/2020 0813            ..  Lab Results   Component Value Date    CHOL 147 06/22/2020    CHOL 159 08/20/2019    CHOL 135 03/28/2018     Lab Results   Component Value Date    HDL 40 06/22/2020    HDL 44 08/20/2019    HDL 41 03/28/2018     Lab Results   Component Value Date    LDLCALC 83.4 06/22/2020    LDLCALC 93.0 08/20/2019    LDLCALC 80.2  03/28/2018     Lab Results   Component Value Date    TRIG 118 06/22/2020    TRIG 110 08/20/2019    TRIG 69 03/28/2018     Lab Results   Component Value Date    CHOLHDL 27.2 06/22/2020    CHOLHDL 27.7 08/20/2019    CHOLHDL 30.4 03/28/2018     ..  Lab Results   Component Value Date    WBC 7.84 02/05/2018    HGB 12.4 02/05/2018    HCT 39.3 02/05/2018    MCV 84 02/05/2018     02/05/2018       Test(s) Reviewed  I have reviewed the following in detail:  [] Stress test   [] Angiography   [x] Echocardiogram   [x] Labs   [] Other:       Assessment:         ICD-10-CM ICD-9-CM   1. Essential hypertension  I10 401.9   2. Mixed hyperlipidemia  E78.2 272.2   3. MANUEL (obstructive sleep apnea)  G47.33 327.23   4. Type 2 diabetes mellitus with diabetic polyneuropathy, without long-term current use of insulin  E11.42 250.60     357.2     Problem List Items Addressed This Visit     Type 2 diabetes mellitus with diabetic polyneuropathy, without long-term current use of insulin (Chronic)    Essential hypertension - Primary (Chronic)    MANUEL (obstructive sleep apnea) (Chronic)    Overview     Moderate 2012, AHI 16, desats to 72%. CPAP 8 and 9 cm effective but increased to 13 cm         Mixed hyperlipidemia (Chronic)           Plan:           Return to clinic 1 year   Aerobic exercise 5x's/wk. Heart healthy diet and risk factor modification.    See labs and med orders.      Portions of this note may have been created with voice recognition software.  Grammatical, syntax and spelling errors may be inevitable.           Detail Level: Detailed

## 2022-04-01 NOTE — PROGRESS NOTES
Subjective:      Patient ID: Amena Anderson is a 56 y.o. female.    Chief Complaint: Follow-up (PCP emily miller 12-6-21. A1C 5.9 on 6-14-21)  Patient returns for follow up  thick and discolored nails for high risk foot care. She is high risk secondary to the diabetes with PVD. No acute changes since her last visit to her feet. Doing well overall, no acute changes also here for the annual diabetic foot check up    PCP: Dr. Miller  Last seen: 12/6/21    Review of Systems   Constitutional: Negative for chills, diaphoresis, fever, malaise/fatigue and night sweats.   Cardiovascular: Positive for leg swelling. Negative for claudication, cyanosis and syncope.   Skin: Negative for color change, dry skin, nail changes, rash, suspicious lesions and unusual hair distribution.   Musculoskeletal: Positive for joint pain. Negative for falls, joint swelling, muscle cramps, muscle weakness and stiffness.   Gastrointestinal: Negative for constipation, diarrhea, nausea and vomiting.   Neurological: Positive for paresthesias. Negative for brief paralysis, disturbances in coordination, focal weakness, numbness, sensory change and tremors.           Objective:      Physical Exam  Constitutional:       General: She is not in acute distress.     Appearance: She is well-developed. She is not diaphoretic.   Cardiovascular:      Pulses:           Dorsalis pedis pulses are 2+ on the right side and 2+ on the left side.        Posterior tibial pulses are 2+ on the right side and 2+ on the left side.      Comments: Capillary refill 3 seconds all toes/distal feet, all toes/both feet warm to touch.      Positive lower extremity edema bilateral.    Musculoskeletal:      Right ankle: Normal. No swelling, deformity, ecchymosis or lacerations. Normal range of motion. Normal pulse.      Right Achilles Tendon: Normal. No defects. De La Cruz's test negative.      Comments: Improving pain to palpation lateral left ankle at cfl and atfl without instability  deformity or loss of function. Also has pain along the peroneal tendons to the brevis insertion.    Ankle dorsiflexion decreased at <10 degrees bilateral with moderate increase with knee flexion bilateral. There is tenderness to palpation bilateral achilles attachments on the calcaneus.    Pain on palpation plantar medial and central heel right foot. No pain with ROM or MMT. No pain with medial and lateral compression of heel.     Feet:      Right foot:      Protective Sensation: 10 sites tested. 8 sites sensed.      Left foot:      Protective Sensation: 10 sites tested. 9 sites sensed.   Lymphadenopathy:      Comments: Negative lymphadenopathy bilateral popliteal fossa and tarsal tunnel.   Skin:     General: Skin is warm and dry.      Coloration: Skin is not pale.      Findings: Lesion present. No abrasion, bruising, burn, ecchymosis, erythema, laceration, petechiae or rash.      Nails: There is no clubbing.      Comments:   Dry scale with superficial flakes over an erythematous base more to the left foot without ulceration, drainage, pus, tracking, fluctuance, malodor, or cardinal signs infection.      Skin is thin and atrophic bilateral    Nails 1-5 bilateral are thick 3-4 mm, long 3-6 mm, and discolored with subungual debris    Hyperkeratotic lesions right plantar medial aspect first metatarsal bilateral          Neurological:      Mental Status: She is alert and oriented to person, place, and time.      Sensory: Sensory deficit present.      Motor: No tremor, atrophy, abnormal muscle tone or seizure activity.      Gait: Gait normal.      Comments: Negative tinel sign to percussion sural, superficial peroneal, deep peroneal, saphenous, and posterior tibial nerves right and left ankles and feet. Decreased vibratory sensation bilateral     Psychiatric:         Behavior: Behavior normal. Behavior is cooperative.               Assessment:       Encounter Diagnoses   Name Primary?    Type 2 diabetes mellitus with  diabetic polyneuropathy, without long-term current use of insulin Yes    Type II diabetes mellitus with peripheral circulatory disorder     Onychomycosis due to dermatophyte     BMI 50.0-59.9, adult          Plan:       Amena was seen today for follow-up.    Diagnoses and all orders for this visit:    Type 2 diabetes mellitus with diabetic polyneuropathy, without long-term current use of insulin    Type II diabetes mellitus with peripheral circulatory disorder    Onychomycosis due to dermatophyte    BMI 50.0-59.9, adult      I counseled the patient on her conditions, their implications and medical management.    Shoe inspection. Diabetic Foot Education. Patient reminded of the importance of good nutrition and blood sugar control to help prevent podiatric complications of diabetes. Patient instructed on proper foot hygeine. We discussed wearing proper shoe gear, daily foot inspections, never walking without protective shoe gear, never putting sharp instruments to feet Discussed importance of supportive shoes with accommodative toe box to reduce pressure and irritation to forefoot.     With patient's verbal consent, nails were aggressively reduced and debrided x 10 to their soft tissue attachment mechanically removing all offending nail and debris. Patient relates relief following the procedure. No anesthesia or hemostasis required. No blood loss.     Discussed importance of healthy diet and weight loss as to her diabetes control and prevention of future pedal complications secondary to diabetes      Follow up in 3 months routine care.    Edson Martinez DPM

## 2022-04-18 RX ORDER — LEVOTHYROXINE SODIUM 150 UG/1
TABLET ORAL
Qty: 90 TABLET | Refills: 0 | OUTPATIENT
Start: 2022-04-18

## 2022-04-28 ENCOUNTER — TELEPHONE (OUTPATIENT)
Dept: ENDOCRINOLOGY | Facility: CLINIC | Age: 57
End: 2022-04-28
Payer: MEDICARE

## 2022-04-28 NOTE — TELEPHONE ENCOUNTER
----- Message from Yuly Elias sent at 4/28/2022  1:35 PM CDT -----  Regarding: self  .Type: Patient Call Back    Who called: self     What is the request in detail: states she needs to schedule an appt with  for thyroid check. Epic does not load his schedule please advise     Can the clinic reply by [facility]?    Would the patient rather a call back or a response via [facility]? Call     Best call back number: .[phone]

## 2022-04-28 NOTE — TELEPHONE ENCOUNTER
Yes, have her come in. Unsure if related as she had a thyroid surgery in the past. But can evaluate when she comes in

## 2022-04-28 NOTE — TELEPHONE ENCOUNTER
Pt last seen 4/2020.  States she feels something in her throat, not painful, no probs breathing, but sometimes has difficulty swallowing.  Do you want me to put her in one of the days you booked in?  Do you want US/labs prior?

## 2022-04-29 DIAGNOSIS — K21.9 GASTROESOPHAGEAL REFLUX DISEASE WITHOUT ESOPHAGITIS: ICD-10-CM

## 2022-04-29 DIAGNOSIS — E11.9 TYPE 2 DIABETES MELLITUS WITHOUT COMPLICATION, WITHOUT LONG-TERM CURRENT USE OF INSULIN: ICD-10-CM

## 2022-04-29 DIAGNOSIS — I10 ESSENTIAL HYPERTENSION: Chronic | ICD-10-CM

## 2022-04-29 RX ORDER — FUROSEMIDE 40 MG/1
40 TABLET ORAL DAILY
Qty: 90 TABLET | Refills: 3 | Status: SHIPPED | OUTPATIENT
Start: 2022-04-29 | End: 2023-01-17

## 2022-04-29 RX ORDER — METFORMIN HYDROCHLORIDE 1000 MG/1
TABLET ORAL
Qty: 90 TABLET | Refills: 3 | Status: SHIPPED | OUTPATIENT
Start: 2022-04-29 | End: 2023-01-17

## 2022-04-29 RX ORDER — OMEPRAZOLE 20 MG/1
20 CAPSULE, DELAYED RELEASE ORAL 2 TIMES DAILY
Qty: 180 CAPSULE | Refills: 3 | Status: SHIPPED | OUTPATIENT
Start: 2022-04-29 | End: 2022-06-07 | Stop reason: DRUGHIGH

## 2022-04-29 NOTE — TELEPHONE ENCOUNTER
No new care gaps identified.  Powered by [employer]. Reference number: [id number].   4/29/2022 1:34:29 PM CDT

## 2022-04-29 NOTE — TELEPHONE ENCOUNTER
----- Message from Naseem May sent at 4/29/2022  1:11 PM CDT -----  Contact: pt at [phone]  Type: Needs Medical Advice  Who Called:  pt  Best Call Back Number: [phone]  Additional Information: pt is calling the office to let the dr know the pharmacy is trying to get in touch with him to get her medications signed off on. Please call back and advise  [id number] - [facility] [address] [place] [address] [place] [facility] [place] [address]  [address] [place] [address]  Phone: [phone] Fax: [phone]  PER THE PT PLEASE REFILL THEM TODAY

## 2022-05-09 ENCOUNTER — PATIENT OUTREACH (OUTPATIENT)
Dept: ADMINISTRATIVE | Facility: OTHER | Age: 57
End: 2022-05-09
Payer: MEDICARE

## 2022-05-09 ENCOUNTER — PATIENT MESSAGE (OUTPATIENT)
Dept: SMOKING CESSATION | Facility: CLINIC | Age: 57
End: 2022-05-09
Payer: MEDICARE

## 2022-05-09 NOTE — PROGRESS NOTES
Health Maintenance Due   Topic Date Due    TETANUS VACCINE  Never done    Shingles Vaccine (1 of 2) Never done    Diabetes Urine Screening  01/19/2018    Hemoglobin A1c  12/14/2021    Foot Exam  02/24/2022    COVID-19 Vaccine (4 - Booster for Pfizer series) 04/14/2022     Updates were requested from care everywhere.  Chart was reviewed for overdue [facility] Encounters ([facility]) topics (CRS, Breast Cancer Screening, Eye exam)  Health Maintenance has been updated.  LINKS immunization registry triggered.  Immunizations were reconciled.

## 2022-05-10 ENCOUNTER — OFFICE VISIT (OUTPATIENT)
Dept: OBSTETRICS AND GYNECOLOGY | Facility: CLINIC | Age: 57
End: 2022-05-10
Payer: MEDICARE

## 2022-05-10 VITALS
WEIGHT: 293 LBS | HEIGHT: 66 IN | DIASTOLIC BLOOD PRESSURE: 82 MMHG | RESPIRATION RATE: 18 BRPM | BODY MASS INDEX: 47.09 KG/M2 | SYSTOLIC BLOOD PRESSURE: 144 MMHG

## 2022-05-10 DIAGNOSIS — N76.0 BV (BACTERIAL VAGINOSIS): Primary | ICD-10-CM

## 2022-05-10 DIAGNOSIS — B96.89 BV (BACTERIAL VAGINOSIS): Primary | ICD-10-CM

## 2022-05-10 PROCEDURE — 3077F PR MOST RECENT SYSTOLIC BLOOD PRESSURE >= 140 MM HG: ICD-10-PCS | Mod: CPTII,S$GLB,, | Performed by: OBSTETRICS & GYNECOLOGY

## 2022-05-10 PROCEDURE — 99999 PR PBB SHADOW E&M-EST. PATIENT-LVL IV: CPT | Mod: PBBFAC,,, | Performed by: OBSTETRICS & GYNECOLOGY

## 2022-05-10 PROCEDURE — 99213 PR OFFICE/OUTPT VISIT, EST, LEVL III, 20-29 MIN: ICD-10-PCS | Mod: S$GLB,,, | Performed by: OBSTETRICS & GYNECOLOGY

## 2022-05-10 PROCEDURE — 87481 CANDIDA DNA AMP PROBE: CPT | Mod: 59 | Performed by: OBSTETRICS & GYNECOLOGY

## 2022-05-10 PROCEDURE — 99999 PR PBB SHADOW E&M-EST. PATIENT-LVL IV: ICD-10-PCS | Mod: PBBFAC,,, | Performed by: OBSTETRICS & GYNECOLOGY

## 2022-05-10 PROCEDURE — 4010F ACE/ARB THERAPY RXD/TAKEN: CPT | Mod: CPTII,S$GLB,, | Performed by: OBSTETRICS & GYNECOLOGY

## 2022-05-10 PROCEDURE — 3079F DIAST BP 80-89 MM HG: CPT | Mod: CPTII,S$GLB,, | Performed by: OBSTETRICS & GYNECOLOGY

## 2022-05-10 PROCEDURE — 1159F PR MEDICATION LIST DOCUMENTED IN MEDICAL RECORD: ICD-10-PCS | Mod: CPTII,S$GLB,, | Performed by: OBSTETRICS & GYNECOLOGY

## 2022-05-10 PROCEDURE — 99499 RISK ADDL DX/OHS AUDIT: ICD-10-PCS | Mod: S$GLB,,, | Performed by: OBSTETRICS & GYNECOLOGY

## 2022-05-10 PROCEDURE — 4010F PR ACE/ARB THEARPY RXD/TAKEN: ICD-10-PCS | Mod: CPTII,S$GLB,, | Performed by: OBSTETRICS & GYNECOLOGY

## 2022-05-10 PROCEDURE — 1159F MED LIST DOCD IN RCRD: CPT | Mod: CPTII,S$GLB,, | Performed by: OBSTETRICS & GYNECOLOGY

## 2022-05-10 PROCEDURE — 3008F PR BODY MASS INDEX (BMI) DOCUMENTED: ICD-10-PCS | Mod: CPTII,S$GLB,, | Performed by: OBSTETRICS & GYNECOLOGY

## 2022-05-10 PROCEDURE — 99213 OFFICE O/P EST LOW 20 MIN: CPT | Mod: S$GLB,,, | Performed by: OBSTETRICS & GYNECOLOGY

## 2022-05-10 PROCEDURE — 3077F SYST BP >= 140 MM HG: CPT | Mod: CPTII,S$GLB,, | Performed by: OBSTETRICS & GYNECOLOGY

## 2022-05-10 PROCEDURE — 3008F BODY MASS INDEX DOCD: CPT | Mod: CPTII,S$GLB,, | Performed by: OBSTETRICS & GYNECOLOGY

## 2022-05-10 PROCEDURE — 3079F PR MOST RECENT DIASTOLIC BLOOD PRESSURE 80-89 MM HG: ICD-10-PCS | Mod: CPTII,S$GLB,, | Performed by: OBSTETRICS & GYNECOLOGY

## 2022-05-10 PROCEDURE — 99499 UNLISTED E&M SERVICE: CPT | Mod: S$GLB,,, | Performed by: OBSTETRICS & GYNECOLOGY

## 2022-05-10 RX ORDER — METRONIDAZOLE 7.5 MG/G
1 GEL VAGINAL NIGHTLY
Qty: 70 G | Refills: 3 | Status: SHIPPED | OUTPATIENT
Start: 2022-05-10 | End: 2022-05-15

## 2022-05-10 NOTE — PROGRESS NOTES
Chief Complaint   Patient presents with    vaginal odor    Well Woman    Medication Refill     estradiol       History of Present Illness   56 y.o. -American Female   patient presents today for vaginal irritation- counseled / orod - discouraged douching.         OB History        4    Para   4    Term   4       0    AB   0    Living           SAB   0    IAB   0    Ectopic   0    Multiple        Live Births                     Past Medical History:   Diagnosis Date    AC (acromioclavicular) joint bone spurs, unspecified laterality     bilateral    Anticoagulant long-term use     aspirin    Anxiety     Back pain     BMI 50.0-59.9, adult 2017    CHF (congestive heart failure)     Diabetes mellitus, type 2     Difficulty swallowing 10/18/2017    Dysphagia     Fatty liver     H. pylori infection     Hepatomegaly     Hoarseness 10/18/2017    Hormone replacement therapy (HRT)     Hypertension     Hypothyroidism     Insomnia     Mitral valve prolapse     per pt    Multinodular goiter 10/18/2017    MANUEL on CPAP        Past Surgical History:   Procedure Laterality Date     SECTION      Three times    COLONOSCOPY  2017    Dr. Benitez, repeat in 10 years    HYSTERECTOMY      total    LASIK      LUMBAR EPIDURAL INJECTION      OOPHORECTOMY      THYROIDECTOMY, PARTIAL      UPPER GASTROINTESTINAL ENDOSCOPY  2017    Dr. Benitez       Current Outpatient Medications on File Prior to Visit   Medication Sig Dispense Refill    amitriptyline (ELAVIL) 25 MG tablet Take 1 tablet (25 mg total) by mouth every evening. 90 tablet 3    amLODIPine (NORVASC) 10 MG tablet TAKE 1 TABLET(10 MG) BY MOUTH EVERY DAY 90 tablet 3    aspirin (ECOTRIN) 81 MG EC tablet Take 81 mg by mouth once daily.      atorvastatin (LIPITOR) 20 MG tablet TAKE 1 TABLET(20 MG) BY MOUTH EVERY DAY 90 tablet 3    azelastine (ASTELIN) 137 mcg (0.1 %) nasal spray 1 spray (137 mcg total) by Nasal  route 2 (two) times daily. 90 mL 1    blood sugar diagnostic Strp To check BG 2 times daily, to use with insurance preferred meter DX E11.42 100 strip 11    blood-glucose meter kit To check BG 2 times daily, to use with insurance preferred meter 100 each 11    ciclopirox (LOPROX) 0.77 % Crea Apply topically 2 (two) times daily. 90 g 3    ciclopirox 0.77 % Gel Apply topically 2 (two) times daily. 1 Tube 6    diclofenac sodium 1 % Gel Apply 2 g topically 4 (four) times daily. 100 g 2    dicyclomine (BENTYL) 10 MG capsule Take 1 capsule (10 mg total) by mouth 4 (four) times daily before meals and nightly. 90 capsule 1    docusate sodium (COLACE) 50 MG capsule Take 100 mg by mouth 2 (two) times daily.      estradioL (ESTRACE) 1 MG tablet TAKE 1 TABLET BY MOUTH EVERY DAY 90 tablet 2    furosemide (LASIX) 40 MG tablet Take 1 tablet (40 mg total) by mouth once daily. 90 tablet 3    gabapentin (NEURONTIN) 400 MG capsule Take 400 mg by mouth 3 (three) times daily.      hydrocodone-acetaminophen 10-325mg (NORCO)  mg Tab Take 10 tablets by mouth 3 (three) times daily.      lancets Misc To check BG 2 times daily, to use with insurance preferred meter 60 each 11    levothyroxine (SYNTHROID) 150 MCG tablet TAKE 1 TABLET(150 MCG) BY MOUTH EVERY DAY 90 tablet 0    lidocaine-prilocaine (EMLA) cream APPLY UP TO 3.2 GRAMS TO PAINFUL AREAS UP TO FIVE TIMES DAILY. RUB IN WELL      lisinopriL (PRINIVIL,ZESTRIL) 20 MG tablet TAKE 1 TABLET(20 MG) BY MOUTH EVERY DAY 90 tablet 3    metFORMIN (GLUCOPHAGE) 1000 MG tablet TAKE 1 TABLET(1000 MG) BY MOUTH EVERY EVENING 90 tablet 3    naproxen (EC NAPROSYN) 500 MG EC tablet Take 500 mg by mouth 2 (two) times daily.      nitroGLYCERIN (NITROSTAT) 0.4 MG SL tablet Place 0.4 mg under the tongue every 5 (five) minutes as needed for Chest pain.      omeprazole (PRILOSEC) 20 MG capsule Take 1 capsule (20 mg total) by mouth 2 (two) times daily. 180 capsule 3    potassium  "chloride SA (K-DUR,KLOR-CON) 20 MEQ tablet TAKE 1 TABLET(20 MEQ) BY MOUTH TWICE DAILY 180 tablet 2     No current facility-administered medications on file prior to visit.       Review of patient's allergies indicates:  No Known Allergies    Social History     Socioeconomic History    Marital status: Single   Occupational History     Comment: retired   Tobacco Use    Smoking status: Never Smoker    Smokeless tobacco: Never Used   Substance and Sexual Activity    Alcohol use: No    Drug use: Yes     Types: Hydrocodone    Sexual activity: Not Currently   Social History Narrative    ** Merged History Encounter **            Family History   Problem Relation Age of Onset    Throat cancer Maternal Uncle     Breast cancer Other 35    Kidney disease Mother     Hypertension Father     Kidney disease Father     Heart disease Father     Alzheimer's disease Father     Breast cancer Cousin     Colon cancer Neg Hx     Colon polyps Neg Hx     Crohn's disease Neg Hx     Ulcerative colitis Neg Hx     Stomach cancer Neg Hx     Esophageal cancer Neg Hx     Ovarian cancer Neg Hx          Past medical and surgical history reviewed.   I have reviewed the patient's medical history in detail and updated the computerized patient record.    Review of patient's allergies indicates:  No Known Allergies      Review of Systems - Negative except HPI  GEN ROS: negative for - chills or fever  Breast ROS: negative for breast lumps  Genito-Urinary ROS: no dysuria, trouble voiding, or hematuria      Physical Examination:  BP (!) 144/82   Resp 18   Ht 5' 6" (1.676 m)   Wt (!) 151 kg (332 lb 14.3 oz)   BMI 53.73 kg/m²    Obese  Minimal mobility.   Pelvic: no lesions or d/c - afrfirm done.   Unable to examine well.   Bimanual without palpable masses or tenderness      Assessment:  1. BV (bacterial vaginosis)  boric acid (BORIC ACID) vaginal suppository    metroNIDAZOLE (METROGEL) 0.75 % vaginal gel       Plan:  Affirm  metrogel " and boric acid suppos - weekly    Patient informed will be contacted with results within 2 weeks. Encouraged to please call back or email if she has not heard from us by then.

## 2022-05-14 LAB
BACTERIAL VAGINOSIS DNA: NEGATIVE
CANDIDA GLABRATA DNA: NEGATIVE
CANDIDA KRUSEI DNA: NEGATIVE
CANDIDA RRNA VAG QL PROBE: NEGATIVE
T VAGINALIS RRNA GENITAL QL PROBE: NEGATIVE

## 2022-05-20 ENCOUNTER — OFFICE VISIT (OUTPATIENT)
Dept: ENDOCRINOLOGY | Facility: CLINIC | Age: 57
End: 2022-05-20
Payer: MEDICARE

## 2022-05-20 VITALS
DIASTOLIC BLOOD PRESSURE: 80 MMHG | HEART RATE: 83 BPM | HEIGHT: 66 IN | OXYGEN SATURATION: 99 % | SYSTOLIC BLOOD PRESSURE: 134 MMHG | WEIGHT: 293 LBS | BODY MASS INDEX: 47.09 KG/M2

## 2022-05-20 DIAGNOSIS — E04.1 THYROID CYST: ICD-10-CM

## 2022-05-20 DIAGNOSIS — K21.9 GASTROESOPHAGEAL REFLUX DISEASE, UNSPECIFIED WHETHER ESOPHAGITIS PRESENT: ICD-10-CM

## 2022-05-20 DIAGNOSIS — E89.0 POSTOPERATIVE HYPOTHYROIDISM: Primary | ICD-10-CM

## 2022-05-20 PROCEDURE — 4010F ACE/ARB THERAPY RXD/TAKEN: CPT | Mod: CPTII,S$GLB,, | Performed by: INTERNAL MEDICINE

## 2022-05-20 PROCEDURE — 3008F BODY MASS INDEX DOCD: CPT | Mod: CPTII,S$GLB,, | Performed by: INTERNAL MEDICINE

## 2022-05-20 PROCEDURE — 3079F DIAST BP 80-89 MM HG: CPT | Mod: CPTII,S$GLB,, | Performed by: INTERNAL MEDICINE

## 2022-05-20 PROCEDURE — 3008F PR BODY MASS INDEX (BMI) DOCUMENTED: ICD-10-PCS | Mod: CPTII,S$GLB,, | Performed by: INTERNAL MEDICINE

## 2022-05-20 PROCEDURE — 4010F PR ACE/ARB THEARPY RXD/TAKEN: ICD-10-PCS | Mod: CPTII,S$GLB,, | Performed by: INTERNAL MEDICINE

## 2022-05-20 PROCEDURE — 3075F SYST BP GE 130 - 139MM HG: CPT | Mod: CPTII,S$GLB,, | Performed by: INTERNAL MEDICINE

## 2022-05-20 PROCEDURE — 1160F PR REVIEW ALL MEDS BY PRESCRIBER/CLIN PHARMACIST DOCUMENTED: ICD-10-PCS | Mod: CPTII,S$GLB,, | Performed by: INTERNAL MEDICINE

## 2022-05-20 PROCEDURE — 99204 PR OFFICE/OUTPT VISIT, NEW, LEVL IV, 45-59 MIN: ICD-10-PCS | Mod: S$GLB,,, | Performed by: INTERNAL MEDICINE

## 2022-05-20 PROCEDURE — 99999 PR PBB SHADOW E&M-EST. PATIENT-LVL V: ICD-10-PCS | Mod: PBBFAC,,, | Performed by: INTERNAL MEDICINE

## 2022-05-20 PROCEDURE — 1159F PR MEDICATION LIST DOCUMENTED IN MEDICAL RECORD: ICD-10-PCS | Mod: CPTII,S$GLB,, | Performed by: INTERNAL MEDICINE

## 2022-05-20 PROCEDURE — 99999 PR PBB SHADOW E&M-EST. PATIENT-LVL V: CPT | Mod: PBBFAC,,, | Performed by: INTERNAL MEDICINE

## 2022-05-20 PROCEDURE — 1160F RVW MEDS BY RX/DR IN RCRD: CPT | Mod: CPTII,S$GLB,, | Performed by: INTERNAL MEDICINE

## 2022-05-20 PROCEDURE — 99204 OFFICE O/P NEW MOD 45 MIN: CPT | Mod: S$GLB,,, | Performed by: INTERNAL MEDICINE

## 2022-05-20 PROCEDURE — 3079F PR MOST RECENT DIASTOLIC BLOOD PRESSURE 80-89 MM HG: ICD-10-PCS | Mod: CPTII,S$GLB,, | Performed by: INTERNAL MEDICINE

## 2022-05-20 PROCEDURE — 3075F PR MOST RECENT SYSTOLIC BLOOD PRESS GE 130-139MM HG: ICD-10-PCS | Mod: CPTII,S$GLB,, | Performed by: INTERNAL MEDICINE

## 2022-05-20 PROCEDURE — 1159F MED LIST DOCD IN RCRD: CPT | Mod: CPTII,S$GLB,, | Performed by: INTERNAL MEDICINE

## 2022-05-20 NOTE — PROGRESS NOTES
CHIEF COMPLAINT: Hypothyroid  56 y.o.  being seen as a f/u. Was seeing Dr. Mackey. Had a thyroidectomy in 2012. Appears to have some remnant tissue on the left with cyst.  On synthroid 150 mcg.  States that she is getting a sensation in her throat when she swallows. Sometimes getting a hoarseness in voice. She does have GERD. Very anxious these symptoms are due to the thyroid. She is seeing GI. No Palpitations. No tremors                PAST MEDICAL HISTORY/PAST SURGICAL HISTORY:  Review[facility] in [facility]     SOCIAL HISTORY: No T/A     FAMILY HISTORY:  No known thyroid disease. + Esophageal CA. + DM 2.      MEDICATIONS/ALLERGIES: The patient's MedCard has been updated and reviewed.      ROS:   Constitutional: weight stable.   Cardiovascular: Denies current anginal symptoms  Respiratory: Denies current respiratory difficulty  Remainder ROS negative           PE:    GENERAL: Well developed, well nourished.  NECK: Supple, trachea midline, No palpable thyroid nodules.   CHEST: Resp even and unlabored, CTA bilateral.  CARDIAC: RRR, S1, S2 heard, no murmurs, rubs, S3, or S4            ASSESSMENT/PLAN:  1. Hypothyroid- Asymptomatic. Check TSH     2. Thyroid Cyst- has some remnant tissue after thyroidectomy. Will get Ultrasound, but discussed that unlikely source of symptoms she describes in HPI.     3. GERD- Discussed sensation she is having is unlikely related to # 2. Discussed GERD can cause similar symptoms. Scheduled to see GI.      FOLLOWUP  Needs TSH and Thyroid Ultrasound  F/U 1 year with TSH, thyroid Ultrasound

## 2022-05-27 ENCOUNTER — TELEPHONE (OUTPATIENT)
Dept: PODIATRY | Facility: CLINIC | Age: 57
End: 2022-05-27
Payer: MEDICARE

## 2022-05-27 NOTE — TELEPHONE ENCOUNTER
----- Message from Tamera Heber sent at 5/27/2022 11:07 AM CDT -----  Patient Call Back    Who Called: PT     What is the request in detail: Pt calling to speak with someone regarding a refill on her gel for her feet and lotion for her feet. Pls advise    Can the clinic reply by [facility]?    Best Call Back Number: [phone]      [facility] [employer] #[id number] [facility] [address] [facility] [address]   Phone: [phone]  Fax:  [phone]

## 2022-05-28 DIAGNOSIS — E11.42 TYPE 2 DIABETES MELLITUS WITH DIABETIC POLYNEUROPATHY, WITHOUT LONG-TERM CURRENT USE OF INSULIN: ICD-10-CM

## 2022-05-28 NOTE — TELEPHONE ENCOUNTER
Care Due:                  Date            Visit Type   Department     Provider  --------------------------------------------------------------------------------                                EP -                              PRIMARY      [facility]  Last Visit: 12-      CARE ([facility])   MEDICINE       MIKE FRANCIS  Next Visit: None Scheduled  None         None Found                                                            Last  Test          Frequency    Reason                     Performed    Due Date  --------------------------------------------------------------------------------    CMP.........  12 months..  atorvastatin, furosemide,   06- 06-                             lisinopriL, metFORMIN....    HBA1C.......  6 months...  metFORMIN................  06- 12-    Lipid Panel.  12 months..  atorvastatin.............  06- 06-    [facility] Embedded Care Gaps. Reference number: [id number]. 5/28/2022   8:35:42 AM CDT

## 2022-05-30 ENCOUNTER — HOSPITAL ENCOUNTER (OUTPATIENT)
Dept: RADIOLOGY | Facility: HOSPITAL | Age: 57
Discharge: HOME | End: 2022-05-30
Attending: INTERNAL MEDICINE
Payer: MEDICARE

## 2022-05-30 DIAGNOSIS — E04.1 THYROID CYST: ICD-10-CM

## 2022-05-30 DIAGNOSIS — E89.0 POSTOPERATIVE HYPOTHYROIDISM: ICD-10-CM

## 2022-05-30 PROCEDURE — 76536 US SOFT TISSUE HEAD NECK THYROID: ICD-10-PCS | Mod: 26,,, | Performed by: RADIOLOGY

## 2022-05-30 PROCEDURE — 76536 US EXAM OF HEAD AND NECK: CPT | Mod: TC,PO

## 2022-05-30 PROCEDURE — 76536 US EXAM OF HEAD AND NECK: CPT | Mod: 26,,, | Performed by: RADIOLOGY

## 2022-05-30 RX ORDER — CICLOPIROX 7.7 MG/G
GEL TOPICAL 2 TIMES DAILY
Qty: 1 EACH | Refills: 6 | Status: SHIPPED | OUTPATIENT
Start: 2022-05-30 | End: 2022-09-01 | Stop reason: SDUPTHER

## 2022-05-30 RX ORDER — LANCETS
EACH MISCELLANEOUS
Qty: 200 EACH | Refills: 1 | Status: SHIPPED | OUTPATIENT
Start: 2022-05-30

## 2022-05-30 NOTE — TELEPHONE ENCOUNTER
Refill Authorization Note   Amena Anderson  is requesting a refill authorization.  Brief Assessment and Rationale for Refill:  Approve     Medication Therapy Plan:       Medication Reconciliation Completed: No   Comments:     No Care Gaps recommended.     Note composed:10:01 AM 05/30/2022

## 2022-05-31 ENCOUNTER — TELEPHONE (OUTPATIENT)
Dept: FAMILY MEDICINE | Facility: CLINIC | Age: 57
End: 2022-05-31
Payer: MEDICARE

## 2022-05-31 ENCOUNTER — PATIENT MESSAGE (OUTPATIENT)
Dept: ADMINISTRATIVE | Facility: HOSPITAL | Age: 57
End: 2022-05-31
Payer: MEDICARE

## 2022-05-31 ENCOUNTER — PATIENT MESSAGE (OUTPATIENT)
Dept: FAMILY MEDICINE | Facility: CLINIC | Age: 57
End: 2022-05-31
Payer: MEDICARE

## 2022-06-01 ENCOUNTER — PATIENT MESSAGE (OUTPATIENT)
Dept: FAMILY MEDICINE | Facility: CLINIC | Age: 57
End: 2022-06-01
Payer: MEDICARE

## 2022-06-01 ENCOUNTER — TELEPHONE (OUTPATIENT)
Dept: FAMILY MEDICINE | Facility: CLINIC | Age: 57
End: 2022-06-01
Payer: MEDICARE

## 2022-06-01 DIAGNOSIS — R11.0 NAUSEA: Primary | ICD-10-CM

## 2022-06-01 RX ORDER — ONDANSETRON HYDROCHLORIDE 8 MG/1
8 TABLET, FILM COATED ORAL EVERY 8 HOURS PRN
Qty: 10 TABLET | Refills: 0 | Status: SHIPPED | OUTPATIENT
Start: 2022-06-01 | End: 2022-06-07

## 2022-06-01 NOTE — TELEPHONE ENCOUNTER
----- Message from Kristina Gonzalez sent at 6/1/2022 12:37 PM CDT -----  Who Called: PATIENT    What is the reqeust in detail: Requesting call back to have something sent over to patient's pharmacy for nausea. Patient is still throwing up, hurting while swallowing, only been able to hold down water and liquids. This seems to be brought on by coughing. Please advise.     Can the clinic reply by [facility]? NO    Best Call Back Number: [phone]    Additional Information: Patient called about this once before and has not yet gotten a response. Please advise.

## 2022-06-02 ENCOUNTER — DOCUMENTATION ONLY (OUTPATIENT)
Dept: FAMILY MEDICINE | Facility: CLINIC | Age: 57
End: 2022-06-02
Payer: MEDICARE

## 2022-06-02 NOTE — PROGRESS NOTES
PA initiated in [facility] for Ondansetron 8 mg  KEY:[id number]  CASE:[id number]  [facility]  Amena Monica ([place]: [id number])     Outcome: approved  This medication is on pt's list of covered drugs.

## 2022-06-03 ENCOUNTER — TELEPHONE (OUTPATIENT)
Dept: FAMILY MEDICINE | Facility: CLINIC | Age: 57
End: 2022-06-03
Payer: MEDICARE

## 2022-06-03 NOTE — TELEPHONE ENCOUNTER
I spoke to Charlotte at pt's pharmacy to confirm pt's Ondansetron was approved. Charlotte confirmed medication was ran without any insurance problems. PA submitted was not needed- covered via pt's current plan.

## 2022-06-07 ENCOUNTER — OFFICE VISIT (OUTPATIENT)
Dept: GASTROENTEROLOGY | Facility: CLINIC | Age: 57
End: 2022-06-07
Payer: MEDICARE

## 2022-06-07 ENCOUNTER — TELEPHONE (OUTPATIENT)
Dept: ENDOCRINOLOGY | Facility: CLINIC | Age: 57
End: 2022-06-07
Payer: MEDICARE

## 2022-06-07 VITALS — BODY MASS INDEX: 47.09 KG/M2 | HEIGHT: 66 IN | WEIGHT: 293 LBS

## 2022-06-07 DIAGNOSIS — R13.14 PHARYNGOESOPHAGEAL DYSPHAGIA: ICD-10-CM

## 2022-06-07 DIAGNOSIS — R11.2 INTRACTABLE VOMITING WITH NAUSEA, UNSPECIFIED VOMITING TYPE: Primary | ICD-10-CM

## 2022-06-07 DIAGNOSIS — F11.90 OPIOID USE: ICD-10-CM

## 2022-06-07 DIAGNOSIS — F19.90 EXCESSIVE USE OF NONSTEROIDAL ANTI-INFLAMMATORY DRUG (NSAID): ICD-10-CM

## 2022-06-07 DIAGNOSIS — R49.0 HOARSENESS OF VOICE: ICD-10-CM

## 2022-06-07 DIAGNOSIS — R10.10 UPPER ABDOMINAL PAIN: ICD-10-CM

## 2022-06-07 DIAGNOSIS — R04.0 FREQUENT NOSEBLEEDS: ICD-10-CM

## 2022-06-07 DIAGNOSIS — Z87.19 HISTORY OF GASTROESOPHAGEAL REFLUX (GERD): ICD-10-CM

## 2022-06-07 DIAGNOSIS — Z79.01 ANTICOAGULANT LONG-TERM USE: ICD-10-CM

## 2022-06-07 DIAGNOSIS — Z86.19 HISTORY OF HELICOBACTER PYLORI INFECTION: ICD-10-CM

## 2022-06-07 DIAGNOSIS — R09.A2 GLOBUS SENSATION: ICD-10-CM

## 2022-06-07 PROCEDURE — 3008F PR BODY MASS INDEX (BMI) DOCUMENTED: ICD-10-PCS | Mod: CPTII,S$GLB,, | Performed by: NURSE PRACTITIONER

## 2022-06-07 PROCEDURE — 99204 OFFICE O/P NEW MOD 45 MIN: CPT | Mod: S$GLB,,, | Performed by: NURSE PRACTITIONER

## 2022-06-07 PROCEDURE — 4010F PR ACE/ARB THEARPY RXD/TAKEN: ICD-10-PCS | Mod: CPTII,S$GLB,, | Performed by: NURSE PRACTITIONER

## 2022-06-07 PROCEDURE — 99999 PR PBB SHADOW E&M-EST. PATIENT-LVL V: ICD-10-PCS | Mod: PBBFAC,,, | Performed by: NURSE PRACTITIONER

## 2022-06-07 PROCEDURE — 1159F PR MEDICATION LIST DOCUMENTED IN MEDICAL RECORD: ICD-10-PCS | Mod: CPTII,S$GLB,, | Performed by: NURSE PRACTITIONER

## 2022-06-07 PROCEDURE — 1159F MED LIST DOCD IN RCRD: CPT | Mod: CPTII,S$GLB,, | Performed by: NURSE PRACTITIONER

## 2022-06-07 PROCEDURE — 1160F RVW MEDS BY RX/DR IN RCRD: CPT | Mod: CPTII,S$GLB,, | Performed by: NURSE PRACTITIONER

## 2022-06-07 PROCEDURE — 3008F BODY MASS INDEX DOCD: CPT | Mod: CPTII,S$GLB,, | Performed by: NURSE PRACTITIONER

## 2022-06-07 PROCEDURE — 99204 PR OFFICE/OUTPT VISIT, NEW, LEVL IV, 45-59 MIN: ICD-10-PCS | Mod: S$GLB,,, | Performed by: NURSE PRACTITIONER

## 2022-06-07 PROCEDURE — 99999 PR PBB SHADOW E&M-EST. PATIENT-LVL V: CPT | Mod: PBBFAC,,, | Performed by: NURSE PRACTITIONER

## 2022-06-07 PROCEDURE — 1160F PR REVIEW ALL MEDS BY PRESCRIBER/CLIN PHARMACIST DOCUMENTED: ICD-10-PCS | Mod: CPTII,S$GLB,, | Performed by: NURSE PRACTITIONER

## 2022-06-07 PROCEDURE — 4010F ACE/ARB THERAPY RXD/TAKEN: CPT | Mod: CPTII,S$GLB,, | Performed by: NURSE PRACTITIONER

## 2022-06-07 RX ORDER — METRONIDAZOLE 7.5 MG/G
GEL VAGINAL
COMMUNITY
Start: 2022-05-27 | End: 2022-08-12 | Stop reason: ALTCHOICE

## 2022-06-07 RX ORDER — OMEPRAZOLE 40 MG/1
40 CAPSULE, DELAYED RELEASE ORAL
Qty: 60 CAPSULE | Refills: 5 | Status: SHIPPED | OUTPATIENT
Start: 2022-06-07 | End: 2022-08-12 | Stop reason: SDUPTHER

## 2022-06-07 RX ORDER — ONDANSETRON 4 MG/1
4 TABLET, ORALLY DISINTEGRATING ORAL 3 TIMES DAILY PRN
Qty: 30 TABLET | Refills: 1 | Status: SHIPPED | OUTPATIENT
Start: 2022-06-07 | End: 2022-08-09

## 2022-06-07 NOTE — PATIENT INSTRUCTIONS
Discharge Instructions: Eating a Soft Diet  You have been prescribed a soft diet (also called gastrointestinal soft diet or bland diet). This reduces the amount of work your digestive tract has to do. It also reduces the chance that your digestive tract will be irritated by the food you eat. A soft diet is prescribed for people with digestive problems. The diet consists of foods that are tender, mildly seasoned, and easy to digest. While on this diet, you should not eat fried or spicy foods, or raw fruits and vegetables. Also avoid alcoholic beverages.  General guidelines  Eat in a calm, relaxed atmosphere. How you eat may be as important as what you eat. Dont rush while eating. Chew your food slowly and thoroughly, and swallow slowly.  Eat small frequent meals throughout the day, but dont eat within 2 hours of bedtime.  Avoid any foods that cause discomfort.  Dont use NSAIDs (nonsteroidal anti-inflammatory drugs), such as aspirin, and ibuprofen. Also avoid medicine that contain aspirin. NSAIDs can cause ulcers and delay or prevent ulcer healing.  Use antacids as needed, but keep in mind that magnesium-containing antacids may cause diarrhea.  Foods to eat  Cream of wheat and cream of rice  Cooked white rice  Mashed potatoes, and boiled potatoes without skin  Plain pasta and noodles  Plain white crackers (such as no-salt soda crackers)  White bread  Applesauce  Cooked fruits without skins or seeds  Mild juices, such as apple and grape  Bananas  Cooked or mashed vegetables without stems and seeds  Carrots  Summer squash (zucchini, yellow squash)  Winter squash (acorn, butternut, spaghetti squash)  Cottage cheese  Mild hard or soft cheeses  Custard  Yogurt without seeds or nuts  Milk (you may need lactose-free milk)  Ice cream without seeds or nuts  Smooth peanut butter  Eggs  Fish, turkey, chicken, or other meat that is not tough or stringy  Tofu  Foods to avoid  Nuts and seeds  Snack foods, such as the  following:  Chocolate-containing snacks, candy, pastries, or cakes.  Potato chips (plain, barbecued, or other flavors)  Taco chips or nachos  Corn chips  Popcorn, popcorn cakes, or rice cakes  Crackers with nuts, seeds, or spicy seasonings  French fries  Fried or greasy foods  Whole-grain breads, rolls, and crackers  Breads and rolls with nuts, seeds, or bran  Bran and granola cereals  Berries with seeds, such as strawberries, raspberries, and blackberries  Acidic fruits, such as oranges, grapefruits, kenia, limes, and pineapples  Raw vegetables  Mild or hot peppers  Sauerkraut and pickled vegetables  Tomatoes or tomato products, such as tomato paste, tomato sauce, and tomato juice  Barbecue sauce  Spicy or flavored cheeses, such as jalapeño and black pepper cheese  Crunchy peanut butter  Dried cooked beans, such as orosco, kidney, or navy beans  The following meats:  Fried or greasy meats  Processed, spicy meats, such as sausage, powers, ham, and lunch meats  Ribs and other meats with barbecue sauce  Tough or stringy meats, such as corned beef or beef jerky  Fluids to avoid  Alcoholic beverages  Coffee and regular teas  Phyllis and other drinks with caffeine  Cranberry, orange, pineapple, and grapefruit juice  Lemonade  Vegetable juice  Whole milk, if you are lactose intolerant  Follow-up  Make a follow-up appointment with a dietitian as directed by our staff.  Date Last Reviewed: 6/21/2015 © 2000-2017 [employer]. [address] [place] [id number]. All rights reserved. This information is not intended as a substitute for professional medical care. Always follow your healthcare professional's instructions.         GERD (Adult)    The esophagus is a tube that carries food from the mouth to the stomach. A valve at the lower end of the esophagus prevents stomach acid from flowing upward. When this valve doesn't work properly, stomach contents may repeatedly flow back up (reflux) into the esophagus.  "This is called gastroesophageal reflux disease (GERD). GERD can irritate the esophagus. It can cause problems with swallowing or breathing. In severe cases, GERD can cause recurrent pneumonia or other serious problems.  Symptoms of reflux include burning, pressure or sharp pain in the upper abdomen or mid to lower chest. The pain can spread to the neck, back, or shoulder. There may be belching, an acid taste in the back of the throat, chronic cough, or sore throat or hoarseness. GERD symptoms often occur during the day after a big meal. They can also occur at night when lying down.   Home care  Lifestyle changes can help reduce symptoms. If needed, medicines may be prescribed. Symptoms often improve with treatment, but if treatment is stopped, the symptoms often return after a few months. So most persons with GERD will need to continue treatment.  Lifestyle changes  Limit or avoid fatty, fried, and spicy foods, as well as coffee, chocolate, mint, and foods with high acid content such as tomatoes and citrus fruit and juices (orange, grapefruit, lemon).  Dont eat large meals, especially at night. Frequent, smaller meals are best. Do not lie down right after eating. And dont eat anything 3 hours before going to bed.  Avoid drinking alcohol and smoking. As much as possible, stay away from second hand smoke.  If you are overweight, losing weight will reduce symptoms.   Avoid wearing tight clothing around your stomach area.  If your symptoms occur during sleep, use a foam wedge to elevate your upper body (not just your head.) Or, place 4" blocks under the head of your bed.  Medicines  If needed, medicines can help relieve the symptoms of GERD and prevent damage to the esophagus. Discuss a medicine plan with your healthcare provider. This may include one or more of the following medicines:  Antacids to help neutralize the normal acids in your stomach.  Acid blockers (H2 blockers) to decrease acid production.  Acid " inhibitors (PPIs) to decrease acid production in a different way than the blockers. They may work better, but can take a little longer to take effect.  Take an antacid 30-60 minutes after eating and at bedtime, but not at the same time as an acid blocker.  Try not to take medicines such as ibuprofen and aspirin. If you are taking aspirin for your heart or other medical reasons, talk to your healthcare provider about stopping it.  Follow-up care  Follow up with your healthcare provider or as advised by our staff.  When to seek medical advice  Call your healthcare provider if any of the following occur:  Stomach pain gets worse or moves to the lower right abdomen (appendix area)  Chest pain appears or gets worse, or spreads to the back, neck, shoulder, or arm  Frequent vomiting (cant keep down liquids)  Blood in the stool or vomit (red or black in color)  Feeling weak or dizzy  Fever of 100.4ºF (38ºC) or higher, or as directed by your healthcare provider  Date Last Reviewed: 6/23/2015  © 3619-9692 [employer]. [address] [place] [address], [place] [address]. All rights reserved. This information is not intended as a substitute for professional medical care. Always follow your healthcare professional's instructions.

## 2022-06-07 NOTE — TELEPHONE ENCOUNTER
Let her know that her Ultrasound shows no findings to suggest thyroid is the cause of her symptoms.

## 2022-06-07 NOTE — PROGRESS NOTES
Subjective:       Patient ID: Amena Anderson is a 56 y.o. female Body mass index is 53.02 kg/m².    Chief Complaint: Dysphagia    Established patient of Dr. Benitez & myself (last in 2018).    Patient is here with her daughter, whom assisted with history.    GI Problem  The primary symptoms include weight loss (lost a few lbs, stable overall), abdominal pain (described as soreness with vomiting), nausea (out of zofran- provides mild relief) and vomiting. Primary symptoms do not include fever, fatigue, diarrhea, melena, hematemesis or hematochezia.   Nausea began more than 1 week ago (started ~4 weeks ago). The nausea is associated with eating.   The vomiting began more than 2 days ago. Vomiting occurs 2 to 5 times per day (anytime she eats). The emesis contains stomach contents and undigested food.   The illness is also significant for dysphagia (CHIEF COMPLAINT: recurred ~4 weeks ago, saw endocrinologist for thyroid & told to see GI; occurs with food and liquids; described as lump in throat; denies problems with pills). The illness does not include chills, odynophagia or constipation (Bowel movements twice daily of formed stool, taking colace 100 mg BID). Significant associated medical issues include GERD (a few times a week, taking prilosec 20 mg BID; PAST TREATMENT: zantac), irritable bowel syndrome (bentyl 10 mg QID PRN- not taking currently) and hemorrhoids.     Review of Systems   Constitutional: Positive for weight loss (lost a few lbs, stable overall). Negative for appetite change, chills, fatigue and fever.        Taking norco TID & naproxen BID   HENT: Positive for nosebleeds (occasional), trouble swallowing (had resolved with EGD with dilation) and voice change (occasional). Negative for sore throat.    Respiratory: Negative for cough, choking and shortness of breath.    Cardiovascular: Negative for chest pain.   Gastrointestinal: Positive for abdominal pain (described as soreness with vomiting), dysphagia  (CHIEF COMPLAINT: recurred ~4 weeks ago, saw endocrinologist for thyroid & told to see GI; occurs with food and liquids; described as lump in throat; denies problems with pills), nausea (out of zofran- provides mild relief) and vomiting. Negative for anal bleeding, blood in stool, constipation (Bowel movements twice daily of formed stool, taking colace 100 mg BID), diarrhea, hematemesis, hematochezia, melena and rectal pain.   Genitourinary: Negative for difficulty urinating and flank pain.   Neurological: Negative for weakness.       No LMP recorded. Patient has had a hysterectomy.    Past Medical History:   Diagnosis Date    AC (acromioclavicular) joint bone spurs, unspecified laterality     bilateral    Anticoagulant long-term use     aspirin    Anxiety     Back pain     BMI 50.0-59.9, adult 2017    CHF (congestive heart failure)     Diabetes mellitus, type 2     Difficulty swallowing 10/18/2017    Dysphagia     Fatty liver     H. pylori infection     Hepatomegaly     Hoarseness 10/18/2017    Hormone replacement therapy (HRT)     Hypertension     Hypothyroidism     Insomnia     Mitral valve prolapse     per pt    Multinodular goiter 10/18/2017    MANUEL on CPAP      Past Surgical History:   Procedure Laterality Date     SECTION      Three times    COLONOSCOPY  2017    Dr. Benitez, repeat in 10 years    HYSTERECTOMY      total    LASIK      LUMBAR EPIDURAL INJECTION      OOPHORECTOMY      THYROIDECTOMY, PARTIAL      UPPER GASTROINTESTINAL ENDOSCOPY  2017    Dr. Benitez     Family History   Problem Relation Age of Onset    Throat cancer Maternal Uncle     Breast cancer Other 35    Kidney disease Mother     Hypertension Father     Kidney disease Father     Heart disease Father     Alzheimer's disease Father     Breast cancer Cousin     Colon cancer Neg Hx     Colon polyps Neg Hx     Crohn's disease Neg Hx     Ulcerative colitis Neg Hx     Stomach cancer Neg  Hx     Esophageal cancer Neg Hx     Ovarian cancer Neg Hx      Social History     Tobacco Use    Smoking status: Never Smoker    Smokeless tobacco: Never Used   Substance Use Topics    Alcohol use: No    Drug use: Yes     Types: Hydrocodone     Wt Readings from Last 10 Encounters:   06/07/22 (!) 149 kg (328 lb 7.8 oz)   05/20/22 (!) 151 kg (332 lb 14.2 oz)   05/10/22 (!) 151 kg (332 lb 14.3 oz)   04/01/22 (!) 151.7 kg (334 lb 7 oz)   12/06/21 (!) 151.7 kg (334 lb 7 oz)   08/24/21 (!) 149.2 kg (328 lb 14.8 oz)   08/23/21 (!) 144.7 kg (319 lb 0.1 oz)   08/02/21 (!) 144.7 kg (319 lb)   06/21/21 (!) 144.7 kg (319 lb 0.1 oz)   02/24/21 (!) 143.2 kg (315 lb 11.2 oz)     Lab Results   Component Value Date    WBC 7.84 02/05/2018    HGB 12.4 02/05/2018    HCT 39.3 02/05/2018    MCV 84 02/05/2018     02/05/2018     CMP  Sodium   Date Value Ref Range Status   06/14/2021 139 136 - 145 mmol/L Final   06/14/2021 139 136 - 145 mmol/L Final     Potassium   Date Value Ref Range Status   06/14/2021 4.1 3.5 - 5.1 mmol/L Final   06/14/2021 4.1 3.5 - 5.1 mmol/L Final     Chloride   Date Value Ref Range Status   06/14/2021 104 95 - 110 mmol/L Final   06/14/2021 104 95 - 110 mmol/L Final     CO2   Date Value Ref Range Status   06/14/2021 26 23 - 29 mmol/L Final   06/14/2021 26 23 - 29 mmol/L Final     Glucose   Date Value Ref Range Status   06/14/2021 111 (H) 70 - 110 mg/dL Final   06/14/2021 111 (H) 70 - 110 mg/dL Final     BUN   Date Value Ref Range Status   06/14/2021 8 6 - 20 mg/dL Final   06/14/2021 8 6 - 20 mg/dL Final     Creatinine   Date Value Ref Range Status   06/14/2021 0.7 0.5 - 1.4 mg/dL Final   06/14/2021 0.7 0.5 - 1.4 mg/dL Final     Calcium   Date Value Ref Range Status   06/14/2021 9.2 8.7 - 10.5 mg/dL Final   06/14/2021 9.2 8.7 - 10.5 mg/dL Final     Total Protein   Date Value Ref Range Status   06/14/2021 7.1 6.0 - 8.4 g/dL Final   06/14/2021 7.1 6.0 - 8.4 g/dL Final     Albumin   Date Value Ref Range  Status   06/14/2021 3.3 (L) 3.5 - 5.2 g/dL Final   06/14/2021 3.3 (L) 3.5 - 5.2 g/dL Final     Total Bilirubin   Date Value Ref Range Status   06/14/2021 0.3 0.1 - 1.0 mg/dL Final     Comment:     For infants and newborns, interpretation of results should be based  on gestational age, weight and in agreement with clinical  observations.    Premature Infant recommended reference ranges:  Up to 24 hours.............<8.0 mg/dL  Up to 48 hours............<12.0 mg/dL  3-5 days..................<15.0 mg/dL  6-29 days.................<15.0 mg/dL     06/14/2021 0.3 0.1 - 1.0 mg/dL Final     Comment:     For infants and newborns, interpretation of results should be based  on gestational age, weight and in agreement with clinical  observations.    Premature Infant recommended reference ranges:  Up to 24 hours.............<8.0 mg/dL  Up to 48 hours............<12.0 mg/dL  3-5 days..................<15.0 mg/dL  6-29 days.................<15.0 mg/dL       Alkaline Phosphatase   Date Value Ref Range Status   06/14/2021 73 55 - 135 U/L Final   06/14/2021 73 55 - 135 U/L Final     AST   Date Value Ref Range Status   06/14/2021 17 10 - 40 U/L Final   06/14/2021 17 10 - 40 U/L Final     ALT   Date Value Ref Range Status   06/14/2021 15 10 - 44 U/L Final   06/14/2021 15 10 - 44 U/L Final     Anion Gap   Date Value Ref Range Status   06/14/2021 9 8 - 16 mmol/L Final   06/14/2021 9 8 - 16 mmol/L Final     eGFR if    Date Value Ref Range Status   06/14/2021 >60.0 >60 mL/min/1.73 m^2 Final   06/14/2021 >60.0 >60 mL/min/1.73 m^2 Final     eGFR if non    Date Value Ref Range Status   06/14/2021 >60.0 >60 mL/min/1.73 m^2 Final     Comment:     Calculation used to obtain the estimated glomerular filtration  rate (eGFR) is the CKD-EPI equation.      06/14/2021 >60.0 >60 mL/min/1.73 m^2 Final     Comment:     Calculation used to obtain the estimated glomerular filtration  rate (eGFR) is the CKD-EPI equation.     "    Lab Results   Component Value Date    LIPASE 11 02/05/2018     Lab Results   Component Value Date    AMYLASE 88 02/05/2018     Lab Results   Component Value Date    TSH 0.969 05/30/2022 2/19/2018 h pylori stool negative    Reviewed prior medical records including radiology report of 3/12/2018 MRI MRCP; 2/12/2018 abdominal ultrasound; 2/5/2018 abdominal x-ray; 11/13/17 modified barium swallow study; & endoscopy history (see surgical history).    12/14/17 EGD was reviewed and procedure report states:   " Findings:       The oropharynx was normal.       The cricopharyngeus was normal. No Zenker's..       The examined esophagus was normal. (NERD?)       The Z-line was regular and was found 37 cm from the incisors.       Some laxness of the lower esophageal sphincter was noted, but I did        not see a definite hiatal hernia.       Minimal inflammation characterized by congestion (edema) and        erythema was found in the gastric fundus. Biopsies were taken with a        cold forceps for histology.       Thestomach was otherwise normal. Biopsies were taken from the antrum        with a cold forceps for Helicobacter pylori testing using CLOtest.        Biopsies were taken with a cold forceps for histology.       The examined duodenum was normal. Biopsies were taken with a cold        forceps for histology.       No endoscopic abnormality was evident in the esophagus to explain        the patient's complaint of dysphagia. It was decided, however, to        proceed with dilation of the entire esophagus. A guidewire was        placed and the scope was withdrawn. Dilation was performed with a        Savary dilator with no resistance at 54 Fr.  Impression:          - Normal oropharynx.                       - Normal cricopharyngeus.                       - Normal esophagus.                       - Z-line regular, 37 cm from the incisors.                       - No endoscopic esophageal abnormality to explain        " "                patient's dysphagia. Esophagus dilated, 54 Fr.                       - Non-erosive esophageal reflux (NERD) disease?.                       - The history / examination was suspicious for an                        esophageal spasm?.                       - Fundal Gastritis. Biopsied.                       - Normal stomach otherwise. Biopsied.                       - Normal examined duodenum. Biopsied.  Recommendation:      - Discharge patient to home.                       - Await pathology and CLOtest results.                       - Follow an antireflux regimen.                       - Use Prilosec (omeprazole) 40 mg PO BID for 2                        months.                       - Use Zantac (ranitidine) 300 mg PO nightly.                       - Return to GI clinic in 6-8 weeks.                       - If symptoms persist, next to perform a modified                        barium swallow. ".  Biopsy results:   "Supplemental Diagnosis  Immunohistochemical stains for Helicobacter species are performed on both specimens numbers 1 and 2 gastric  biopsies and is positive for Helicobacter species organisms in both biopsy specimens with satisfactory positive and  negative controls.  (Electronically Signed: 2017-12-20 14:54:58 )  Diagnosed by: Myranda Li M.D.  FINAL PATHOLOGIC DIAGNOSIS  Stomach, fundus, biopsy:  Marked chronic gastritis with lymphoid follicle formation and focal activity.  Immunohistochemical stain for Helicobacter species will be performed and results will follow in a supplemental  report.  2. Stomach, antrum, biopsy:  Marked chronic focally active gastritis.  Immunohistochemical stain for Helicobacter species will be performed and results will follow in a supplemental  report.  3. Duodenum, random biopsy:  Duodenal mucosa with gastric type foveolar metaplasia and chronic inflammation suggestive of peptic  duodenitis.  No evidence of dysplasia or malignancy."    8/21/17 " "Colonoscopy was reviewed and procedure report states:   " Findings:       The perianal and digital rectal examinations were normal. Pertinent        negatives include normal sphincter tone and no palpable rectal        lesions.       Non-bleeding internal hemorrhoids were found during retroflexion.        The hemorrhoids were small and Grade I (internal hemorrhoids that do        not prolapse).       No additional abnormalities were found on retroflexion.       The colon appeared normal.       The terminal ileum appeared normal.  Impression:          - Non-bleeding internal hemorrhoids.                       - The entire examined colon is normal.                       - The examined portion of the ileum was normal.                       - No specimens collected.  Recommendation:      - Discharge patient to home.                       - High fiber diet.                       - Repeat colonoscopy in 10 years for screening                        purposes.                       - Continue present medications.                       - Patient has a contact number available for                        emergencies. The signs and symptoms of potential                        delayed complications were discussed with the                        patient. Return to normal activities tomorrow.                        Written discharge instructions were provided to the                        patient.                       - Return to normal activities tomorrow. ".     Objective:      Physical Exam  Vitals and nursing note reviewed.   Constitutional:       General: She is not in acute distress.     Appearance: Normal appearance. She is well-developed. She is not diaphoretic.      Comments: cane   HENT:      Mouth/Throat:      Comments: Patient is wearing a face mask, which covers patient's mouth and nose, due to COVID 19 concerns.  Eyes:      General: No scleral icterus.     Conjunctiva/sclera: Conjunctivae normal.      Pupils: Pupils " are equal, round, and reactive to light.   Pulmonary:      Effort: Pulmonary effort is normal. No respiratory distress.      Breath sounds: Normal breath sounds. No wheezing.   Abdominal:      General: Bowel sounds are normal. There is no distension or abdominal bruit.      Palpations: Abdomen is soft. Abdomen is not rigid. There is no mass.      Tenderness: There is abdominal tenderness (mild). There is no guarding or rebound. Negative signs include Kwon's sign and McBurney's sign.   Skin:     General: Skin is warm and dry.      Coloration: Skin is not pale.      Findings: No erythema or rash.      Comments: Non-jaundiced   Neurological:      Mental Status: She is alert and oriented to person, place, and time.   Psychiatric:         Behavior: Behavior normal.         Thought Content: Thought content normal.         Judgment: Judgment normal.         Assessment:       1. Intractable vomiting with nausea, unspecified vomiting type    2. Pharyngoesophageal dysphagia    3. Globus sensation    4. History of gastroesophageal reflux (GERD)    5. Upper abdominal pain    6. History of Helicobacter pylori infection    7. Opioid use    8. Excessive use of nonsteroidal anti-inflammatory drug (NSAID)    9. Frequent nosebleeds    10. Hoarseness of voice    11. Anticoagulant long-term use        Plan:       Intractable vomiting with nausea, unspecified vomiting type  - strongly recommended patient to go to the ER for further evaluation and management; patient and patient's daughter both verbalized understanding but patient refused to go to the ER and she wants to schedule outpatient EGD to be done by Dr. Emmanuel LEIGH Gastric Emptying; Future; Expected date: 06/07/2022  -  REFILL   ondansetron (ZOFRAN-ODT) 4 MG TbDL; Take 1 tablet (4 mg total) by mouth 3 (three) times daily as needed (nausea).  Dispense: 30 tablet; Refill: 1  - INCREASE TO    omeprazole (PRILOSEC) 40 MG capsule; Take 1 capsule (40 mg total) by mouth 2 (two)  times daily before meals.  Dispense: 60 capsule; Refill: 5  -     CBC Without Differential; Future; Expected date: 06/07/2022  -     Lipase; Future; Expected date: 06/07/2022  -     Hepatic Function Panel; Future; Expected date: 06/07/2022  -     H. pylori antigen, stool; Future; Expected date: 06/07/2022  - schedule EGD, discussed procedure with patient, including risks and benefits, patient verbalized understanding    Pharyngoesophageal dysphagia  - schedule EGD, discussed procedure with patient and possible esophageal dilation may be performed during procedure if indicated, patient verbalized understanding  - educated patient to eat smaller more frequent meals and to eat slowly and advised to eat a soft diet.  - possible UGI/esophagram/esophageal manometry if symptoms persist    Globus sensation  -     Ambulatory referral/consult to ENT; Future; Expected date: 06/26/2022    History of gastroesophageal reflux (GERD)  -  INCREASE TO   omeprazole (PRILOSEC) 40 MG capsule; Take 1 capsule (40 mg total) by mouth 2 (two) times daily before meals.  Dispense: 60 capsule; Refill: 5  - schedule EGD, discussed procedure with patient, including risks and benefits, patient verbalized understanding    Upper abdominal pain  -  INCREASE TO   omeprazole (PRILOSEC) 40 MG capsule; Take 1 capsule (40 mg total) by mouth 2 (two) times daily before meals.  Dispense: 60 capsule; Refill: 5  -     CBC Without Differential; Future; Expected date: 06/07/2022  -     Lipase; Future; Expected date: 06/07/2022  -     Hepatic Function Panel; Future; Expected date: 06/07/2022  - schedule EGD, discussed procedure with patient, including risks and benefits, patient verbalized understanding    History of Helicobacter pylori infection  -     H. pylori antigen, stool; Future; Expected date: 06/07/2022  - schedule EGD, discussed procedure with patient, including risks and benefits, patient verbalized understanding    Opioid use  - discussed with patient  that a side effect of narcotic pain medications is nausea and vomiting, advised patient to talk to provider who manages pain medication, patient verbalized understanding    Excessive use of nonsteroidal anti-inflammatory drug (NSAID)  - schedule EGD, discussed procedure with patient, including risks and benefits, patient verbalized understanding  - avoid/minimize use of NSAIDs- since they can cause GI upset, bleeding and/or ulcers. If NSAID must be taken, recommend take with food.    Frequent nosebleeds  -     Ambulatory referral/consult to ENT; Future; Expected date: 06/26/2022    Hoarseness of voice  -     Ambulatory referral/consult to ENT; Future; Expected date: 06/26/2022    Anticoagulant long-term use  - informed patient that the anticoagulant(s) will likely need to be held for endoscopy, nurse will confirm with endoscopist, cardiologist, and/or PCP.    Follow up in about 1 month (around 7/7/2022), or if symptoms worsen or fail to improve.    If no improvement in symptoms or symptoms worsen, call/follow-up at clinic or go to ER.        42 minutes of total time spent on the encounter, which includes face to face time and non-face to face time preparing to see the patient (eg, review of tests), Obtaining and/or reviewing separately obtained history, Documenting clinical information in the electronic or other health record, Independently interpreting results (not separately reported) and communicating results to the patient/family/caregiver, or Care coordination (not separately reported).

## 2022-06-13 ENCOUNTER — LAB VISIT (OUTPATIENT)
Dept: LAB | Facility: HOSPITAL | Age: 57
End: 2022-06-13
Attending: NURSE PRACTITIONER
Payer: MEDICARE

## 2022-06-13 ENCOUNTER — OFFICE VISIT (OUTPATIENT)
Dept: FAMILY MEDICINE | Facility: CLINIC | Age: 57
End: 2022-06-13
Payer: MEDICARE

## 2022-06-13 ENCOUNTER — PATIENT MESSAGE (OUTPATIENT)
Dept: ADMINISTRATIVE | Facility: HOSPITAL | Age: 57
End: 2022-06-13
Payer: MEDICARE

## 2022-06-13 VITALS
SYSTOLIC BLOOD PRESSURE: 120 MMHG | DIASTOLIC BLOOD PRESSURE: 80 MMHG | BODY MASS INDEX: 47.09 KG/M2 | WEIGHT: 293 LBS | OXYGEN SATURATION: 97 % | HEIGHT: 66 IN | HEART RATE: 81 BPM

## 2022-06-13 DIAGNOSIS — E89.0 POSTABLATIVE HYPOTHYROIDISM: Chronic | ICD-10-CM

## 2022-06-13 DIAGNOSIS — I10 ESSENTIAL HYPERTENSION: Chronic | ICD-10-CM

## 2022-06-13 DIAGNOSIS — G89.4 CHRONIC PAIN SYNDROME: Chronic | ICD-10-CM

## 2022-06-13 DIAGNOSIS — R13.10 DYSPHAGIA, UNSPECIFIED TYPE: ICD-10-CM

## 2022-06-13 DIAGNOSIS — E78.2 MIXED HYPERLIPIDEMIA: Chronic | ICD-10-CM

## 2022-06-13 DIAGNOSIS — R10.10 UPPER ABDOMINAL PAIN: ICD-10-CM

## 2022-06-13 DIAGNOSIS — G47.33 OSA (OBSTRUCTIVE SLEEP APNEA): Chronic | ICD-10-CM

## 2022-06-13 DIAGNOSIS — E11.42 TYPE 2 DIABETES MELLITUS WITH DIABETIC POLYNEUROPATHY, WITHOUT LONG-TERM CURRENT USE OF INSULIN: Chronic | ICD-10-CM

## 2022-06-13 DIAGNOSIS — I10 ESSENTIAL HYPERTENSION: Primary | Chronic | ICD-10-CM

## 2022-06-13 DIAGNOSIS — R11.2 NON-INTRACTABLE VOMITING WITH NAUSEA: ICD-10-CM

## 2022-06-13 LAB
ALBUMIN SERPL BCP-MCNC: 3.8 G/DL (ref 3.5–5.2)
ALBUMIN SERPL BCP-MCNC: 3.9 G/DL (ref 3.5–5.2)
ALP SERPL-CCNC: 59 U/L (ref 55–135)
ALP SERPL-CCNC: 63 U/L (ref 55–135)
ALT SERPL W/O P-5'-P-CCNC: 21 U/L (ref 10–44)
ALT SERPL W/O P-5'-P-CCNC: 21 U/L (ref 10–44)
ANION GAP SERPL CALC-SCNC: 12 MMOL/L (ref 8–16)
AST SERPL-CCNC: 21 U/L (ref 10–40)
AST SERPL-CCNC: 21 U/L (ref 10–40)
BILIRUB DIRECT SERPL-MCNC: 0.2 MG/DL (ref 0.1–0.3)
BILIRUB SERPL-MCNC: 0.5 MG/DL (ref 0.1–1)
BILIRUB SERPL-MCNC: 0.5 MG/DL (ref 0.1–1)
BUN SERPL-MCNC: 7 MG/DL (ref 6–20)
CALCIUM SERPL-MCNC: 9 MG/DL (ref 8.7–10.5)
CHLORIDE SERPL-SCNC: 103 MMOL/L (ref 95–110)
CHOLEST SERPL-MCNC: 160 MG/DL (ref 120–199)
CHOLEST/HDLC SERPL: 4.4 {RATIO} (ref 2–5)
CO2 SERPL-SCNC: 25 MMOL/L (ref 23–29)
CREAT SERPL-MCNC: 0.7 MG/DL (ref 0.5–1.4)
ERYTHROCYTE [DISTWIDTH] IN BLOOD BY AUTOMATED COUNT: 14.2 % (ref 11.5–14.5)
EST. GFR  (AFRICAN AMERICAN): >60 ML/MIN/1.73 M^2
EST. GFR  (NON AFRICAN AMERICAN): >60 ML/MIN/1.73 M^2
GLUCOSE SERPL-MCNC: 101 MG/DL (ref 70–110)
HCT VFR BLD AUTO: 43.5 % (ref 37–48.5)
HDLC SERPL-MCNC: 36 MG/DL (ref 40–75)
HDLC SERPL: 22.5 % (ref 20–50)
HGB BLD-MCNC: 13.2 G/DL (ref 12–16)
LDLC SERPL CALC-MCNC: 96.2 MG/DL (ref 63–159)
LIPASE SERPL-CCNC: 14 U/L (ref 4–60)
MCH RBC QN AUTO: 27.2 PG (ref 27–31)
MCHC RBC AUTO-ENTMCNC: 30.3 G/DL (ref 32–36)
MCV RBC AUTO: 90 FL (ref 82–98)
NONHDLC SERPL-MCNC: 124 MG/DL
PLATELET # BLD AUTO: 345 K/UL (ref 150–450)
PMV BLD AUTO: 11.4 FL (ref 9.2–12.9)
POTASSIUM SERPL-SCNC: 3.7 MMOL/L (ref 3.5–5.1)
PROT SERPL-MCNC: 7.2 G/DL (ref 6–8.4)
PROT SERPL-MCNC: 7.4 G/DL (ref 6–8.4)
RBC # BLD AUTO: 4.85 M/UL (ref 4–5.4)
SODIUM SERPL-SCNC: 140 MMOL/L (ref 136–145)
TRIGL SERPL-MCNC: 139 MG/DL (ref 30–150)
WBC # BLD AUTO: 9.05 K/UL (ref 3.9–12.7)

## 2022-06-13 PROCEDURE — 3008F BODY MASS INDEX DOCD: CPT | Mod: CPTII,S$GLB,, | Performed by: EMERGENCY MEDICINE

## 2022-06-13 PROCEDURE — 3074F PR MOST RECENT SYSTOLIC BLOOD PRESSURE < 130 MM HG: ICD-10-PCS | Mod: CPTII,S$GLB,, | Performed by: EMERGENCY MEDICINE

## 2022-06-13 PROCEDURE — 3008F PR BODY MASS INDEX (BMI) DOCUMENTED: ICD-10-PCS | Mod: CPTII,S$GLB,, | Performed by: EMERGENCY MEDICINE

## 2022-06-13 PROCEDURE — 99213 OFFICE O/P EST LOW 20 MIN: CPT | Mod: S$GLB,,, | Performed by: EMERGENCY MEDICINE

## 2022-06-13 PROCEDURE — 80053 COMPREHEN METABOLIC PANEL: CPT | Performed by: EMERGENCY MEDICINE

## 2022-06-13 PROCEDURE — 4010F ACE/ARB THERAPY RXD/TAKEN: CPT | Mod: CPTII,S$GLB,, | Performed by: EMERGENCY MEDICINE

## 2022-06-13 PROCEDURE — 85027 COMPLETE CBC AUTOMATED: CPT | Performed by: NURSE PRACTITIONER

## 2022-06-13 PROCEDURE — 99999 PR PBB SHADOW E&M-EST. PATIENT-LVL V: CPT | Mod: PBBFAC,,, | Performed by: EMERGENCY MEDICINE

## 2022-06-13 PROCEDURE — 99999 PR PBB SHADOW E&M-EST. PATIENT-LVL V: ICD-10-PCS | Mod: PBBFAC,,, | Performed by: EMERGENCY MEDICINE

## 2022-06-13 PROCEDURE — 1159F MED LIST DOCD IN RCRD: CPT | Mod: CPTII,S$GLB,, | Performed by: EMERGENCY MEDICINE

## 2022-06-13 PROCEDURE — 3079F DIAST BP 80-89 MM HG: CPT | Mod: CPTII,S$GLB,, | Performed by: EMERGENCY MEDICINE

## 2022-06-13 PROCEDURE — 36415 COLL VENOUS BLD VENIPUNCTURE: CPT | Mod: PO | Performed by: NURSE PRACTITIONER

## 2022-06-13 PROCEDURE — 1159F PR MEDICATION LIST DOCUMENTED IN MEDICAL RECORD: ICD-10-PCS | Mod: CPTII,S$GLB,, | Performed by: EMERGENCY MEDICINE

## 2022-06-13 PROCEDURE — 4010F PR ACE/ARB THEARPY RXD/TAKEN: ICD-10-PCS | Mod: CPTII,S$GLB,, | Performed by: EMERGENCY MEDICINE

## 2022-06-13 PROCEDURE — 83690 ASSAY OF LIPASE: CPT | Performed by: NURSE PRACTITIONER

## 2022-06-13 PROCEDURE — 80076 HEPATIC FUNCTION PANEL: CPT | Performed by: NURSE PRACTITIONER

## 2022-06-13 PROCEDURE — 80061 LIPID PANEL: CPT | Performed by: EMERGENCY MEDICINE

## 2022-06-13 PROCEDURE — 36415 COLL VENOUS BLD VENIPUNCTURE: CPT | Mod: PO | Performed by: EMERGENCY MEDICINE

## 2022-06-13 PROCEDURE — 99213 PR OFFICE/OUTPT VISIT, EST, LEVL III, 20-29 MIN: ICD-10-PCS | Mod: S$GLB,,, | Performed by: EMERGENCY MEDICINE

## 2022-06-13 PROCEDURE — 3079F PR MOST RECENT DIASTOLIC BLOOD PRESSURE 80-89 MM HG: ICD-10-PCS | Mod: CPTII,S$GLB,, | Performed by: EMERGENCY MEDICINE

## 2022-06-13 PROCEDURE — 3074F SYST BP LT 130 MM HG: CPT | Mod: CPTII,S$GLB,, | Performed by: EMERGENCY MEDICINE

## 2022-06-13 NOTE — Clinical Note
Just looking at her labs.  The orders from Dr. Mendoza are still in place.  I do not see his she had her hemoglobin A1c drawn.  Can you please check with the lab to see if they can add the hemoglobin A1c.

## 2022-06-13 NOTE — PROGRESS NOTES
Subjective:   THIS NOTE IS DONE WITH VOICE RECOGNITION        Patient ID: Amena Anderson is a 56 y.o. female.    Chief Complaint: discuss test result          Assessment:       1. Essential hypertension    2. Type 2 diabetes mellitus with diabetic polyneuropathy, without long-term current use of insulin    3. BMI 50.0-59.9, adult    4. Chronic pain syndrome    5. MANUEL (obstructive sleep apnea)    6. Postablative hypothyroidism    7. Mixed hyperlipidemia    8. Dysphagia, unspecified type        Plan:       1. Essential hypertension  Controlled.  Continue current medications  Continue to avoid excessive sodium  Continue home monitoring  Target blood pressure is 139/89 or less    - Comprehensive Metabolic Panel; Future  - CBC Auto Differential; Future    2. Type 2 diabetes mellitus with diabetic polyneuropathy, without long-term current use of insulin  Stable  Check hemoglobin A1c    - Lipid Panel; Future  - Comprehensive Metabolic Panel; Future    3. BMI 50.0-59.9, adult  Positive change with weight loss  Continue good work    4. Chronic pain syndrome  Update labs  Continue to work with Dr. De La Cruz    - CBC Auto Differential; Future    5. MANUEL (obstructive sleep apnea)  Some weight loss noted  Continue CPAP  Equipment in good repair    6. Postablative hypothyroidism  Stable  Ultrasound reviewed with her  No change in medications recommended.  She has seen Endocrinology.    7. Mixed hyperlipidemia  Continue current medications  Update lipid panel today    - Lipid Panel; Future    8. Dysphagia, unspecified type  Esophageal gastroduodenoscopy scheduled for later this week  Continue current medications          HPI  Here to follow up on blood pressure.  Taking current medications.    BP Readings from Last 3 Encounters:   06/13/22 120/80   05/20/22 134/80   05/10/22 (!) 144/82     Lab Results   Component Value Date    HGBA1C 5.9 (H) 06/14/2021    HGBA1C 5.8 (H) 01/21/2021    HGBA1C 5.7 (H) 06/22/2020     Lab Results    Component Value Date    LDLCALC 100.0 06/14/2021    LDLCALC 100.0 06/14/2021    LDLCALC 83.4 06/22/2020     Lab Results   Component Value Date    CREATININE 0.7 06/14/2021    CREATININE 0.7 06/14/2021    CREATININE 0.7 06/22/2020     Lab Results   Component Value Date    ESTGFRAFRICA >60.0 06/14/2021    ESTGFRAFRICA >60.0 06/14/2021    ESTGFRAFRICA >60.0 06/22/2020     Hyperlipidemia monitoring.  Atorvastatin 20 mg    Lab Results   Component Value Date    CHOL 148 06/14/2021    CHOL 148 06/14/2021    CHOL 147 06/22/2020     Lab Results   Component Value Date    HDL 40 06/14/2021    HDL 40 06/14/2021    HDL 40 06/22/2020     Lab Results   Component Value Date    LDLCALC 100.0 06/14/2021    LDLCALC 100.0 06/14/2021    LDLCALC 83.4 06/22/2020     Lab Results   Component Value Date    TRIG 40 06/14/2021    TRIG 40 06/14/2021    TRIG 118 06/22/2020     Lab Results   Component Value Date    CHOLHDL 27.0 06/14/2021    CHOLHDL 27.0 06/14/2021    CHOLHDL 27.2 06/22/2020     Thyroid is stable.  No changes.  Ultrasound is acceptable.    Lab Results   Component Value Date    TSH 0.969 05/30/2022       Chronic pain, followed by Dr. Dio De La Cruz.  No unusual new findings.    She has had significant trouble swallowing, and is thought to have significant reflux..  2017 EGD unremarkable.  Scheduled for repeat EGD this week.  Secondary to this discomfort she has not been able eat it has had some weight loss.    Sleep apnea remains an issue.  She is using CPAP.  Her equipment is in reasonable repair.    No new issues at home.  Her grandson is living with her.  Her financial status is stable, and adequate.    Limited physical exercise.      Immunization History   Administered Date(s) Administered    COVID-19, MRNA, LN-S, PF (Pfizer) (Purple Cap) 04/12/2021, 05/03/2021, 12/14/2021    Influenza - Quadrivalent - PF *Preferred* (6 months and older) 11/27/2017, 10/11/2018, 10/28/2019, 09/21/2020, 12/06/2021    Pneumococcal  Conjugate - 13 Valent 05/22/2017    Pneumococcal Polysaccharide - 23 Valent 10/11/2018     Shingrix (recombinant shingles vaccine) has been discussed and recommended.    Encouraged 2nd COVID booster.    Current Outpatient Medications   Medication Sig Dispense Refill    amitriptyline (ELAVIL) 25 MG tablet Take 1 tablet (25 mg total) by mouth every evening. 90 tablet 3    amLODIPine (NORVASC) 10 MG tablet TAKE 1 TABLET(10 MG) BY MOUTH EVERY DAY 90 tablet 3    aspirin (ECOTRIN) 81 MG EC tablet Take 81 mg by mouth once daily.      atorvastatin (LIPITOR) 20 MG tablet TAKE 1 TABLET(20 MG) BY MOUTH EVERY DAY 90 tablet 3    azelastine (ASTELIN) 137 mcg (0.1 %) nasal spray 1 spray (137 mcg total) by Nasal route 2 (two) times daily. 90 mL 1    blood sugar diagnostic Strp To check BG 2 times daily, to use with insurance preferred meter DX E11.42 200 strip 1    blood-glucose meter kit To check BG 2 times daily, to use with insurance preferred meter 100 each 11    boric acid (BORIC ACID) vaginal suppository Place 1 each (650 mg total) vaginally every 7 days. 12 suppository 3    ciclopirox (LOPROX) 0.77 % Crea Apply topically 2 (two) times daily. 90 g 3    ciclopirox 0.77 % Gel Apply topically 2 (two) times daily. 1 each 6    diclofenac sodium 1 % Gel Apply 2 g topically 4 (four) times daily. 100 g 2    docusate sodium (COLACE) 50 MG capsule Take 100 mg by mouth 2 (two) times daily.      estradioL (ESTRACE) 1 MG tablet TAKE 1 TABLET BY MOUTH EVERY DAY 90 tablet 2    furosemide (LASIX) 40 MG tablet Take 1 tablet (40 mg total) by mouth once daily. 90 tablet 3    gabapentin (NEURONTIN) 400 MG capsule Take 400 mg by mouth 3 (three) times daily.      hydrocodone-acetaminophen 10-325mg (NORCO)  mg Tab Take 10 tablets by mouth 3 (three) times daily.      lancets Misc To check BG 2 times daily, to use with insurance preferred meter 200 each 1    levothyroxine (SYNTHROID) 150 MCG tablet TAKE 1 TABLET(150 MCG) BY  MOUTH EVERY DAY 90 tablet 3    lidocaine-prilocaine (EMLA) cream APPLY UP TO 3.2 GRAMS TO PAINFUL AREAS UP TO FIVE TIMES DAILY. RUB IN WELL      lisinopriL (PRINIVIL,ZESTRIL) 20 MG tablet TAKE 1 TABLET(20 MG) BY MOUTH EVERY DAY 90 tablet 3    metFORMIN (GLUCOPHAGE) 1000 MG tablet TAKE 1 TABLET(1000 MG) BY MOUTH EVERY EVENING 90 tablet 3    metroNIDAZOLE (METROGEL) 0.75 % vaginal gel SMARTSI Applicator Vaginal Every Evening      naproxen (EC NAPROSYN) 500 MG EC tablet Take 500 mg by mouth 2 (two) times daily.      nitroGLYCERIN (NITROSTAT) 0.4 MG SL tablet Place 0.4 mg under the tongue every 5 (five) minutes as needed for Chest pain.      omeprazole (PRILOSEC) 40 MG capsule Take 1 capsule (40 mg total) by mouth 2 (two) times daily before meals. 60 capsule 5    ondansetron (ZOFRAN-ODT) 4 MG TbDL Take 1 tablet (4 mg total) by mouth 3 (three) times daily as needed (nausea). 30 tablet 1    potassium chloride SA (K-DUR,KLOR-CON) 20 MEQ tablet TAKE 1 TABLET(20 MEQ) BY MOUTH TWICE DAILY 180 tablet 2     No current facility-administered medications for this visit.         Review of Systems   Constitutional: Negative for activity change, appetite change, chills, diaphoresis, fatigue, fever and unexpected weight change.   HENT: Positive for trouble swallowing. Negative for congestion, ear pain, hearing loss, rhinorrhea and voice change.    Eyes: Negative for pain and visual disturbance.   Respiratory: Negative for cough, chest tightness and shortness of breath.    Cardiovascular: Positive for leg swelling (Chronic). Negative for chest pain and palpitations.   Gastrointestinal: Negative for abdominal distention, abdominal pain and blood in stool.   Genitourinary: Negative for difficulty urinating, flank pain, frequency and urgency.   Musculoskeletal: Positive for back pain ( followed by pain management). Negative for arthralgias, joint swelling, myalgias, neck pain and neck stiffness.   Skin: Negative for pallor and  rash.   Neurological: Negative for dizziness, tremors, syncope, weakness and headaches.   Hematological: Negative for adenopathy.   Psychiatric/Behavioral: Negative for dysphoric mood and sleep disturbance. The patient is not nervous/anxious.        Objective:      Physical Exam  Vitals reviewed.   Constitutional:       General: She is not in acute distress.     Appearance: Normal appearance. She is well-developed. She is obese.   HENT:      Head: Normocephalic and atraumatic.      Right Ear: External ear normal.      Left Ear: External ear normal.      Nose: Nose normal.   Eyes:      General: No scleral icterus.     Conjunctiva/sclera: Conjunctivae normal.      Pupils: Pupils are equal, round, and reactive to light.   Neck:      Thyroid: No thyroid mass or thyromegaly.      Vascular: No JVD.   Cardiovascular:      Rate and Rhythm: Normal rate and regular rhythm.      Heart sounds: Normal heart sounds. No murmur heard.  Pulmonary:      Effort: Pulmonary effort is normal.      Breath sounds: Normal breath sounds. No wheezing or rales.   Abdominal:      General: Bowel sounds are normal. There is no distension.      Palpations: Abdomen is soft.      Tenderness: There is no abdominal tenderness.   Musculoskeletal:         General: No tenderness. Normal range of motion.      Cervical back: Normal range of motion and neck supple. No tenderness.   Lymphadenopathy:      Cervical: No cervical adenopathy.   Skin:     General: Skin is warm and dry.      Capillary Refill: Capillary refill takes less than 2 seconds.      Findings: No erythema or rash.   Neurological:      Mental Status: She is alert and oriented to person, place, and time.      Cranial Nerves: No cranial nerve deficit.      Coordination: Coordination normal.      Deep Tendon Reflexes: Reflexes are normal and symmetric.   Psychiatric:         Mood and Affect: Mood normal.         Judgment: Judgment normal.

## 2022-06-14 ENCOUNTER — TELEPHONE (OUTPATIENT)
Dept: GASTROENTEROLOGY | Facility: CLINIC | Age: 57
End: 2022-06-14
Payer: MEDICARE

## 2022-06-14 ENCOUNTER — TELEPHONE (OUTPATIENT)
Dept: ENDOSCOPY | Facility: HOSPITAL | Age: 57
End: 2022-06-14
Payer: MEDICARE

## 2022-06-14 NOTE — TELEPHONE ENCOUNTER
Called and spoke with the patient, patient was notified of the message received from the surgery center regarding her procedure, patient was upset regarding the moving of her procedure, patient was advised that if a cancellation occurs at [facility], we would call and offer her the cancellation date to try to get her some relief, patient verbalized understanding of this.

## 2022-06-14 NOTE — TELEPHONE ENCOUNTER
----- Message from David Catherine MD sent at 6/13/2022 11:00 PM CDT -----  Tell pt blood counts, LFT's and lipase normal. Continue with SANJAY Irving's recommendations; F/U EGD as scheduled with Dr Benitez.

## 2022-06-14 NOTE — TELEPHONE ENCOUNTER
Good morning,     Ms. Anderson has an EGD scheduled on Friday, 6/17. She has a current BMI of 52.5. Our cutoff for an endoscopy procedures here at the [facility] surgery center is 50 per our anesthesiologists. Please contact Ms. Anderson to reschedule her procedure in a hospital setting.     Thank you!

## 2022-06-17 ENCOUNTER — TELEPHONE (OUTPATIENT)
Dept: GASTROENTEROLOGY | Facility: CLINIC | Age: 57
End: 2022-06-17
Payer: MEDICARE

## 2022-06-17 NOTE — TELEPHONE ENCOUNTER
Hunter informed pt's family to have pt go to ER. GI not able to see pt without GI consult so if pt would like to be seen by Dr. Benitez, she needs to request this. Family informed.

## 2022-06-17 NOTE — TELEPHONE ENCOUNTER
----- Message from Hunter Bello MA sent at 6/17/2022 10:59 AM CDT -----  Contact: NHUNG CRONIN [[id number]]  Type: Needs Medical Advice    Who Called: NHUNG CRONIN [[id number]]  Best Call Back Number: [phone]  Inquiry/Question: Would you kindly call NHUNG CRONIN [[id number]] enroute to [facility] patient is consistently throwing up      Thank you~

## 2022-06-21 ENCOUNTER — HOSPITAL ENCOUNTER (OUTPATIENT)
Dept: RADIOLOGY | Facility: HOSPITAL | Age: 57
Discharge: HOME | End: 2022-06-21
Attending: NURSE PRACTITIONER
Payer: MEDICARE

## 2022-06-21 DIAGNOSIS — R11.2 INTRACTABLE VOMITING WITH NAUSEA, UNSPECIFIED VOMITING TYPE: ICD-10-CM

## 2022-06-21 PROCEDURE — 78264 NM GASTRIC EMPTYING: ICD-10-PCS | Mod: 26,,, | Performed by: RADIOLOGY

## 2022-06-21 PROCEDURE — A9541 TC99M SULFUR COLLOID: HCPCS | Mod: PO

## 2022-06-21 PROCEDURE — 78264 GASTRIC EMPTYING IMG STUDY: CPT | Mod: 26,,, | Performed by: RADIOLOGY

## 2022-06-22 ENCOUNTER — TELEPHONE (OUTPATIENT)
Dept: GASTROENTEROLOGY | Facility: CLINIC | Age: 57
End: 2022-06-22
Payer: MEDICARE

## 2022-06-22 NOTE — TELEPHONE ENCOUNTER
Spoke with patient regarding results from Gastric Study per JUSTICE Irving. Patient verbalized understanding.

## 2022-07-05 ENCOUNTER — TELEPHONE (OUTPATIENT)
Dept: GASTROENTEROLOGY | Facility: CLINIC | Age: 57
End: 2022-07-05
Payer: MEDICARE

## 2022-07-05 NOTE — TELEPHONE ENCOUNTER
Called and spoke with the patient, patient was given the number to the surgery center so that she can get her arrival time.  Patient verbalized understanding of this.

## 2022-07-05 NOTE — TELEPHONE ENCOUNTER
----- Message from Lou Caldera sent at 7/5/2022 10:42 AM CDT -----  Type:  Patient Call Back    Who Called:Pt     What is the reqeust in detail: Pt is requesting a call back to confirm procedure time. Please Advise     Can the clinic reply by [facility]?    Best Call Back Number: [phone]

## 2022-07-11 ENCOUNTER — OFFICE VISIT (OUTPATIENT)
Dept: PODIATRY | Facility: CLINIC | Age: 57
End: 2022-07-11
Payer: MEDICARE

## 2022-07-11 DIAGNOSIS — E11.42 TYPE 2 DIABETES MELLITUS WITH DIABETIC POLYNEUROPATHY, WITHOUT LONG-TERM CURRENT USE OF INSULIN: Primary | ICD-10-CM

## 2022-07-11 DIAGNOSIS — E11.51 TYPE II DIABETES MELLITUS WITH PERIPHERAL CIRCULATORY DISORDER: ICD-10-CM

## 2022-07-11 DIAGNOSIS — B35.1 ONYCHOMYCOSIS DUE TO DERMATOPHYTE: ICD-10-CM

## 2022-07-11 PROCEDURE — 1159F MED LIST DOCD IN RCRD: CPT | Mod: CPTII,S$GLB,, | Performed by: PODIATRIST

## 2022-07-11 PROCEDURE — 4010F ACE/ARB THERAPY RXD/TAKEN: CPT | Mod: CPTII,S$GLB,, | Performed by: PODIATRIST

## 2022-07-11 PROCEDURE — 99999 PR PBB SHADOW E&M-EST. PATIENT-LVL III: ICD-10-PCS | Mod: PBBFAC,,, | Performed by: PODIATRIST

## 2022-07-11 PROCEDURE — 99499 NO LOS: ICD-10-PCS | Mod: S$GLB,,, | Performed by: PODIATRIST

## 2022-07-11 PROCEDURE — 1160F PR REVIEW ALL MEDS BY PRESCRIBER/CLIN PHARMACIST DOCUMENTED: ICD-10-PCS | Mod: CPTII,S$GLB,, | Performed by: PODIATRIST

## 2022-07-11 PROCEDURE — 11721 DEBRIDE NAIL 6 OR MORE: CPT | Mod: Q9,S$GLB,, | Performed by: PODIATRIST

## 2022-07-11 PROCEDURE — 99499 UNLISTED E&M SERVICE: CPT | Mod: S$GLB,,, | Performed by: PODIATRIST

## 2022-07-11 PROCEDURE — 1160F RVW MEDS BY RX/DR IN RCRD: CPT | Mod: CPTII,S$GLB,, | Performed by: PODIATRIST

## 2022-07-11 PROCEDURE — 99999 PR PBB SHADOW E&M-EST. PATIENT-LVL III: CPT | Mod: PBBFAC,,, | Performed by: PODIATRIST

## 2022-07-11 PROCEDURE — 11721 PR DEBRIDEMENT OF NAILS, 6 OR MORE: ICD-10-PCS | Mod: Q9,S$GLB,, | Performed by: PODIATRIST

## 2022-07-11 PROCEDURE — 1159F PR MEDICATION LIST DOCUMENTED IN MEDICAL RECORD: ICD-10-PCS | Mod: CPTII,S$GLB,, | Performed by: PODIATRIST

## 2022-07-11 PROCEDURE — 4010F PR ACE/ARB THEARPY RXD/TAKEN: ICD-10-PCS | Mod: CPTII,S$GLB,, | Performed by: PODIATRIST

## 2022-07-11 NOTE — PROGRESS NOTES
Subjective:      Patient ID: Amena Anderson is a 56 y.o. female.    Chief Complaint: Nail Care  Patient returns for follow up  thick and discolored nails for high risk foot care. She is high risk secondary to the diabetes with PVD. No acute changes since her last visit to her feet. Doing well overall, no acute changes also here for the annual diabetic foot check up    PCP: Dr. Miller  Last seen: 6/13/22    Review of Systems   Constitutional: Negative for chills, diaphoresis, fever, malaise/fatigue and night sweats.   Cardiovascular: Positive for leg swelling. Negative for claudication, cyanosis and syncope.   Skin: Negative for color change, dry skin, nail changes, rash, suspicious lesions and unusual hair distribution.   Musculoskeletal: Positive for joint pain. Negative for falls, joint swelling, muscle cramps, muscle weakness and stiffness.   Gastrointestinal: Negative for constipation, diarrhea, nausea and vomiting.   Neurological: Positive for paresthesias. Negative for brief paralysis, disturbances in coordination, focal weakness, numbness, sensory change and tremors.           Objective:      Physical Exam  Constitutional:       General: She is not in acute distress.     Appearance: She is well-developed. She is not diaphoretic.   Cardiovascular:      Pulses:           Dorsalis pedis pulses are 2+ on the right side and 2+ on the left side.        Posterior tibial pulses are 2+ on the right side and 2+ on the left side.      Comments: Capillary refill 3 seconds all toes/distal feet, all toes/both feet warm to touch.      Positive lower extremity edema bilateral.    Musculoskeletal:      Right ankle: Normal. No swelling, deformity, ecchymosis or lacerations. Normal range of motion. Normal pulse.      Right Achilles Tendon: Normal. No defects. De La Cruz's test negative.      Comments: Improving pain to palpation lateral left ankle at cfl and atfl without instability deformity or loss of function. Also has pain  along the peroneal tendons to the brevis insertion.    Ankle dorsiflexion decreased at <10 degrees bilateral with moderate increase with knee flexion bilateral. There is tenderness to palpation bilateral achilles attachments on the calcaneus.    Pain on palpation plantar medial and central heel right foot. No pain with ROM or MMT. No pain with medial and lateral compression of heel.     Feet:      Right foot:      Protective Sensation: 10 sites tested. 8 sites sensed.      Left foot:      Protective Sensation: 10 sites tested. 9 sites sensed.   Lymphadenopathy:      Comments: Negative lymphadenopathy bilateral popliteal fossa and tarsal tunnel.   Skin:     General: Skin is warm and dry.      Coloration: Skin is not pale.      Findings: Lesion present. No abrasion, bruising, burn, ecchymosis, erythema, laceration, petechiae or rash.      Nails: There is no clubbing.      Comments:   Dry scale with superficial flakes over an erythematous base more to the left foot without ulceration, drainage, pus, tracking, fluctuance, malodor, or cardinal signs infection.      Skin is thin and atrophic bilateral    Nails 1-5 bilateral are thick 3-4 mm, long 3-6 mm, and discolored with subungual debris    Hyperkeratotic lesions right plantar medial aspect first metatarsal bilateral          Neurological:      Mental Status: She is alert and oriented to person, place, and time.      Sensory: Sensory deficit present.      Motor: No tremor, atrophy, abnormal muscle tone or seizure activity.      Gait: Gait normal.      Comments: Negative tinel sign to percussion sural, superficial peroneal, deep peroneal, saphenous, and posterior tibial nerves right and left ankles and feet. Decreased vibratory sensation bilateral     Psychiatric:         Behavior: Behavior normal. Behavior is cooperative.               Assessment:       Encounter Diagnoses   Name Primary?    Type 2 diabetes mellitus with diabetic polyneuropathy, without long-term  current use of insulin Yes    Type II diabetes mellitus with peripheral circulatory disorder     Onychomycosis due to dermatophyte     BMI 50.0-59.9, adult          Plan:       Amena was seen today for nail care.    Diagnoses and all orders for this visit:    Type 2 diabetes mellitus with diabetic polyneuropathy, without long-term current use of insulin    Type II diabetes mellitus with peripheral circulatory disorder    Onychomycosis due to dermatophyte    BMI 50.0-59.9, adult      I counseled the patient on her conditions, their implications and medical management.    Shoe inspection. Diabetic Foot Education. Patient reminded of the importance of good nutrition and blood sugar control to help prevent podiatric complications of diabetes. Patient instructed on proper foot hygeine. We discussed wearing proper shoe gear, daily foot inspections, never walking without protective shoe gear, never putting sharp instruments to feet Discussed importance of supportive shoes with accommodative toe box to reduce pressure and irritation to forefoot.     With patient's verbal consent, nails were aggressively reduced and debrided x 10 to their soft tissue attachment mechanically removing all offending nail and debris. Patient relates relief following the procedure. No anesthesia or hemostasis required. No blood loss.     Discussed importance of healthy diet and weight loss as to her diabetes control and prevention of future pedal complications secondary to diabetes      Follow up in 3 months routine care.    Edson Martinez DPM

## 2022-07-13 ENCOUNTER — TELEPHONE (OUTPATIENT)
Dept: GASTROENTEROLOGY | Facility: CLINIC | Age: 57
End: 2022-07-13
Payer: MEDICARE

## 2022-07-13 DIAGNOSIS — E11.9 TYPE 2 DIABETES MELLITUS WITHOUT COMPLICATION: ICD-10-CM

## 2022-07-13 NOTE — TELEPHONE ENCOUNTER
Called and spoke with the patient, an appointment was set up with the patient for post procedure f/u [facility] per Dr. Benitez's recommendations, patient verbalized understanding of this.

## 2022-07-18 ENCOUNTER — PATIENT MESSAGE (OUTPATIENT)
Dept: ADMINISTRATIVE | Facility: HOSPITAL | Age: 57
End: 2022-07-18
Payer: MEDICARE

## 2022-07-21 ENCOUNTER — PATIENT MESSAGE (OUTPATIENT)
Dept: ADMINISTRATIVE | Facility: HOSPITAL | Age: 57
End: 2022-07-21
Payer: MEDICARE

## 2022-07-21 ENCOUNTER — PATIENT OUTREACH (OUTPATIENT)
Dept: ADMINISTRATIVE | Facility: HOSPITAL | Age: 57
End: 2022-07-21
Payer: MEDICARE

## 2022-08-12 ENCOUNTER — OFFICE VISIT (OUTPATIENT)
Dept: GASTROENTEROLOGY | Facility: CLINIC | Age: 57
End: 2022-08-12
Payer: MEDICARE

## 2022-08-12 VITALS — BODY MASS INDEX: 50.02 KG/M2 | WEIGHT: 293 LBS | HEIGHT: 64 IN

## 2022-08-12 DIAGNOSIS — Z87.19 HISTORY OF GASTRITIS: ICD-10-CM

## 2022-08-12 DIAGNOSIS — Z98.890 HISTORY OF ESOPHAGOGASTRODUODENOSCOPY (EGD): Primary | ICD-10-CM

## 2022-08-12 DIAGNOSIS — Z87.19 HISTORY OF GASTROESOPHAGEAL REFLUX (GERD): ICD-10-CM

## 2022-08-12 DIAGNOSIS — Z87.19 HISTORY OF CONSTIPATION: ICD-10-CM

## 2022-08-12 PROCEDURE — 99999 PR PBB SHADOW E&M-EST. PATIENT-LVL IV: CPT | Mod: PBBFAC,,, | Performed by: NURSE PRACTITIONER

## 2022-08-12 PROCEDURE — 99214 PR OFFICE/OUTPT VISIT, EST, LEVL IV, 30-39 MIN: ICD-10-PCS | Mod: S$GLB,,, | Performed by: NURSE PRACTITIONER

## 2022-08-12 PROCEDURE — 1160F PR REVIEW ALL MEDS BY PRESCRIBER/CLIN PHARMACIST DOCUMENTED: ICD-10-PCS | Mod: CPTII,S$GLB,, | Performed by: NURSE PRACTITIONER

## 2022-08-12 PROCEDURE — 99999 PR PBB SHADOW E&M-EST. PATIENT-LVL IV: ICD-10-PCS | Mod: PBBFAC,,, | Performed by: NURSE PRACTITIONER

## 2022-08-12 PROCEDURE — 1159F MED LIST DOCD IN RCRD: CPT | Mod: CPTII,S$GLB,, | Performed by: NURSE PRACTITIONER

## 2022-08-12 PROCEDURE — 3008F BODY MASS INDEX DOCD: CPT | Mod: CPTII,S$GLB,, | Performed by: NURSE PRACTITIONER

## 2022-08-12 PROCEDURE — 4010F PR ACE/ARB THEARPY RXD/TAKEN: ICD-10-PCS | Mod: CPTII,S$GLB,, | Performed by: NURSE PRACTITIONER

## 2022-08-12 PROCEDURE — 99214 OFFICE O/P EST MOD 30 MIN: CPT | Mod: S$GLB,,, | Performed by: NURSE PRACTITIONER

## 2022-08-12 PROCEDURE — 4010F ACE/ARB THERAPY RXD/TAKEN: CPT | Mod: CPTII,S$GLB,, | Performed by: NURSE PRACTITIONER

## 2022-08-12 PROCEDURE — 1160F RVW MEDS BY RX/DR IN RCRD: CPT | Mod: CPTII,S$GLB,, | Performed by: NURSE PRACTITIONER

## 2022-08-12 PROCEDURE — 3008F PR BODY MASS INDEX (BMI) DOCUMENTED: ICD-10-PCS | Mod: CPTII,S$GLB,, | Performed by: NURSE PRACTITIONER

## 2022-08-12 PROCEDURE — 1159F PR MEDICATION LIST DOCUMENTED IN MEDICAL RECORD: ICD-10-PCS | Mod: CPTII,S$GLB,, | Performed by: NURSE PRACTITIONER

## 2022-08-12 RX ORDER — OMEPRAZOLE 40 MG/1
40 CAPSULE, DELAYED RELEASE ORAL
Qty: 180 CAPSULE | Refills: 3 | Status: SHIPPED | OUTPATIENT
Start: 2022-08-12 | End: 2023-08-24

## 2022-08-12 NOTE — PROGRESS NOTES
Subjective:       Patient ID: Amena Anderson is a 56 y.o. female Body mass index is 54.15 kg/m².    Chief Complaint: Follow-up and Gastroesophageal Reflux    Established patient of Dr. Benitez & myself.    GI Problem  The primary symptoms include weight loss (trying to lose weight; eating smaller portions). Primary symptoms do not include fever, fatigue, abdominal pain, nausea (zofran PRN), vomiting, diarrhea, melena, hematemesis or hematochezia. The problem has been rapidly improving.   The illness does not include chills, dysphagia (EGD with dilation helped), odynophagia or constipation (Bowel movements twice daily of formed stool, taking colace 100 mg BID). Significant associated medical issues include GERD (rapidly improving since taking prilosec 40 mg BID; PAST TREATMENT: zantac), irritable bowel syndrome (PAST TREATMENT: bentyl) and hemorrhoids.     Review of Systems   Constitutional: Positive for weight loss (trying to lose weight; eating smaller portions). Negative for appetite change, chills, fatigue and fever.        Taking norco TID & naproxen BID   HENT: Negative for nosebleeds, sore throat, trouble swallowing and voice change.    Respiratory: Negative for cough, choking and shortness of breath.    Cardiovascular: Negative for chest pain.   Gastrointestinal: Negative for abdominal pain, anal bleeding, blood in stool, constipation (Bowel movements twice daily of formed stool, taking colace 100 mg BID), diarrhea, dysphagia (EGD with dilation helped), hematemesis, hematochezia, melena, nausea (zofran PRN), rectal pain and vomiting.   Genitourinary: Negative for difficulty urinating and flank pain.   Neurological: Negative for weakness.       No LMP recorded. Patient has had a hysterectomy.    Past Medical History:   Diagnosis Date    AC (acromioclavicular) joint bone spurs, unspecified laterality     bilateral    Anticoagulant long-term use     aspirin    Anxiety     Back pain     BMI 50.0-59.9, adult  2017    CHF (congestive heart failure)     Diabetes mellitus, type 2     Difficulty swallowing 10/18/2017    Dysphagia     Fatty liver     H. pylori infection     Hepatomegaly     Hoarseness 10/18/2017    Hormone replacement therapy (HRT)     Hypertension     Hypothyroidism     Insomnia     Mitral valve prolapse     per pt    Multinodular goiter 10/18/2017    MANUEL on CPAP      Past Surgical History:   Procedure Laterality Date     SECTION      Three times    COLONOSCOPY  2017    Dr. Benitez, repeat in 10 years    ESOPHAGEAL DILATION N/A 2022    Procedure: DILATION, ESOPHAGUS;  Surgeon: Randy Benitez Jr., MD;  Location: [facility] ENDO;  Service: Endoscopy;  Laterality: N/A;    ESOPHAGOGASTRODUODENOSCOPY N/A 2022    Procedure: EGD (ESOPHAGOGASTRODUODENOSCOPY);  Surgeon: Randy Benitez Jr., MD;  Location: [facility];  Service: Endoscopy;  Laterality: N/A;    HYSTERECTOMY      total    LASIK      LUMBAR EPIDURAL INJECTION      OOPHORECTOMY      THYROIDECTOMY, PARTIAL      UPPER GASTROINTESTINAL ENDOSCOPY  2017    Dr. Benitez     Family History   Problem Relation Age of Onset    Throat cancer Maternal Uncle     Breast cancer Other 35    Kidney disease Mother     Hypertension Father     Kidney disease Father     Heart disease Father     Alzheimer's disease Father     Breast cancer Cousin     Colon cancer Neg Hx     Colon polyps Neg Hx     Crohn's disease Neg Hx     Ulcerative colitis Neg Hx     Stomach cancer Neg Hx     Esophageal cancer Neg Hx     Ovarian cancer Neg Hx      Social History     Tobacco Use    Smoking status: Never Smoker    Smokeless tobacco: Never Used   Substance Use Topics    Alcohol use: No    Drug use: Yes     Types: Hydrocodone     Wt Readings from Last 10 Encounters:   22 (!) 143.1 kg (315 lb 7.7 oz)   22 (!) 144.7 kg (319 lb 0.1 oz)   22 (!) 147.4 kg (325 lb)   22 (!) 147.7 kg (325 lb 9.9 oz)   22  (!) 149 kg (328 lb 7.8 oz)   05/20/22 (!) 151 kg (332 lb 14.2 oz)   05/10/22 (!) 151 kg (332 lb 14.3 oz)   04/01/22 (!) 151.7 kg (334 lb 7 oz)   12/06/21 (!) 151.7 kg (334 lb 7 oz)   08/24/21 (!) 149.2 kg (328 lb 14.8 oz)     Lab Results   Component Value Date    WBC 8.15 06/17/2022    HGB 13.1 06/17/2022    HCT 41.0 06/17/2022    MCV 85 06/17/2022     06/17/2022     CMP  Sodium   Date Value Ref Range Status   06/17/2022 141 136 - 145 mmol/L Final     Potassium   Date Value Ref Range Status   06/17/2022 4.0 3.5 - 5.1 mmol/L Final     Chloride   Date Value Ref Range Status   06/17/2022 107 95 - 110 mmol/L Final     CO2   Date Value Ref Range Status   06/17/2022 30 22 - 31 mmol/L Final     Glucose   Date Value Ref Range Status   06/17/2022 115 (H) 70 - 110 mg/dL Final     Comment:     The ADA recommends the following guidelines for fasting glucose:    Normal:       less than 100 mg/dL    Prediabetes:  100 mg/dL to 125 mg/dL    Diabetes:     126 mg/dL or higher       BUN   Date Value Ref Range Status   06/17/2022 3 (L) 7 - 18 mg/dL Final     Creatinine   Date Value Ref Range Status   06/17/2022 0.55 0.50 - 1.40 mg/dL Final     Calcium   Date Value Ref Range Status   06/17/2022 8.5 8.4 - 10.2 mg/dL Final     Total Protein   Date Value Ref Range Status   06/17/2022 7.9 6.0 - 8.4 g/dL Final     Albumin   Date Value Ref Range Status   06/17/2022 4.1 3.5 - 5.2 g/dL Final     Total Bilirubin   Date Value Ref Range Status   06/17/2022 0.5 0.2 - 1.3 mg/dL Final     Alkaline Phosphatase   Date Value Ref Range Status   06/17/2022 67 38 - 145 U/L Final     AST   Date Value Ref Range Status   06/17/2022 47 (H) 14 - 36 U/L Final     ALT   Date Value Ref Range Status   06/17/2022 30 0 - 35 U/L Final     Anion Gap   Date Value Ref Range Status   06/17/2022 4 (L) 8 - 16 mmol/L Final     eGFR if    Date Value Ref Range Status   06/17/2022 >60 >60 mL/min/1.73 m^2 Final     eGFR if non    Date Value  "Ref Range Status   06/17/2022 >60 >60 mL/min/1.73 m^2 Final     Comment:     Calculation used to obtain the estimated glomerular filtration  rate (eGFR) is the CKD-EPI equation.        Lab Results   Component Value Date    LIPASE 14 06/13/2022     Lab Results   Component Value Date    LIPASERES 44 06/17/2022     Lab Results   Component Value Date    AMYLASE 88 02/05/2018     Lab Results   Component Value Date    TSH 0.969 05/30/2022 2/19/2018 h pylori stool negative    Reviewed prior medical records including radiology report of 6/21/2022 gastric emptying study WNL; 3/12/2018 MRI MRCP; 2/12/2018 abdominal ultrasound; 2/5/2018 abdominal x-ray; 11/13/17 modified barium swallow study; & endoscopy history (see surgical history).    8/21/17 Colonoscopy was reviewed and procedure report states:   " Findings:       The perianal and digital rectal examinations were normal. Pertinent        negatives include normal sphincter tone and no palpable rectal        lesions.       Non-bleeding internal hemorrhoids were found during retroflexion.        The hemorrhoids were small and Grade I (internal hemorrhoids that do        not prolapse).       No additional abnormalities were found on retroflexion.       The colon appeared normal.       The terminal ileum appeared normal.  Impression:          - Non-bleeding internal hemorrhoids.                       - The entire examined colon is normal.                       - The examined portion of the ileum was normal.                       - No specimens collected.  Recommendation:      - Discharge patient to home.                       - High fiber diet.                       - Repeat colonoscopy in 10 years for screening                        purposes.                       - Continue present medications.                       - Patient has a contact number available for                        emergencies. The signs and symptoms of potential                        delayed " "complications were discussed with the                        patient. Return to normal activities tomorrow.                        Written discharge instructions were provided to the                        patient.                       - Return to normal activities tomorrow. ".     Objective:      Physical Exam  Vitals and nursing note reviewed.   Constitutional:       General: She is not in acute distress.     Appearance: Normal appearance. She is well-developed. She is not diaphoretic.   HENT:      Mouth/Throat:      Comments: Patient is wearing a face mask, which covers patient's mouth and nose, due to COVID 19 concerns.  Eyes:      General: No scleral icterus.     Conjunctiva/sclera: Conjunctivae normal.      Pupils: Pupils are equal, round, and reactive to light.   Pulmonary:      Effort: Pulmonary effort is normal. No respiratory distress.      Breath sounds: Normal breath sounds. No wheezing.   Abdominal:      General: Bowel sounds are normal. There is no distension or abdominal bruit.      Palpations: Abdomen is soft. Abdomen is not rigid. There is no mass.      Tenderness: There is no abdominal tenderness. There is no guarding or rebound. Negative signs include Kwon's sign and McBurney's sign.   Skin:     General: Skin is warm and dry.      Coloration: Skin is not pale.      Findings: No erythema or rash.      Comments: Non-jaundiced   Neurological:      Mental Status: She is alert and oriented to person, place, and time.   Psychiatric:         Behavior: Behavior normal.         Thought Content: Thought content normal.         Judgment: Judgment normal.         Assessment:       1. History of esophagogastroduodenoscopy (EGD)    2. History of gastroesophageal reflux (GERD)    3. History of gastritis    4. History of constipation        Plan:       History of esophagogastroduodenoscopy (EGD), History of gastroesophageal reflux (GERD), & History of gastritis  - discussed with patient about long term use of " reflux medications (preference to use lowest effective dose or discontinuing if possible), the risk and benefits of using these medications long term, the risk of untreated GERD such as ayala's esophagus, and recommend a diet high in calcium and/or taking OTC calcium and vitamin d supplements as directed (such as Citracal +D), patient verbalized understanding & patient wants to continue current medication at current dosage.  - recommend annual monitoring with blood work to include CMP, CBC, vitamin B12, and magnesium; can be done by PCP; if not done by next follow-up, then we can order the labs.  -  REFILL   omeprazole (PRILOSEC) 40 MG capsule; Take 1 capsule (40 mg total) by mouth 2 (two) times daily before meals.  Dispense: 180 capsule; Refill: 3  - avoid/minimize use of NSAIDs- since they can cause GI upset, bleeding and/or ulcers. If NSAID must be taken, recommend take with food.    History of constipation  Recommend daily exercise as tolerated, adequate water intake (six 8-oz glasses of water daily), and high fiber diet. OTC fiber supplements are recommended if diet does not reach daily fiber goal (20-30 grams daily), such as Metamucil, Citrucel, or FiberCon (take as directed, separate from other oral medications by >2 hours).  - CAN CONTINUE OTC stool softener such as Colace as directed to avoid hard stools and straining with bowel movements PRN  -If still no improvement with these measures, call/follow-up  - discussed with patient that a side effect of narcotic pain medications is nausea and vomiting, advised patient to talk to provider who manages pain medication, patient verbalized understanding    Follow up in about 3 months (around 11/12/2022), or if symptoms worsen or fail to improve.    If no improvement in symptoms or symptoms worsen, call/follow-up at clinic or go to ER.        33 minutes of total time spent on the encounter, which includes face to face time and non-face to face time preparing to  see the patient (eg, review of tests), Obtaining and/or reviewing separately obtained history, Documenting clinical information in the electronic or other health record, Independently interpreting results (not separately reported) and communicating results to the patient/family/caregiver, or Care coordination (not separately reported).

## 2022-08-12 NOTE — PATIENT INSTRUCTIONS
"GERD (Adult)    The esophagus is a tube that carries food from the mouth to the stomach. A valve at the lower end of the esophagus prevents stomach acid from flowing upward. When this valve doesn't work properly, stomach contents may repeatedly flow back up (reflux) into the esophagus. This is called gastroesophageal reflux disease (GERD). GERD can irritate the esophagus. It can cause problems with swallowing or breathing. In severe cases, GERD can cause recurrent pneumonia or other serious problems.  Symptoms of reflux include burning, pressure or sharp pain in the upper abdomen or mid to lower chest. The pain can spread to the neck, back, or shoulder. There may be belching, an acid taste in the back of the throat, chronic cough, or sore throat or hoarseness. GERD symptoms often occur during the day after a big meal. They can also occur at night when lying down.   Home care  Lifestyle changes can help reduce symptoms. If needed, medicines may be prescribed. Symptoms often improve with treatment, but if treatment is stopped, the symptoms often return after a few months. So most persons with GERD will need to continue treatment.  Lifestyle changes  Limit or avoid fatty, fried, and spicy foods, as well as coffee, chocolate, mint, and foods with high acid content such as tomatoes and citrus fruit and juices (orange, grapefruit, lemon).  Dont eat large meals, especially at night. Frequent, smaller meals are best. Do not lie down right after eating. And dont eat anything 3 hours before going to bed.  Avoid drinking alcohol and smoking. As much as possible, stay away from second hand smoke.  If you are overweight, losing weight will reduce symptoms.   Avoid wearing tight clothing around your stomach area.  If your symptoms occur during sleep, use a foam wedge to elevate your upper body (not just your head.) Or, place 4" blocks under the head of your bed.  Medicines  If needed, medicines can help relieve the symptoms of " GERD and prevent damage to the esophagus. Discuss a medicine plan with your healthcare provider. This may include one or more of the following medicines:  Antacids to help neutralize the normal acids in your stomach.  Acid blockers (H2 blockers) to decrease acid production.  Acid inhibitors (PPIs) to decrease acid production in a different way than the blockers. They may work better, but can take a little longer to take effect.  Take an antacid 30-60 minutes after eating and at bedtime, but not at the same time as an acid blocker.  Try not to take medicines such as ibuprofen and aspirin. If you are taking aspirin for your heart or other medical reasons, talk to your healthcare provider about stopping it.  Follow-up care  Follow up with your healthcare provider or as advised by our staff.  When to seek medical advice  Call your healthcare provider if any of the following occur:  Stomach pain gets worse or moves to the lower right abdomen (appendix area)  Chest pain appears or gets worse, or spreads to the back, neck, shoulder, or arm  Frequent vomiting (cant keep down liquids)  Blood in the stool or vomit (red or black in color)  Feeling weak or dizzy  Fever of 100.4ºF (38ºC) or higher, or as directed by your healthcare provider  Date Last Reviewed: 6/23/2015  © 9778-3598 [employer] [address] [place] [address], [place] [address]. All rights reserved. This information is not intended as a substitute for professional medical care. Always follow your healthcare professional's instructions.

## 2022-08-24 ENCOUNTER — PATIENT MESSAGE (OUTPATIENT)
Dept: ADMINISTRATIVE | Facility: HOSPITAL | Age: 57
End: 2022-08-24
Payer: MEDICARE

## 2022-08-24 DIAGNOSIS — E11.9 TYPE 2 DIABETES MELLITUS WITHOUT COMPLICATION: ICD-10-CM

## 2022-08-24 DIAGNOSIS — Z12.31 OTHER SCREENING MAMMOGRAM: ICD-10-CM

## 2022-08-29 ENCOUNTER — PATIENT MESSAGE (OUTPATIENT)
Dept: ADMINISTRATIVE | Facility: HOSPITAL | Age: 57
End: 2022-08-29
Payer: MEDICARE

## 2022-09-12 ENCOUNTER — PATIENT MESSAGE (OUTPATIENT)
Dept: ADMINISTRATIVE | Facility: HOSPITAL | Age: 57
End: 2022-09-12
Payer: MEDICARE

## 2022-09-29 ENCOUNTER — HOSPITAL ENCOUNTER (OUTPATIENT)
Dept: RADIOLOGY | Facility: HOSPITAL | Age: 57
Discharge: HOME | End: 2022-09-29
Attending: EMERGENCY MEDICINE
Payer: MEDICARE

## 2022-09-29 DIAGNOSIS — Z12.31 OTHER SCREENING MAMMOGRAM: ICD-10-CM

## 2022-09-29 PROCEDURE — 77063 BREAST TOMOSYNTHESIS BI: CPT | Mod: 26,,, | Performed by: RADIOLOGY

## 2022-09-29 PROCEDURE — 77063 MAMMO DIGITAL SCREENING BILAT WITH TOMO: ICD-10-PCS | Mod: 26,,, | Performed by: RADIOLOGY

## 2022-09-29 PROCEDURE — 77067 SCR MAMMO BI INCL CAD: CPT | Mod: TC,PO

## 2022-09-29 PROCEDURE — 77067 SCR MAMMO BI INCL CAD: CPT | Mod: 26,,, | Performed by: RADIOLOGY

## 2022-09-29 PROCEDURE — 77067 MAMMO DIGITAL SCREENING BILAT WITH TOMO: ICD-10-PCS | Mod: 26,,, | Performed by: RADIOLOGY

## 2022-10-10 ENCOUNTER — PATIENT OUTREACH (OUTPATIENT)
Dept: ADMINISTRATIVE | Facility: HOSPITAL | Age: 57
End: 2022-10-10
Payer: MEDICARE

## 2022-10-14 ENCOUNTER — LAB VISIT (OUTPATIENT)
Dept: LAB | Facility: HOSPITAL | Age: 57
End: 2022-10-14
Attending: EMERGENCY MEDICINE
Payer: MEDICARE

## 2022-10-14 DIAGNOSIS — G89.4 CHRONIC PAIN SYNDROME: Chronic | ICD-10-CM

## 2022-10-14 DIAGNOSIS — E11.9 TYPE 2 DIABETES MELLITUS WITHOUT COMPLICATION: ICD-10-CM

## 2022-10-14 DIAGNOSIS — I10 ESSENTIAL HYPERTENSION: Chronic | ICD-10-CM

## 2022-10-14 DIAGNOSIS — K76.0 FATTY LIVER: Chronic | ICD-10-CM

## 2022-10-14 DIAGNOSIS — G47.33 OSA (OBSTRUCTIVE SLEEP APNEA): Chronic | ICD-10-CM

## 2022-10-14 DIAGNOSIS — E78.2 MIXED HYPERLIPIDEMIA: Chronic | ICD-10-CM

## 2022-10-14 LAB
ALBUMIN SERPL BCP-MCNC: 3.4 G/DL (ref 3.5–5.2)
ALP SERPL-CCNC: 66 U/L (ref 55–135)
ALT SERPL W/O P-5'-P-CCNC: 14 U/L (ref 10–44)
ANION GAP SERPL CALC-SCNC: 7 MMOL/L (ref 8–16)
AST SERPL-CCNC: 17 U/L (ref 10–40)
BILIRUB SERPL-MCNC: 0.4 MG/DL (ref 0.1–1)
BUN SERPL-MCNC: 8 MG/DL (ref 6–20)
CALCIUM SERPL-MCNC: 8.8 MG/DL (ref 8.7–10.5)
CHLORIDE SERPL-SCNC: 105 MMOL/L (ref 95–110)
CHOLEST SERPL-MCNC: 159 MG/DL (ref 120–199)
CHOLEST/HDLC SERPL: 4 {RATIO} (ref 2–5)
CO2 SERPL-SCNC: 29 MMOL/L (ref 23–29)
CREAT SERPL-MCNC: 0.7 MG/DL (ref 0.5–1.4)
EST. GFR  (NO RACE VARIABLE): >60 ML/MIN/1.73 M^2
ESTIMATED AVG GLUCOSE: 126 MG/DL (ref 68–131)
GLUCOSE SERPL-MCNC: 108 MG/DL (ref 70–110)
HBA1C MFR BLD: 6 % (ref 4–5.6)
HDLC SERPL-MCNC: 40 MG/DL (ref 40–75)
HDLC SERPL: 25.2 % (ref 20–50)
LDLC SERPL CALC-MCNC: 99.8 MG/DL (ref 63–159)
NONHDLC SERPL-MCNC: 119 MG/DL
POTASSIUM SERPL-SCNC: 4 MMOL/L (ref 3.5–5.1)
PROT SERPL-MCNC: 6.9 G/DL (ref 6–8.4)
SODIUM SERPL-SCNC: 141 MMOL/L (ref 136–145)
T4 FREE SERPL-MCNC: 1.09 NG/DL (ref 0.71–1.51)
TRIGL SERPL-MCNC: 96 MG/DL (ref 30–150)
TSH SERPL DL<=0.005 MIU/L-ACNC: 1.12 UIU/ML (ref 0.4–4)

## 2022-10-14 PROCEDURE — 84443 ASSAY THYROID STIM HORMONE: CPT | Performed by: INTERNAL MEDICINE

## 2022-10-14 PROCEDURE — 84439 ASSAY OF FREE THYROXINE: CPT | Performed by: INTERNAL MEDICINE

## 2022-10-14 PROCEDURE — 83036 HEMOGLOBIN GLYCOSYLATED A1C: CPT | Performed by: EMERGENCY MEDICINE

## 2022-10-14 PROCEDURE — 36415 COLL VENOUS BLD VENIPUNCTURE: CPT | Mod: PO | Performed by: INTERNAL MEDICINE

## 2022-10-14 PROCEDURE — 80053 COMPREHEN METABOLIC PANEL: CPT | Performed by: INTERNAL MEDICINE

## 2022-10-14 PROCEDURE — 80061 LIPID PANEL: CPT | Performed by: INTERNAL MEDICINE

## 2022-10-17 NOTE — PATIENT INSTRUCTIONS
1. Stretch calf and plantar fascia at least 3x per day for 30 sec and before getting out of bed.    2. Supportive shoes at all times (athletic shoe including bah, new balance, asics, HOKA or casual shoes like Dansko, Roma, Naot, Vionoic, Fit flop  clog or wedge with extra heel padding and arch support. For house slippers would recommend Fitflop or Spenco found on amazon.com, Never walk barefoot or in flats.    ([employer] sports, Phidippides, [place] [employer], Masseys, Goodfeet, Cantilever, Feet First, Foot Solutions, Therapeutic shoes, SAS, [facility] [employer] [facility] pro shop) http://www.[employer]/      4. NSAID's for 2-3 weeks if no GI or Kidney problems (example: ibuprofen 600 mg 2 x per day)    5. ICE massage with frozen water bottle 2x per day for 30 minutes.    6. Consider night splint, custom orthotics, therapy and/or steroid injection    What Is Plantar Fasciitis?   The plantar fascia is a ligament-like band running from your heel to the ball of your foot. This band pulls on the heel bone, raising the arch of your foot as it pushes off the ground. But if your foot moves incorrectly, the plantar fascia may become strained. The fascia may swell and its tiny fibers may begin to fray, causing plantar fasciitis.  Causes  Plantar fasciitis is often caused by poor foot mechanics. If your foot flattens too much, the fascia may overstretch and swell. If your foot flattens too little, the fascia may ache from being pulled too tight.    The plantar fascia is a thick, fibrous layer of tissue that covers the bones on the bottom of your foot. It holds the foot bones in an arched position. Plantar fasciitis is a painful swelling of the plantar fascia.  A heel spur is an overgrowth of bone where the plantar fascia attaches to the heel bone. The heel spur itself usually doesnt cause pain. However, the heel spur might be a sign of plantar fasciitis which may cause your foot pain. There is no specific treatment  for heel spurs.   Plantar fasciitis can develop slowly or suddenly. It usually affects one foot at a time. Heel pain can feel sharp, like a knife sticking into the bottom of your foot. You may feel pain after exercising, long-distance jogging, stair climbing, long periods of standing, or after standing up.  Risk factors for plantar fasciitis include: arthritis, diabetes, obesity or recent weight gain, flat foot, and having high arches. Wearing high heels, loose shoes, or shoes with poor arch support adds to the risk.    Foot pain is usually worse in the morning. But it improves with walking. By the end of the day there may be a dull aching. Treatment includes short-term rest and controlling inflammation. It may take up to 9 months before all symptoms go away. In rare cases, a steroid injection in the foot, or surgery, may be needed.  Home care  · If you are overweight, lose weight to help healing.  · Choose supportive shoes with good arch support and shock absorbency. Replace athletic shoes when they become worn out. Dont walk or run barefoot.  · Premade or custom-fitted shoe inserts may be helpful. Inserts made of silicone seem to be the most effective. Custom-made inserts can be provided by a podiatrist or foot specialist, physical therapist, or orthopedist.  · Premade or custom-made night splints keep the heel stretched out while you sleep. They may prevent morning pain.  · Avoid activities that stress the feet: jogging, prolonged standing or walking, contact sports, etc.  · First thing in the morning and before sports, stretch the bottom of your foot. Gently flex your ankle so the toes move toward your knee.  · Icing may help control heel pain. Apply an ice pack to the heel for 10-20 minutes as a preventive. Or ice your heel after a severe flare-up of symptoms. You may repeat this every 1-2 hours as needed.  · You may use over-the-counter pain medicine to control pain, unless another medicine was  prescribed. Anti-inflammatory pain medicines, such as ibuprofen or naproxen, may work better than acetaminophen. If you have chronic liver or kidney disease or ever had a stomach ulcer or GI bleeding, talk with your healthcare provider before using these medicines.  · Shoe inserts, a night splint, or a special boot may be needed. Use these as directed by your healthcare provider.      Treating Plantar Fasciitis    First, your doctor relieves pain. Then, the cause of your problem may be found and corrected. If your pain is due to poor foot mechanics, custom-made shoe inserts (orthoses) may help.        Reduce Symptoms:  · To relieve mild symptoms, try aspirin, ibuprofen, or other medications as directed. Rubbing ice on the affected area may also help.  · To reduce severe pain and swelling, your doctor may prescribe pills or injections or a walking cast in some instances. Physical therapy, such as ultrasound or a daily stretching program, may also be recommended. Surgery is rarely required.  · To reduce symptoms caused by poor foot mechanics, your foot may be taped. This supports the arch and temporarily controls movement. Night splints may also help by stretching the fascia.    Control Movement  If taping helps, your doctor may prescribe orthoses. Built from plaster casts of your feet, these inserts control the way your foot moves. As a result, your symptoms should go away.  If Surgery Is Needed  Your doctor may consider surgery if other types of treatment don't control your pain. During surgery, the plantar fascia is partially cut to release tension. As you heal, fibrous tissue fills the space between the heel bone and the plantar fascia.   Reduce Overuse  Every time your foot strikes the ground, the plantar fascia is stretched. You can reduce the strain on the plantar fascia and the possibility of overuse by following these suggestions:  · Lose any excess weight.  · Avoid running on hard or uneven ground.  · Use  orthoses at all times in your shoes and house slippers.  © 5558-8700 [employer]. [address] [place] [address]. All rights reserved. This information is not intended as a substitute for professional medical care. Always follow your healthcare professional's instructions.            _                Lower Body Exercises: Calf Stretch    This exercise both stretches and strengthens your lower body to help your back. Do the exercise as often as suggested by your health care provider. As you work out, dont rush or strain. Use an exercise mat, pillow, or folded towel to protect your knees and other sensitive areas.  · Face a wall 2 feet away. Step toward the wall with one foot.  · Place both palms on the wall and bend your front knee.  · Lean forward, keeping the back leg straight and the heel on the floor.  · Hold for 20 seconds. Switch legs.  © 3094-7686 [employer]. [address] [place] [address]. All rights reserved. This information is not intended as a substitute for professional medical care. Always follow your healthcare professional's instructions.    These instructions are for your right foot. Switch sides for your left foot.  1. Sit in a chair. Rest your right ankle on your left knee.  2. Hold your toes with your right hand. Gently bend the toes backward. Feel a stretch in the undersides of the toes and ball of the foot. Hold for 30 to 60 seconds.  3. Then gently bend the toes in the other direction. Gently press on them until your foot is pointed. Hold for 30 to 60 seconds.  4. Repeat 5 times, or as instructed.  Date Last Reviewed: 5/1/2016  © 2275-3779 [employer]. [address] [place] [address]. All rights reserved. This information is not intended as a substitute for professional medical care. Always follow your healthcare professional's instructions.                   Paramedian Forehead Flap Text: A decision was made to reconstruct the defect utilizing an interpolation axial flap and a staged reconstruction.  A telfa template was made of the defect.  This telfa template was then used to outline the paramedian forehead pedicle flap.  The donor area for the pedicle flap was then injected with anesthesia.  The flap was excised through the skin and subcutaneous tissue down to the layer of the underlying musculature.  The pedicle flap was carefully excised within this deep plane to maintain its blood supply.  The edges of the donor site were undermined.   The donor site was closed in a primary fashion.  The pedicle was then rotated into position and sutured.  Once the tube was sutured into place, adequate blood supply was confirmed with blanching and refill.  The pedicle was then wrapped with xeroform gauze and dressed appropriately with a telfa and gauze bandage to ensure continued blood supply and protect the attached pedicle.

## 2022-10-19 DIAGNOSIS — E11.9 TYPE 2 DIABETES MELLITUS WITHOUT COMPLICATION: ICD-10-CM

## 2022-10-24 ENCOUNTER — PATIENT MESSAGE (OUTPATIENT)
Dept: ADMINISTRATIVE | Facility: HOSPITAL | Age: 57
End: 2022-10-24
Payer: MEDICARE

## 2022-10-24 ENCOUNTER — OFFICE VISIT (OUTPATIENT)
Dept: PODIATRY | Facility: CLINIC | Age: 57
End: 2022-10-24
Payer: MEDICARE

## 2022-10-24 DIAGNOSIS — E11.42 TYPE 2 DIABETES MELLITUS WITH DIABETIC POLYNEUROPATHY, WITHOUT LONG-TERM CURRENT USE OF INSULIN: Primary | ICD-10-CM

## 2022-10-24 DIAGNOSIS — E11.51 TYPE II DIABETES MELLITUS WITH PERIPHERAL CIRCULATORY DISORDER: ICD-10-CM

## 2022-10-24 DIAGNOSIS — B35.1 ONYCHOMYCOSIS DUE TO DERMATOPHYTE: ICD-10-CM

## 2022-10-24 PROCEDURE — 11721 PR DEBRIDEMENT OF NAILS, 6 OR MORE: ICD-10-PCS | Mod: Q9,S$GLB,, | Performed by: PODIATRIST

## 2022-10-24 PROCEDURE — 3044F PR MOST RECENT HEMOGLOBIN A1C LEVEL <7.0%: ICD-10-PCS | Mod: CPTII,S$GLB,, | Performed by: PODIATRIST

## 2022-10-24 PROCEDURE — 99499 UNLISTED E&M SERVICE: CPT | Mod: S$GLB,,, | Performed by: PODIATRIST

## 2022-10-24 PROCEDURE — 99999 PR PBB SHADOW E&M-EST. PATIENT-LVL III: CPT | Mod: PBBFAC,,, | Performed by: PODIATRIST

## 2022-10-24 PROCEDURE — 1160F RVW MEDS BY RX/DR IN RCRD: CPT | Mod: CPTII,S$GLB,, | Performed by: PODIATRIST

## 2022-10-24 PROCEDURE — 4010F PR ACE/ARB THEARPY RXD/TAKEN: ICD-10-PCS | Mod: CPTII,S$GLB,, | Performed by: PODIATRIST

## 2022-10-24 PROCEDURE — 1159F PR MEDICATION LIST DOCUMENTED IN MEDICAL RECORD: ICD-10-PCS | Mod: CPTII,S$GLB,, | Performed by: PODIATRIST

## 2022-10-24 PROCEDURE — 11721 DEBRIDE NAIL 6 OR MORE: CPT | Mod: Q9,S$GLB,, | Performed by: PODIATRIST

## 2022-10-24 PROCEDURE — 99499 NO LOS: ICD-10-PCS | Mod: S$GLB,,, | Performed by: PODIATRIST

## 2022-10-24 PROCEDURE — 99999 PR PBB SHADOW E&M-EST. PATIENT-LVL III: ICD-10-PCS | Mod: PBBFAC,,, | Performed by: PODIATRIST

## 2022-10-24 PROCEDURE — 1159F MED LIST DOCD IN RCRD: CPT | Mod: CPTII,S$GLB,, | Performed by: PODIATRIST

## 2022-10-24 PROCEDURE — 3044F HG A1C LEVEL LT 7.0%: CPT | Mod: CPTII,S$GLB,, | Performed by: PODIATRIST

## 2022-10-24 PROCEDURE — 4010F ACE/ARB THERAPY RXD/TAKEN: CPT | Mod: CPTII,S$GLB,, | Performed by: PODIATRIST

## 2022-10-24 PROCEDURE — 1160F PR REVIEW ALL MEDS BY PRESCRIBER/CLIN PHARMACIST DOCUMENTED: ICD-10-PCS | Mod: CPTII,S$GLB,, | Performed by: PODIATRIST

## 2022-10-24 NOTE — PROGRESS NOTES
Subjective:      Patient ID: Amena Anderson is a 57 y.o. female.    Chief Complaint: Diabetes Mellitus and Routine Foot Care  Patient returns for follow up  thick and discolored nails for high risk foot care. She is high risk secondary to the diabetes with PVD. No acute changes since her last visit to her feet. Doing well overall, no acute changes doing well overall    PCP: Dr. Miller  Last seen: 6/13/22    Review of Systems   Constitutional: Negative for chills, diaphoresis, fever, malaise/fatigue and night sweats.   Cardiovascular:  Positive for leg swelling. Negative for claudication, cyanosis and syncope.   Skin:  Negative for color change, dry skin, nail changes, rash, suspicious lesions and unusual hair distribution.   Musculoskeletal:  Positive for joint pain. Negative for falls, joint swelling, muscle cramps, muscle weakness and stiffness.   Gastrointestinal:  Negative for constipation, diarrhea, nausea and vomiting.   Neurological:  Positive for paresthesias. Negative for brief paralysis, disturbances in coordination, focal weakness, numbness, sensory change and tremors.         Objective:      Physical Exam  Constitutional:       General: She is not in acute distress.     Appearance: She is well-developed. She is not diaphoretic.   Cardiovascular:      Pulses:           Dorsalis pedis pulses are 2+ on the right side and 2+ on the left side.        Posterior tibial pulses are 2+ on the right side and 2+ on the left side.      Comments: Capillary refill 3 seconds all toes/distal feet, all toes/both feet warm to touch.      Positive lower extremity edema bilateral.    Musculoskeletal:      Right ankle: Normal. No swelling, deformity, ecchymosis or lacerations. Normal range of motion. Normal pulse.      Right Achilles Tendon: Normal. No defects. De La Cruz's test negative.      Comments: Improving pain to palpation lateral left ankle at cfl and atfl without instability deformity or loss of function. Also has pain  along the peroneal tendons to the brevis insertion.    Ankle dorsiflexion decreased at <10 degrees bilateral with moderate increase with knee flexion bilateral. There is tenderness to palpation bilateral achilles attachments on the calcaneus.    Pain on palpation plantar medial and central heel right foot. No pain with ROM or MMT. No pain with medial and lateral compression of heel.     Feet:      Right foot:      Protective Sensation: 10 sites tested.  8 sites sensed.      Left foot:      Protective Sensation: 10 sites tested.  9 sites sensed.   Lymphadenopathy:      Comments: Negative lymphadenopathy bilateral popliteal fossa and tarsal tunnel.   Skin:     General: Skin is warm and dry.      Coloration: Skin is not pale.      Findings: Lesion present. No abrasion, bruising, burn, ecchymosis, erythema, laceration, petechiae or rash.      Nails: There is no clubbing.      Comments:   Dry scale with superficial flakes over an erythematous base more to the left foot without ulceration, drainage, pus, tracking, fluctuance, malodor, or cardinal signs infection.      Skin is thin and atrophic bilateral    Nails 1-5 bilateral are thick 3-4 mm, long 3-6 mm, and discolored with subungual debris    Hyperkeratotic lesions right plantar medial aspect first metatarsal bilateral          Neurological:      Mental Status: She is alert and oriented to person, place, and time.      Sensory: Sensory deficit present.      Motor: No tremor, atrophy, abnormal muscle tone or seizure activity.      Gait: Gait normal.      Comments: Negative tinel sign to percussion sural, superficial peroneal, deep peroneal, saphenous, and posterior tibial nerves right and left ankles and feet. Decreased vibratory sensation bilateral     Psychiatric:         Behavior: Behavior normal. Behavior is cooperative.             Assessment:       Encounter Diagnoses   Name Primary?    Type 2 diabetes mellitus with diabetic polyneuropathy, without long-term  current use of insulin Yes    Type II diabetes mellitus with peripheral circulatory disorder     Onychomycosis due to dermatophyte     BMI 50.0-59.9, adult            Plan:       Amena was seen today for diabetes mellitus and routine foot care.    Diagnoses and all orders for this visit:    Type 2 diabetes mellitus with diabetic polyneuropathy, without long-term current use of insulin    Type II diabetes mellitus with peripheral circulatory disorder    Onychomycosis due to dermatophyte    BMI 50.0-59.9, adult      I counseled the patient on her conditions, their implications and medical management.    Shoe inspection. Diabetic Foot Education. Patient reminded of the importance of good nutrition and blood sugar control to help prevent podiatric complications of diabetes. Patient instructed on proper foot hygeine. We discussed wearing proper shoe gear, daily foot inspections, never walking without protective shoe gear, never putting sharp instruments to feet Discussed importance of supportive shoes with accommodative toe box to reduce pressure and irritation to forefoot.     With patient's verbal consent, nails were aggressively reduced and debrided x 10 to their soft tissue attachment mechanically removing all offending nail and debris. Patient relates relief following the procedure. No anesthesia or hemostasis required. No blood loss.     Discussed importance of healthy diet and weight loss as to her diabetes control and prevention of future pedal complications secondary to diabetes      Follow up in 3 months routine care.    Edson Martinez DPM

## 2022-11-03 ENCOUNTER — OFFICE VISIT (OUTPATIENT)
Dept: FAMILY MEDICINE | Facility: CLINIC | Age: 57
End: 2022-11-03
Payer: MEDICARE

## 2022-11-03 VITALS
SYSTOLIC BLOOD PRESSURE: 136 MMHG | HEART RATE: 95 BPM | OXYGEN SATURATION: 96 % | WEIGHT: 293 LBS | BODY MASS INDEX: 54.23 KG/M2 | DIASTOLIC BLOOD PRESSURE: 80 MMHG

## 2022-11-03 DIAGNOSIS — G89.4 CHRONIC PAIN SYNDROME: Chronic | ICD-10-CM

## 2022-11-03 DIAGNOSIS — E89.0 POSTABLATIVE HYPOTHYROIDISM: Chronic | ICD-10-CM

## 2022-11-03 DIAGNOSIS — G47.33 OSA (OBSTRUCTIVE SLEEP APNEA): Chronic | ICD-10-CM

## 2022-11-03 DIAGNOSIS — E11.42 TYPE 2 DIABETES MELLITUS WITH DIABETIC POLYNEUROPATHY, WITHOUT LONG-TERM CURRENT USE OF INSULIN: Primary | Chronic | ICD-10-CM

## 2022-11-03 DIAGNOSIS — I10 ESSENTIAL HYPERTENSION: Chronic | ICD-10-CM

## 2022-11-03 DIAGNOSIS — M25.552 LEFT HIP PAIN: ICD-10-CM

## 2022-11-03 DIAGNOSIS — E78.2 MIXED HYPERLIPIDEMIA: Chronic | ICD-10-CM

## 2022-11-03 DIAGNOSIS — K76.0 FATTY LIVER: Chronic | ICD-10-CM

## 2022-11-03 PROCEDURE — 3079F DIAST BP 80-89 MM HG: CPT | Mod: CPTII,S$GLB,, | Performed by: EMERGENCY MEDICINE

## 2022-11-03 PROCEDURE — 4010F ACE/ARB THERAPY RXD/TAKEN: CPT | Mod: CPTII,S$GLB,, | Performed by: EMERGENCY MEDICINE

## 2022-11-03 PROCEDURE — 3044F HG A1C LEVEL LT 7.0%: CPT | Mod: CPTII,S$GLB,, | Performed by: EMERGENCY MEDICINE

## 2022-11-03 PROCEDURE — 99499 UNLISTED E&M SERVICE: CPT | Mod: S$GLB,,, | Performed by: EMERGENCY MEDICINE

## 2022-11-03 PROCEDURE — 3044F PR MOST RECENT HEMOGLOBIN A1C LEVEL <7.0%: ICD-10-PCS | Mod: CPTII,S$GLB,, | Performed by: EMERGENCY MEDICINE

## 2022-11-03 PROCEDURE — 99214 PR OFFICE/OUTPT VISIT, EST, LEVL IV, 30-39 MIN: ICD-10-PCS | Mod: S$GLB,,, | Performed by: EMERGENCY MEDICINE

## 2022-11-03 PROCEDURE — 3008F BODY MASS INDEX DOCD: CPT | Mod: CPTII,S$GLB,, | Performed by: EMERGENCY MEDICINE

## 2022-11-03 PROCEDURE — 90686 IIV4 VACC NO PRSV 0.5 ML IM: CPT | Mod: S$GLB,,, | Performed by: EMERGENCY MEDICINE

## 2022-11-03 PROCEDURE — 99499 RISK ADDL DX/OHS AUDIT: ICD-10-PCS | Mod: S$GLB,,, | Performed by: EMERGENCY MEDICINE

## 2022-11-03 PROCEDURE — 99214 OFFICE O/P EST MOD 30 MIN: CPT | Mod: S$GLB,,, | Performed by: EMERGENCY MEDICINE

## 2022-11-03 PROCEDURE — 4010F PR ACE/ARB THEARPY RXD/TAKEN: ICD-10-PCS | Mod: CPTII,S$GLB,, | Performed by: EMERGENCY MEDICINE

## 2022-11-03 PROCEDURE — 3079F PR MOST RECENT DIASTOLIC BLOOD PRESSURE 80-89 MM HG: ICD-10-PCS | Mod: CPTII,S$GLB,, | Performed by: EMERGENCY MEDICINE

## 2022-11-03 PROCEDURE — 99999 PR PBB SHADOW E&M-EST. PATIENT-LVL V: CPT | Mod: PBBFAC,,, | Performed by: EMERGENCY MEDICINE

## 2022-11-03 PROCEDURE — 3008F PR BODY MASS INDEX (BMI) DOCUMENTED: ICD-10-PCS | Mod: CPTII,S$GLB,, | Performed by: EMERGENCY MEDICINE

## 2022-11-03 PROCEDURE — 1159F PR MEDICATION LIST DOCUMENTED IN MEDICAL RECORD: ICD-10-PCS | Mod: CPTII,S$GLB,, | Performed by: EMERGENCY MEDICINE

## 2022-11-03 PROCEDURE — 99999 PR PBB SHADOW E&M-EST. PATIENT-LVL V: ICD-10-PCS | Mod: PBBFAC,,, | Performed by: EMERGENCY MEDICINE

## 2022-11-03 PROCEDURE — 3075F PR MOST RECENT SYSTOLIC BLOOD PRESS GE 130-139MM HG: ICD-10-PCS | Mod: CPTII,S$GLB,, | Performed by: EMERGENCY MEDICINE

## 2022-11-03 PROCEDURE — 1159F MED LIST DOCD IN RCRD: CPT | Mod: CPTII,S$GLB,, | Performed by: EMERGENCY MEDICINE

## 2022-11-03 PROCEDURE — 3075F SYST BP GE 130 - 139MM HG: CPT | Mod: CPTII,S$GLB,, | Performed by: EMERGENCY MEDICINE

## 2022-11-03 PROCEDURE — 90686 FLU VACCINE (QUAD) GREATER THAN OR EQUAL TO 3YO PRESERVATIVE FREE IM: ICD-10-PCS | Mod: S$GLB,,, | Performed by: EMERGENCY MEDICINE

## 2022-11-03 PROCEDURE — G0008 FLU VACCINE (QUAD) GREATER THAN OR EQUAL TO 3YO PRESERVATIVE FREE IM: ICD-10-PCS | Mod: S$GLB,,, | Performed by: EMERGENCY MEDICINE

## 2022-11-03 PROCEDURE — G0008 ADMIN INFLUENZA VIRUS VAC: HCPCS | Mod: S$GLB,,, | Performed by: EMERGENCY MEDICINE

## 2022-11-03 NOTE — PROGRESS NOTES
Subjective:   THIS NOTE IS DONE WITH VOICE RECOGNITION        Patient ID: Amena Anderson is a 57 y.o. female.    Chief Complaint: Diabetes, Hypertension, Hyperlipidemia, and left hip pain         Assessment:       1. Type 2 diabetes mellitus with diabetic polyneuropathy, without long-term current use of insulin    2. Essential hypertension    3. MANUEL (obstructive sleep apnea)    4. Chronic pain syndrome    5. BMI 50.0-59.9, adult    6. Postablative hypothyroidism    7. Fatty liver    8. Mixed hyperlipidemia    9. Left hip pain        Plan:       1. Type 2 diabetes mellitus with diabetic polyneuropathy, without long-term current use of insulin  Controlled  Annual foot exam recommended  Annual retinal exam recommended.   Hemoglobin A1c at 6 month intervals  Annual lipid profile recommended    2. Essential hypertension  Controlled.  Continue current medications  Continue to avoid excessive sodium  Continue home monitoring  Target blood pressure is 139/89 or less    3. MANUEL (obstructive sleep apnea)  Consistently using CPAP  Equipment in good repair  Weight loss discussed and encouraged.    4. Chronic pain syndrome  Continue follow-up with Dr. De La Cruz   Chronic low back pain    5. BMI 50.0-59.9, adult  Possibility of using Ozempic discussed.  She declines this given her needle phobia.    6. Postablative hypothyroidism  Controlled   No change in medications   Annual TSH    7. Fatty liver  Weight reduction encouraged   Not consuming alcohol   Continue to monitor    8. Mixed hyperlipidemia  LDL target   Continue current medications    9. Left hip pain  Suspect soft tissue   X-ray to rule out bony lesions   No new medications or therapies recommended at this time.      - X-Ray Hip 2 or 3 views Left (with Pelvis when performed); Future  2      HPI    She has seen optometry.  It sounds like she is having early cataracts.  Exam otherwise stable.  She is concerned about any possible surgery.  She does see Dr. Vargas for eye  care.    Lab Results   Component Value Date    HGBA1C 6.0 (H) 10/14/2022    HGBA1C 5.9 (H) 06/14/2021    HGBA1C 5.8 (H) 01/21/2021       Lab Results   Component Value Date    LDLCALC 99.8 10/14/2022    LDLCALC 96.2 06/13/2022    LDLCALC 100.0 06/14/2021    LDLCALC 100.0 06/14/2021       Lab Results   Component Value Date    CREATININE 0.7 10/14/2022    CREATININE 0.55 06/17/2022    CREATININE 0.7 06/13/2022       Lab Results   Component Value Date    EGFRNONAA >60 06/17/2022    EGFRNONAA >60.0 06/13/2022    EGFRNONAA >60.0 06/14/2021    EGFRNONAA >60.0 06/14/2021     Right sided ulnar symptoms.  This has been a problem for the last three weeks.     She has developed left-sided hip pain.  This is very lateral.  There has been no recognized trauma.  It is more painful when she lays on the hip.  She has not lost range of motion.  She is not meeting effort intervening the pain.  She does not describe typical sciatica.      Hyperlipidemia monitoring.  Currently using atorvastatin 20 milligrams a day.    Lab Results   Component Value Date    CHOL 159 10/14/2022    CHOL 160 06/13/2022    CHOL 148 06/14/2021    CHOL 148 06/14/2021     Lab Results   Component Value Date    HDL 40 10/14/2022    HDL 36 (L) 06/13/2022    HDL 40 06/14/2021    HDL 40 06/14/2021     Lab Results   Component Value Date    LDLCALC 99.8 10/14/2022    LDLCALC 96.2 06/13/2022    LDLCALC 100.0 06/14/2021    LDLCALC 100.0 06/14/2021     Lab Results   Component Value Date    TRIG 96 10/14/2022    TRIG 139 06/13/2022    TRIG 40 06/14/2021    TRIG 40 06/14/2021     Lab Results   Component Value Date    CHOLHDL 25.2 10/14/2022    CHOLHDL 22.5 06/13/2022    CHOLHDL 27.0 06/14/2021    CHOLHDL 27.0 06/14/2021     She does monitor her blood pressure at home.  Most of the time she is getting under 130 over 80.  She is not experiencing palpitations or chest pain.      BP Readings from Last 3 Encounters:   11/03/22 136/80   09/01/22 (!) 134/8   07/08/22 (!) 156/70      Sleep apnea is well controlled.   Her machine is working well.  Supplies are current.    Chronic pain managed by Dr. De La Cruz.  No recent changes    Postablative hypothyroidism, with replacement therapy in place, is well controlled.    Lab Results   Component Value Date    TSH 1.121 10/14/2022       She is known to have fatty liver.  Her most recent liver enzymes are stable.  Continue focus on weight loss encouraged.    Lab Results   Component Value Date    ALT 14 10/14/2022    AST 17 10/14/2022    ALKPHOS 66 10/14/2022    BILITOT 0.4 10/14/2022     BMI greater than 50.  315 lbs today.  Ozempic discussed.  She wants to avoid all needles.    Immunization History   Administered Date(s) Administered    COVID-19, MRNA, LN-S, PF (Pfizer) (Purple Cap) 04/12/2021, 05/03/2021, 12/14/2021    Influenza - Quadrivalent - PF *Preferred* (6 months and older) 11/27/2017, 10/11/2018, 10/28/2019, 09/21/2020, 12/06/2021, 11/03/2022    Pneumococcal Conjugate - 13 Valent 05/22/2017    Pneumococcal Polysaccharide - 23 Valent 10/11/2018     Shingrix (recombinant shingles vaccine) has been discussed and recommended.      Current Outpatient Medications   Medication Sig Dispense Refill    amitriptyline (ELAVIL) 25 MG tablet Take 1 tablet (25 mg total) by mouth every evening. 90 tablet 3    amLODIPine (NORVASC) 10 MG tablet TAKE 1 TABLET(10 MG) BY MOUTH EVERY DAY 90 tablet 3    aspirin (ECOTRIN) 81 MG EC tablet Take 81 mg by mouth once daily.      atorvastatin (LIPITOR) 20 MG tablet TAKE 1 TABLET(20 MG) BY MOUTH EVERY DAY 90 tablet 3    azelastine (ASTELIN) 137 mcg (0.1 %) nasal spray 1 spray (137 mcg total) by Nasal route 2 (two) times daily. 90 mL 1    blood sugar diagnostic Strp To check BG 2 times daily, to use with insurance preferred meter DX E11.42 200 strip 1    blood-glucose meter kit To check BG 2 times daily, to use with insurance preferred meter 100 each 11    boric acid (BORIC ACID) vaginal suppository Place 1 each (650 mg  total) vaginally every 7 days. 12 suppository 3    ciclopirox (LOPROX) 0.77 % Crea Apply topically 2 (two) times daily. 90 g 3    diclofenac sodium 1 % Gel Apply 2 g topically 4 (four) times daily. 100 g 2    docusate sodium (COLACE) 50 MG capsule Take 100 mg by mouth 2 (two) times daily.      estradioL (ESTRACE) 1 MG tablet TAKE 1 TABLET BY MOUTH EVERY DAY 90 tablet 2    furosemide (LASIX) 40 MG tablet Take 1 tablet (40 mg total) by mouth once daily. 90 tablet 3    gabapentin (NEURONTIN) 400 MG capsule Take 400 mg by mouth 3 (three) times daily.      hydrocodone-acetaminophen 10-325mg (NORCO)  mg Tab Take 10 tablets by mouth 3 (three) times daily.      lancets Misc To check BG 2 times daily, to use with insurance preferred meter 200 each 1    levothyroxine (SYNTHROID) 150 MCG tablet TAKE 1 TABLET(150 MCG) BY MOUTH EVERY DAY 90 tablet 3    lidocaine-prilocaine (EMLA) cream APPLY UP TO 3.2 GRAMS TO PAINFUL AREAS UP TO FIVE TIMES DAILY. RUB IN WELL      lisinopriL (PRINIVIL,ZESTRIL) 20 MG tablet TAKE 1 TABLET(20 MG) BY MOUTH EVERY DAY 90 tablet 3    metFORMIN (GLUCOPHAGE) 1000 MG tablet TAKE 1 TABLET(1000 MG) BY MOUTH EVERY EVENING 90 tablet 3    naproxen (EC NAPROSYN) 500 MG EC tablet Take 500 mg by mouth 2 (two) times daily.      nitroGLYCERIN (NITROSTAT) 0.4 MG SL tablet Place 0.4 mg under the tongue every 5 (five) minutes as needed for Chest pain.      omeprazole (PRILOSEC) 40 MG capsule Take 1 capsule (40 mg total) by mouth 2 (two) times daily before meals. 180 capsule 3    ondansetron (ZOFRAN-ODT) 4 MG TbDL DISSOLVE 1 TABLET(4 MG) ON THE TONGUE THREE TIMES DAILY AS NEEDED FOR NAUSEA 30 tablet 0    potassium chloride SA (K-DUR,KLOR-CON) 20 MEQ tablet TAKE 1 TABLET(20 MEQ) BY MOUTH TWICE DAILY 180 tablet 2     No current facility-administered medications for this visit.         Review of Systems   Constitutional:  Positive for activity change (walking less). Negative for appetite change, chills, diaphoresis,  fatigue, fever and unexpected weight change.   HENT:  Negative for congestion, ear pain, hearing loss, rhinorrhea, trouble swallowing and voice change.    Eyes:  Negative for pain and visual disturbance.   Respiratory:  Negative for cough, chest tightness and shortness of breath.    Cardiovascular:  Negative for chest pain, palpitations and leg swelling.   Gastrointestinal:  Negative for abdominal distention, abdominal pain and blood in stool.   Genitourinary:  Negative for difficulty urinating, flank pain, frequency and urgency.   Musculoskeletal:  Positive for back pain. Negative for arthralgias, joint swelling, myalgias, neck pain and neck stiffness.   Skin:  Negative for pallor and rash.   Neurological:  Negative for dizziness, tremors, syncope, weakness and headaches.   Hematological:  Negative for adenopathy.   Psychiatric/Behavioral:  Negative for dysphoric mood and sleep disturbance. The patient is nervous/anxious.      Objective:      Physical Exam  Vitals reviewed.   Constitutional:       General: She is not in acute distress.     Appearance: Normal appearance. She is well-developed. She is obese.   HENT:      Head: Normocephalic and atraumatic.      Right Ear: External ear normal.      Left Ear: External ear normal.      Nose: Nose normal.      Mouth/Throat:      Mouth: Mucous membranes are moist.   Eyes:      General: No scleral icterus.     Conjunctiva/sclera: Conjunctivae normal.      Pupils: Pupils are equal, round, and reactive to light.   Neck:      Thyroid: No thyromegaly.      Vascular: No JVD.   Cardiovascular:      Rate and Rhythm: Normal rate and regular rhythm.      Pulses: Normal pulses.      Heart sounds: Normal heart sounds. No murmur heard.  Pulmonary:      Effort: Pulmonary effort is normal.      Breath sounds: Normal breath sounds. No wheezing or rales.   Abdominal:      General: Bowel sounds are normal. There is no distension.      Palpations: Abdomen is soft.      Tenderness: There is  no abdominal tenderness.   Musculoskeletal:      Cervical back: Normal range of motion and neck supple.      Lumbar back: Tenderness present. No bony tenderness. Decreased range of motion. Negative right straight leg raise test and negative left straight leg raise test.      Right hip: Normal.      Left hip: Tenderness present. No bony tenderness or crepitus. Normal range of motion.      Right lower leg: No edema.      Left lower leg: No edema.   Lymphadenopathy:      Cervical: No cervical adenopathy.   Skin:     General: Skin is warm and dry.      Findings: No erythema or rash.   Neurological:      Mental Status: She is alert and oriented to person, place, and time. Mental status is at baseline.      Cranial Nerves: No cranial nerve deficit.      Coordination: Coordination normal.      Deep Tendon Reflexes: Reflexes are normal and symmetric.      Comments: Pain and numbness right ulnar distribution.   Psychiatric:         Behavior: Behavior normal.      Comments: Anxious about needles, medications, and life in general.

## 2022-11-03 NOTE — PATIENT INSTRUCTIONS
Amena,    We do need to check an xray of your left hip.    I will be retiring next month.  We will set up an appointment for you to you see your new doctor in three months.    If your right hand pain increases, we may need to check your nerve.    Your blood work is excellent.  NO changes in your medications.    You did get a flu shot today.    Respectfully,    Jose Miller MD

## 2022-11-07 ENCOUNTER — TELEPHONE (OUTPATIENT)
Dept: FAMILY MEDICINE | Facility: CLINIC | Age: 57
End: 2022-11-07
Payer: MEDICARE

## 2022-11-07 NOTE — TELEPHONE ENCOUNTER
----- Message from Maya Tarango, Patient Care Assistant sent at 11/7/2022  7:28 AM CST -----  Regarding: appointment  Contact: pt  Type:  Sooner Appointment Request    Caller is requesting a sooner appointment.  Caller declined first available appointment listed below.  Caller will not accept being placed on the waitlist and is requesting a message be sent to doctor.    Name of Caller:   pt   When is the first available appointment?  11/14/22 requesting 11/9/22 morning   Symptoms:  left hip pain - X ray   Best Call Back Number:  [phone] (home)     Additional Information:  please call pt to advise. Thanks!

## 2022-11-08 ENCOUNTER — TELEPHONE (OUTPATIENT)
Dept: PODIATRY | Facility: CLINIC | Age: 57
End: 2022-11-08
Payer: MEDICARE

## 2022-11-08 ENCOUNTER — TELEPHONE (OUTPATIENT)
Dept: FAMILY MEDICINE | Facility: CLINIC | Age: 57
End: 2022-11-08
Payer: MEDICARE

## 2022-11-08 NOTE — TELEPHONE ENCOUNTER
----- Message from Marsha Yung sent at 11/8/2022 10:49 AM CST -----  Contact: Patient  Type:  Needs Medical Advice    Who Called:  Patient       Would the patient rather a call back or a response via [facility]?  Call    Best Call Back Number:  [phone] (home) \    Additional Information:  Patient needs to speak to the nurse about getting her labs sent to her orthopedic shoe place     Please call to advise

## 2022-11-08 NOTE — TELEPHONE ENCOUNTER
----- Message from Marsha Yung sent at 11/8/2022 10:47 AM CST -----  Contact: Patient  Type:  Needs Medical Advice    Who Called:  Patient       Would the patient rather a call back or a response via [facility]?  Call    Best Call Back Number:  [phone] (home)     Additional Information:  Patient needs to speak to the nurse about her shoes     Please call to advise

## 2022-11-08 NOTE — TELEPHONE ENCOUNTER
----- Message from Edith Farias sent at 11/8/2022  3:42 PM CST -----  Contact: self  Type:  Patient Returning Call    Who Called:  patient  Who Left Message for Patient:  Melody  Does the patient know what this is regarding?:  info for corrective shores  Best Call Back Number:  [phone]  Additional Information:  thanks

## 2022-11-08 NOTE — TELEPHONE ENCOUNTER
Spoke to pt. [facility] is requesting her last A1C labs for her diabetic shoes.   Labs faxed to [phone]

## 2022-11-09 ENCOUNTER — HOSPITAL ENCOUNTER (OUTPATIENT)
Dept: RADIOLOGY | Facility: HOSPITAL | Age: 57
Discharge: HOME | End: 2022-11-09
Attending: EMERGENCY MEDICINE
Payer: MEDICARE

## 2022-11-09 DIAGNOSIS — M25.552 LEFT HIP PAIN: ICD-10-CM

## 2022-11-09 PROCEDURE — 73502 X-RAY EXAM HIP UNI 2-3 VIEWS: CPT | Mod: 26,LT,, | Performed by: RADIOLOGY

## 2022-11-09 PROCEDURE — 73502 X-RAY EXAM HIP UNI 2-3 VIEWS: CPT | Mod: TC,FY,PO,LT

## 2022-11-09 PROCEDURE — 73502 XR HIP WITH PELVIS WHEN PERFORMED, 2 OR 3 VIEWS LEFT: ICD-10-PCS | Mod: 26,LT,, | Performed by: RADIOLOGY

## 2022-11-11 ENCOUNTER — TELEPHONE (OUTPATIENT)
Dept: PODIATRY | Facility: CLINIC | Age: 57
End: 2022-11-11
Payer: MEDICARE

## 2022-11-11 NOTE — TELEPHONE ENCOUNTER
----- Message from Karsten Camp sent at 11/11/2022 10:30 AM CST -----  Type: Needs Medical Advice  Who Called:  pt  Symptoms (please be specific):  pt said she need to speak to the nurse--said it's about her feet--please call and advise  Best Call Back Number: [phone] (home)     Additional Information: thank you

## 2022-11-14 ENCOUNTER — TELEPHONE (OUTPATIENT)
Dept: FAMILY MEDICINE | Facility: CLINIC | Age: 57
End: 2022-11-14
Payer: MEDICARE

## 2022-11-14 NOTE — TELEPHONE ENCOUNTER
Spoke to pt. She is in a lot of pain from her hip down to her feet. She fell this weekend because her hip gave out but her family member caught her. I reviewed your message regarding her xray but she stated she needs help because she is in a lot of pain. Please Advise

## 2022-11-14 NOTE — TELEPHONE ENCOUNTER
----- Message from Naseem May sent at 11/14/2022 12:33 PM CST -----  Contact: pt at [phone]  Type: Needs Medical Advice  Who Called:  pt  Best Call Back Number: [phone]  Additional Information: pt is calling the office to let the dr know that the pain in her left leg connected to the left hip is hurting really bad and it went out on her yesterday and she fell and she wants to know what her next steps are. Please call back and advise.  PER THE PT PLEASE CALL BACK TODAY

## 2022-11-16 ENCOUNTER — TELEPHONE (OUTPATIENT)
Dept: FAMILY MEDICINE | Facility: CLINIC | Age: 57
End: 2022-11-16
Payer: MEDICARE

## 2022-11-16 NOTE — TELEPHONE ENCOUNTER
Spoke to [facility] - NP Alycia Benson. Informed her that I sent a message to . She verbalized understanding and just waiting on his response.

## 2022-11-16 NOTE — TELEPHONE ENCOUNTER
I am sorry to hear this.      We need to be careful, as she does see Dr. De La Cruz for pain management.  She cannot afford to lose that relationship.    Options:   If she has not increased the moist heat to the area, she should.  10 minutes several times a day.  If she is requiring narcotics, she will need to see Dr. De La Cruz  Physical therapy is an option.  Orthopedic consultation is an option  If she is not using her naproxen, she should.  Caution with stomach issues  1000 mg of Tylenol every eight hours is also an option.    Please give her these options.    MARGARITO

## 2022-11-16 NOTE — TELEPHONE ENCOUNTER
----- Message from Romana Gonzalez sent at 11/16/2022  3:09 PM CST -----  Type: Patient Call Back         Who called: [facility] - NP Alycia Benson          What is the request in detail: calling to leave msg for staff that's she saw patient for an annual home visit; states that she kept complaining regarding a left hip pain from a near fall; no significant swelling but states patient is walking; requesting a f/u call back to patient; state she may need another x ray; please advise           Best call back number: [phone]         Additional Information: [phone] - Nurse Alycia Benson           Thank You

## 2022-11-21 DIAGNOSIS — E11.42 TYPE 2 DIABETES MELLITUS WITH DIABETIC POLYNEUROPATHY, WITHOUT LONG-TERM CURRENT USE OF INSULIN: Primary | ICD-10-CM

## 2022-11-21 DIAGNOSIS — E11.51 TYPE II DIABETES MELLITUS WITH PERIPHERAL CIRCULATORY DISORDER: ICD-10-CM

## 2022-11-21 DIAGNOSIS — L84 CORN OR CALLUS: ICD-10-CM

## 2022-11-29 ENCOUNTER — TELEPHONE (OUTPATIENT)
Dept: FAMILY MEDICINE | Facility: CLINIC | Age: 57
End: 2022-11-29
Payer: MEDICARE

## 2022-11-29 NOTE — TELEPHONE ENCOUNTER
----- Message from Cher Foreman sent at 11/29/2022  2:59 PM CST -----  Contact: yash  Type: Needs Medical Advice  Who Called:  Yash with [facility]   Best Call Back Number: [phone]  Additional Information: checking the status of order sent over on 11/17 for patient diabetic footwear. Fax# [phone]

## 2022-12-02 ENCOUNTER — TELEPHONE (OUTPATIENT)
Dept: FAMILY MEDICINE | Facility: CLINIC | Age: 57
End: 2022-12-02
Payer: MEDICARE

## 2022-12-02 NOTE — TELEPHONE ENCOUNTER
----- Message from John Nguyễn sent at 12/2/2022 12:15 PM CST -----  Type: Needs Medical Advice  Who Called: Pt   Symptoms (please be specific):   How long has patient had these symptoms:    Pharmacy name and phone #:    Best Call Back Number: [phone]  Additional Information: Pt requesting a call back from the nurse.

## 2022-12-07 ENCOUNTER — TELEPHONE (OUTPATIENT)
Dept: FAMILY MEDICINE | Facility: CLINIC | Age: 57
End: 2022-12-07
Payer: MEDICARE

## 2022-12-07 NOTE — TELEPHONE ENCOUNTER
Spoke to pt. She wanted to make sure  will be able to see all her records. I explained he is able to see everything in her chart. She verbalized understanding.

## 2022-12-07 NOTE — TELEPHONE ENCOUNTER
----- Message from Arlene Jj sent at 12/7/2022 11:13 AM CST -----  Contact: patient  Type:  Needs Medical Advice    Who Called:  patient       Would the patient rather a call back or a response via [facility]? Call     Best Call Back Number: [phone] (home)      Additional Information:  Patient would like to speak with the nurse to discuss something that is going on.     Please call to advise

## 2023-01-24 ENCOUNTER — OFFICE VISIT (OUTPATIENT)
Dept: PODIATRY | Facility: CLINIC | Age: 58
End: 2023-01-24
Payer: MEDICARE

## 2023-01-24 VITALS — WEIGHT: 293 LBS | HEIGHT: 64 IN | BODY MASS INDEX: 50.02 KG/M2

## 2023-01-24 DIAGNOSIS — E11.51 TYPE II DIABETES MELLITUS WITH PERIPHERAL CIRCULATORY DISORDER: ICD-10-CM

## 2023-01-24 DIAGNOSIS — B35.1 ONYCHOMYCOSIS DUE TO DERMATOPHYTE: ICD-10-CM

## 2023-01-24 DIAGNOSIS — E11.42 TYPE 2 DIABETES MELLITUS WITH DIABETIC POLYNEUROPATHY, WITHOUT LONG-TERM CURRENT USE OF INSULIN: Primary | ICD-10-CM

## 2023-01-24 DIAGNOSIS — B35.3 TINEA PEDIS OF BOTH FEET: ICD-10-CM

## 2023-01-24 PROCEDURE — 1159F MED LIST DOCD IN RCRD: CPT | Mod: CPTII,S$GLB,, | Performed by: PODIATRIST

## 2023-01-24 PROCEDURE — 3008F PR BODY MASS INDEX (BMI) DOCUMENTED: ICD-10-PCS | Mod: CPTII,S$GLB,, | Performed by: PODIATRIST

## 2023-01-24 PROCEDURE — 11721 PR DEBRIDEMENT OF NAILS, 6 OR MORE: ICD-10-PCS | Mod: Q9,S$GLB,, | Performed by: PODIATRIST

## 2023-01-24 PROCEDURE — 1160F PR REVIEW ALL MEDS BY PRESCRIBER/CLIN PHARMACIST DOCUMENTED: ICD-10-PCS | Mod: CPTII,S$GLB,, | Performed by: PODIATRIST

## 2023-01-24 PROCEDURE — 99999 PR PBB SHADOW E&M-EST. PATIENT-LVL III: ICD-10-PCS | Mod: PBBFAC,,, | Performed by: PODIATRIST

## 2023-01-24 PROCEDURE — 3008F BODY MASS INDEX DOCD: CPT | Mod: CPTII,S$GLB,, | Performed by: PODIATRIST

## 2023-01-24 PROCEDURE — 99213 PR OFFICE/OUTPT VISIT, EST, LEVL III, 20-29 MIN: ICD-10-PCS | Mod: 25,S$GLB,, | Performed by: PODIATRIST

## 2023-01-24 PROCEDURE — 1159F PR MEDICATION LIST DOCUMENTED IN MEDICAL RECORD: ICD-10-PCS | Mod: CPTII,S$GLB,, | Performed by: PODIATRIST

## 2023-01-24 PROCEDURE — 11721 DEBRIDE NAIL 6 OR MORE: CPT | Mod: Q9,S$GLB,, | Performed by: PODIATRIST

## 2023-01-24 PROCEDURE — 99999 PR PBB SHADOW E&M-EST. PATIENT-LVL III: CPT | Mod: PBBFAC,,, | Performed by: PODIATRIST

## 2023-01-24 PROCEDURE — 99499 RISK ADDL DX/OHS AUDIT: ICD-10-PCS | Mod: ,,, | Performed by: PODIATRIST

## 2023-01-24 PROCEDURE — 99213 OFFICE O/P EST LOW 20 MIN: CPT | Mod: 25,S$GLB,, | Performed by: PODIATRIST

## 2023-01-24 PROCEDURE — 99499 UNLISTED E&M SERVICE: CPT | Mod: ,,, | Performed by: PODIATRIST

## 2023-01-24 PROCEDURE — 1160F RVW MEDS BY RX/DR IN RCRD: CPT | Mod: CPTII,S$GLB,, | Performed by: PODIATRIST

## 2023-01-24 RX ORDER — DICLOFENAC SODIUM 10 MG/G
2 GEL TOPICAL 4 TIMES DAILY PRN
Qty: 100 G | Refills: 2 | Status: SHIPPED | OUTPATIENT
Start: 2023-01-24

## 2023-01-24 RX ORDER — CICLOPIROX OLAMINE 7.7 MG/G
CREAM TOPICAL 2 TIMES DAILY
Qty: 90 G | Refills: 3 | Status: SHIPPED | OUTPATIENT
Start: 2023-01-24

## 2023-02-03 ENCOUNTER — OFFICE VISIT (OUTPATIENT)
Dept: FAMILY MEDICINE | Facility: CLINIC | Age: 58
End: 2023-02-03
Payer: MEDICARE

## 2023-02-03 VITALS
BODY MASS INDEX: 50.02 KG/M2 | OXYGEN SATURATION: 98 % | HEIGHT: 64 IN | HEART RATE: 96 BPM | DIASTOLIC BLOOD PRESSURE: 76 MMHG | WEIGHT: 293 LBS | SYSTOLIC BLOOD PRESSURE: 132 MMHG

## 2023-02-03 DIAGNOSIS — E66.01 CLASS 3 SEVERE OBESITY DUE TO EXCESS CALORIES WITH SERIOUS COMORBIDITY AND BODY MASS INDEX (BMI) OF 50.0 TO 59.9 IN ADULT: ICD-10-CM

## 2023-02-03 DIAGNOSIS — I10 ESSENTIAL HYPERTENSION: Chronic | ICD-10-CM

## 2023-02-03 DIAGNOSIS — B37.2 CANDIDAL SKIN INFECTION: Primary | ICD-10-CM

## 2023-02-03 PROBLEM — G89.29 CHRONIC LOWER BACK PAIN: Status: ACTIVE | Noted: 2023-02-03

## 2023-02-03 PROBLEM — I51.89 DIASTOLIC DYSFUNCTION: Status: ACTIVE | Noted: 2023-02-03

## 2023-02-03 PROBLEM — M54.50 CHRONIC LOWER BACK PAIN: Status: ACTIVE | Noted: 2023-02-03

## 2023-02-03 PROBLEM — E66.813 CLASS 3 SEVERE OBESITY DUE TO EXCESS CALORIES WITH SERIOUS COMORBIDITY AND BODY MASS INDEX (BMI) OF 50.0 TO 59.9 IN ADULT: Status: ACTIVE | Noted: 2017-01-22

## 2023-02-03 PROBLEM — K76.0 NONALCOHOLIC FATTY LIVER DISEASE: Status: ACTIVE | Noted: 2018-06-05

## 2023-02-03 PROCEDURE — 3078F PR MOST RECENT DIASTOLIC BLOOD PRESSURE < 80 MM HG: ICD-10-PCS | Mod: CPTII,S$GLB,, | Performed by: STUDENT IN AN ORGANIZED HEALTH CARE EDUCATION/TRAINING PROGRAM

## 2023-02-03 PROCEDURE — 1159F MED LIST DOCD IN RCRD: CPT | Mod: CPTII,S$GLB,, | Performed by: STUDENT IN AN ORGANIZED HEALTH CARE EDUCATION/TRAINING PROGRAM

## 2023-02-03 PROCEDURE — 3008F BODY MASS INDEX DOCD: CPT | Mod: CPTII,S$GLB,, | Performed by: STUDENT IN AN ORGANIZED HEALTH CARE EDUCATION/TRAINING PROGRAM

## 2023-02-03 PROCEDURE — 3075F PR MOST RECENT SYSTOLIC BLOOD PRESS GE 130-139MM HG: ICD-10-PCS | Mod: CPTII,S$GLB,, | Performed by: STUDENT IN AN ORGANIZED HEALTH CARE EDUCATION/TRAINING PROGRAM

## 2023-02-03 PROCEDURE — 3075F SYST BP GE 130 - 139MM HG: CPT | Mod: CPTII,S$GLB,, | Performed by: STUDENT IN AN ORGANIZED HEALTH CARE EDUCATION/TRAINING PROGRAM

## 2023-02-03 PROCEDURE — 3008F PR BODY MASS INDEX (BMI) DOCUMENTED: ICD-10-PCS | Mod: CPTII,S$GLB,, | Performed by: STUDENT IN AN ORGANIZED HEALTH CARE EDUCATION/TRAINING PROGRAM

## 2023-02-03 PROCEDURE — 99214 PR OFFICE/OUTPT VISIT, EST, LEVL IV, 30-39 MIN: ICD-10-PCS | Mod: S$GLB,,, | Performed by: STUDENT IN AN ORGANIZED HEALTH CARE EDUCATION/TRAINING PROGRAM

## 2023-02-03 PROCEDURE — 3078F DIAST BP <80 MM HG: CPT | Mod: CPTII,S$GLB,, | Performed by: STUDENT IN AN ORGANIZED HEALTH CARE EDUCATION/TRAINING PROGRAM

## 2023-02-03 PROCEDURE — 1159F PR MEDICATION LIST DOCUMENTED IN MEDICAL RECORD: ICD-10-PCS | Mod: CPTII,S$GLB,, | Performed by: STUDENT IN AN ORGANIZED HEALTH CARE EDUCATION/TRAINING PROGRAM

## 2023-02-03 PROCEDURE — 99999 PR PBB SHADOW E&M-EST. PATIENT-LVL V: CPT | Mod: PBBFAC,,, | Performed by: STUDENT IN AN ORGANIZED HEALTH CARE EDUCATION/TRAINING PROGRAM

## 2023-02-03 PROCEDURE — 99214 OFFICE O/P EST MOD 30 MIN: CPT | Mod: S$GLB,,, | Performed by: STUDENT IN AN ORGANIZED HEALTH CARE EDUCATION/TRAINING PROGRAM

## 2023-02-03 PROCEDURE — 99999 PR PBB SHADOW E&M-EST. PATIENT-LVL V: ICD-10-PCS | Mod: PBBFAC,,, | Performed by: STUDENT IN AN ORGANIZED HEALTH CARE EDUCATION/TRAINING PROGRAM

## 2023-02-03 RX ORDER — NYSTATIN 100000 [USP'U]/G
POWDER TOPICAL 2 TIMES DAILY
Qty: 30 G | Refills: 3 | Status: SHIPPED | OUTPATIENT
Start: 2023-02-03 | End: 2023-10-16

## 2023-02-03 NOTE — ASSESSMENT & PLAN NOTE
Overall improved. Weight has fluctuated some over the last few months, but overall weight loss is greatly beneficial. I would like to discuss her weight loss plan in more detail at her next visit.

## 2023-02-03 NOTE — PROGRESS NOTES
Name: Amena Anderson  MRN: [id number]  : 1965  PCP: Jacobo Joy MD    HPI  Patient is here for regular follow up and to establish. Previously under the care of Dr. Miller.  Primary concern is a rash that is developing under her breasts and under her belly. It itches.    She mentions efforts to lose weight - says she has lost weight recently and is motivated to continue this trend. Highest weight documented in our system is 334 lbs on 2022. Currently weight 316 lbs.    We discussed pain - has neuropathy, lower back pain for which she follows pain management. They perform steroid injections, prescribe gabapentin and hydrocodone-acetaminophen 10mg. The patient was recently increased to QID for the latter medication but pain management plans to taper her back down to TID at next visit. No side effects from increased dose.    Review of Systems   Musculoskeletal:  Positive for back pain.   Skin:  Positive for rash.   Neurological:  Positive for numbness.     Patient Active Problem List   Diagnosis    Type 2 diabetes mellitus with diabetic polyneuropathy, without long-term current use of insulin    Essential hypertension    MANUEL (obstructive sleep apnea)    Chronic pain syndrome    Class 3 severe obesity due to excess calories with serious comorbidity and body mass index (BMI) of 50.0 to 59.9 in adult    Hypothyroidism    Gastroesophageal reflux disease without esophagitis    Dysphagia    Nonalcoholic fatty liver disease    Mixed hyperlipidemia    Diastolic dysfunction       Vitals:    23 0823   BP: 132/76   Pulse: 96       Physical Exam  Constitutional:       General: She is not in acute distress.     Appearance: Normal appearance. She is well-developed.   HENT:      Head: Normocephalic and atraumatic.      Right Ear: External ear normal.      Left Ear: External ear normal.   Eyes:      Conjunctiva/sclera: Conjunctivae normal.   Pulmonary:      Effort: Pulmonary effort is normal. No respiratory  distress.   Abdominal:      General: Abdomen is flat. There is no distension.   Musculoskeletal:         General: No swelling or deformity.      Right lower leg: No edema.      Left lower leg: No edema.   Skin:     General: Skin is warm and dry.      Coloration: Skin is not jaundiced.      Findings: Erythema (mild erythema present under bilateral breasts) present.   Neurological:      Mental Status: She is alert and oriented to person, place, and time. Mental status is at baseline.   Psychiatric:         Attention and Perception: Attention and perception normal.         Mood and Affect: Mood normal.         Speech: Speech normal.         Behavior: Behavior normal. Behavior is cooperative.         Thought Content: Thought content normal.         Cognition and Memory: Cognition normal.         Judgment: Judgment normal.       1. Candidal skin infection  -     nystatin (MYCOSTATIN) powder; Apply topically 2 (two) times daily.  Dispense: 30 g; Refill: 3    2. Essential hypertension  Assessment & Plan:  Stable, controlled. Continue current approach.      3. Class 3 severe obesity due to excess calories with serious comorbidity and body mass index (BMI) of 50.0 to 59.9 in adult  Assessment & Plan:  Overall improved. Weight has fluctuated some over the last few months, but overall weight loss is greatly beneficial. I would like to discuss her weight loss plan in more detail at her next visit.          Follow up in 3 months    Jacobo Joy MD  02/03/2023

## 2023-03-01 LAB
LEFT EYE DM RETINOPATHY: NEGATIVE
RIGHT EYE DM RETINOPATHY: NEGATIVE

## 2023-03-23 ENCOUNTER — PATIENT OUTREACH (OUTPATIENT)
Dept: ADMINISTRATIVE | Facility: HOSPITAL | Age: 58
End: 2023-03-23
Payer: MEDICARE

## 2023-03-23 NOTE — LETTER
AUTHORIZATION FOR RELEASE OF   CONFIDENTIAL INFORMATION    Dear Oscar Vargas OD    We are seeing Amena Anderson, date of birth 1965, in the clinic at [facility]. Jacobo Joy MD is the patient's PCP. Amena Anderson has an outstanding lab/procedure at the time we reviewed her chart. In order to help keep her health information updated, she has authorized us to request the following medical record(s):        (  )  MAMMOGRAM                                      (  )  COLONOSCOPY      (  )  PAP SMEAR                                          (  )  OUTSIDE LAB RESULTS     (  )  DEXA SCAN                                          (X)  EYE EXAM            (  )  FOOT EXAM                                          (  )  ENTIRE RECORD     (  )  OUTSIDE IMMUNIZATIONS                 (  )  _______________         Please fax records to [facility], Jason M Petitjean, MD, [phone]     If you have any questions, please contact Stephanie Hernandez LPN CCC        Patient Name: Amena Anderson  : 1965  Patient Phone #: [phone]   Amena Anderson  MRN: [id number]  : 1965  Age: 56 y.o.  Sex: female       ClinAuthPatSig  Patient/Legal Guardian Signature  This signature was collected at 2022    PatientorGuardian     RelationshiptoPatient  _______________________________   Printed Name/Relationship to Patient      Consent for Examination and Treatment: I hereby authorize the providers and employees of [facility] ([facility]) to provide medical treatment/services which includes, but is not limited to, performing and administering tests and diagnostic procedures that are deemed necessary, including, but not limited to, imaging examinations, blood tests and other laboratory procedures as may be required by the hospital, clinic, or may be ordered by my physician(s) or persons working under the general and/or special instructions of my physician(s).      I understand and agree that this  consent covers all authorized persons, including but not limited to physicians, residents, nurse practitioners, physicians' assistants, specialists, consultants, student nurses, and independently contracted physicians, who are called upon by the physician in charge, to carry out the diagnostic procedures and medical or surgical treatment.     I hereby authorize [facility] to retain or dispose of any specimens or tissue, should there be such remaining from any test or procedure.     I hereby authorize and give consent for [facility] providers and employees to take photographs, images or videotapes of such diagnostic, surgical or treatment procedures of Patient as may be required by [facility] or as may be ordered by a physician. I further acknowledge and agree that [facility] may use cameras or other devices for patient monitoring.     I am aware that the practice of medicine is not an exact science, and I acknowledge that no guarantees have been made to me as to the outcome of any tests, procedures or treatment.     Authorization for Release of Information: I understand that my insurance company and/or their agents may need information necessary to make determinations about payment/reimbursement. I hereby provide authorization to release to all insurance companies, their successors, assignees, other parties with whom they may have contracted, or others acting on their behalf, that are involved with payment for any hospital and/or clinic charges incurred by the patient, any information that they request and deem necessary for payment/reimbursement, and/or quality review.  I further authorize the release of my health information to physicians or other health care practitioners on staff who are involved in my health care now and in the future, and to other health care providers, entities, or institutions for the purpose of my continued care and treatment, including referrals.     REGISTRATION AUTHORIZATION  Form No. 44515 (Rev.  7/20/2020)       Medicare Patient's Certification and Authorization to Release Information and Payment Request:  I certify that the information given by me in applying for payment under Title XVIII of the Social Security Act is correct. I authorize any nickerson of medical or other information about me to release to the Social SecurityAdministration, or its intermediaries or carriers, any information needed for this or a related Medicare claim. I request that payment of authorized benefits be made on my behalf.     Assignment of Insurance Benefits:   I hereby authorize any and all insurance companies, health plans, defined   benefit plans, health insurers or any entity that is or may be responsible for payment of my medical expenses to pay all hospital and medical benefits now due, and to become due and payable to me under any hospital benefits, sick benefits, injury benefits or any other benefit for services rendered to me, including Major Medical Benefits, direct to [facility] and all independently contracted physicians. I assign any and all rights that I may have against any and all insurance companies, health plans, defined benefit plans, health insurers or any entity that is or may be responsible for payment of my medical expenses, including, but not limited to any right to appeal a denial of a claim, any right to bring any action, lawsuit, administrative proceeding, or other cause of action on my behalf. I specifically assign my right to pursue litigation against any and all insurance companies, health plans, defined benefit plans, health insurers or any entity that is or may be responsible for payment of my medical expenses based upon a refusal to pay charges.            E. Valuables: It is understood and agreed that [facility] is not liable for the damage to or loss of any money, jewelry,   documents, dentures, eye glasses, hearing aids, prosthetics, or other property of value.     F. Computer Equipment: I understand  and agree that should I choose to use computer equipment owned by [facility] or if I choose to access the Internet via [facility]s network, I do so at my own risk. [facility] is not responsible for any damage to my computer equipment or to any damages of any type that might arise from my loss of equipment or data.     G. Acceptance of Financial Responsibility:  I agree that in consideration of the services and   supplies that have been   or will be furnished to the patient, I am hereby obligated to pay all charges made for or on the account of the patient according to the standard rates (in effect at the time the services and supplies are delivered) established by [facility], including its Patient Financial Assistance Policy to the extent it is applicable. I understand that I am responsible for all charges, or portions thereof, not covered by insurance or other sources. Patient refunds will be distributed only after balances at all [facility] facilities are paid.     H. Communication Authorization:  I hereby authorize [facility] and its representatives, along with any billing service   or  who may work on their behalf, to contact me on   my cell phone and/or home phone using pre- recorded messages, artificial voice messages, automatic telephone dialing devices or other computer assisted technology, or by electronic        mail, text messaging, or by any other form of electronic communication. This includes, but is not limited to, appointment reminders, yearly physical exam reminders, preventive care reminders, patient campaigns, welcome calls, and calls about account balances on my account or any account on which I am listed as a guarantor. I understand I have the right to opt out of these communications at any time.      Relationship  Between  Facility and  Provider:      I understand that some, but not all, providers furnishing services to the patient are not employees or agents of [facility]. The patient is under the  care and supervision of his/her attending physician, and it is the responsibility of the facility and its nursing staff to carry out the instructions of such physicians. It is the responsibility of the patient's physician/designee to obtain the patient's informed consent, when required, for medical or surgical treatment, special diagnostic or therapeutic procedures, or hospital services rendered for the patient under the special instructions of the physician/designee.     REGISTRATION AUTHORIZATION  Form No. 66955 (Rev. 7/20/2020)      Notice of Privacy Practices: I acknowledge I have received a copy of [facility]'s Notice of Privacy Practices.     Facility  Directory: I have discussed with the organization my desire to be either included or excluded  in the facility directory in the event of my being an inpatient at an [facility] facility. I understand that if my choice is to opt-out of being identified in the facility directory that the facility will not provide any information about me such as my condition (e.g. fair, stable, etc.) or my location in the facility (e.g., room number, department).     TERM: This authorization is valid for this and subsequent care/treatment I receive at [facility] and will remain valid unless/until revoked in writing by me.     [facility]: As used in this document, [facility] or [facility] mean all [facility] affiliated entities including all health centers, surgery centers, clinics, and hospitals. It includes more specifically, the following entities: [facility], a not for profit [facility], and its subsidiaries and affiliates, including [facility], [facility], L[facility]LM[facility]issaC., [facility], [facility], [facility]., [facility] d/b/a [facility], LMelissaLMelissaC. d/b/a  [facility] [employer], [facility] as manager of [place], [facility], [employer], L[facility] d/b/a [facility], [facility], [facility], [facility] and [facility] as manager of [facility], [employer]), and [facility] [employer].        [facility] complies with applicable Federal civil rights laws and does not discriminate on the basis of race, color, national origin, age, disability, or sex.  ATENCIÓN: si habla sharonda, tiene a baird disposición servicios gratuitos de asistencia lingüística. Jenni mesa [phone].  CHÚ Ý: N?u b?n nói Ti?ng Vi?t, có các d?ch v? h? tr? ngôn ng? mi?n phí dành cho b?n. G?i s? [phone].     REGISTRATION AUTHORIZATION  Form No. 78261 (Rev. 7/20/2020)

## 2023-03-24 ENCOUNTER — PATIENT OUTREACH (OUTPATIENT)
Dept: ADMINISTRATIVE | Facility: HOSPITAL | Age: 58
End: 2023-03-24
Payer: MEDICARE

## 2023-04-11 ENCOUNTER — PATIENT MESSAGE (OUTPATIENT)
Dept: ADMINISTRATIVE | Facility: HOSPITAL | Age: 58
End: 2023-04-11
Payer: MEDICARE

## 2023-04-11 PROBLEM — H25.13 AGE-RELATED NUCLEAR CATARACT OF BOTH EYES: Status: ACTIVE | Noted: 2023-04-11

## 2023-04-11 PROBLEM — H04.123 DRY EYE SYNDROME OF BOTH EYES: Status: ACTIVE | Noted: 2023-04-11

## 2023-04-11 PROBLEM — H40.1131 PRIMARY OPEN ANGLE GLAUCOMA (POAG) OF BOTH EYES, MILD STAGE: Status: ACTIVE | Noted: 2023-04-11

## 2023-04-24 ENCOUNTER — OFFICE VISIT (OUTPATIENT)
Dept: PODIATRY | Facility: CLINIC | Age: 58
End: 2023-04-24
Payer: MEDICARE

## 2023-04-24 DIAGNOSIS — L84 CORN OR CALLUS: ICD-10-CM

## 2023-04-24 DIAGNOSIS — B35.1 ONYCHOMYCOSIS DUE TO DERMATOPHYTE: ICD-10-CM

## 2023-04-24 DIAGNOSIS — E11.51 TYPE II DIABETES MELLITUS WITH PERIPHERAL CIRCULATORY DISORDER: ICD-10-CM

## 2023-04-24 DIAGNOSIS — E11.42 TYPE 2 DIABETES MELLITUS WITH DIABETIC POLYNEUROPATHY, WITHOUT LONG-TERM CURRENT USE OF INSULIN: Primary | ICD-10-CM

## 2023-04-24 PROCEDURE — 99499 NO LOS: ICD-10-PCS | Mod: S$GLB,,, | Performed by: PODIATRIST

## 2023-04-24 PROCEDURE — 11721 PR DEBRIDEMENT OF NAILS, 6 OR MORE: ICD-10-PCS | Mod: 59,Q9,S$GLB, | Performed by: PODIATRIST

## 2023-04-24 PROCEDURE — 11056 PARNG/CUTG B9 HYPRKR LES 2-4: CPT | Mod: Q9,S$GLB,, | Performed by: PODIATRIST

## 2023-04-24 PROCEDURE — 1160F PR REVIEW ALL MEDS BY PRESCRIBER/CLIN PHARMACIST DOCUMENTED: ICD-10-PCS | Mod: CPTII,S$GLB,, | Performed by: PODIATRIST

## 2023-04-24 PROCEDURE — 4010F PR ACE/ARB THEARPY RXD/TAKEN: ICD-10-PCS | Mod: CPTII,S$GLB,, | Performed by: PODIATRIST

## 2023-04-24 PROCEDURE — 1160F RVW MEDS BY RX/DR IN RCRD: CPT | Mod: CPTII,S$GLB,, | Performed by: PODIATRIST

## 2023-04-24 PROCEDURE — 11056 PR TRIM BENIGN HYPERKERATOTIC SKIN LESION,2-4: ICD-10-PCS | Mod: Q9,S$GLB,, | Performed by: PODIATRIST

## 2023-04-24 PROCEDURE — 4010F ACE/ARB THERAPY RXD/TAKEN: CPT | Mod: CPTII,S$GLB,, | Performed by: PODIATRIST

## 2023-04-24 PROCEDURE — 99499 UNLISTED E&M SERVICE: CPT | Mod: S$GLB,,, | Performed by: PODIATRIST

## 2023-04-24 PROCEDURE — 1159F MED LIST DOCD IN RCRD: CPT | Mod: CPTII,S$GLB,, | Performed by: PODIATRIST

## 2023-04-24 PROCEDURE — 11721 DEBRIDE NAIL 6 OR MORE: CPT | Mod: 59,Q9,S$GLB, | Performed by: PODIATRIST

## 2023-04-24 PROCEDURE — 99999 PR PBB SHADOW E&M-EST. PATIENT-LVL III: CPT | Mod: PBBFAC,,, | Performed by: PODIATRIST

## 2023-04-24 PROCEDURE — 1159F PR MEDICATION LIST DOCUMENTED IN MEDICAL RECORD: ICD-10-PCS | Mod: CPTII,S$GLB,, | Performed by: PODIATRIST

## 2023-04-24 PROCEDURE — 99999 PR PBB SHADOW E&M-EST. PATIENT-LVL III: ICD-10-PCS | Mod: PBBFAC,,, | Performed by: PODIATRIST

## 2023-04-24 NOTE — PROGRESS NOTES
Subjective:      Patient ID: Amena Anderson is a 57 y.o. female.    Chief Complaint: Nail Care    Patient returns for follow up  thick and discolored nails for high risk foot care. She is high risk secondary to the diabetes with PVD. No acute changes since her last visit to her feet. Doing well overall, no acute changes doing well overall.     PCP: Dr. Miller  Last seen: 11/15/22    Review of Systems   Constitutional: Negative for chills, diaphoresis, fever, malaise/fatigue and night sweats.   Cardiovascular:  Positive for leg swelling. Negative for claudication, cyanosis and syncope.   Skin:  Negative for color change, dry skin, nail changes, rash, suspicious lesions and unusual hair distribution.   Musculoskeletal:  Positive for joint pain. Negative for falls, joint swelling, muscle cramps, muscle weakness and stiffness.   Gastrointestinal:  Negative for constipation, diarrhea, nausea and vomiting.   Neurological:  Positive for paresthesias. Negative for brief paralysis, disturbances in coordination, focal weakness, numbness, sensory change and tremors.         Objective:      Physical Exam  Constitutional:       General: She is not in acute distress.     Appearance: She is well-developed. She is not diaphoretic.   Cardiovascular:      Pulses:           Dorsalis pedis pulses are 2+ on the right side and 2+ on the left side.        Posterior tibial pulses are 2+ on the right side and 2+ on the left side.      Comments: Capillary refill 3 seconds all toes/distal feet, all toes/both feet warm to touch.      Positive lower extremity edema bilateral.    Musculoskeletal:      Right ankle: Normal. No swelling, deformity, ecchymosis or lacerations. Normal range of motion. Normal pulse.      Right Achilles Tendon: Normal. No defects. De La Cruz's test negative.      Comments: Improving pain to palpation lateral left ankle at cfl and atfl without instability deformity or loss of function. Also has pain along the peroneal  tendons to the brevis insertion.    Ankle dorsiflexion decreased at <10 degrees bilateral with moderate increase with knee flexion bilateral. There is tenderness to palpation bilateral achilles attachments on the calcaneus.    Pain on palpation plantar medial and central heel right foot. No pain with ROM or MMT. No pain with medial and lateral compression of heel.     Feet:      Right foot:      Protective Sensation: 10 sites tested.  8 sites sensed.      Left foot:      Protective Sensation: 10 sites tested.  9 sites sensed.   Lymphadenopathy:      Comments: Negative lymphadenopathy bilateral popliteal fossa and tarsal tunnel.   Skin:     General: Skin is warm and dry.      Coloration: Skin is not pale.      Findings: Lesion present. No abrasion, bruising, burn, ecchymosis, erythema, laceration, petechiae or rash.      Nails: There is no clubbing.      Comments:   Dry scale with superficial flakes over an erythematous base more to the left foot without ulceration, drainage, pus, tracking, fluctuance, malodor, or cardinal signs infection.      Skin is thin and atrophic bilateral    Nails 1-5 bilateral are thick 3-4 mm, long 3-6 mm, and discolored with subungual debris    Hyperkeratotic lesions right plantar medial aspect first metatarsal bilateral          Neurological:      Mental Status: She is alert and oriented to person, place, and time.      Sensory: Sensory deficit present.      Motor: No tremor, atrophy, abnormal muscle tone or seizure activity.      Gait: Gait normal.      Comments: Negative tinel sign to percussion sural, superficial peroneal, deep peroneal, saphenous, and posterior tibial nerves right and left ankles and feet. Decreased vibratory sensation bilateral     Psychiatric:         Behavior: Behavior normal. Behavior is cooperative.             Assessment:       Encounter Diagnoses   Name Primary?    Type 2 diabetes mellitus with diabetic polyneuropathy, without long-term current use of insulin  Yes    Type II diabetes mellitus with peripheral circulatory disorder     Onychomycosis due to dermatophyte              Plan:       Amena was seen today for nail care.    Diagnoses and all orders for this visit:    Type 2 diabetes mellitus with diabetic polyneuropathy, without long-term current use of insulin    Type II diabetes mellitus with peripheral circulatory disorder    Onychomycosis due to dermatophyte          I counseled the patient on her conditions, their implications and medical management.    Shoe inspection. Diabetic Foot Education. Patient reminded of the importance of good nutrition and blood sugar control to help prevent podiatric complications of diabetes. Patient instructed on proper foot hygeine. We discussed wearing proper shoe gear, daily foot inspections, never walking without protective shoe gear, never putting sharp instruments to feet Discussed importance of supportive shoes with accommodative toe box to reduce pressure and irritation to forefoot.     With patient's verbal consent, nails were aggressively reduced and debrided x 10 to their soft tissue attachment mechanically removing all offending nail and debris. Patient relates relief following the procedure. No anesthesia or hemostasis required. No blood loss.     With patient's verbal consent the hyperkeratotic lesions x2  bilateral foot were trimmed to healthy appearing skin using a # 15 scalpel. Patient relates relief of pain following the procedure. Patient tolerated the procedure well without complications. No anesthesia or hemostasis required. No blood loss.      Discussed importance of healthy diet and weight loss as to her diabetes control and prevention of future pedal complications secondary to diabetes      Follow up in 3 months routine care.    Edson Martinez DPM

## 2023-04-27 DIAGNOSIS — E11.9 TYPE 2 DIABETES MELLITUS WITHOUT COMPLICATION: ICD-10-CM

## 2023-05-01 ENCOUNTER — PATIENT MESSAGE (OUTPATIENT)
Dept: ADMINISTRATIVE | Facility: HOSPITAL | Age: 58
End: 2023-05-01
Payer: MEDICARE

## 2023-05-15 ENCOUNTER — TELEPHONE (OUTPATIENT)
Dept: FAMILY MEDICINE | Facility: CLINIC | Age: 58
End: 2023-05-15
Payer: MEDICARE

## 2023-05-15 NOTE — TELEPHONE ENCOUNTER
----- Message from Maritza Riley sent at 5/15/2023  2:14 PM CDT -----  Contact: [phone]  Type: Needs Medical Advice  Who Called:  Pt     Best Call Back Number: [phone]    Additional Information: Pt stated she is logged into her virtual appt today and want to make sure that it is showing on your end.

## 2023-05-15 NOTE — TELEPHONE ENCOUNTER
Spoke with Patient, she was having difficulty with Virtual Visit, rescheduled appointment and sent portal message with VV info.

## 2023-05-16 ENCOUNTER — OFFICE VISIT (OUTPATIENT)
Dept: FAMILY MEDICINE | Facility: CLINIC | Age: 58
End: 2023-05-16
Payer: MEDICARE

## 2023-05-16 DIAGNOSIS — E66.01 CLASS 3 SEVERE OBESITY DUE TO EXCESS CALORIES WITH SERIOUS COMORBIDITY AND BODY MASS INDEX (BMI) OF 50.0 TO 59.9 IN ADULT: ICD-10-CM

## 2023-05-16 DIAGNOSIS — R11.0 NAUSEA: Primary | ICD-10-CM

## 2023-05-16 PROCEDURE — 99443 PR PHYSICIAN TELEPHONE EVALUATION 21-30 MIN: ICD-10-PCS | Mod: 95,,, | Performed by: STUDENT IN AN ORGANIZED HEALTH CARE EDUCATION/TRAINING PROGRAM

## 2023-05-16 PROCEDURE — 4010F PR ACE/ARB THEARPY RXD/TAKEN: ICD-10-PCS | Mod: CPTII,95,, | Performed by: STUDENT IN AN ORGANIZED HEALTH CARE EDUCATION/TRAINING PROGRAM

## 2023-05-16 PROCEDURE — 4010F ACE/ARB THERAPY RXD/TAKEN: CPT | Mod: CPTII,95,, | Performed by: STUDENT IN AN ORGANIZED HEALTH CARE EDUCATION/TRAINING PROGRAM

## 2023-05-16 PROCEDURE — 99443 PR PHYSICIAN TELEPHONE EVALUATION 21-30 MIN: CPT | Mod: 95,,, | Performed by: STUDENT IN AN ORGANIZED HEALTH CARE EDUCATION/TRAINING PROGRAM

## 2023-05-16 RX ORDER — ONDANSETRON 4 MG/1
4 TABLET, ORALLY DISINTEGRATING ORAL EVERY 8 HOURS PRN
Qty: 30 TABLET | Refills: 2 | Status: SHIPPED | OUTPATIENT
Start: 2023-05-16

## 2023-05-16 NOTE — PROGRESS NOTES
The patient location is: [place]  The chief complaint leading to consultation is: Regular follow up    Visit type: audio only    Notes:    HPI  Patient presents for regular follow up. There were issues with the video portal so the visit was converted to a phone interview.    Patient has been experiencing intermittent nausea and was requesting nausea medication.    She recently had an issue with her CPAP supplies. Had been without one for a few weeks as a result but she now has a replacement and is sleeping well.    She is currently working with her ophthalmologist to workup her vision changes and see about any necessary procedures.    She is scheduled to see her pain management doctor on 5/18. She is hopeful they can provide a steroid injection.    She weighs about 316 lbs at this time. Working with her daughter to include more vegetables in their diet for further weight loss.     Review of Systems   Constitutional:  Negative for activity change and unexpected weight change.   HENT:  Negative for hearing loss, rhinorrhea and trouble swallowing.    Eyes:  Positive for visual disturbance. Negative for discharge.   Respiratory:  Negative for chest tightness and wheezing.    Cardiovascular:  Negative for chest pain and palpitations.   Gastrointestinal:  Negative for blood in stool, constipation, diarrhea and vomiting.   Endocrine: Positive for polydipsia. Negative for polyuria.   Genitourinary:  Negative for difficulty urinating, dysuria, hematuria and menstrual problem.   Musculoskeletal:  Positive for arthralgias and joint swelling.   Neurological:  Negative for headaches.   Psychiatric/Behavioral:  Negative for dysphoric mood.      Objective:  Physical Exam  Psychiatric:         Mood and Affect: Mood normal.         Behavior: Behavior normal.         Thought Content: Thought content normal.         Judgment: Judgment normal.       Plan:  1. Nausea  -     ondansetron (ZOFRAN-ODT) 4 MG TbDL; Take 1 tablet (4 mg  total) by mouth every 8 (eight) hours as needed (nausea).  Dispense: 30 tablet; Refill: 2    2. Class 3 severe obesity due to excess calories with serious comorbidity and body mass index (BMI) of 50.0 to 59.9 in adult  Assessment & Plan:  Stable. Encouraged current approach to diet. Discussed the overall goal of calorie restriction.            Face to Face time with patient: 21 minutes  25 minutes of total time spent on the encounter, which includes face to face time and non-face to face time preparing to see the patient (eg, review of tests), Obtaining and/or reviewing separately obtained history, Documenting clinical information in the electronic or other health record, Independently interpreting results (not separately reported) and communicating results to the patient/family/caregiver, or Care coordination (not separately reported).     Each patient to whom he or she provides medical services by telemedicine is:  (1) informed of the relationship between the physician and patient and the respective role of any other health care provider with respect to management of the patient; and (2) notified that he or she may decline to receive medical services by telemedicine and may withdraw from such care at any time.

## 2023-07-13 ENCOUNTER — PATIENT MESSAGE (OUTPATIENT)
Dept: ADMINISTRATIVE | Facility: HOSPITAL | Age: 58
End: 2023-07-13
Payer: MEDICARE

## 2023-07-18 RX ORDER — ESTRADIOL 1 MG/1
TABLET ORAL
Qty: 90 TABLET | Refills: 1 | Status: SHIPPED | OUTPATIENT
Start: 2023-07-18 | End: 2024-01-16 | Stop reason: SDUPTHER

## 2023-07-24 ENCOUNTER — TELEPHONE (OUTPATIENT)
Dept: GASTROENTEROLOGY | Facility: CLINIC | Age: 58
End: 2023-07-24
Payer: MEDICARE

## 2023-07-24 ENCOUNTER — OFFICE VISIT (OUTPATIENT)
Dept: PODIATRY | Facility: CLINIC | Age: 58
End: 2023-07-24
Payer: MEDICARE

## 2023-07-24 VITALS — BODY MASS INDEX: 50.02 KG/M2 | HEIGHT: 64 IN | WEIGHT: 293 LBS

## 2023-07-24 DIAGNOSIS — E11.42 TYPE 2 DIABETES MELLITUS WITH DIABETIC POLYNEUROPATHY, WITHOUT LONG-TERM CURRENT USE OF INSULIN: Primary | ICD-10-CM

## 2023-07-24 DIAGNOSIS — B35.1 ONYCHOMYCOSIS DUE TO DERMATOPHYTE: ICD-10-CM

## 2023-07-24 DIAGNOSIS — L84 CORN OR CALLUS: ICD-10-CM

## 2023-07-24 DIAGNOSIS — E11.51 TYPE II DIABETES MELLITUS WITH PERIPHERAL CIRCULATORY DISORDER: ICD-10-CM

## 2023-07-24 PROCEDURE — 11056 PARNG/CUTG B9 HYPRKR LES 2-4: CPT | Mod: Q9,S$GLB,, | Performed by: PODIATRIST

## 2023-07-24 PROCEDURE — 11721 PR DEBRIDEMENT OF NAILS, 6 OR MORE: ICD-10-PCS | Mod: 59,Q9,S$GLB, | Performed by: PODIATRIST

## 2023-07-24 PROCEDURE — 99499 NO LOS: ICD-10-PCS | Mod: S$GLB,,, | Performed by: PODIATRIST

## 2023-07-24 PROCEDURE — 99999 PR PBB SHADOW E&M-EST. PATIENT-LVL II: CPT | Mod: PBBFAC,,, | Performed by: PODIATRIST

## 2023-07-24 PROCEDURE — 99499 UNLISTED E&M SERVICE: CPT | Mod: S$GLB,,, | Performed by: PODIATRIST

## 2023-07-24 PROCEDURE — 99999 PR PBB SHADOW E&M-EST. PATIENT-LVL II: ICD-10-PCS | Mod: PBBFAC,,, | Performed by: PODIATRIST

## 2023-07-24 PROCEDURE — 11721 DEBRIDE NAIL 6 OR MORE: CPT | Mod: 59,Q9,S$GLB, | Performed by: PODIATRIST

## 2023-07-24 PROCEDURE — 11056 PR TRIM BENIGN HYPERKERATOTIC SKIN LESION,2-4: ICD-10-PCS | Mod: Q9,S$GLB,, | Performed by: PODIATRIST

## 2023-07-24 RX ORDER — NAPROXEN 500 MG/1
500 TABLET ORAL 2 TIMES DAILY
COMMUNITY
Start: 2023-07-20 | End: 2023-11-22

## 2023-07-24 RX ORDER — CYCLOSPORINE 0.5 MG/ML
EMULSION OPHTHALMIC
COMMUNITY
Start: 2023-07-17

## 2023-07-24 NOTE — PROGRESS NOTES
Subjective:      Patient ID: Amena Anderson is a 57 y.o. female.    Chief Complaint: Diabetic Foot Exam (PCP 5/16/23) and Nail Care    Patient returns for follow up  thick and discolored nails for high risk foot care. She is high risk secondary to the diabetes with PVD. No acute changes since her last visit to her feet. Doing well overall, no acute changes doing well overall.     PCP: Dr. Miller  Last seen: 11/15/22    Review of Systems   Constitutional: Negative for chills, diaphoresis, fever, malaise/fatigue and night sweats.   Cardiovascular:  Positive for leg swelling. Negative for claudication, cyanosis and syncope.   Skin:  Negative for color change, dry skin, nail changes, rash, suspicious lesions and unusual hair distribution.   Musculoskeletal:  Positive for joint pain. Negative for falls, joint swelling, muscle cramps, muscle weakness and stiffness.   Gastrointestinal:  Negative for constipation, diarrhea, nausea and vomiting.   Neurological:  Positive for paresthesias. Negative for brief paralysis, disturbances in coordination, focal weakness, numbness, sensory change and tremors.         Objective:      Physical Exam  Constitutional:       General: She is not in acute distress.     Appearance: She is well-developed. She is not diaphoretic.   Cardiovascular:      Pulses:           Dorsalis pedis pulses are 2+ on the right side and 2+ on the left side.        Posterior tibial pulses are 2+ on the right side and 2+ on the left side.      Comments: Capillary refill 3 seconds all toes/distal feet, all toes/both feet warm to touch.      Positive lower extremity edema bilateral.    Musculoskeletal:      Right ankle: Normal. No swelling, deformity, ecchymosis or lacerations. Normal range of motion. Normal pulse.      Right Achilles Tendon: Normal. No defects. De La Cruz's test negative.      Comments: Improving pain to palpation lateral left ankle at cfl and atfl without instability deformity or loss of function.  Also has pain along the peroneal tendons to the brevis insertion.    Ankle dorsiflexion decreased at <10 degrees bilateral with moderate increase with knee flexion bilateral. There is tenderness to palpation bilateral achilles attachments on the calcaneus.    Pain on palpation plantar medial and central heel right foot. No pain with ROM or MMT. No pain with medial and lateral compression of heel.     Feet:      Right foot:      Protective Sensation: 10 sites tested.  8 sites sensed.      Left foot:      Protective Sensation: 10 sites tested.  9 sites sensed.   Lymphadenopathy:      Comments: Negative lymphadenopathy bilateral popliteal fossa and tarsal tunnel.   Skin:     General: Skin is warm and dry.      Coloration: Skin is not pale.      Findings: Lesion present. No abrasion, bruising, burn, ecchymosis, erythema, laceration, petechiae or rash.      Nails: There is no clubbing.      Comments:   Dry scale with superficial flakes over an erythematous base more to the left foot without ulceration, drainage, pus, tracking, fluctuance, malodor, or cardinal signs infection.      Skin is thin and atrophic bilateral    Nails 1-5 bilateral are thick 3-4 mm, long 3-6 mm, and discolored with subungual debris    Hyperkeratotic lesions right plantar medial aspect first metatarsal bilateral          Neurological:      Mental Status: She is alert and oriented to person, place, and time.      Sensory: Sensory deficit present.      Motor: No tremor, atrophy, abnormal muscle tone or seizure activity.      Gait: Gait normal.      Comments: Negative tinel sign to percussion sural, superficial peroneal, deep peroneal, saphenous, and posterior tibial nerves right and left ankles and feet. Decreased vibratory sensation bilateral     Psychiatric:         Behavior: Behavior normal. Behavior is cooperative.             Assessment:       No diagnosis found.            Plan:       There are no diagnoses linked to this encounter.        I  counseled the patient on her conditions, their implications and medical management.    Shoe inspection. Diabetic Foot Education. Patient reminded of the importance of good nutrition and blood sugar control to help prevent podiatric complications of diabetes. Patient instructed on proper foot hygeine. We discussed wearing proper shoe gear, daily foot inspections, never walking without protective shoe gear, never putting sharp instruments to feet Discussed importance of supportive shoes with accommodative toe box to reduce pressure and irritation to forefoot.     With patient's verbal consent, nails were aggressively reduced and debrided x 10 to their soft tissue attachment mechanically removing all offending nail and debris. Patient relates relief following the procedure. No anesthesia or hemostasis required. No blood loss.     With patient's verbal consent the hyperkeratotic lesions x2  bilateral foot were trimmed to healthy appearing skin using a # 15 scalpel. Patient relates relief of pain following the procedure. Patient tolerated the procedure well without complications. No anesthesia or hemostasis required. No blood loss.    Prescribed CMPD Voriconazole 2.67%, Vancomycin 6.67% Nail Suspension in DMSO, apply twice daily to affected nails     Discussed importance of healthy diet and weight loss as to her diabetes control and prevention of future pedal complications secondary to diabetes      Follow up in 3 months routine care.    Edson Martinez DPM

## 2023-07-24 NOTE — TELEPHONE ENCOUNTER
----- Message from Cady Uribe sent at 7/24/2023 12:33 PM CDT -----  Regarding: Colonscopy  Type:  Needs Medical Advice    Who Called: Pt      Would the patient rather a call back or a response via [facility]? Callback    Best Call Back Number: [phone]    Additional Information: Pt sts she think she should be having a colonoscopy but not sure when and time. She would like someone to call to clarify this information because she is not sure if she is right about this. Please advise

## 2023-07-24 NOTE — TELEPHONE ENCOUNTER
Returned call to patient, patient advised that her next colonoscopy is due in 2027, Patient states that she is having trouble swallowing and spitting up, An appointment was set up for the patient, patient verbalized understanding of this.

## 2023-08-17 ENCOUNTER — OFFICE VISIT (OUTPATIENT)
Dept: GASTROENTEROLOGY | Facility: CLINIC | Age: 58
End: 2023-08-17
Payer: MEDICARE

## 2023-08-17 VITALS — BODY MASS INDEX: 50.02 KG/M2 | WEIGHT: 293 LBS | HEIGHT: 64 IN

## 2023-08-17 DIAGNOSIS — Z79.899 LONG-TERM CURRENT USE OF PROTON PUMP INHIBITOR THERAPY: ICD-10-CM

## 2023-08-17 DIAGNOSIS — K21.9 GASTROESOPHAGEAL REFLUX DISEASE, UNSPECIFIED WHETHER ESOPHAGITIS PRESENT: ICD-10-CM

## 2023-08-17 DIAGNOSIS — R14.2 BELCHING: ICD-10-CM

## 2023-08-17 DIAGNOSIS — R11.0 NAUSEA: ICD-10-CM

## 2023-08-17 DIAGNOSIS — R10.13 EPIGASTRIC PAIN: Primary | ICD-10-CM

## 2023-08-17 DIAGNOSIS — Z87.19 HISTORY OF GASTRITIS: ICD-10-CM

## 2023-08-17 DIAGNOSIS — Z12.11 SCREENING FOR COLON CANCER: ICD-10-CM

## 2023-08-17 DIAGNOSIS — K76.0 FATTY LIVER: ICD-10-CM

## 2023-08-17 PROCEDURE — 1159F MED LIST DOCD IN RCRD: CPT | Mod: CPTII,S$GLB,,

## 2023-08-17 PROCEDURE — 1160F PR REVIEW ALL MEDS BY PRESCRIBER/CLIN PHARMACIST DOCUMENTED: ICD-10-PCS | Mod: CPTII,S$GLB,,

## 2023-08-17 PROCEDURE — 3008F BODY MASS INDEX DOCD: CPT | Mod: CPTII,S$GLB,,

## 2023-08-17 PROCEDURE — 1159F PR MEDICATION LIST DOCUMENTED IN MEDICAL RECORD: ICD-10-PCS | Mod: CPTII,S$GLB,,

## 2023-08-17 PROCEDURE — 1160F RVW MEDS BY RX/DR IN RCRD: CPT | Mod: CPTII,S$GLB,,

## 2023-08-17 PROCEDURE — 99214 PR OFFICE/OUTPT VISIT, EST, LEVL IV, 30-39 MIN: ICD-10-PCS | Mod: S$GLB,,,

## 2023-08-17 PROCEDURE — 4010F PR ACE/ARB THEARPY RXD/TAKEN: ICD-10-PCS | Mod: CPTII,S$GLB,,

## 2023-08-17 PROCEDURE — 99999 PR PBB SHADOW E&M-EST. PATIENT-LVL III: ICD-10-PCS | Mod: PBBFAC,,,

## 2023-08-17 PROCEDURE — 3008F PR BODY MASS INDEX (BMI) DOCUMENTED: ICD-10-PCS | Mod: CPTII,S$GLB,,

## 2023-08-17 PROCEDURE — 99999 PR PBB SHADOW E&M-EST. PATIENT-LVL III: CPT | Mod: PBBFAC,,,

## 2023-08-17 PROCEDURE — 4010F ACE/ARB THERAPY RXD/TAKEN: CPT | Mod: CPTII,S$GLB,,

## 2023-08-17 PROCEDURE — 99214 OFFICE O/P EST MOD 30 MIN: CPT | Mod: S$GLB,,,

## 2023-08-17 RX ORDER — SUCRALFATE 1 G/10ML
1 SUSPENSION ORAL 4 TIMES DAILY
Qty: 400 ML | Refills: 0 | Status: SHIPPED | OUTPATIENT
Start: 2023-08-17 | End: 2023-08-27

## 2023-08-17 NOTE — PROGRESS NOTES
"Subjective:       Patient ID: Amena Anderson is a 57 y.o. female Body mass index is 53.7 kg/m².    Chief Complaint: Abdominal Pain    This patient is new to me.  Established patient of Dr. Joreg Irving.     Abdominal Pain  This is a chronic problem. Episode onset: started 3 months ago. The onset quality is gradual. The problem occurs intermittently. Duration: lasts hours when present. The problem has been gradually worsening. The pain is located in the epigastric region. The pain is at a severity of 6/10. The pain is moderate. Quality: stabbing and a "hurt" pain. The abdominal pain does not radiate. Associated symptoms include belching and nausea (Improved; currently taking Zofran p.r.n. with relief). Pertinent negatives include no constipation, diarrhea (Typically has 2 BMs daily; she reports not looking at stool after BMs, but denies constipation or diarrhea), dysuria, fever, flatus, frequency, headaches, hematochezia, hematuria, melena, vomiting or weight loss. Associated symptoms comments: EGD 07/08/22 - Normal oropharynx. Normal cricopharyngeus.Normal esophagus. Z-line regular, 39 cm from the incisors. Patulous lower esophageal sphincter (NERD?). Normal stomach. Normal antrum. Biopsied. Normal pylorus. Normal examined duodenum. Normal major papilla. No endoscopic esophageal abnormality to explain patient's dysphagia. Esophagus dilated, 54 Fr. Non-erosive esophageal reflux (NERD) disease present; patho: benign, no bacteria. The pain is aggravated by eating. The pain is relieved by Nothing. She has tried proton pump inhibitors for the symptoms. The treatment provided no relief. Prior diagnostic workup includes upper endoscopy. Her past medical history is significant for GERD (Reports heartburn is well-controlled taking Prilosec 40 mg once daily). There is no history of abdominal surgery, colon cancer, Crohn's disease, gallstones, irritable bowel syndrome, pancreatitis, PUD or ulcerative colitis. Patient's " medical history does not include kidney stones and UTI.     Review of Systems   Constitutional:  Negative for activity change, appetite change, chills, diaphoresis, fatigue, fever, unexpected weight change and weight loss.   HENT:  Negative for sore throat and trouble swallowing (denies dysphagia; resolved after EGD with dilation).    Respiratory:  Negative for cough, choking and shortness of breath.    Cardiovascular:  Negative for chest pain.   Gastrointestinal:  Positive for abdominal pain and nausea (Improved; currently taking Zofran p.r.n. with relief). Negative for abdominal distention, anal bleeding, blood in stool, constipation, diarrhea (Typically has 2 BMs daily; she reports not looking at stool after BMs, but denies constipation or diarrhea), flatus, hematochezia, melena, rectal pain and vomiting.   Genitourinary:  Negative for dysuria, frequency and hematuria.   Neurological:  Negative for headaches.       No LMP recorded. Patient has had a hysterectomy.  Past Medical History:   Diagnosis Date    AC (acromioclavicular) joint bone spurs, unspecified laterality     bilateral    Dysphagia     H. pylori infection     Insomnia      Past Surgical History:   Procedure Laterality Date     SECTION      Three times    COLONOSCOPY  2017    Dr. Benitez, repeat in 10 years    ESOPHAGEAL DILATION N/A 2022    Procedure: DILATION, ESOPHAGUS;  Surgeon: Randy Benitez Jr., MD;  Location: [facility];  Service: Endoscopy;  Laterality: N/A;    ESOPHAGOGASTRODUODENOSCOPY N/A 2022    Procedure: EGD (ESOPHAGOGASTRODUODENOSCOPY);  Surgeon: Randy Benitez Jr., MD;  Location: [facility];  Service: Endoscopy;  Laterality: N/A;    HYSTERECTOMY      total    LASIK      LUMBAR EPIDURAL INJECTION      OOPHORECTOMY      THYROIDECTOMY, PARTIAL      UPPER GASTROINTESTINAL ENDOSCOPY  2017    Dr. Benitez     Family History   Problem Relation Age of Onset    Throat cancer Maternal Uncle     Breast cancer Other 35     Kidney disease Mother     Hypertension Father     Kidney disease Father     Heart disease Father     Alzheimer's disease Father     Breast cancer Cousin     Colon cancer Neg Hx     Colon polyps Neg Hx     Crohn's disease Neg Hx     Ulcerative colitis Neg Hx     Stomach cancer Neg Hx     Esophageal cancer Neg Hx     Ovarian cancer Neg Hx      Social History     Tobacco Use    Smoking status: Never    Smokeless tobacco: Never   Substance Use Topics    Alcohol use: No    Drug use: Yes     Types: Hydrocodone     Wt Readings from Last 10 Encounters:   08/17/23 (!) 141.9 kg (312 lb 13.3 oz)   07/24/23 (!) 143.3 kg (316 lb)   02/03/23 (!) 143.7 kg (316 lb 12.8 oz)   01/24/23 (!) 143.3 kg (315 lb 14.7 oz)   11/03/22 (!) 143.3 kg (315 lb 14.7 oz)   09/01/22 (!) 140.8 kg (310 lb 8 oz)   08/12/22 (!) 143.1 kg (315 lb 7.7 oz)   07/08/22 (!) 144.7 kg (319 lb 0.1 oz)   06/17/22 (!) 147.4 kg (325 lb)   06/13/22 (!) 147.7 kg (325 lb 9.9 oz)     Lab Results   Component Value Date    WBC 8.15 06/17/2022    HGB 13.1 06/17/2022    HCT 41.0 06/17/2022    MCV 85 06/17/2022     06/17/2022     CMP  Sodium   Date Value Ref Range Status   10/14/2022 141 136 - 145 mmol/L Final     Potassium   Date Value Ref Range Status   10/14/2022 4.0 3.5 - 5.1 mmol/L Final     Chloride   Date Value Ref Range Status   10/14/2022 105 95 - 110 mmol/L Final     CO2   Date Value Ref Range Status   10/14/2022 29 23 - 29 mmol/L Final     Glucose   Date Value Ref Range Status   10/14/2022 108 70 - 110 mg/dL Final     BUN   Date Value Ref Range Status   10/14/2022 8 6 - 20 mg/dL Final     Creatinine   Date Value Ref Range Status   10/14/2022 0.7 0.5 - 1.4 mg/dL Final     Calcium   Date Value Ref Range Status   10/14/2022 8.8 8.7 - 10.5 mg/dL Final     Total Protein   Date Value Ref Range Status   10/14/2022 6.9 6.0 - 8.4 g/dL Final     Albumin   Date Value Ref Range Status   10/14/2022 3.4 (L) 3.5 - 5.2 g/dL Final     Total Bilirubin   Date Value Ref Range  Status   10/14/2022 0.4 0.1 - 1.0 mg/dL Final     Comment:     For infants and newborns, interpretation of results should be based  on gestational age, weight and in agreement with clinical  observations.    Premature Infant recommended reference ranges:  Up to 24 hours.............<8.0 mg/dL  Up to 48 hours............<12.0 mg/dL  3-5 days..................<15.0 mg/dL  6-29 days.................<15.0 mg/dL       Alkaline Phosphatase   Date Value Ref Range Status   10/14/2022 66 55 - 135 U/L Final     AST   Date Value Ref Range Status   10/14/2022 17 10 - 40 U/L Final     ALT   Date Value Ref Range Status   10/14/2022 14 10 - 44 U/L Final     Anion Gap   Date Value Ref Range Status   10/14/2022 7 (L) 8 - 16 mmol/L Final     eGFR if    Date Value Ref Range Status   06/17/2022 >60 >60 mL/min/1.73 m^2 Final     eGFR if non    Date Value Ref Range Status   06/17/2022 >60 >60 mL/min/1.73 m^2 Final     Comment:     Calculation used to obtain the estimated glomerular filtration  rate (eGFR) is the CKD-EPI equation.        Lab Results   Component Value Date    AMYLASE 88 02/05/2018     Lab Results   Component Value Date    LIPASE 14 06/13/2022     Lab Results   Component Value Date    LIPASERES 44 06/17/2022     Lab Results   Component Value Date    TSH 1.121 10/14/2022     Reviewed prior medical records including office visit with Candice Irving NP 08/12/2022 radiology report of MRI MRCP 03/12/2018, abdominal ultrasound 02/12/2018, KUB 02/05/2018, barium swallow 11/13/2017 & endoscopy history (see surgical history).    Objective:      Physical Exam  Vitals and nursing note reviewed.   Constitutional:       General: She is not in acute distress.     Appearance: Normal appearance. She is obese. She is not ill-appearing.   HENT:      Mouth/Throat:      Lips: Pink. No lesions.   Cardiovascular:      Rate and Rhythm: Normal rate.      Pulses: Normal pulses.      Heart sounds: Normal heart sounds.    Pulmonary:      Effort: Pulmonary effort is normal. No respiratory distress.      Breath sounds: Normal breath sounds.   Abdominal:      General: Abdomen is protuberant. Bowel sounds are normal. There is no distension or abdominal bruit. There are no signs of injury.      Palpations: Abdomen is soft. There is no shifting dullness, fluid wave, hepatomegaly, splenomegaly or mass.      Tenderness: There is abdominal tenderness in the epigastric area. There is no guarding or rebound. Negative signs include Kwon's sign, Rovsing's sign and McBurney's sign.   Skin:     General: Skin is warm and dry.      Coloration: Skin is not jaundiced or pale.   Neurological:      Mental Status: She is alert and oriented to person, place, and time.   Psychiatric:         Attention and Perception: Attention normal.         Mood and Affect: Mood normal.         Speech: Speech normal.         Behavior: Behavior normal.         Assessment:       1. Epigastric pain    2. History of gastritis    3. Gastroesophageal reflux disease, unspecified whether esophagitis present    4. Belching    5. Nausea    6. Long-term current use of proton pump inhibitor therapy    7. Fatty liver    8. Screening for colon cancer        Plan:       Epigastric pain & History of gastritis   -     CBC Without Differential; Future; Expected date: 08/17/2023  -     Comprehensive Metabolic Panel; Future; Expected date: 08/17/2023  -     Lipase; Future; Expected date: 08/17/2023  -     US Abdomen Limited (GALLBLADDER); Future; Expected date: 08/17/2023  -     H. pylori antigen, stool; Future; Expected date: 08/17/2023  -START:sucralfate (CARAFATE) 100 mg/mL suspension; Take 10 mLs (1 g total) by mouth 4 (four) times daily. for 10 days  Dispense: 400 mL; Refill: 0    Gastroesophageal reflux disease, unspecified whether esophagitis present & Belching  -Avoid large meals, avoid eating within 2-3 hours of bedtime (avoid late night eating & lying down soon after eating),  elevate head of bed if nocturnal symptoms are present, smoking cessation (if current smoker), & weight loss (if overweight).   -Avoid known foods which trigger reflux symptoms & to minimize/avoid high-fat foods, chocolate, caffeine, citrus, alcohol, & tomato products.  -Avoid/limit use of NSAID's, since they can cause GI upset, bleeding, and/or ulcers. If needed, take with food.  -continue Prilosec 40 mg b.i.d. for 30 days then tried to decrease to once daily thereafter    Nausea  -continue Zofran p.r.n. as prescribed  -consider GES  -     CBC Without Differential; Future; Expected date: 08/17/2023  -     Comprehensive Metabolic Panel; Future; Expected date: 08/17/2023  -     Lipase; Future; Expected date: 08/17/2023  -     US Abdomen Limited (GALLBLADDER); Future; Expected date: 08/17/2023  -     H. pylori antigen, stool; Future; Expected date: 08/17/2023  -START:sucralfate (CARAFATE) 100 mg/mL suspension; Take 10 mLs (1 g total) by mouth 4 (four) times daily. for 10 days  Dispense: 400 mL; Refill: 0    Long-term current use of proton pump inhibitor therapy  - preference to use lowest effective dose or discontinuing if possible  - recommend a diet high in calcium and/or taking OTC calcium and vitamin d supplements as directed (such as Citracal +D)  - recommend annual monitoring with blood work to include CMP, CBC, vitamin B12, and magnesium.  -     Magnesium; Future; Expected date: 08/17/2023  -     Vitamin B12; Future; Expected date: 08/17/2023    Fatty liver  For fatty liver recommend: low fat, low cholesterol diet, maintain good control of blood sugars and cholesterol levels, exercise, weight loss (if overweight), minimize/avoid alcohol and tylenol products, & follow-up with PCP for continued evaluation and management; if specialist is needed, recommend seeing hepatology.    Screening for colon cancer  -follow-up for surveillance colonoscopy 08/2027    Follow up in about 4 weeks (around 9/14/2023), or if  symptoms worsen or fail to improve.      If no improvement in symptoms or symptoms worsen, call/follow-up at clinic or go to ER.        30 minutes of total time spent on the encounter, which includes face to face time and non-face to face time preparing to see the patient (eg, review of tests), Obtaining and/or reviewing separately obtained history, Documenting clinical information in the electronic or other health record, Independently interpreting results (not separately reported) and communicating results to the patient/family/caregiver, or Care coordination (not separately reported).     A dictation software program was used for this note. Please expect some simple typographical  errors in this note.

## 2023-08-21 ENCOUNTER — HOSPITAL ENCOUNTER (OUTPATIENT)
Dept: RADIOLOGY | Facility: HOSPITAL | Age: 58
Discharge: HOME | End: 2023-08-21
Payer: MEDICARE

## 2023-08-21 DIAGNOSIS — R16.0 HEPATOMEGALY: Primary | ICD-10-CM

## 2023-08-21 DIAGNOSIS — K76.0 FATTY LIVER: ICD-10-CM

## 2023-08-21 DIAGNOSIS — R10.13 EPIGASTRIC PAIN: ICD-10-CM

## 2023-08-21 PROCEDURE — 76705 ECHO EXAM OF ABDOMEN: CPT | Mod: TC,PO

## 2023-08-21 PROCEDURE — 76705 ECHO EXAM OF ABDOMEN: CPT | Mod: 26,,, | Performed by: RADIOLOGY

## 2023-08-21 PROCEDURE — 76705 US ABDOMEN LIMITED: ICD-10-PCS | Mod: 26,,, | Performed by: RADIOLOGY

## 2023-08-24 DIAGNOSIS — Z87.19 HISTORY OF GASTROESOPHAGEAL REFLUX (GERD): ICD-10-CM

## 2023-08-24 RX ORDER — OMEPRAZOLE 40 MG/1
40 CAPSULE, DELAYED RELEASE ORAL
Qty: 180 CAPSULE | Refills: 3 | Status: SHIPPED | OUTPATIENT
Start: 2023-08-24

## 2023-08-30 ENCOUNTER — TELEPHONE (OUTPATIENT)
Dept: GASTROENTEROLOGY | Facility: CLINIC | Age: 58
End: 2023-08-30
Payer: MEDICARE

## 2023-08-30 NOTE — TELEPHONE ENCOUNTER
Call placed to Ms. Anderson, results from her recent US reviewed, and informed her that Dolores Bob referred her to a hepatologist for fatty and enlarged liver. She stated the Dr. Benitez is her doctor, and she wanted to speak with his nurse. I apologized to her and informed her that his office sent the message to me.  I informed her that he is a general GI doctor who does upper and lower scopes. I explained to her that she needs to see the liver specialist for her enlarged liver. She verbalized understanding, and stated she was going to call to get this appt made. No further issues noted.

## 2023-08-30 NOTE — TELEPHONE ENCOUNTER
----- Message from Myranda Srinivasan sent at 8/30/2023 10:54 AM CDT -----  Contact: pt  Type:  Needs Medical Advice    Who Called: pt  Would the patient rather a call back or a response via [facility]? Call back  Best Call Back Number: [phone]    Additional Information: Pt wants to talk about results  Please call to advise  Thanks

## 2023-09-11 ENCOUNTER — TELEPHONE (OUTPATIENT)
Dept: NEPHROLOGY | Facility: CLINIC | Age: 58
End: 2023-09-11
Payer: MEDICARE

## 2023-09-11 NOTE — TELEPHONE ENCOUNTER
----- Message from Maritza Riley sent at 9/11/2023 12:22 PM CDT -----  Contact: pt [phone]  Type: Needs Medical Advice  Who Called:  Pt     Best Call Back Number: [phone]    Additional Information: Pt calling to schedule a NP appt. Pt stated she was referred by Princess Bob.

## 2023-09-18 ENCOUNTER — PATIENT MESSAGE (OUTPATIENT)
Dept: ADMINISTRATIVE | Facility: HOSPITAL | Age: 58
End: 2023-09-18
Payer: MEDICARE

## 2023-09-18 ENCOUNTER — PATIENT OUTREACH (OUTPATIENT)
Dept: ADMINISTRATIVE | Facility: HOSPITAL | Age: 58
End: 2023-09-18
Payer: MEDICARE

## 2023-09-18 NOTE — PROGRESS NOTES
Uncontrolled BP REPORT  BP Readings from Last 3 Encounters:   09/01/23 (!) 140/94   02/03/23 132/76   11/03/22 136/80       Non-compliant report chart audits for HYPERTENSION MANAGEMENT     Outreach to patient in reference to hypertension management       NEED REMOTE HOME BP READING DOCUMENTED   OR  BP FOLLOW UP WITH NURSE VISIT OR CARE TEAM MEMBER

## 2023-10-16 ENCOUNTER — OFFICE VISIT (OUTPATIENT)
Dept: NEPHROLOGY | Facility: CLINIC | Age: 58
End: 2023-10-16
Payer: MEDICARE

## 2023-10-16 VITALS
BODY MASS INDEX: 50.02 KG/M2 | WEIGHT: 293 LBS | SYSTOLIC BLOOD PRESSURE: 140 MMHG | HEIGHT: 64 IN | HEART RATE: 71 BPM | OXYGEN SATURATION: 97 % | DIASTOLIC BLOOD PRESSURE: 100 MMHG

## 2023-10-16 DIAGNOSIS — I10 ESSENTIAL HYPERTENSION: Chronic | ICD-10-CM

## 2023-10-16 DIAGNOSIS — E66.01 CLASS 3 SEVERE OBESITY DUE TO EXCESS CALORIES WITH SERIOUS COMORBIDITY AND BODY MASS INDEX (BMI) OF 50.0 TO 59.9 IN ADULT: ICD-10-CM

## 2023-10-16 DIAGNOSIS — I51.89 DIASTOLIC DYSFUNCTION: ICD-10-CM

## 2023-10-16 DIAGNOSIS — G89.4 CHRONIC PAIN SYNDROME: Primary | Chronic | ICD-10-CM

## 2023-10-16 DIAGNOSIS — E11.42 TYPE 2 DIABETES MELLITUS WITH DIABETIC POLYNEUROPATHY, WITHOUT LONG-TERM CURRENT USE OF INSULIN: Chronic | ICD-10-CM

## 2023-10-16 DIAGNOSIS — G47.33 OSA (OBSTRUCTIVE SLEEP APNEA): Chronic | ICD-10-CM

## 2023-10-16 PROCEDURE — 99204 PR OFFICE/OUTPT VISIT, NEW, LEVL IV, 45-59 MIN: ICD-10-PCS | Mod: S$GLB,,, | Performed by: INTERNAL MEDICINE

## 2023-10-16 PROCEDURE — 99999 PR PBB SHADOW E&M-EST. PATIENT-LVL V: ICD-10-PCS | Mod: PBBFAC,,, | Performed by: INTERNAL MEDICINE

## 2023-10-16 PROCEDURE — 1160F RVW MEDS BY RX/DR IN RCRD: CPT | Mod: CPTII,S$GLB,, | Performed by: INTERNAL MEDICINE

## 2023-10-16 PROCEDURE — 1159F MED LIST DOCD IN RCRD: CPT | Mod: CPTII,S$GLB,, | Performed by: INTERNAL MEDICINE

## 2023-10-16 PROCEDURE — 3077F SYST BP >= 140 MM HG: CPT | Mod: CPTII,S$GLB,, | Performed by: INTERNAL MEDICINE

## 2023-10-16 PROCEDURE — 4010F ACE/ARB THERAPY RXD/TAKEN: CPT | Mod: CPTII,S$GLB,, | Performed by: INTERNAL MEDICINE

## 2023-10-16 PROCEDURE — 3080F PR MOST RECENT DIASTOLIC BLOOD PRESSURE >= 90 MM HG: ICD-10-PCS | Mod: CPTII,S$GLB,, | Performed by: INTERNAL MEDICINE

## 2023-10-16 PROCEDURE — 3066F PR DOCUMENTATION OF TREATMENT FOR NEPHROPATHY: ICD-10-PCS | Mod: CPTII,S$GLB,, | Performed by: INTERNAL MEDICINE

## 2023-10-16 PROCEDURE — 3080F DIAST BP >= 90 MM HG: CPT | Mod: CPTII,S$GLB,, | Performed by: INTERNAL MEDICINE

## 2023-10-16 PROCEDURE — 99999 PR PBB SHADOW E&M-EST. PATIENT-LVL V: CPT | Mod: PBBFAC,,, | Performed by: INTERNAL MEDICINE

## 2023-10-16 PROCEDURE — 3066F NEPHROPATHY DOC TX: CPT | Mod: CPTII,S$GLB,, | Performed by: INTERNAL MEDICINE

## 2023-10-16 PROCEDURE — 1160F PR REVIEW ALL MEDS BY PRESCRIBER/CLIN PHARMACIST DOCUMENTED: ICD-10-PCS | Mod: CPTII,S$GLB,, | Performed by: INTERNAL MEDICINE

## 2023-10-16 PROCEDURE — 3008F PR BODY MASS INDEX (BMI) DOCUMENTED: ICD-10-PCS | Mod: CPTII,S$GLB,, | Performed by: INTERNAL MEDICINE

## 2023-10-16 PROCEDURE — 99204 OFFICE O/P NEW MOD 45 MIN: CPT | Mod: S$GLB,,, | Performed by: INTERNAL MEDICINE

## 2023-10-16 PROCEDURE — 4010F PR ACE/ARB THEARPY RXD/TAKEN: ICD-10-PCS | Mod: CPTII,S$GLB,, | Performed by: INTERNAL MEDICINE

## 2023-10-16 PROCEDURE — 3077F PR MOST RECENT SYSTOLIC BLOOD PRESSURE >= 140 MM HG: ICD-10-PCS | Mod: CPTII,S$GLB,, | Performed by: INTERNAL MEDICINE

## 2023-10-16 PROCEDURE — 3008F BODY MASS INDEX DOCD: CPT | Mod: CPTII,S$GLB,, | Performed by: INTERNAL MEDICINE

## 2023-10-16 PROCEDURE — 1159F PR MEDICATION LIST DOCUMENTED IN MEDICAL RECORD: ICD-10-PCS | Mod: CPTII,S$GLB,, | Performed by: INTERNAL MEDICINE

## 2023-10-16 RX ORDER — SPIRONOLACTONE 25 MG/1
25 TABLET ORAL DAILY
Qty: 30 TABLET | Refills: 11 | Status: SHIPPED | OUTPATIENT
Start: 2023-10-16 | End: 2023-11-22 | Stop reason: SINTOL

## 2023-10-16 RX ORDER — NITROFURANTOIN 25; 75 MG/1; MG/1
100 CAPSULE ORAL 2 TIMES DAILY
Qty: 6 CAPSULE | Refills: 0 | Status: SHIPPED | OUTPATIENT
Start: 2023-10-16 | End: 2023-10-19

## 2023-10-16 NOTE — PROGRESS NOTES
"Subjective:       Patient ID: Amena Anderson is a 58 y.o. Black or  female who presents for initial evaluation of HTN referred by PCP, dr Bob.     Renal function per chart review with sr cr baseline of 0.7 mg/dL without proteinuria    - HTN diagnosed about 10 years ago. Patient reports good adherence to the current regiment, which includes Norvasc, Lisinopril, Lasix . They are following low salt diet. Never hospitalized for uncontrolled HTN or hypotension/syncopal episodes.  - MANUEL: on CPAP    Denies use of NSAIDs, nephrolithiasis or fam Hx of renal disease.      Review of Systems   Constitutional:  Positive for appetite change. Negative for chills, fatigue and fever.   HENT:  Negative for hearing loss, trouble swallowing and voice change.    Respiratory:  Positive for shortness of breath. Negative for cough and wheezing.    Cardiovascular:  Positive for leg swelling. Negative for chest pain and palpitations.   Gastrointestinal:  Positive for nausea. Negative for abdominal distention, abdominal pain, constipation, diarrhea and vomiting.   Endocrine: Negative for polydipsia, polyphagia and polyuria.   Genitourinary:  Positive for dysuria. Negative for flank pain, frequency and hematuria.   Musculoskeletal:  Negative for arthralgias, back pain and myalgias.   Skin:  Negative for rash and wound.   Neurological:  Positive for numbness and headaches. Negative for dizziness, syncope, weakness and light-headedness.   Hematological:  Negative for adenopathy. Does not bruise/bleed easily.   Psychiatric/Behavioral:  Positive for sleep disturbance.        The past medical, family and social histories were reviewed for this encounter.     BP (!) 140/100 (BP Location: Left forearm, Patient Position: Sitting, BP Method: Medium (Manual))   Pulse 71   Ht 5' 4" (1.626 m)   Wt (!) 138.8 kg (306 lb)   SpO2 97%   BMI 52.52 kg/m²     Objective:      Physical Exam  Vitals and nursing note reviewed. "   Constitutional:       Appearance: Normal appearance. She is obese.   HENT:      Head: Normocephalic and atraumatic.      Mouth/Throat:      Mouth: Mucous membranes are moist.      Pharynx: Oropharynx is clear.   Eyes:      Extraocular Movements: Extraocular movements intact.      Conjunctiva/sclera: Conjunctivae normal.   Neck:      Vascular: No carotid bruit.   Cardiovascular:      Rate and Rhythm: Normal rate and regular rhythm.      Heart sounds: Normal heart sounds.   Pulmonary:      Effort: Pulmonary effort is normal. No respiratory distress.      Breath sounds: Normal breath sounds.   Abdominal:      General: Bowel sounds are normal. There is no distension.      Palpations: Abdomen is soft.      Tenderness: There is no abdominal tenderness.   Musculoskeletal:         General: No tenderness. Normal range of motion.      Cervical back: Normal range of motion. No tenderness.      Right lower leg: Edema present.      Left lower leg: Edema present.   Lymphadenopathy:      Cervical: No cervical adenopathy.   Skin:     General: Skin is warm and dry.      Capillary Refill: Capillary refill takes less than 2 seconds.      Coloration: Skin is not jaundiced.   Neurological:      General: No focal deficit present.      Mental Status: She is alert and oriented to person, place, and time.   Psychiatric:         Mood and Affect: Mood normal.         Behavior: Behavior normal.         Judgment: Judgment normal.         Assessment:       1. Chronic pain syndrome    2. Essential hypertension    3. Type 2 diabetes mellitus with diabetic polyneuropathy, without long-term current use of insulin    4. Class 3 severe obesity due to excess calories with serious comorbidity and body mass index (BMI) of 50.0 to 59.9 in adult    5. MANUEL (obstructive sleep apnea)    6. Diastolic dysfunction        Plan:   Return to clinic in 2 weeks    Baseline creatinine is 0.7 mg/dL with no proteinuria  Lab Results   Component Value Date    CREATININE  0.7 08/21/2023      - Euvolemic   - Complete avoidance of NSAIDs   - Low salt diet with < 2000 mg/day   - Dose adjust medications to GFR of > 60   - Avoid nephrotoxins including IV contrast    HTN   - BP uncontrolled: continue current medications, avoid hypotension and dehydration   - start spironolactone     DM   - DM without diabetic retinopathy: well controlled with most recent HgbA1c of 6%, continue yearly optho checks    Obesity    - Calorie restriction    HF   - Switch diuretics   - Low salt   - Guideline directed per cards    Dysuria   - Macrobid x 3 days    Pain   - Avoid NSAIDs    Med changes:   Start Spironolactone  Stop Lasix and potassium

## 2023-10-16 NOTE — PATIENT INSTRUCTIONS
Stop Furosemide (lasix) and potassium supplements  Start Spironolactone 25 mg   Stop Naproxen and use tylenol instead    DO NOT take any of NSAIDs or pain medications listed below, instead use Tylenol as written on the bottle.   DO NOT take and of the following  ibuprofen.  naproxen.  diclofenac.  celecoxib.  mefenamic acid.  etoricoxib.  indomethacin.  high-dose aspirin (low-dose aspirin is not normally considered to be an NSAID).    Ok to take Tylenol    Low Salt diet < 2000 mg per day

## 2023-10-24 ENCOUNTER — OFFICE VISIT (OUTPATIENT)
Dept: PODIATRY | Facility: CLINIC | Age: 58
End: 2023-10-24
Payer: MEDICARE

## 2023-10-24 ENCOUNTER — TELEPHONE (OUTPATIENT)
Dept: PRIMARY CARE CLINIC | Facility: CLINIC | Age: 58
End: 2023-10-24
Payer: MEDICARE

## 2023-10-24 VITALS — BODY MASS INDEX: 50.02 KG/M2 | HEIGHT: 64 IN | WEIGHT: 293 LBS

## 2023-10-24 DIAGNOSIS — B35.1 ONYCHOMYCOSIS DUE TO DERMATOPHYTE: ICD-10-CM

## 2023-10-24 DIAGNOSIS — E11.51 TYPE II DIABETES MELLITUS WITH PERIPHERAL CIRCULATORY DISORDER: ICD-10-CM

## 2023-10-24 DIAGNOSIS — L84 CORN OR CALLUS: ICD-10-CM

## 2023-10-24 DIAGNOSIS — E11.42 TYPE 2 DIABETES MELLITUS WITH DIABETIC POLYNEUROPATHY, WITHOUT LONG-TERM CURRENT USE OF INSULIN: Primary | ICD-10-CM

## 2023-10-24 DIAGNOSIS — Z12.31 ENCOUNTER FOR SCREENING MAMMOGRAM FOR MALIGNANT NEOPLASM OF BREAST: Primary | ICD-10-CM

## 2023-10-24 PROCEDURE — 99499 NO LOS: ICD-10-PCS | Mod: S$GLB,,, | Performed by: PODIATRIST

## 2023-10-24 PROCEDURE — 99999 PR PBB SHADOW E&M-EST. PATIENT-LVL IV: CPT | Mod: PBBFAC,,, | Performed by: PODIATRIST

## 2023-10-24 PROCEDURE — 11056 PR TRIM BENIGN HYPERKERATOTIC SKIN LESION,2-4: ICD-10-PCS | Mod: Q9,S$GLB,, | Performed by: PODIATRIST

## 2023-10-24 PROCEDURE — 11721 PR DEBRIDEMENT OF NAILS, 6 OR MORE: ICD-10-PCS | Mod: 59,Q9,S$GLB, | Performed by: PODIATRIST

## 2023-10-24 PROCEDURE — 99499 UNLISTED E&M SERVICE: CPT | Mod: S$GLB,,, | Performed by: PODIATRIST

## 2023-10-24 PROCEDURE — 99999 PR PBB SHADOW E&M-EST. PATIENT-LVL IV: ICD-10-PCS | Mod: PBBFAC,,, | Performed by: PODIATRIST

## 2023-10-24 PROCEDURE — 11056 PARNG/CUTG B9 HYPRKR LES 2-4: CPT | Mod: Q9,S$GLB,, | Performed by: PODIATRIST

## 2023-10-24 PROCEDURE — 11721 DEBRIDE NAIL 6 OR MORE: CPT | Mod: 59,Q9,S$GLB, | Performed by: PODIATRIST

## 2023-10-24 NOTE — TELEPHONE ENCOUNTER
----- Message from Janet Bryant, Patient Care Assistant sent at 10/24/2023 10:22 AM CDT -----  Contact: self  Pt is calling for order for a mammo. Please call back to advise [phone]  thanks

## 2023-10-24 NOTE — PROGRESS NOTES
Subjective:      Patient ID: Amena Anderson is a 58 y.o. female.    Chief Complaint: Nail Care    Patient returns for follow up  thick and discolored nails for high risk foot care. She is high risk secondary to the diabetes with PVD. No acute changes since her last visit to her feet. Doing well overall, no acute changes doing well overall.     PCP: Dr. Miller  Last seen: 5/16/23    Review of Systems   Constitutional: Negative for chills, diaphoresis, fever, malaise/fatigue and night sweats.   Cardiovascular:  Positive for leg swelling. Negative for claudication, cyanosis and syncope.   Skin:  Negative for color change, dry skin, nail changes, rash, suspicious lesions and unusual hair distribution.   Musculoskeletal:  Positive for joint pain. Negative for falls, joint swelling, muscle cramps, muscle weakness and stiffness.   Gastrointestinal:  Negative for constipation, diarrhea, nausea and vomiting.   Neurological:  Positive for paresthesias. Negative for brief paralysis, disturbances in coordination, focal weakness, numbness, sensory change and tremors.           Objective:      Physical Exam  Constitutional:       General: She is not in acute distress.     Appearance: She is well-developed. She is not diaphoretic.   Cardiovascular:      Pulses:           Dorsalis pedis pulses are 2+ on the right side and 2+ on the left side.        Posterior tibial pulses are 2+ on the right side and 2+ on the left side.      Comments: Capillary refill 3 seconds all toes/distal feet, all toes/both feet warm to touch.      Positive lower extremity edema bilateral.    Musculoskeletal:      Right ankle: Normal. No swelling, deformity, ecchymosis or lacerations. Normal range of motion. Normal pulse.      Right Achilles Tendon: Normal. No defects. De La Cruz's test negative.      Comments: Improving pain to palpation lateral left ankle at cfl and atfl without instability deformity or loss of function. Also has pain along the peroneal  tendons to the brevis insertion.    Ankle dorsiflexion decreased at <10 degrees bilateral with moderate increase with knee flexion bilateral. There is tenderness to palpation bilateral achilles attachments on the calcaneus.    Pain on palpation plantar medial and central heel right foot. No pain with ROM or MMT. No pain with medial and lateral compression of heel.     Feet:      Right foot:      Protective Sensation: 10 sites tested.  8 sites sensed.      Left foot:      Protective Sensation: 10 sites tested.  9 sites sensed.   Lymphadenopathy:      Comments: Negative lymphadenopathy bilateral popliteal fossa and tarsal tunnel.   Skin:     General: Skin is warm and dry.      Coloration: Skin is not pale.      Findings: Lesion present. No abrasion, bruising, burn, ecchymosis, erythema, laceration, petechiae or rash.      Nails: There is no clubbing.      Comments:   Dry scale with superficial flakes over an erythematous base more to the left foot without ulceration, drainage, pus, tracking, fluctuance, malodor, or cardinal signs infection.      Skin is thin and atrophic bilateral    Nails 1-5 bilateral are thick 3-4 mm, long 3-6 mm, and discolored with subungual debris    Hyperkeratotic lesions right plantar medial aspect first metatarsal bilateral          Neurological:      Mental Status: She is alert and oriented to person, place, and time.      Sensory: Sensory deficit present.      Motor: No tremor, atrophy, abnormal muscle tone or seizure activity.      Gait: Gait normal.      Comments: Negative tinel sign to percussion sural, superficial peroneal, deep peroneal, saphenous, and posterior tibial nerves right and left ankles and feet. Decreased vibratory sensation bilateral     Psychiatric:         Behavior: Behavior normal. Behavior is cooperative.               Assessment:       Encounter Diagnoses   Name Primary?    Type 2 diabetes mellitus with diabetic polyneuropathy, without long-term current use of insulin  Yes    Type II diabetes mellitus with peripheral circulatory disorder     Onychomycosis due to dermatophyte     Corn or callus                Plan:       Amena was seen today for nail care.    Diagnoses and all orders for this visit:    Type 2 diabetes mellitus with diabetic polyneuropathy, without long-term current use of insulin    Type II diabetes mellitus with peripheral circulatory disorder    Onychomycosis due to dermatophyte    Corn or callus            I counseled the patient on her conditions, their implications and medical management.    Shoe inspection. Diabetic Foot Education. Patient reminded of the importance of good nutrition and blood sugar control to help prevent podiatric complications of diabetes. Patient instructed on proper foot hygeine. We discussed wearing proper shoe gear, daily foot inspections, never walking without protective shoe gear, never putting sharp instruments to feet Discussed importance of supportive shoes with accommodative toe box to reduce pressure and irritation to forefoot.     With patient's verbal consent, nails were aggressively reduced and debrided x 10 to their soft tissue attachment mechanically removing all offending nail and debris. Patient relates relief following the procedure. No anesthesia or hemostasis required. No blood loss.     With patient's verbal consent the hyperkeratotic lesions x2  bilateral foot were trimmed to healthy appearing skin using a # 15 scalpel. Patient relates relief of pain following the procedure. Patient tolerated the procedure well without complications. No anesthesia or hemostasis required. No blood loss.          Follow up in 3 months routine care.    Edson Martinez DPM

## 2023-10-25 ENCOUNTER — HOSPITAL ENCOUNTER (OUTPATIENT)
Dept: RADIOLOGY | Facility: HOSPITAL | Age: 58
Discharge: HOME | End: 2023-10-25
Attending: INTERNAL MEDICINE
Payer: MEDICARE

## 2023-10-25 DIAGNOSIS — G47.33 OSA (OBSTRUCTIVE SLEEP APNEA): ICD-10-CM

## 2023-10-25 DIAGNOSIS — I51.89 DIASTOLIC DYSFUNCTION: ICD-10-CM

## 2023-10-25 DIAGNOSIS — G89.4 CHRONIC PAIN SYNDROME: ICD-10-CM

## 2023-10-25 DIAGNOSIS — I10 ESSENTIAL HYPERTENSION: ICD-10-CM

## 2023-10-25 DIAGNOSIS — E11.42 TYPE 2 DIABETES MELLITUS WITH DIABETIC POLYNEUROPATHY, WITHOUT LONG-TERM CURRENT USE OF INSULIN: ICD-10-CM

## 2023-10-25 DIAGNOSIS — E66.01 CLASS 3 SEVERE OBESITY DUE TO EXCESS CALORIES WITH SERIOUS COMORBIDITY AND BODY MASS INDEX (BMI) OF 50.0 TO 59.9 IN ADULT: ICD-10-CM

## 2023-10-27 ENCOUNTER — TELEPHONE (OUTPATIENT)
Dept: NEPHROLOGY | Facility: CLINIC | Age: 58
End: 2023-10-27
Payer: MEDICARE

## 2023-10-27 NOTE — TELEPHONE ENCOUNTER
----- Message from David Girard sent at 10/27/2023  1:43 PM CDT -----  Regarding: appt  Contact: AMENA CRONIN [[id number]]  Type:  Sooner Appointment Request    Caller is requesting a sooner appointment.  Caller declined first available appointment listed below.  Caller will not accept being placed on the waitlist and is requesting a message be sent to doctor.    Name of Caller:  Amena    When is the first available appointment?  None through [facility]    Symptoms:  f/u    Would the patient rather a call back or a response via [facility]? Call    Best Call Back Number:  [phone] (home)     Additional Information:  Needs to reschedule 11/2 due to injections. Please call to advise.

## 2023-10-30 ENCOUNTER — TELEPHONE (OUTPATIENT)
Dept: NEPHROLOGY | Facility: CLINIC | Age: 58
End: 2023-10-30
Payer: MEDICARE

## 2023-10-30 NOTE — TELEPHONE ENCOUNTER
----- Message from Francisca Lancaster sent at 10/30/2023  3:29 PM CDT -----  Contact: self  Type: Sooner Appointment Request        Caller is requesting a sooner appointment. Caller declined first available appointment listed below. Caller will not accept being placed on the waitlist and is requesting a message be sent to doctor.        Name of Caller: patient   Best Call Back Number: [phone]  Additional Information: Pt states she needs a call to talk over some future dates. Plz call today when possible. Thanks

## 2023-11-01 ENCOUNTER — HOSPITAL ENCOUNTER (OUTPATIENT)
Dept: RADIOLOGY | Facility: HOSPITAL | Age: 58
Discharge: HOME | End: 2023-11-01
Attending: INTERNAL MEDICINE
Payer: MEDICARE

## 2023-11-01 PROCEDURE — 76770 US EXAM ABDO BACK WALL COMP: CPT | Mod: 26,59,, | Performed by: RADIOLOGY

## 2023-11-01 PROCEDURE — 93975 VASCULAR STUDY: CPT | Mod: TC,PO

## 2023-11-01 PROCEDURE — 93975 US DOPPLER RENAL ARTERY AND VEIN (XPD): ICD-10-PCS | Mod: 26,,, | Performed by: RADIOLOGY

## 2023-11-01 PROCEDURE — 93975 VASCULAR STUDY: CPT | Mod: 26,,, | Performed by: RADIOLOGY

## 2023-11-01 PROCEDURE — 76770 US DOPPLER RENAL ARTERY AND VEIN (XPD): ICD-10-PCS | Mod: 26,59,, | Performed by: RADIOLOGY

## 2023-11-17 ENCOUNTER — HOSPITAL ENCOUNTER (OUTPATIENT)
Dept: RADIOLOGY | Facility: HOSPITAL | Age: 58
Discharge: HOME | End: 2023-11-17
Attending: STUDENT IN AN ORGANIZED HEALTH CARE EDUCATION/TRAINING PROGRAM
Payer: MEDICARE

## 2023-11-17 ENCOUNTER — OFFICE VISIT (OUTPATIENT)
Dept: FAMILY MEDICINE | Facility: CLINIC | Age: 58
End: 2023-11-17
Payer: MEDICARE

## 2023-11-17 VITALS
BODY MASS INDEX: 50.02 KG/M2 | SYSTOLIC BLOOD PRESSURE: 138 MMHG | OXYGEN SATURATION: 96 % | HEART RATE: 84 BPM | WEIGHT: 293 LBS | HEIGHT: 64 IN | DIASTOLIC BLOOD PRESSURE: 86 MMHG

## 2023-11-17 DIAGNOSIS — K76.0 NONALCOHOLIC FATTY LIVER DISEASE: Primary | ICD-10-CM

## 2023-11-17 DIAGNOSIS — E66.01 CLASS 3 SEVERE OBESITY DUE TO EXCESS CALORIES WITH SERIOUS COMORBIDITY AND BODY MASS INDEX (BMI) OF 50.0 TO 59.9 IN ADULT: ICD-10-CM

## 2023-11-17 DIAGNOSIS — R45.89 ANXIETY ABOUT HEALTH: ICD-10-CM

## 2023-11-17 DIAGNOSIS — I10 ESSENTIAL HYPERTENSION: Chronic | ICD-10-CM

## 2023-11-17 DIAGNOSIS — Z23 NEED FOR VACCINATION: ICD-10-CM

## 2023-11-17 DIAGNOSIS — Z12.31 ENCOUNTER FOR SCREENING MAMMOGRAM FOR MALIGNANT NEOPLASM OF BREAST: ICD-10-CM

## 2023-11-17 DIAGNOSIS — H57.11 ACUTE RIGHT EYE PAIN: ICD-10-CM

## 2023-11-17 DIAGNOSIS — E11.42 TYPE 2 DIABETES MELLITUS WITH DIABETIC POLYNEUROPATHY, WITHOUT LONG-TERM CURRENT USE OF INSULIN: Chronic | ICD-10-CM

## 2023-11-17 PROBLEM — F41.8 ANXIETY ABOUT HEALTH: Status: ACTIVE | Noted: 2023-11-17

## 2023-11-17 PROCEDURE — 77067 SCR MAMMO BI INCL CAD: CPT | Mod: 26,,, | Performed by: RADIOLOGY

## 2023-11-17 PROCEDURE — 3066F NEPHROPATHY DOC TX: CPT | Mod: CPTII,S$GLB,, | Performed by: STUDENT IN AN ORGANIZED HEALTH CARE EDUCATION/TRAINING PROGRAM

## 2023-11-17 PROCEDURE — 4010F PR ACE/ARB THEARPY RXD/TAKEN: ICD-10-PCS | Mod: CPTII,S$GLB,, | Performed by: STUDENT IN AN ORGANIZED HEALTH CARE EDUCATION/TRAINING PROGRAM

## 2023-11-17 PROCEDURE — 3079F PR MOST RECENT DIASTOLIC BLOOD PRESSURE 80-89 MM HG: ICD-10-PCS | Mod: CPTII,S$GLB,, | Performed by: STUDENT IN AN ORGANIZED HEALTH CARE EDUCATION/TRAINING PROGRAM

## 2023-11-17 PROCEDURE — 90686 IIV4 VACC NO PRSV 0.5 ML IM: CPT | Mod: S$GLB,,, | Performed by: STUDENT IN AN ORGANIZED HEALTH CARE EDUCATION/TRAINING PROGRAM

## 2023-11-17 PROCEDURE — 90686 FLU VACCINE (QUAD) GREATER THAN OR EQUAL TO 3YO PRESERVATIVE FREE IM: ICD-10-PCS | Mod: S$GLB,,, | Performed by: STUDENT IN AN ORGANIZED HEALTH CARE EDUCATION/TRAINING PROGRAM

## 2023-11-17 PROCEDURE — 3008F BODY MASS INDEX DOCD: CPT | Mod: CPTII,S$GLB,, | Performed by: STUDENT IN AN ORGANIZED HEALTH CARE EDUCATION/TRAINING PROGRAM

## 2023-11-17 PROCEDURE — 3075F PR MOST RECENT SYSTOLIC BLOOD PRESS GE 130-139MM HG: ICD-10-PCS | Mod: CPTII,S$GLB,, | Performed by: STUDENT IN AN ORGANIZED HEALTH CARE EDUCATION/TRAINING PROGRAM

## 2023-11-17 PROCEDURE — 3075F SYST BP GE 130 - 139MM HG: CPT | Mod: CPTII,S$GLB,, | Performed by: STUDENT IN AN ORGANIZED HEALTH CARE EDUCATION/TRAINING PROGRAM

## 2023-11-17 PROCEDURE — G0008 FLU VACCINE (QUAD) GREATER THAN OR EQUAL TO 3YO PRESERVATIVE FREE IM: ICD-10-PCS | Mod: S$GLB,,, | Performed by: STUDENT IN AN ORGANIZED HEALTH CARE EDUCATION/TRAINING PROGRAM

## 2023-11-17 PROCEDURE — 3079F DIAST BP 80-89 MM HG: CPT | Mod: CPTII,S$GLB,, | Performed by: STUDENT IN AN ORGANIZED HEALTH CARE EDUCATION/TRAINING PROGRAM

## 2023-11-17 PROCEDURE — 1159F MED LIST DOCD IN RCRD: CPT | Mod: CPTII,S$GLB,, | Performed by: STUDENT IN AN ORGANIZED HEALTH CARE EDUCATION/TRAINING PROGRAM

## 2023-11-17 PROCEDURE — 77067 MAMMO DIGITAL SCREENING BILAT WITH TOMO: ICD-10-PCS | Mod: 26,,, | Performed by: RADIOLOGY

## 2023-11-17 PROCEDURE — 77067 SCR MAMMO BI INCL CAD: CPT | Mod: TC,PO

## 2023-11-17 PROCEDURE — 77063 MAMMO DIGITAL SCREENING BILAT WITH TOMO: ICD-10-PCS | Mod: 26,,, | Performed by: RADIOLOGY

## 2023-11-17 PROCEDURE — 3066F PR DOCUMENTATION OF TREATMENT FOR NEPHROPATHY: ICD-10-PCS | Mod: CPTII,S$GLB,, | Performed by: STUDENT IN AN ORGANIZED HEALTH CARE EDUCATION/TRAINING PROGRAM

## 2023-11-17 PROCEDURE — 3044F HG A1C LEVEL LT 7.0%: CPT | Mod: CPTII,S$GLB,, | Performed by: STUDENT IN AN ORGANIZED HEALTH CARE EDUCATION/TRAINING PROGRAM

## 2023-11-17 PROCEDURE — 77063 BREAST TOMOSYNTHESIS BI: CPT | Mod: 26,,, | Performed by: RADIOLOGY

## 2023-11-17 PROCEDURE — G0008 ADMIN INFLUENZA VIRUS VAC: HCPCS | Mod: S$GLB,,, | Performed by: STUDENT IN AN ORGANIZED HEALTH CARE EDUCATION/TRAINING PROGRAM

## 2023-11-17 PROCEDURE — 99214 OFFICE O/P EST MOD 30 MIN: CPT | Mod: S$GLB,,, | Performed by: STUDENT IN AN ORGANIZED HEALTH CARE EDUCATION/TRAINING PROGRAM

## 2023-11-17 PROCEDURE — 3044F PR MOST RECENT HEMOGLOBIN A1C LEVEL <7.0%: ICD-10-PCS | Mod: CPTII,S$GLB,, | Performed by: STUDENT IN AN ORGANIZED HEALTH CARE EDUCATION/TRAINING PROGRAM

## 2023-11-17 PROCEDURE — 99999 PR PBB SHADOW E&M-EST. PATIENT-LVL V: CPT | Mod: PBBFAC,,, | Performed by: STUDENT IN AN ORGANIZED HEALTH CARE EDUCATION/TRAINING PROGRAM

## 2023-11-17 PROCEDURE — 99214 PR OFFICE/OUTPT VISIT, EST, LEVL IV, 30-39 MIN: ICD-10-PCS | Mod: S$GLB,,, | Performed by: STUDENT IN AN ORGANIZED HEALTH CARE EDUCATION/TRAINING PROGRAM

## 2023-11-17 PROCEDURE — 99999 PR PBB SHADOW E&M-EST. PATIENT-LVL V: ICD-10-PCS | Mod: PBBFAC,,, | Performed by: STUDENT IN AN ORGANIZED HEALTH CARE EDUCATION/TRAINING PROGRAM

## 2023-11-17 PROCEDURE — 1159F PR MEDICATION LIST DOCUMENTED IN MEDICAL RECORD: ICD-10-PCS | Mod: CPTII,S$GLB,, | Performed by: STUDENT IN AN ORGANIZED HEALTH CARE EDUCATION/TRAINING PROGRAM

## 2023-11-17 PROCEDURE — 4010F ACE/ARB THERAPY RXD/TAKEN: CPT | Mod: CPTII,S$GLB,, | Performed by: STUDENT IN AN ORGANIZED HEALTH CARE EDUCATION/TRAINING PROGRAM

## 2023-11-17 PROCEDURE — 3008F PR BODY MASS INDEX (BMI) DOCUMENTED: ICD-10-PCS | Mod: CPTII,S$GLB,, | Performed by: STUDENT IN AN ORGANIZED HEALTH CARE EDUCATION/TRAINING PROGRAM

## 2023-11-17 RX ORDER — NITROGLYCERIN 0.4 MG/1
0.4 TABLET SUBLINGUAL EVERY 5 MIN PRN
Qty: 30 TABLET | Refills: 11 | Status: SHIPPED | OUTPATIENT
Start: 2023-11-17

## 2023-11-17 NOTE — ASSESSMENT & PLAN NOTE
Patient tearful today about her recent health issues, mostly in the context of several of her family members having recently  (heart attack, tumor) or suffered significant health issues. She mentions concern for her own health but also the pain of losing close ones and the fear of losing other family members. I discussed her chronic medical issues and either reassured her or discussed in detail what needed to be done and what further testing was needed.

## 2023-11-17 NOTE — PROGRESS NOTES
Name: Amena Anderson  MRN: [id number]  : 1965  PCP: Jacobo Joy MD    HPI  Patient presents for regular follow up. She had several concerns today - mention in A&P below.     Review of Systems   Eyes:  Positive for pain (lateral eye lid on right side over last three days) and visual disturbance (right eye over last three days).       Patient Active Problem List   Diagnosis    Type 2 diabetes mellitus with diabetic polyneuropathy, without long-term current use of insulin    Essential hypertension    MANUEL (obstructive sleep apnea)    Chronic pain syndrome    Class 3 severe obesity due to excess calories with serious comorbidity and body mass index (BMI) of 50.0 to 59.9 in adult    Hypothyroidism    Gastroesophageal reflux disease without esophagitis    Dysphagia    Nonalcoholic fatty liver disease    Mixed hyperlipidemia    Diastolic dysfunction    Chronic lower back pain    Candidal skin infection    Dry eye syndrome of both eyes    Age-related nuclear cataract of both eyes    Primary open angle glaucoma (POAG) of both eyes, mild stage    Anxiety about health    Acute right eye pain       Health Maintenance Due   Topic Date Due    TETANUS VACCINE  Never done    Shingles Vaccine (1 of 2) Never done    Diabetes Urine Screening  2018    Foot Exam  2022    COVID-19 Vaccine (2023- season) 2023    RSV Vaccine (Age 60+) (1 - 1-dose 60+ series) Never done    Lipid Panel  10/14/2023       Vitals:    23 0816   BP: 138/86   Pulse: 84       Physical Exam  Constitutional:       General: She is not in acute distress.     Appearance: Normal appearance. She is well-developed.   HENT:      Head: Normocephalic and atraumatic.      Right Ear: External ear normal.      Left Ear: External ear normal.   Eyes:      Conjunctiva/sclera: Conjunctivae normal.      Pupils: Pupils are equal, round, and reactive to light.      Comments: Right periorbital soft-tissue tenderness   Pulmonary:      Effort:  Pulmonary effort is normal. No respiratory distress.   Abdominal:      General: Abdomen is flat. There is no distension.   Musculoskeletal:         General: No swelling or deformity.      Right lower leg: No edema.      Left lower leg: No edema.   Skin:     General: Skin is warm and dry.      Coloration: Skin is not jaundiced.   Neurological:      Mental Status: She is alert and oriented to person, place, and time. Mental status is at baseline.   Psychiatric:         Attention and Perception: Attention and perception normal.         Mood and Affect: Mood is anxious. Affect is tearful.         Speech: Speech normal.         Behavior: Behavior normal. Behavior is cooperative.         Thought Content: Thought content normal.         Cognition and Memory: Cognition normal.         Judgment: Judgment normal.         1. Nonalcoholic fatty liver disease  Assessment & Plan:  Has hepatology appointment upcoming in December      2. Essential hypertension  Assessment & Plan:  Improved since last visit. Of note, Nina started her on spironolactone but she started experiencing chest pains bad enough that took a nitro pill. She has stopped the medication. Continue other medications for now.       3. Type 2 diabetes mellitus with diabetic polyneuropathy, without long-term current use of insulin  Assessment & Plan:  A1c two weeks ago was 6.0. Well controlled. Continue metformin.       4. Class 3 severe obesity due to excess calories with serious comorbidity and body mass index (BMI) of 50.0 to 59.9 in adult  Assessment & Plan:  Weight continues to improve. Patient has worked on including more vegetables in her diet, using the DASH salt, baking things and avoiding fried foods. Continue to monitor. Goal is to continue weight loss to help with liver - ideally, would like to see a ten lb weight loss for next visit.       5. Anxiety about health  Assessment & Plan:  Patient tearful today about her recent health issues, mostly in the  context of several of her family members having recently  (heart attack, tumor) or suffered significant health issues. She mentions concern for her own health but also the pain of losing close ones and the fear of losing other family members. I discussed her chronic medical issues and either reassured her or discussed in detail what needed to be done and what further testing was needed.      6. Acute right eye pain  Assessment & Plan:  Unclear etiology. However, pupillary function is normal. I am less concerned about any ophthalmologic emergency. She will follow up with her eye doctor.      7. Need for vaccination  -     Influenza - Quadrivalent (PF)    Other orders  -     nitroGLYCERIN (NITROSTAT) 0.4 MG SL tablet; Place 1 tablet (0.4 mg total) under the tongue every 5 (five) minutes as needed for Chest pain.  Dispense: 30 tablet; Refill: 11        Follow up in 3 months. I spent 31 minutes pre-charting, interviewing patient, performing exam, formulating and discussing plan, placing orders, and documenting.     Jacobo Joy MD  2023

## 2023-11-17 NOTE — ASSESSMENT & PLAN NOTE
Unclear etiology. However, pupillary function is normal. I am less concerned about any ophthalmologic emergency. She will follow up with her eye doctor.

## 2023-11-17 NOTE — ASSESSMENT & PLAN NOTE
Weight continues to improve. Patient has worked on including more vegetables in her diet, using the DASH salt, baking things and avoiding fried foods. Continue to monitor. Goal is to continue weight loss to help with liver - ideally, would like to see a ten lb weight loss for next visit.

## 2023-11-17 NOTE — ASSESSMENT & PLAN NOTE
Improved since last visit. Of note, Nina started her on spironolactone but she started experiencing chest pains bad enough that took a nitro pill. She has stopped the medication. Continue other medications for now.

## 2023-11-20 ENCOUNTER — TELEPHONE (OUTPATIENT)
Dept: FAMILY MEDICINE | Facility: CLINIC | Age: 58
End: 2023-11-20
Payer: MEDICARE

## 2023-11-20 DIAGNOSIS — B37.2 CANDIDAL SKIN INFECTION: Primary | ICD-10-CM

## 2023-11-20 DIAGNOSIS — Z91.09 ENVIRONMENTAL ALLERGIES: ICD-10-CM

## 2023-11-20 RX ORDER — NYSTATIN 100000 [USP'U]/G
POWDER TOPICAL 2 TIMES DAILY
Qty: 30 G | Refills: 11 | Status: SHIPPED | OUTPATIENT
Start: 2023-11-20

## 2023-11-20 RX ORDER — CETIRIZINE HYDROCHLORIDE 10 MG/1
10 TABLET ORAL DAILY
Qty: 90 TABLET | Refills: 3 | Status: SHIPPED | OUTPATIENT
Start: 2023-11-20 | End: 2024-11-19

## 2023-11-20 NOTE — TELEPHONE ENCOUNTER
LOV:11/17/2023  NOV: 02/16/2024    Called pt and spoke to her.    She is calling regards to medication for allergies. She would like a RX zyrtec so she doesn't have to pay out of pocket for this. Her ins stated she can get an RX and not have to pay out of pocket.     Patient also wants to know if she can get fluconazole to help with the yeast under her breast. Patient stated she forgot to mention at appointment     Please advise and I will call pt back

## 2023-11-20 NOTE — TELEPHONE ENCOUNTER
----- Message from Dennis Sneed sent at 11/20/2023 10:26 AM CST -----  Type: Needs Medical Advice  Who Called:  Patient    Pharmacy name and phone #:    [id number] - [facility] [address] [address] [address] AT [facility] [address] [facility] [address]  [id number] [place] [id number]  Phone: [phone] Fax: [phone]      Best Call Back Number:  [phone]  Additional Information: Patient states that she would like a callback regarding questions about her medications.

## 2023-11-22 ENCOUNTER — OFFICE VISIT (OUTPATIENT)
Dept: NEPHROLOGY | Facility: CLINIC | Age: 58
End: 2023-11-22
Payer: MEDICARE

## 2023-11-22 VITALS
DIASTOLIC BLOOD PRESSURE: 86 MMHG | SYSTOLIC BLOOD PRESSURE: 144 MMHG | HEART RATE: 80 BPM | WEIGHT: 293 LBS | HEIGHT: 64 IN | BODY MASS INDEX: 50.02 KG/M2 | OXYGEN SATURATION: 93 %

## 2023-11-22 DIAGNOSIS — I10 ESSENTIAL HYPERTENSION: Primary | Chronic | ICD-10-CM

## 2023-11-22 DIAGNOSIS — I51.89 DIASTOLIC DYSFUNCTION: ICD-10-CM

## 2023-11-22 DIAGNOSIS — E66.01 CLASS 3 SEVERE OBESITY DUE TO EXCESS CALORIES WITH SERIOUS COMORBIDITY AND BODY MASS INDEX (BMI) OF 50.0 TO 59.9 IN ADULT: ICD-10-CM

## 2023-11-22 PROCEDURE — 4010F PR ACE/ARB THEARPY RXD/TAKEN: ICD-10-PCS | Mod: CPTII,S$GLB,, | Performed by: INTERNAL MEDICINE

## 2023-11-22 PROCEDURE — 1160F PR REVIEW ALL MEDS BY PRESCRIBER/CLIN PHARMACIST DOCUMENTED: ICD-10-PCS | Mod: CPTII,S$GLB,, | Performed by: INTERNAL MEDICINE

## 2023-11-22 PROCEDURE — 1160F RVW MEDS BY RX/DR IN RCRD: CPT | Mod: CPTII,S$GLB,, | Performed by: INTERNAL MEDICINE

## 2023-11-22 PROCEDURE — 1159F PR MEDICATION LIST DOCUMENTED IN MEDICAL RECORD: ICD-10-PCS | Mod: CPTII,S$GLB,, | Performed by: INTERNAL MEDICINE

## 2023-11-22 PROCEDURE — 3066F NEPHROPATHY DOC TX: CPT | Mod: CPTII,S$GLB,, | Performed by: INTERNAL MEDICINE

## 2023-11-22 PROCEDURE — 99999 PR PBB SHADOW E&M-EST. PATIENT-LVL IV: ICD-10-PCS | Mod: PBBFAC,,, | Performed by: INTERNAL MEDICINE

## 2023-11-22 PROCEDURE — 99214 PR OFFICE/OUTPT VISIT, EST, LEVL IV, 30-39 MIN: ICD-10-PCS | Mod: S$GLB,,, | Performed by: INTERNAL MEDICINE

## 2023-11-22 PROCEDURE — 3066F PR DOCUMENTATION OF TREATMENT FOR NEPHROPATHY: ICD-10-PCS | Mod: CPTII,S$GLB,, | Performed by: INTERNAL MEDICINE

## 2023-11-22 PROCEDURE — 1159F MED LIST DOCD IN RCRD: CPT | Mod: CPTII,S$GLB,, | Performed by: INTERNAL MEDICINE

## 2023-11-22 PROCEDURE — 3077F SYST BP >= 140 MM HG: CPT | Mod: CPTII,S$GLB,, | Performed by: INTERNAL MEDICINE

## 2023-11-22 PROCEDURE — 3079F PR MOST RECENT DIASTOLIC BLOOD PRESSURE 80-89 MM HG: ICD-10-PCS | Mod: CPTII,S$GLB,, | Performed by: INTERNAL MEDICINE

## 2023-11-22 PROCEDURE — 3008F BODY MASS INDEX DOCD: CPT | Mod: CPTII,S$GLB,, | Performed by: INTERNAL MEDICINE

## 2023-11-22 PROCEDURE — 99999 PR PBB SHADOW E&M-EST. PATIENT-LVL IV: CPT | Mod: PBBFAC,,, | Performed by: INTERNAL MEDICINE

## 2023-11-22 PROCEDURE — 3077F PR MOST RECENT SYSTOLIC BLOOD PRESSURE >= 140 MM HG: ICD-10-PCS | Mod: CPTII,S$GLB,, | Performed by: INTERNAL MEDICINE

## 2023-11-22 PROCEDURE — 3044F HG A1C LEVEL LT 7.0%: CPT | Mod: CPTII,S$GLB,, | Performed by: INTERNAL MEDICINE

## 2023-11-22 PROCEDURE — 3079F DIAST BP 80-89 MM HG: CPT | Mod: CPTII,S$GLB,, | Performed by: INTERNAL MEDICINE

## 2023-11-22 PROCEDURE — 3008F PR BODY MASS INDEX (BMI) DOCUMENTED: ICD-10-PCS | Mod: CPTII,S$GLB,, | Performed by: INTERNAL MEDICINE

## 2023-11-22 PROCEDURE — 3044F PR MOST RECENT HEMOGLOBIN A1C LEVEL <7.0%: ICD-10-PCS | Mod: CPTII,S$GLB,, | Performed by: INTERNAL MEDICINE

## 2023-11-22 PROCEDURE — 4010F ACE/ARB THERAPY RXD/TAKEN: CPT | Mod: CPTII,S$GLB,, | Performed by: INTERNAL MEDICINE

## 2023-11-22 PROCEDURE — 99214 OFFICE O/P EST MOD 30 MIN: CPT | Mod: S$GLB,,, | Performed by: INTERNAL MEDICINE

## 2023-11-22 RX ORDER — ONDANSETRON HYDROCHLORIDE 8 MG/1
TABLET, FILM COATED ORAL
COMMUNITY

## 2023-11-22 RX ORDER — CICLOPIROX 7.7 MG/G
1 GEL TOPICAL 2 TIMES DAILY
COMMUNITY

## 2023-11-22 RX ORDER — HYDRALAZINE HYDROCHLORIDE 25 MG/1
25 TABLET, FILM COATED ORAL 3 TIMES DAILY
Qty: 90 TABLET | Refills: 11 | Status: SHIPPED | OUTPATIENT
Start: 2023-11-22 | End: 2024-02-12

## 2023-11-22 NOTE — PROGRESS NOTES
"Subjective:       Patient ID: Amena Anderson is a 58 y.o. Black or  female who presents for ifollow up on HTN.    HTN: started Spironolactone and stopped due to palpitations  DM reports good BG control     Denies use of NSAIDs, nephrolithiasis or fam Hx of renal disease.      Review of Systems   Constitutional:  Negative for appetite change, chills and fever.   HENT:  Negative for congestion.    Eyes:  Negative for visual disturbance.   Respiratory:  Negative for cough and shortness of breath.    Cardiovascular:  Negative for chest pain and leg swelling.   Gastrointestinal:  Negative for abdominal pain, diarrhea, nausea and vomiting.   Genitourinary:  Negative for difficulty urinating, dysuria and hematuria.   Musculoskeletal:  Positive for gait problem. Negative for myalgias.   Skin:  Negative for rash.   Neurological:  Negative for headaches.   Psychiatric/Behavioral:  Negative for sleep disturbance.        The past medical, family and social histories were reviewed for this encounter.     BP (!) 144/86 (BP Location: Right forearm, Patient Position: Sitting, BP Method: Medium (Manual))   Pulse 80   Ht 5' 4" (1.626 m)   Wt (!) 137.5 kg (303 lb 2.1 oz)   SpO2 (!) 93%   BMI 52.03 kg/m²     Objective:      Physical Exam  Vitals and nursing note reviewed.   Constitutional:       Appearance: Normal appearance. She is obese. She is ill-appearing.   HENT:      Head: Normocephalic and atraumatic.      Mouth/Throat:      Mouth: Mucous membranes are moist.      Pharynx: Oropharynx is clear.   Eyes:      Extraocular Movements: Extraocular movements intact.      Conjunctiva/sclera: Conjunctivae normal.   Neck:      Vascular: No carotid bruit.   Cardiovascular:      Rate and Rhythm: Normal rate and regular rhythm.      Heart sounds: Normal heart sounds.   Pulmonary:      Effort: Pulmonary effort is normal. No respiratory distress.      Breath sounds: Normal breath sounds.   Abdominal:      General: Bowel " sounds are normal. There is no distension.      Palpations: Abdomen is soft.      Tenderness: There is no abdominal tenderness.   Musculoskeletal:         General: No swelling or tenderness. Normal range of motion.      Cervical back: Normal range of motion. No tenderness.   Lymphadenopathy:      Cervical: No cervical adenopathy.   Skin:     General: Skin is warm and dry.      Capillary Refill: Capillary refill takes less than 2 seconds.      Coloration: Skin is not jaundiced.   Neurological:      General: No focal deficit present.      Mental Status: She is alert.      Gait: Gait abnormal.   Psychiatric:         Mood and Affect: Mood normal.         Behavior: Behavior normal.         Judgment: Judgment normal.         Assessment:       1. Essential hypertension    2. Diastolic dysfunction    3. Class 3 severe obesity due to excess calories with serious comorbidity and body mass index (BMI) of 50.0 to 59.9 in adult        Plan:   Return to clinic in 1 mo     Baseline creatinine is 0.8 mg/dL with no proteinuria  Lab Results   Component Value Date    CREATININE 0.8 11/01/2023                - Euvolemic              - Complete avoidance of NSAIDs              - Low salt diet with < 2000 mg/day              - Dose adjust medications to GFR of > 60              - Avoid nephrotoxins including IV contrast     HTN              - BP uncontrolled: continue current medications, avoid hypotension and dehydration   - Stop Spironolactone and start Hydralazine 25 mg three times per day      DM              - DM without diabetic retinopathy: well controlled with most recent HgbA1c of 6%, continue yearly optho checks     Obesity               - Calorie restriction     HF              - Low salt              - Guideline directed per cards     Med changes:   Stop Spironolactone and start Hydralazine 25 mg three times per day

## 2023-11-29 ENCOUNTER — TELEPHONE (OUTPATIENT)
Dept: FAMILY MEDICINE | Facility: CLINIC | Age: 58
End: 2023-11-29
Payer: MEDICARE

## 2023-11-29 NOTE — TELEPHONE ENCOUNTER
----- Message from Terry Coley sent at 11/29/2023  8:20 AM CST -----  Contact: Self  Type: Needs Medical Advice  Who Called:  PT  Symptoms (please be specific):  FLU  How long has patient had these symptoms:  2 days  Pharmacy name and phone #:    [id number] - [facility] [address] [address] AT [facility] [address]  [address] [place] [address]  Phone: [phone] Fax: [phone]      Best Call Back Number: [phone]    Additional Information: PT would like a call to discuss if an RX can be sent in for her.

## 2023-12-08 ENCOUNTER — PATIENT MESSAGE (OUTPATIENT)
Dept: ADMINISTRATIVE | Facility: HOSPITAL | Age: 58
End: 2023-12-08
Payer: MEDICARE

## 2023-12-11 DIAGNOSIS — E11.42 TYPE 2 DIABETES MELLITUS WITH DIABETIC POLYNEUROPATHY, WITHOUT LONG-TERM CURRENT USE OF INSULIN: ICD-10-CM

## 2023-12-11 RX ORDER — INSULIN PUMP SYRINGE, 3 ML
EACH MISCELLANEOUS
Qty: 100 EACH | Refills: 11 | Status: SHIPPED | OUTPATIENT
Start: 2023-12-11 | End: 2026-01-03

## 2023-12-11 NOTE — TELEPHONE ENCOUNTER
----- Message from José Antonio Wright sent at 12/11/2023 10:11 AM CST -----  Type: Needs Medical Advice  Who Called:  pt  Best Call Back Number: [phone]  Additional Information: pt is requesting to speak with the nurse, states she lost her machine to check her sugar, and wanted the office to be on the look out for the replacement, pl call bk and advise thanks

## 2023-12-11 NOTE — TELEPHONE ENCOUNTER
Care Due:                  Date            Visit Type   Department     Provider  --------------------------------------------------------------------------------                                EP -                              [facility] FAMILY  Last Visit: 11-      CARE ([facility])   MEDICINE       Jacobo Joy                              EP -                              PRIMARY      [facility] FAMILY  Next Visit: 02-      CARE ([facility])   MEDICINE       Jacobo Joy                                                            Last  Test          Frequency    Reason                     Performed    Due Date  --------------------------------------------------------------------------------    Lipid Panel.  12 months..  atorvastatin.............  10-   10-    Health [facility] Embedded Care Due Messages. Reference number: [id number].   12/11/2023 10:32:50 AM CST

## 2023-12-20 ENCOUNTER — OFFICE VISIT (OUTPATIENT)
Dept: HEPATOLOGY | Facility: CLINIC | Age: 58
End: 2023-12-20
Payer: MEDICARE

## 2023-12-20 VITALS — BODY MASS INDEX: 50.02 KG/M2 | WEIGHT: 293 LBS | HEIGHT: 64 IN

## 2023-12-20 DIAGNOSIS — R16.0 HEPATOMEGALY: ICD-10-CM

## 2023-12-20 DIAGNOSIS — E11.9 TYPE 2 DIABETES MELLITUS WITHOUT COMPLICATION: ICD-10-CM

## 2023-12-20 DIAGNOSIS — E11.42 TYPE 2 DIABETES MELLITUS WITH DIABETIC POLYNEUROPATHY, WITHOUT LONG-TERM CURRENT USE OF INSULIN: Chronic | ICD-10-CM

## 2023-12-20 DIAGNOSIS — E66.01 MORBID OBESITY WITH BMI OF 50.0-59.9, ADULT: ICD-10-CM

## 2023-12-20 DIAGNOSIS — E78.2 MIXED HYPERLIPIDEMIA: Chronic | ICD-10-CM

## 2023-12-20 DIAGNOSIS — K76.0 FATTY LIVER: Primary | ICD-10-CM

## 2023-12-20 PROCEDURE — 1160F PR REVIEW ALL MEDS BY PRESCRIBER/CLIN PHARMACIST DOCUMENTED: ICD-10-PCS | Mod: CPTII,S$GLB,, | Performed by: NURSE PRACTITIONER

## 2023-12-20 PROCEDURE — 3044F PR MOST RECENT HEMOGLOBIN A1C LEVEL <7.0%: ICD-10-PCS | Mod: CPTII,S$GLB,, | Performed by: NURSE PRACTITIONER

## 2023-12-20 PROCEDURE — 3044F HG A1C LEVEL LT 7.0%: CPT | Mod: CPTII,S$GLB,, | Performed by: NURSE PRACTITIONER

## 2023-12-20 PROCEDURE — 99999 PR PBB SHADOW E&M-EST. PATIENT-LVL III: CPT | Mod: PBBFAC,,, | Performed by: NURSE PRACTITIONER

## 2023-12-20 PROCEDURE — 3066F PR DOCUMENTATION OF TREATMENT FOR NEPHROPATHY: ICD-10-PCS | Mod: CPTII,S$GLB,, | Performed by: NURSE PRACTITIONER

## 2023-12-20 PROCEDURE — 1159F MED LIST DOCD IN RCRD: CPT | Mod: CPTII,S$GLB,, | Performed by: NURSE PRACTITIONER

## 2023-12-20 PROCEDURE — 3008F BODY MASS INDEX DOCD: CPT | Mod: CPTII,S$GLB,, | Performed by: NURSE PRACTITIONER

## 2023-12-20 PROCEDURE — 3008F PR BODY MASS INDEX (BMI) DOCUMENTED: ICD-10-PCS | Mod: CPTII,S$GLB,, | Performed by: NURSE PRACTITIONER

## 2023-12-20 PROCEDURE — 1160F RVW MEDS BY RX/DR IN RCRD: CPT | Mod: CPTII,S$GLB,, | Performed by: NURSE PRACTITIONER

## 2023-12-20 PROCEDURE — 3066F NEPHROPATHY DOC TX: CPT | Mod: CPTII,S$GLB,, | Performed by: NURSE PRACTITIONER

## 2023-12-20 PROCEDURE — 99999 PR PBB SHADOW E&M-EST. PATIENT-LVL III: ICD-10-PCS | Mod: PBBFAC,,, | Performed by: NURSE PRACTITIONER

## 2023-12-20 PROCEDURE — 99215 PR OFFICE/OUTPT VISIT, EST, LEVL V, 40-54 MIN: ICD-10-PCS | Mod: S$GLB,,, | Performed by: NURSE PRACTITIONER

## 2023-12-20 PROCEDURE — 4010F ACE/ARB THERAPY RXD/TAKEN: CPT | Mod: CPTII,S$GLB,, | Performed by: NURSE PRACTITIONER

## 2023-12-20 PROCEDURE — 4010F PR ACE/ARB THEARPY RXD/TAKEN: ICD-10-PCS | Mod: CPTII,S$GLB,, | Performed by: NURSE PRACTITIONER

## 2023-12-20 PROCEDURE — 1159F PR MEDICATION LIST DOCUMENTED IN MEDICAL RECORD: ICD-10-PCS | Mod: CPTII,S$GLB,, | Performed by: NURSE PRACTITIONER

## 2023-12-20 PROCEDURE — 99215 OFFICE O/P EST HI 40 MIN: CPT | Mod: S$GLB,,, | Performed by: NURSE PRACTITIONER

## 2023-12-20 NOTE — PROGRESS NOTES
[facility] - New Patient     REFERRING PROVIDER: Dolores Bob  PCP: Jacobo Joy MD    Chief Complaint: Fatty liver      HISTORY     This is a 58 y.o. female referred for evaluation of fatty liver.    Fatty liver first noted on abd US in 2018. Imaging has shown hepatomegaly, no liver lesions or biliary dilation. Spleen WNL; no findings to suggest advanced fibrosis.     Review of labs:  - Transaminases: WNL  - Alk phos: WNL  - Synthetic liver function: WNL  - Platelets:    Workup thus far:  Serologies:   Lab Results   Component Value Date    HEPCAB Negative 10/03/2018       Risk factors for fatty liver:   Weight -- Body mass index is 51.58 kg/m².        Max weight 400 lb a few years ago -- today 300 lb. Has made significant dietary changes.                  Dyslipidemia -- yes   On statin                               Insulin resistance / diabetes -- yes, HgbA1c 6  On metformin        Hypertension -- yes   Alcohol use -- none    Family history:  Mother had cirrhosis due to alcohol   +CV disease, DM     Meds/supplements:  -Rx: none concerning from liver standpoint  -OTC, herbs/vitamins: none  No recent medication changes.     Social history:  Drug use: no  Exercise: not strenuous; no myalgias     Denies recent illness or infection.    No symptoms of hepatic decompensation including jaundice, ascites, cognitive problems to suggest hepatic encephalopathy, or GI bleeding.                Past medical history, surgical history, problem list, family history, social history, allergies: Reviewed and updated in the appropriate section of the electronic medical record.      Current Outpatient Medications   Medication Sig Dispense Refill    amitriptyline (ELAVIL) 25 MG tablet TAKE 1 TABLET(25 MG) BY MOUTH EVERY EVENING 90 tablet 3    amLODIPine (NORVASC) 10 MG tablet TAKE 1 TABLET(10 MG) BY MOUTH EVERY DAY 90 tablet 3    aspirin (ECOTRIN) 81 MG EC tablet Take 81 mg by mouth once daily.       atorvastatin (LIPITOR) 20 MG tablet TAKE 1 TABLET(20 MG) BY MOUTH EVERY DAY 90 tablet 3    azelastine (ASTELIN) 137 mcg (0.1 %) nasal spray USE 1 SPRAY BY NASAL ROUTE TWICE DAILY 90 mL 1    blood sugar diagnostic Strp To check BG 2 times daily, to use with insurance preferred meter DX E11.42 200 strip 1    blood-glucose meter kit To check BG 2 times daily, to use with insurance preferred meter 100 each 11    boric acid (BORIC ACID) vaginal suppository Place 1 each (650 mg total) vaginally every 7 days. (Patient not taking: Reported on 10/16/2023) 12 suppository 3    cetirizine (ZYRTEC) 10 MG tablet Take 1 tablet (10 mg total) by mouth once daily. 90 tablet 3    ciclopirox (LOPROX) 0.77 % Crea Apply topically 2 (two) times daily. 90 g 3    ciclopirox 0.77 % Gel 1 application  2 (two) times daily.      diclofenac sodium (VOLTAREN) 1 % Gel Apply 2 g topically 4 (four) times daily as needed (pain). 100 g 2    docusate sodium (COLACE) 50 MG capsule Take 100 mg by mouth 2 (two) times daily.      estradioL (ESTRACE) 1 MG tablet TAKE 1 TABLET BY MOUTH EVERY DAY 90 tablet 1    gabapentin (NEURONTIN) 400 MG capsule Take 400 mg by mouth 3 (three) times daily.      hydrALAZINE (APRESOLINE) 25 MG tablet Take 1 tablet (25 mg total) by mouth 3 (three) times daily. 90 tablet 11    hydrocodone-acetaminophen 10-325mg (NORCO)  mg Tab Take 10 tablets by mouth 3 (three) times daily.      lancets Misc To check BG 2 times daily, to use with insurance preferred meter 200 each 1    levothyroxine (SYNTHROID) 150 MCG tablet TAKE 1 TABLET(150 MCG) BY MOUTH EVERY DAY 90 tablet 3    lidocaine-prilocaine (EMLA) cream APPLY UP TO 3.2 GRAMS TO PAINFUL AREAS UP TO FIVE TIMES DAILY. RUB IN WELL      lisinopriL (PRINIVIL,ZESTRIL) 20 MG tablet TAKE 1 TABLET(20 MG) BY MOUTH EVERY DAY 90 tablet 3    metFORMIN (GLUCOPHAGE) 1000 MG tablet TAKE 1 TABLET(1000 MG) BY MOUTH EVERY EVENING 90 tablet 3    naproxen (EC NAPROSYN) 500 MG EC tablet Take 500 mg  by mouth 2 (two) times daily.      nitroGLYCERIN (NITROSTAT) 0.4 MG SL tablet Place 1 tablet (0.4 mg total) under the tongue every 5 (five) minutes as needed for Chest pain. 30 tablet 11    nystatin (MYCOSTATIN) powder Apply topically 2 (two) times daily. 30 g 11    omeprazole (PRILOSEC) 40 MG capsule TAKE 1 CAPSULE(40 MG) BY MOUTH TWICE DAILY BEFORE MEALS 180 capsule 3    ondansetron (ZOFRAN) 8 MG tablet       ondansetron (ZOFRAN-ODT) 4 MG TbDL Take 1 tablet (4 mg total) by mouth every 8 (eight) hours as needed (nausea). 30 tablet 2    RESTASIS 0.05 % ophthalmic emulsion        No current facility-administered medications for this visit.     Medication list reviewed and updated.      Review of Systems - as per HPI  Constitutional: Negative for unexpected weight change.   Respiratory: Negative for shortness of breath.    Cardiovascular: Negative for leg swelling.  Gastrointestinal: Negative for abdominal distention or abdominal pain. Negative for melena or hematemesis.  Musculoskeletal: Negative for myalgias.    Skin: Negative for jaundice or itching.  Neurological: Negative for confusion or slowed mentation. Negative for tremors.   Hematological: Does not bruise/bleed easily.       Physical Exam   Constitutional: No distress. Alert and oriented.  Eyes: No scleral icterus.   Pulmonary/Chest: Respiratory effort normal. No respiratory distress.   Abdominal: No distension, no ascites appreciated.   Extremities: No edema.   Neurological: No tremor.   Skin: No jaundice.   Psychiatric: Normal mood and affect. Speech, behavior, and thought content normal.         LABS & DIAGNOSTIC STUDIES     I have personally reviewed pertinent laboratory findings:    Lab Results   Component Value Date    ALT 25 08/21/2023    AST 26 08/21/2023    ALKPHOS 67 08/21/2023    BILITOT 0.5 08/21/2023    ALBUMIN 3.5 11/01/2023       Lab Results   Component Value Date    WBC 10.78 08/21/2023    HGB 13.0 08/21/2023    HCT 41.3 08/21/2023    MCV 86  "08/21/2023     08/21/2023       Lab Results   Component Value Date     11/01/2023    K 4.4 11/01/2023    BUN 9 11/01/2023    CREATININE 0.8 11/01/2023    ESTGFRAFRICA >60 06/17/2022    EGFRNONAA >60 06/17/2022       Lab Results   Component Value Date    HEPCAB Negative 10/03/2018    SSB83OJOU Negative 06/22/2020       No results found for: "AFP"    I have personally reviewed the following result reports:  Abdominal US - 8/21/23  EGD - 7/8/22        ASSESSMENT & PLAN     58 y.o. female with:    1. NAFLD   -- Normal liver enzymes/function.  -- Obtain MRI elastography for fibrosis and steatosis staging (will need sedation)  -- Screen for HBV and check immunity status for viral hepatitis.     Fatty liver discussion:  - Reviewed risk of hepatic inflammation and subsequent fibrosis from fatty liver.  - At this time, the only treatment for fatty liver is weight loss and maintaining good control of risk factors (blood pressure, cholesterol, and blood sugar).   - Alcohol use is also a risk factor for fatty liver. If no advanced fibrosis, should limit alcohol to max 1 standard drinks/day. Do not recommend daily alcohol use or drinking alcohol most days per week.     FIB-4 Calculation: 0.85 at 12/20/2023  3:30 PM     FIB-4 below 1.30 is considered as low-risk for advanced fibrosis  FIB-4 over 2.67 is considered as high-risk for advanced fibrosis  FIB-4 values between 1.30 and 2.67 are considered as intermediate-risk of advanced fibrosis for ages 36-64.       2. Obesity with Body mass index is 51.58 kg/m²., HLD, DM  -- Recommend low carbohydrate/sugar, high protein/fiber diet.   -- Commended on weight loss success, encouraged to continue  -- May benefit from GLP-1 for additional weight loss and blood sugar control; recommend she discuss with PCP       Orders Placed This Encounter   Procedures    MR Elastography    Ferritin    Iron and TIBC    Hepatitis A antibody, IgG    Hepatitis B Core Antibody, Total    " Hepatitis B Surface Ab, Qualitative    Hepatitis B Surface Antigen    Hepatitis C Antibody       *See AVS for patient education and instructions.       F/u after MRI elasto to discuss results.       Thank you for allowing me to participate in the care of Amena Rucker, FNP-C  Hepatology        Duration of encounter: 45 min  This includes face to face time and non-face to face time preparing to see the patient (eg, review of tests), obtaining and/or reviewing separately obtained history, documenting clinical information in the electronic or other health record, independently interpreting results and communicating results to the patient/family/caregiver, or Care coordination.

## 2023-12-20 NOTE — PATIENT INSTRUCTIONS
Can discuss weight loss/diabetes meds with PCP  Will add a few tests to labs in January  MRI in March to assess for liver scarring/damage from fatty liver  Congrats on the weight loss success, keep up the good work!         FATTY LIVER EDUCATION:  There is no FDA approved therapy for non-alcoholic fatty liver disease (NAFLD); therefore, lifestyle changes are important:  1. Ongoing weight loss efforts   *Weight loss of 5% has been shown to reduce fat in the liver  *Weight loss of 7-10% has been shown to improve fatty liver, inflammation from fatty liver, and liver fibrosis (scarring/damage)    2. Decreasing daily calories is recommended for weight loss. Try to limit carbohydrate intake to LESS than 30-45 grams of carbs with a meal and LESS than 5-10 grams of carbs with a snack. Avoid drinking beverages with sugar/carbohydrates. Meeting with a dietician or using one of the weight loss apps below can be helpful to determine individualized calorie goals and add up carbs throughout the day. Look for snacks high in protein, low in carbohydrates/sugar.    3. Exercise as tolerated. Studies have shown that exercise improves fatty liver even without weight loss.     4. Recommend good control of blood pressure, cholesterol, and blood sugar levels as these are risk factors for fatty liver. Cholesterol lowering medications including statins are typically safe and beneficial (even if liver enzymes are elevated).    5. Limit alcohol consumption, no more than 1 serving(s) of alcohol in any day (1 serving is 5 ounces of wine, 12 ounces of beer, or 1.5 ounces of liquor). Do not recommend daily alcohol use or drinking alcohol most days per week.    In some people, fatty liver can progress to steatohepatitis (inflamatory fatty liver). This can cause liver scarring or damage (fibrosis) and possibly to cirrhosis. Cirrhosis increases risk of liver cancer and liver failure. Lifestyle changes now can help to decrease this risk.     Ask  about our fatty liver/GUNTER clinical trials if you have fibrosis / scar tissue related to fatty liver.        Additional Resources:    Websites with information about fatty liver and inflammation related to fatty liver (GUNTER): www.nashtruth.com and www.nashactually.com   [facility]: Non-Alcoholic Fatty Liver Disease (NAFLD)    Facebook support group with tips and recipes:   Non-Alcoholic Fatty Liver Disease (NAFLD) Diet & Nutrition Support     Can download MyFitness Pal or Lose It britton to add up your carbohydrates throughout the day.      Try www.[employer] for recipes.    If interested in seeing a dietician to create a weight loss plan, contact the dietician team at [facility]: [email or web address].  You can also call to schedule a consult with a dietician: [phone]  *Virtual visits are available with one of our dieticians.     If you have diabetes or high blood pressure, you can meet with a Health  through [facility]. Let us know if you are interested in a referral.     Let us know if you are interested in a referral to [facility].

## 2023-12-21 ENCOUNTER — TELEPHONE (OUTPATIENT)
Dept: FAMILY MEDICINE | Facility: CLINIC | Age: 58
End: 2023-12-21
Payer: MEDICARE

## 2023-12-21 NOTE — TELEPHONE ENCOUNTER
----- Message from José Antonio Wright sent at 12/21/2023 11:10 AM CST -----  Type: Needs Medical Advice  Who Called:  pt  Best Call Back Number: [phone]  Additional Information: pt is requesting to speak with the nurse states its very important, pl call bk to advise thanks

## 2023-12-21 NOTE — TELEPHONE ENCOUNTER
Spoke to pt. She stated SANJAY Hernandez (Hepatology ) would like for her to start Ozempic or Monjuro to help with her weight because its affecting her liver.

## 2024-01-02 ENCOUNTER — LAB VISIT (OUTPATIENT)
Dept: LAB | Facility: HOSPITAL | Age: 59
End: 2024-01-02
Attending: INTERNAL MEDICINE
Payer: MEDICARE

## 2024-01-02 DIAGNOSIS — K76.0 FATTY LIVER: ICD-10-CM

## 2024-01-02 DIAGNOSIS — I10 ESSENTIAL HYPERTENSION: Chronic | ICD-10-CM

## 2024-01-02 DIAGNOSIS — R16.0 HEPATOMEGALY: ICD-10-CM

## 2024-01-02 LAB
ALBUMIN SERPL BCP-MCNC: 3.6 G/DL (ref 3.5–5.2)
ANION GAP SERPL CALC-SCNC: 11 MMOL/L (ref 8–16)
BUN SERPL-MCNC: 9 MG/DL (ref 6–20)
CALCIUM SERPL-MCNC: 9.4 MG/DL (ref 8.7–10.5)
CHLORIDE SERPL-SCNC: 105 MMOL/L (ref 95–110)
CO2 SERPL-SCNC: 25 MMOL/L (ref 23–29)
CREAT SERPL-MCNC: 0.7 MG/DL (ref 0.5–1.4)
EST. GFR  (NO RACE VARIABLE): >60 ML/MIN/1.73 M^2
FERRITIN SERPL-MCNC: 134 NG/ML (ref 20–300)
GLUCOSE SERPL-MCNC: 110 MG/DL (ref 70–110)
HAV IGG SER QL IA: NORMAL
HBV CORE AB SERPL QL IA: NORMAL
HBV SURFACE AB SER-ACNC: <3 MIU/ML
HBV SURFACE AB SER-ACNC: NORMAL M[IU]/ML
HBV SURFACE AG SERPL QL IA: NORMAL
HCV AB SERPL QL IA: NORMAL
IRON SERPL-MCNC: 62 UG/DL (ref 30–160)
PHOSPHATE SERPL-MCNC: 2.8 MG/DL (ref 2.7–4.5)
POTASSIUM SERPL-SCNC: 4.2 MMOL/L (ref 3.5–5.1)
SATURATED IRON: 17 % (ref 20–50)
SODIUM SERPL-SCNC: 141 MMOL/L (ref 136–145)
TOTAL IRON BINDING CAPACITY: 369 UG/DL (ref 250–450)
TRANSFERRIN SERPL-MCNC: 249 MG/DL (ref 200–375)

## 2024-01-02 PROCEDURE — 82728 ASSAY OF FERRITIN: CPT | Performed by: NURSE PRACTITIONER

## 2024-01-02 PROCEDURE — 87340 HEPATITIS B SURFACE AG IA: CPT | Performed by: NURSE PRACTITIONER

## 2024-01-02 PROCEDURE — 36415 COLL VENOUS BLD VENIPUNCTURE: CPT | Mod: PO | Performed by: NURSE PRACTITIONER

## 2024-01-02 PROCEDURE — 86790 VIRUS ANTIBODY NOS: CPT | Performed by: NURSE PRACTITIONER

## 2024-01-02 PROCEDURE — 86706 HEP B SURFACE ANTIBODY: CPT | Mod: 91 | Performed by: NURSE PRACTITIONER

## 2024-01-02 PROCEDURE — 86803 HEPATITIS C AB TEST: CPT | Performed by: NURSE PRACTITIONER

## 2024-01-02 PROCEDURE — 80069 RENAL FUNCTION PANEL: CPT | Performed by: INTERNAL MEDICINE

## 2024-01-02 PROCEDURE — 86704 HEP B CORE ANTIBODY TOTAL: CPT | Performed by: NURSE PRACTITIONER

## 2024-01-02 PROCEDURE — 83540 ASSAY OF IRON: CPT | Performed by: NURSE PRACTITIONER

## 2024-01-16 DIAGNOSIS — E78.5 DYSLIPIDEMIA: ICD-10-CM

## 2024-01-16 DIAGNOSIS — G89.4 CHRONIC PAIN SYNDROME: Chronic | ICD-10-CM

## 2024-01-16 DIAGNOSIS — E11.9 TYPE 2 DIABETES MELLITUS WITHOUT COMPLICATION, WITHOUT LONG-TERM CURRENT USE OF INSULIN: ICD-10-CM

## 2024-01-16 DIAGNOSIS — I10 ESSENTIAL HYPERTENSION: Chronic | ICD-10-CM

## 2024-01-16 RX ORDER — AMITRIPTYLINE HYDROCHLORIDE 25 MG/1
25 TABLET, FILM COATED ORAL NIGHTLY
Qty: 90 TABLET | Refills: 1 | Status: SHIPPED | OUTPATIENT
Start: 2024-01-16 | End: 2024-05-10

## 2024-01-16 RX ORDER — ATORVASTATIN CALCIUM 20 MG/1
20 TABLET, FILM COATED ORAL DAILY
Qty: 90 TABLET | Refills: 1 | Status: SHIPPED | OUTPATIENT
Start: 2024-01-16

## 2024-01-16 RX ORDER — LISINOPRIL 20 MG/1
20 TABLET ORAL DAILY
Qty: 90 TABLET | Refills: 1 | Status: SHIPPED | OUTPATIENT
Start: 2024-01-16 | End: 2024-06-19

## 2024-01-16 RX ORDER — ESTRADIOL 1 MG/1
1 TABLET ORAL DAILY
Qty: 90 TABLET | Refills: 1 | Status: SHIPPED | OUTPATIENT
Start: 2024-01-16

## 2024-01-16 NOTE — TELEPHONE ENCOUNTER
----- Message from Cathy Pearson sent at 1/16/2024 12:01 PM CST -----  Contact: self  Type:  RX Refill Request    Who Called:  Patient  Refill or New Rx:  Refill  RX Name and Strength:  amitriptyline (ELAVIL) 25 MG tablet  lisinopriL (PRINIVIL,ZESTRIL) 20 MG tablet  estradioL (ESTRACE) 1 MG tablet  atorvastatin (LIPITOR) 20 MG tablet    How is the patient currently taking it? (ex. 1XDay):  as directed  Is this a 30 day or 90 day RX:  as directed  Preferred Pharmacy with phone number:    [facility] #[id number] - [facility] [address] [place] [address] AT [facility] OF [facility] [address] [address]  Phone: [phone] Fax: [phone]      Local or Mail Order:  Local  Ordering Provider:  Benita Watt Call Back Number:  [phone]    Additional Information:  States she need a refill and need to speak with office.Please call back

## 2024-01-16 NOTE — TELEPHONE ENCOUNTER
No care due was identified.  Health [facility] Embedded Care Due Messages. Reference number: [id number].   1/16/2024 12:42:16 PM CST

## 2024-01-24 ENCOUNTER — OFFICE VISIT (OUTPATIENT)
Dept: PODIATRY | Facility: CLINIC | Age: 59
End: 2024-01-24
Payer: MEDICARE

## 2024-01-24 VITALS — HEIGHT: 64 IN | WEIGHT: 293 LBS | BODY MASS INDEX: 50.02 KG/M2

## 2024-01-24 DIAGNOSIS — E11.42 TYPE 2 DIABETES MELLITUS WITH DIABETIC POLYNEUROPATHY, WITHOUT LONG-TERM CURRENT USE OF INSULIN: Primary | ICD-10-CM

## 2024-01-24 DIAGNOSIS — E11.51 TYPE II DIABETES MELLITUS WITH PERIPHERAL CIRCULATORY DISORDER: ICD-10-CM

## 2024-01-24 DIAGNOSIS — B35.1 ONYCHOMYCOSIS DUE TO DERMATOPHYTE: ICD-10-CM

## 2024-01-24 PROCEDURE — 99213 OFFICE O/P EST LOW 20 MIN: CPT | Mod: 25,S$GLB,, | Performed by: PODIATRIST

## 2024-01-24 PROCEDURE — 11721 DEBRIDE NAIL 6 OR MORE: CPT | Mod: Q9,S$GLB,, | Performed by: PODIATRIST

## 2024-01-24 PROCEDURE — 99999 PR PBB SHADOW E&M-EST. PATIENT-LVL II: CPT | Mod: PBBFAC,,, | Performed by: PODIATRIST

## 2024-01-24 NOTE — PROGRESS NOTES
Subjective:      Patient ID: Amena Anderson is a 58 y.o. female.    Chief Complaint: Diabetic Foot Exam (PCP 11/17/23) and Nail Care    Patient returns for follow up  thick and discolored nails for high risk foot care. She is high risk secondary to the diabetes with PVD. No acute changes since her last visit to her feet. Doing well overall, no acute changes doing well overall. Also here for her annual diabetic foot exam    PCP: Dr. Miller  Last seen: 11/17/23    Review of Systems   Constitutional: Negative for chills, diaphoresis, fever, malaise/fatigue and night sweats.   Cardiovascular:  Positive for leg swelling. Negative for claudication, cyanosis and syncope.   Skin:  Negative for color change, dry skin, nail changes, rash, suspicious lesions and unusual hair distribution.   Musculoskeletal:  Positive for joint pain. Negative for falls, joint swelling, muscle cramps, muscle weakness and stiffness.   Gastrointestinal:  Negative for constipation, diarrhea, nausea and vomiting.   Neurological:  Positive for paresthesias. Negative for brief paralysis, disturbances in coordination, focal weakness, numbness, sensory change and tremors.           Objective:      Physical Exam  Constitutional:       General: She is not in acute distress.     Appearance: She is well-developed. She is not diaphoretic.   Cardiovascular:      Pulses:           Dorsalis pedis pulses are 2+ on the right side and 2+ on the left side.        Posterior tibial pulses are 2+ on the right side and 2+ on the left side.      Comments: Capillary refill 3 seconds all toes/distal feet, all toes/both feet warm to touch.      Positive lower extremity edema bilateral.    Musculoskeletal:      Right ankle: Normal. No swelling, deformity, ecchymosis or lacerations. Normal range of motion. Normal pulse.      Right Achilles Tendon: Normal. No defects. De La Cruz's test negative.      Comments: Improving pain to palpation lateral left ankle at cfl and atfl  without instability deformity or loss of function. Also has pain along the peroneal tendons to the brevis insertion.    Ankle dorsiflexion decreased at <10 degrees bilateral with moderate increase with knee flexion bilateral. There is tenderness to palpation bilateral achilles attachments on the calcaneus.    Pain on palpation plantar medial and central heel right foot. No pain with ROM or MMT. No pain with medial and lateral compression of heel.     Feet:      Right foot:      Protective Sensation: 10 sites tested.  8 sites sensed.      Left foot:      Protective Sensation: 10 sites tested.  9 sites sensed.   Lymphadenopathy:      Comments: Negative lymphadenopathy bilateral popliteal fossa and tarsal tunnel.   Skin:     General: Skin is warm and dry.      Coloration: Skin is not pale.      Findings: Lesion present. No abrasion, bruising, burn, ecchymosis, erythema, laceration, petechiae or rash.      Nails: There is no clubbing.      Comments:   Dry scale with superficial flakes over an erythematous base more to the left foot without ulceration, drainage, pus, tracking, fluctuance, malodor, or cardinal signs infection.      Skin is thin and atrophic bilateral    Nails 1-5 bilateral are thick 3-4 mm, long 3-6 mm, and discolored with subungual debris    Hyperkeratotic lesions right plantar medial aspect first metatarsal bilateral          Neurological:      Mental Status: She is alert and oriented to person, place, and time.      Sensory: Sensory deficit present.      Motor: No tremor, atrophy, abnormal muscle tone or seizure activity.      Gait: Gait normal.      Comments: Negative tinel sign to percussion sural, superficial peroneal, deep peroneal, saphenous, and posterior tibial nerves right and left ankles and feet. Decreased vibratory sensation bilateral     Psychiatric:         Behavior: Behavior normal. Behavior is cooperative.               Assessment:       Encounter Diagnoses   Name Primary?    Type 2  diabetes mellitus with diabetic polyneuropathy, without long-term current use of insulin Yes    Type II diabetes mellitus with peripheral circulatory disorder     Onychomycosis due to dermatophyte                  Plan:       Amena was seen today for diabetic foot exam and nail care.    Diagnoses and all orders for this visit:    Type 2 diabetes mellitus with diabetic polyneuropathy, without long-term current use of insulin    Type II diabetes mellitus with peripheral circulatory disorder    Onychomycosis due to dermatophyte              I counseled the patient on her conditions, their implications and medical management.    Shoe inspection. Diabetic Foot Education. Patient reminded of the importance of good nutrition and blood sugar control to help prevent podiatric complications of diabetes. Patient instructed on proper foot hygeine. We discussed wearing proper shoe gear, daily foot inspections, never walking without protective shoe gear, never putting sharp instruments to feet Discussed importance of supportive shoes with accommodative toe box to reduce pressure and irritation to forefoot.     With patient's verbal consent, nails were aggressively reduced and debrided x 10 to their soft tissue attachment mechanically removing all offending nail and debris. Patient relates relief following the procedure. No anesthesia or hemostasis required. No blood loss.           Follow up in 3 months routine care.    Edson Mratinez DPM

## 2024-02-07 LAB
LEFT EYE DM RETINOPATHY: NEGATIVE
RIGHT EYE DM RETINOPATHY: NEGATIVE

## 2024-02-12 ENCOUNTER — TELEPHONE (OUTPATIENT)
Dept: CARDIOLOGY | Facility: CLINIC | Age: 59
End: 2024-02-12
Payer: MEDICARE

## 2024-02-12 ENCOUNTER — OFFICE VISIT (OUTPATIENT)
Dept: CARDIOLOGY | Facility: CLINIC | Age: 59
End: 2024-02-12
Payer: MEDICARE

## 2024-02-12 VITALS
SYSTOLIC BLOOD PRESSURE: 145 MMHG | HEIGHT: 66 IN | HEART RATE: 88 BPM | WEIGHT: 293 LBS | DIASTOLIC BLOOD PRESSURE: 84 MMHG | BODY MASS INDEX: 47.09 KG/M2

## 2024-02-12 DIAGNOSIS — R07.9 CHEST PAIN, UNSPECIFIED TYPE: ICD-10-CM

## 2024-02-12 DIAGNOSIS — E66.01 MORBID OBESITY: ICD-10-CM

## 2024-02-12 DIAGNOSIS — I10 ESSENTIAL HYPERTENSION: Chronic | ICD-10-CM

## 2024-02-12 DIAGNOSIS — E11.42 TYPE 2 DIABETES MELLITUS WITH DIABETIC POLYNEUROPATHY, WITHOUT LONG-TERM CURRENT USE OF INSULIN: ICD-10-CM

## 2024-02-12 DIAGNOSIS — E78.2 MIXED HYPERLIPIDEMIA: Chronic | ICD-10-CM

## 2024-02-12 DIAGNOSIS — G47.33 OSA (OBSTRUCTIVE SLEEP APNEA): ICD-10-CM

## 2024-02-12 DIAGNOSIS — R07.89 ATYPICAL CHEST PAIN: Primary | ICD-10-CM

## 2024-02-12 DIAGNOSIS — R00.2 PALPITATIONS: ICD-10-CM

## 2024-02-12 PROBLEM — E66.813 CLASS 3 SEVERE OBESITY DUE TO EXCESS CALORIES WITH SERIOUS COMORBIDITY AND BODY MASS INDEX (BMI) OF 50.0 TO 59.9 IN ADULT: Status: RESOLVED | Noted: 2017-01-22 | Resolved: 2024-02-12

## 2024-02-12 PROCEDURE — 99214 OFFICE O/P EST MOD 30 MIN: CPT | Mod: S$GLB,,, | Performed by: INTERNAL MEDICINE

## 2024-02-12 PROCEDURE — 93010 ELECTROCARDIOGRAM REPORT: CPT | Mod: S$GLB,,, | Performed by: INTERNAL MEDICINE

## 2024-02-12 PROCEDURE — 99999 PR PBB SHADOW E&M-EST. PATIENT-LVL III: CPT | Mod: PBBFAC,,, | Performed by: INTERNAL MEDICINE

## 2024-02-12 PROCEDURE — 93005 ELECTROCARDIOGRAM TRACING: CPT | Mod: PO

## 2024-02-12 NOTE — PATIENT INSTRUCTIONS
Call us with the names and doses of your medications when you get home   Stress test  Heart monitor  Return in 6 months

## 2024-02-12 NOTE — PROGRESS NOTES
" Cardiology Clinic Note  Date: 24    Patient: Amena Anderson, 1965, [id number]  Primary Care Provider: Jacobo Joy MD     Chief Complaint/Reason for Referral: atypical chest discomfort     Subjective     Amena Anderson is a 58 y.o. female who presents for establishment of care. They are accompanied by cousin Cheryl Adam.    Was told she had CHF years ago. She had an angiogram 10 yrs ago or so and no revascularization. She has been having palpitations and chest tightness. Told she had ?mitral prolapse. Did not tolerate spironolactone so stopped it on her own. Gets short of breath also with activity. Symptoms have worsened in the past 2-3 weeks. She has been using nitrolgycerin SL when she gets severe pain. Sister  in  so she is very anxious and under a lot of stress.     Focused Past History includes:  TTE 2021 reported normal  BMI 48  Nonalcoholic fatty liver disease, early   Type 2 diabetes on orals  GERD   Chronic pain   Hypertension  No renal artery stenosis reported by Doppler 2023  Never smoker   Sister  of "heart failure"       Current Outpatient Medications   Medication Sig    amitriptyline (ELAVIL) 25 MG tablet Take 1 tablet (25 mg total) by mouth every evening.    amLODIPine (NORVASC) 10 MG tablet TAKE 1 TABLET(10 MG) BY MOUTH EVERY DAY    aspirin (ECOTRIN) 81 MG EC tablet Take 81 mg by mouth once daily.    atorvastatin (LIPITOR) 20 MG tablet Take 1 tablet (20 mg total) by mouth once daily.    azelastine (ASTELIN) 137 mcg (0.1 %) nasal spray USE 1 SPRAY BY NASAL ROUTE TWICE DAILY    blood sugar diagnostic Strp To check BG 2 times daily, to use with insurance preferred meter DX E11.42    blood-glucose meter kit To check BG 2 times daily, to use with insurance preferred meter    cetirizine (ZYRTEC) 10 MG tablet Take 1 tablet (10 mg total) by mouth once daily.    ciclopirox (LOPROX) 0.77 % Crea Apply topically 2 (two) times daily.    ciclopirox 0.77 % Gel 1 " application  2 (two) times daily.    diclofenac sodium (VOLTAREN) 1 % Gel Apply 2 g topically 4 (four) times daily as needed (pain).    docusate sodium (COLACE) 50 MG capsule Take 100 mg by mouth 2 (two) times daily.    estradioL (ESTRACE) 1 MG tablet Take 1 tablet (1 mg total) by mouth once daily.    gabapentin (NEURONTIN) 400 MG capsule Take 400 mg by mouth 3 (three) times daily.    hydrocodone-acetaminophen 10-325mg (NORCO)  mg Tab Take 10 tablets by mouth 3 (three) times daily.    lancets Misc To check BG 2 times daily, to use with insurance preferred meter    levothyroxine (SYNTHROID) 150 MCG tablet TAKE 1 TABLET(150 MCG) BY MOUTH EVERY DAY    lisinopriL (PRINIVIL,ZESTRIL) 20 MG tablet Take 1 tablet (20 mg total) by mouth once daily.    metFORMIN (GLUCOPHAGE) 1000 MG tablet TAKE 1 TABLET(1000 MG) BY MOUTH EVERY EVENING    naproxen (EC NAPROSYN) 500 MG EC tablet Take 500 mg by mouth 2 (two) times daily.    nitroGLYCERIN (NITROSTAT) 0.4 MG SL tablet Place 1 tablet (0.4 mg total) under the tongue every 5 (five) minutes as needed for Chest pain.    nystatin (MYCOSTATIN) powder Apply topically 2 (two) times daily.    omeprazole (PRILOSEC) 40 MG capsule TAKE 1 CAPSULE(40 MG) BY MOUTH TWICE DAILY BEFORE MEALS    ondansetron (ZOFRAN) 8 MG tablet     ondansetron (ZOFRAN-ODT) 4 MG TbDL Take 1 tablet (4 mg total) by mouth every 8 (eight) hours as needed (nausea).    RESTASIS 0.05 % ophthalmic emulsion     diazePAM (VALIUM) 5 MG tablet Take 1 tablet (5 mg total) by mouth On call Procedure (prior to MRI). Take 1 hour prior to MRI. Do not drive after taking medication.    tirzepatide (MOUNJARO) 2.5 mg/0.5 mL PnIj Inject 2.5 mg into the skin every 7 days.     No current facility-administered medications for this visit.               Review of Systems  Constitutional: negative for fevers, night sweats, and weight loss  Eyes: negative for visual disturbance, diplopia  Respiratory: negative for cough, hemoptysis, sputum,  and wheezing  Cardiovascular: see HPI  Gastrointestinal: negative for abdominal pain, bright red blood per rectum, change in bowel habits, dysphagia, melena, and reflux symptoms  Genitourinary:negative for dysuria, frequency, and hematuria  Hematologic/lymphatic: negative for bleeding, easy bruising, and lymphadenopathy  Musculoskeletal:negative for arthralgias, back pain, and myalgias  Neurological: negative for gait problems, paresthesia, speech problems, vertigo, and weakness  Behavioral/Psych: negative for excessive alcohol consumption, illegal drug usage, and sleep disturbance    ----------------------------  AC (acromioclavicular) joint bone spurs, unspecified laterality      Comment:  bilateral  Dysphagia  H. pylori infection  Insomnia  ----------------------------   section      Comment:  Three times  Colonoscopy      Comment:  Dr. Benitez, repeat in 10 years  Esophageal dilation      Comment:  Procedure: DILATION, ESOPHAGUS;  Surgeon: Randy Benitez Jr., MD;  Location: [facility];  Service: Endoscopy;               Laterality: N/A;  Esophagogastroduodenoscopy      Comment:  Procedure: EGD (ESOPHAGOGASTRODUODENOSCOPY);  Surgeon:                Randy Benitez Jr., MD;  Location: [facility];  Service:               Endoscopy;  Laterality: N/A;  Hysterectomy      Comment:  total  Lasik  Lumbar epidural injection  Oophorectomy  Thyroidectomy, partial  Upper gastrointestinal endoscopy      Comment:  Dr. Benitez     Family History   Problem Relation Age of Onset    Cirrhosis Mother     Kidney disease Mother     Hypertension Father     Kidney disease Father     Heart disease Father     Alzheimer's disease Father     Throat cancer Maternal Uncle     Breast cancer Cousin     Breast cancer Other 35    Colon cancer Neg Hx     Colon polyps Neg Hx     Crohn's disease Neg Hx     Ulcerative colitis Neg Hx     Stomach cancer Neg Hx     Esophageal cancer Neg Hx     Ovarian cancer Neg Hx      Social  "History     Tobacco Use    Smoking status: Never    Smokeless tobacco: Never   Substance Use Topics    Alcohol use: No    Drug use: Yes     Types: Hydrocodone         Physical Exam  BP (!) 145/84 (BP Location: Left forearm, Patient Position: Sitting, BP Method: Medium (Automatic))   Pulse 88   Ht 5' 6" (1.676 m)   Wt 135.9 kg (299 lb 9.7 oz)   BMI 48.36 kg/m²   Body surface area is 2.52 meters squared.  Body mass index is 48.36 kg/m².    General appearance: alert, appears stated age, cooperative, and no distress  Head: Normocephalic, without obvious abnormality, atraumatic  Neck: no carotid bruit, no JVD, and supple, symmetrical, trachea midline  Lungs: clear to auscultation bilaterally  Heart: regular rate and rhythm; S1, S2 normal, no murmur, click, rub or gallop  Abdomen: soft, non-tender, no distended  Extremities: extremities atraumatic, no pitting edema  Skin: warm, no cyanosis, no pathologic ecchymosis in exposed portions  Neurologic: Grossly normal. A&O x3      Labs Reviewed     Lab Results   Component Value Date    WBC 10.78 08/21/2023    HGB 13.0 08/21/2023     08/21/2023       Lab Results   Component Value Date     01/02/2024    K 4.2 01/02/2024     01/02/2024    CO2 25 01/02/2024    CALCIUM 9.4 01/02/2024    MG 1.6 10/25/2023     01/02/2024    PROT 7.5 08/21/2023       Lab Results   Component Value Date    BUN 9 01/02/2024    BUN 9 11/01/2023    BUN 7 10/25/2023    CREATININE 0.7 01/02/2024    CREATININE 0.8 11/01/2023    CREATININE 0.7 10/25/2023    EGFRNORACEVR >60.0 01/02/2024    EGFRNORACEVR >60.0 11/01/2023    EGFRNORACEVR >60.0 10/25/2023       Lab Results   Component Value Date    ALBUMIN 3.6 01/02/2024    BILITOT 0.5 08/21/2023    ALKPHOS 67 08/21/2023    ALT 25 08/21/2023       Lab Results   Component Value Date    TRIG 96 10/14/2022    CHOL 159 10/14/2022    HDL 40 10/14/2022    LDLCALC 99.8 10/14/2022    LDLCALC 96.2 06/13/2022       Lab Results   Component Value " "Date    HGBA1C 6.0 (H) 11/01/2023    HGBA1C 6.0 (H) 10/14/2022    TSH 1.121 10/14/2022    FREET4 1.09 10/14/2022       No results found for: "NTPROBNP", "BNP"      EKG today:  Baseline artifact.  V1 missing.  Otherwise normal sinus rhythm and within normal limits            ASSESSMENT & PLAN:    This is a 58 y.o. pleasant female with atypical chest discomfort and palpitations.      1. Atypical chest pain  Stress Echo Which stress agent will be used? Pharmacological; Color Flow Doppler? Yes      2. Mixed hyperlipidemia        3. Essential hypertension        4. Chest pain, unspecified type  IN OFFICE EKG 12-LEAD (to Muse)      5. Palpitations  Cardiac event monitor      6. MANUEL (obstructive sleep apnea)        7. Type 2 diabetes mellitus with diabetic polyneuropathy, without long-term current use of insulin        8. Morbid obesity             Dobutamine stress echo - limited in walking/running  Event monitor   Continue amlodipine , lisinopril for hypertension  Continue atorvastatin for primary prevention of atherosclerotic disease  Metformin and tirzepatide for diabetes which is well-controlled  Emphasized the importance of modifying lifestyle related risk factors including weight loss, limiting alcohol intake, exercise, diet most resembling a Mediterranean diet.    I appreciate the opportunity to participate in Amena Anderson 's care today.  Please follow up with me in 6 months.      Tejinder Garcia MD, [facility]  Interventional Cardiology/Structural Heart Disease  [facility]  Office: [phone]            Parts of this note were completed using voice recognition software. Please excuse any misspellings or syntax errors and reach out to me with questions.    "

## 2024-02-14 NOTE — TELEPHONE ENCOUNTER
"Spoke with pt about all medications she is taking. Pt wanted to make sure she is taking all of her medications correctly and to let me know what she does not take anymore. Pt verbalized understanding to her medication list and what she is taking and not taking.   Pt informed me that she no longer takes Boric acid 650 mg vaginal suppository and lidocaine-prilocaine cream 3.2 g. Pt informed me that she is taking "sucralate 1mg 4 times/day" Pt expressed that her Gastroenterologist had prescribed it. I advised her to call her Gastroenterologist to confirm prescription because I do not see it in her medication list. Pt verbalized understanding.   "

## 2024-02-16 ENCOUNTER — OFFICE VISIT (OUTPATIENT)
Dept: FAMILY MEDICINE | Facility: CLINIC | Age: 59
End: 2024-02-16
Payer: MEDICARE

## 2024-02-16 VITALS
TEMPERATURE: 99 F | BODY MASS INDEX: 47.09 KG/M2 | HEIGHT: 66 IN | RESPIRATION RATE: 18 BRPM | SYSTOLIC BLOOD PRESSURE: 142 MMHG | DIASTOLIC BLOOD PRESSURE: 82 MMHG | OXYGEN SATURATION: 97 % | WEIGHT: 293 LBS | HEART RATE: 99 BPM

## 2024-02-16 DIAGNOSIS — E66.01 CLASS 3 SEVERE OBESITY DUE TO EXCESS CALORIES WITH SERIOUS COMORBIDITY AND BODY MASS INDEX (BMI) OF 45.0 TO 49.9 IN ADULT: ICD-10-CM

## 2024-02-16 DIAGNOSIS — I10 ESSENTIAL HYPERTENSION: Chronic | ICD-10-CM

## 2024-02-16 DIAGNOSIS — E11.42 TYPE 2 DIABETES MELLITUS WITH DIABETIC POLYNEUROPATHY, WITHOUT LONG-TERM CURRENT USE OF INSULIN: Primary | Chronic | ICD-10-CM

## 2024-02-16 PROBLEM — H57.11 ACUTE RIGHT EYE PAIN: Status: RESOLVED | Noted: 2023-11-17 | Resolved: 2024-02-16

## 2024-02-16 LAB
OHS QRS DURATION: 78 MS
OHS QTC CALCULATION: 457 MS

## 2024-02-16 PROCEDURE — 99213 OFFICE O/P EST LOW 20 MIN: CPT | Mod: S$GLB,,, | Performed by: STUDENT IN AN ORGANIZED HEALTH CARE EDUCATION/TRAINING PROGRAM

## 2024-02-16 PROCEDURE — 99999 PR PBB SHADOW E&M-EST. PATIENT-LVL V: CPT | Mod: PBBFAC,,, | Performed by: STUDENT IN AN ORGANIZED HEALTH CARE EDUCATION/TRAINING PROGRAM

## 2024-02-16 RX ORDER — TIRZEPATIDE 2.5 MG/.5ML
2.5 INJECTION, SOLUTION SUBCUTANEOUS
Qty: 4 PEN | Refills: 11 | Status: SHIPPED | OUTPATIENT
Start: 2024-02-16 | End: 2024-03-12

## 2024-02-16 NOTE — PROGRESS NOTES
Name: Amena Anderson  MRN: [id number]  : 1965  PCP: Jacobo Joy MD    Subjective   Patient presents for regular follow up. She has since seen hepatology for fatty liver. They think patient needs to lose a significant amount of weight to improve her fatty liver disease.    Review of Systems    Patient Active Problem List   Diagnosis    Type 2 diabetes mellitus with diabetic polyneuropathy, without long-term current use of insulin    Essential hypertension    MANUEL (obstructive sleep apnea)    Chronic pain syndrome    Class 3 severe obesity due to excess calories with serious comorbidity and body mass index (BMI) of 45.0 to 49.9 in adult    Hypothyroidism    Gastroesophageal reflux disease without esophagitis    Dysphagia    Nonalcoholic fatty liver disease    Mixed hyperlipidemia    Diastolic dysfunction    Chronic lower back pain    Candidal skin infection    Dry eye syndrome of both eyes    Age-related nuclear cataract of both eyes    Primary open angle glaucoma (POAG) of both eyes, mild stage    Anxiety about health       Health Maintenance Due   Topic Date Due    TETANUS VACCINE  Never done    Shingles Vaccine (1 of 2) Never done    Diabetes Urine Screening  2018    COVID-19 Vaccine (2023- season) 2023    RSV Vaccine (Age 60+ and Pregnant patients) (1 - 1-dose 60+ series) Never done    Lipid Panel  10/14/2023       Objective   Vitals:    24 1055   BP: (!) 142/82   Pulse: 99   Resp: 18   Temp: 98.5 °F (36.9 °C)       Physical Exam  Constitutional:       General: She is not in acute distress.     Appearance: Normal appearance. She is well-developed.   HENT:      Head: Normocephalic and atraumatic.      Right Ear: External ear normal.      Left Ear: External ear normal.   Eyes:      Conjunctiva/sclera: Conjunctivae normal.   Pulmonary:      Effort: Pulmonary effort is normal. No respiratory distress.   Abdominal:      General: Abdomen is flat. There is no distension.    Musculoskeletal:         General: No swelling or deformity.      Right lower leg: No edema.      Left lower leg: No edema.   Skin:     General: Skin is warm and dry.      Coloration: Skin is not jaundiced.   Neurological:      Mental Status: She is alert and oriented to person, place, and time. Mental status is at baseline.   Psychiatric:         Attention and Perception: Attention and perception normal.         Mood and Affect: Mood normal.         Speech: Speech normal.         Behavior: Behavior normal. Behavior is cooperative.         Thought Content: Thought content normal.         Cognition and Memory: Cognition normal.         Judgment: Judgment normal.         Assessment & Plan   1. Type 2 diabetes mellitus with diabetic polyneuropathy, without long-term current use of insulin  -     tirzepatide (MOUNJARO) 2.5 mg/0.5 mL PnIj; Inject 2.5 mg into the skin every 7 days.  Dispense: 4 Pen; Refill: 11    2. Class 3 severe obesity due to excess calories with serious comorbidity and body mass index (BMI) of 45.0 to 49.9 in adult  Assessment & Plan:  Improving somewhat. However, given fatty liver disease and hypertension, I think starting a medication is necessary.    Orders:  -     tirzepatide (MOUNJARO) 2.5 mg/0.5 mL PnIj; Inject 2.5 mg into the skin every 7 days.  Dispense: 4 Pen; Refill: 11    3. Essential hypertension        Follow up in 3 months.     Jacobo Joy MD  02/16/2024

## 2024-02-16 NOTE — ASSESSMENT & PLAN NOTE
Improving somewhat. However, given fatty liver disease and hypertension, I think starting a medication is necessary.

## 2024-02-19 ENCOUNTER — TELEPHONE (OUTPATIENT)
Dept: FAMILY MEDICINE | Facility: CLINIC | Age: 59
End: 2024-02-19
Payer: MEDICARE

## 2024-02-19 NOTE — TELEPHONE ENCOUNTER
Medication not prescribed by this pharmacy, returned call and was unable to get information why we were called.

## 2024-02-20 ENCOUNTER — DOCUMENTATION ONLY (OUTPATIENT)
Dept: FAMILY MEDICINE | Facility: CLINIC | Age: 59
End: 2024-02-20
Payer: MEDICARE

## 2024-02-20 ENCOUNTER — PATIENT MESSAGE (OUTPATIENT)
Dept: FAMILY MEDICINE | Facility: CLINIC | Age: 59
End: 2024-02-20
Payer: MEDICARE

## 2024-02-20 NOTE — PROGRESS NOTES
Amena Anderson (Key: [id number])  PA Case ID #: [id number]  Need Help? Call us at [phone]  Outcome  Approved on February 19  Request Reference Number: [id number]. MOUNJARO INJ 2.5/0.5 is approved through 12/31/2024. Your patient may now fill this prescription and it will be covered.  Authorization Expiration Date: 12/31/2024  Drug  Mounjaro 2.5MG/0.5ML pen-injectors    Form  [employer] Medicare Part D Electronic Prior Authorization Form (2017 NCPDP)

## 2024-02-27 ENCOUNTER — TELEPHONE (OUTPATIENT)
Dept: HEPATOLOGY | Facility: CLINIC | Age: 59
End: 2024-02-27
Payer: MEDICARE

## 2024-02-27 RX ORDER — DIAZEPAM 5 MG/1
5 TABLET ORAL
Qty: 1 TABLET | Refills: 0 | Status: SHIPPED | OUTPATIENT
Start: 2024-02-27 | End: 2024-05-16

## 2024-02-27 NOTE — TELEPHONE ENCOUNTER
Spoke with patient informing her that message was sent to Sanjuana on yesterday. Informed patient most of the time medication be called in the day before. Informed patient if mediation is not received by Thursday afternoon to give us a call back. Patient verbalized understanding.

## 2024-02-27 NOTE — TELEPHONE ENCOUNTER
Spoke with patient informing her per Sanjuana sent a request to one of the liver physicians to send a pre-med to her pharmacy. She should take this 30-60 min prior to the scan and make sure to have someone drive her. Patient states her daughter is bring her to [place] and verbalized understanding.

## 2024-02-27 NOTE — TELEPHONE ENCOUNTER
----- Message from Terry Coley sent at 2/27/2024  9:30 AM CST -----  Contact: Self  Type: Needs Medical Advice  Who Called:  PT    Best Call Back Number: [phone]    Additional Information: PT would like to discuss MRI

## 2024-02-27 NOTE — TELEPHONE ENCOUNTER
----- Message from Sanjuana Rucker NP sent at 2/27/2024 12:24 PM CST -----  Please let her know I sent a request to one of the liver physicians to send a pre-med to her pharmacy. She should take this 30-60 min prior to the scan and make sure to have someone drive her. Thanks!     ----- Message -----  From: Ruma Mahajan MA  Sent: 2/26/2024   3:08 PM CST  To: Sanjuana Rucker NP    Hi patient scheduled for MRI on 3/1/24, need something called into pharmacy please.   Areli.       Thanks    ----- Message -----  From: Aubrey Garcia LPN  Sent: 2/26/2024   2:56 PM CST  To: Ruma Mahajan MA    No she was trying to get Mrs. Rucker office.   ----- Message -----  From: Ruma Mahajan MA  Sent: 2/26/2024   2:51 PM CST  To: NIDIA Mendieta is this message for Dr. Garcia?        ----- Message -----  From: Aubrey Garcia LPN  Sent: 2/26/2024   2:31 PM CST  To: Chaim Wei Staff      ----- Message -----  From: Tamera aDwkins  Sent: 2/26/2024  11:46 AM CST  To: Jose Salas    Type:  Needs Medical Advice    Who Called: pt    Symptoms (please be specific): na     How long has patient had these symptoms:  na    Pharmacy name and phone #:  na    Would the patient rather a call back or a response via [facility]? Call back    Best Call Back Number: [phone]      Additional Information: pt is calling to let the office know she is going to get her test and that she needs the rx called in, wouldn't state which one. She just says she wants to talk to Dr Garcia nurse first      Please call Back to advise. Thanks!

## 2024-03-01 ENCOUNTER — HOSPITAL ENCOUNTER (OUTPATIENT)
Dept: RADIOLOGY | Facility: HOSPITAL | Age: 59
Discharge: HOME | End: 2024-03-01
Attending: NURSE PRACTITIONER
Payer: MEDICARE

## 2024-03-01 DIAGNOSIS — E66.01 MORBID OBESITY WITH BMI OF 50.0-59.9, ADULT: ICD-10-CM

## 2024-03-01 DIAGNOSIS — E11.42 TYPE 2 DIABETES MELLITUS WITH DIABETIC POLYNEUROPATHY, WITHOUT LONG-TERM CURRENT USE OF INSULIN: Chronic | ICD-10-CM

## 2024-03-01 DIAGNOSIS — K76.0 FATTY LIVER: ICD-10-CM

## 2024-03-01 PROCEDURE — 76391 MR ELASTOGRAPHY: CPT | Mod: 26,,, | Performed by: RADIOLOGY

## 2024-03-01 PROCEDURE — 76391 MR ELASTOGRAPHY: CPT | Mod: TC

## 2024-03-04 ENCOUNTER — HOSPITAL ENCOUNTER (OUTPATIENT)
Dept: CARDIOLOGY | Facility: HOSPITAL | Age: 59
Discharge: HOME | End: 2024-03-04
Attending: INTERNAL MEDICINE
Payer: MEDICARE

## 2024-03-04 VITALS
DIASTOLIC BLOOD PRESSURE: 75 MMHG | SYSTOLIC BLOOD PRESSURE: 137 MMHG | HEIGHT: 66 IN | BODY MASS INDEX: 47.09 KG/M2 | WEIGHT: 293 LBS

## 2024-03-04 DIAGNOSIS — R00.2 PALPITATIONS: ICD-10-CM

## 2024-03-04 DIAGNOSIS — R07.89 ATYPICAL CHEST PAIN: ICD-10-CM

## 2024-03-04 PROCEDURE — 93320 DOPPLER ECHO COMPLETE: CPT | Mod: 26,,, | Performed by: INTERNAL MEDICINE

## 2024-03-04 PROCEDURE — 93272 ECG/REVIEW INTERPRET ONLY: CPT | Mod: ,,, | Performed by: INTERNAL MEDICINE

## 2024-03-04 PROCEDURE — 93351 STRESS TTE COMPLETE: CPT | Mod: 26,,, | Performed by: INTERNAL MEDICINE

## 2024-03-04 PROCEDURE — 93325 DOPPLER ECHO COLOR FLOW MAPG: CPT | Mod: 26,,, | Performed by: INTERNAL MEDICINE

## 2024-03-04 PROCEDURE — 93271 ECG/MONITORING AND ANALYSIS: CPT | Mod: PO

## 2024-03-04 PROCEDURE — 93351 STRESS TTE COMPLETE: CPT | Mod: PO

## 2024-03-04 NOTE — NURSING NOTE
Dobutamine Stress Echo procedure and medication explained; patient verbalized understanding. 24G IV started to left AC; flushed and secured. Dobutamine infusion started at 10mcg/kg/min per protocol (increased by 10mcg/kg/min every 3 minutes with a max dose of 60mcg/kg/min). Dobutamine 30mcg/kg/min infused; NO Atropine given. Patient refused medication admin at this point, so Dobutamine d/c'd. Patient's VS returned to baseline during recovery period. IV D/C'd, cath tip intact. Patient tolerated well; no distress noted.

## 2024-03-05 ENCOUNTER — OFFICE VISIT (OUTPATIENT)
Dept: HEPATOLOGY | Facility: CLINIC | Age: 59
End: 2024-03-05
Payer: MEDICARE

## 2024-03-05 DIAGNOSIS — E78.2 MIXED HYPERLIPIDEMIA: Chronic | ICD-10-CM

## 2024-03-05 DIAGNOSIS — E11.42 TYPE 2 DIABETES MELLITUS WITH DIABETIC POLYNEUROPATHY, WITHOUT LONG-TERM CURRENT USE OF INSULIN: Chronic | ICD-10-CM

## 2024-03-05 DIAGNOSIS — K74.01 HEPATIC FIBROSIS, EARLY FIBROSIS: ICD-10-CM

## 2024-03-05 DIAGNOSIS — K76.0 FATTY LIVER: Primary | ICD-10-CM

## 2024-03-05 DIAGNOSIS — Z23 NEED FOR HEPATITIS A AND B VACCINATION: ICD-10-CM

## 2024-03-05 DIAGNOSIS — E66.01 CLASS 3 SEVERE OBESITY WITH SERIOUS COMORBIDITY AND BODY MASS INDEX (BMI) OF 45.0 TO 49.9 IN ADULT, UNSPECIFIED OBESITY TYPE: ICD-10-CM

## 2024-03-05 PROCEDURE — 99214 OFFICE O/P EST MOD 30 MIN: CPT | Mod: 95,,, | Performed by: NURSE PRACTITIONER

## 2024-03-05 NOTE — PROGRESS NOTES
[facility] - Established Patient    Last Clinic Visit: 12/20/23    Chief Complaint: Follow-up for fatty liver        HISTORY     This is a 58 y.o. female with PMH noted below, here for follow-up of fatty liver.      Fatty liver first noted on abd US in 2018. Imaging has shown hepatomegaly. Spleen WNL; no findings to suggest advanced liver fibrosis.      Risk factors for fatty liver include:   BMI >50  HTN, HLD, DM, MANUEL  Does not drink alcohol.    She has normal liver enzymes and synthetic liver function.   Platelet count is normal.    Serologic workup has been negative for hemochromatosis and viral hepatitis.    Fibrosis staging:   MRI elastography 3/2024 = F1-2     Interval history:  Feels well, no new concerns from liver standpoint.     No symptoms of hepatic decompensation including jaundice, ascites, cognitive problems to suggest hepatic encephalopathy, or GI bleeding.     Updates on risk factors for fatty liver:  Weight -- BMI 47  Max weight 400 lb a few years ago -- was able to get down to 300 lb with dietary changes  Has been on Mounjaro for 2 weeks and lost another 7 lb                     Dyslipidemia -- well controlled       On statin                           Insulin resistance / diabetes -- last HgbA1c 6.0         Alcohol use -- none    Health Maintenance:  - Most recent imaging: MRI 3/2024 without focal hepatic lesion   - Hepatitis A & B vaccination: needs vaccines        Past medical history, surgical history, problem list, family history, social history, allergies: Reviewed and updated in the appropriate section of the electronic medical record.      Current Outpatient Medications   Medication Sig Dispense Refill    amitriptyline (ELAVIL) 25 MG tablet Take 1 tablet (25 mg total) by mouth every evening. 90 tablet 1    amLODIPine (NORVASC) 10 MG tablet TAKE 1 TABLET(10 MG) BY MOUTH EVERY DAY 90 tablet 3    aspirin (ECOTRIN) 81 MG EC tablet Take 81 mg by mouth once daily.      atorvastatin  (LIPITOR) 20 MG tablet Take 1 tablet (20 mg total) by mouth once daily. 90 tablet 1    azelastine (ASTELIN) 137 mcg (0.1 %) nasal spray USE 1 SPRAY BY NASAL ROUTE TWICE DAILY 90 mL 1    blood sugar diagnostic Strp To check BG 2 times daily, to use with insurance preferred meter DX E11.42 200 strip 1    blood-glucose meter kit To check BG 2 times daily, to use with insurance preferred meter 100 each 11    cetirizine (ZYRTEC) 10 MG tablet Take 1 tablet (10 mg total) by mouth once daily. 90 tablet 3    ciclopirox (LOPROX) 0.77 % Crea Apply topically 2 (two) times daily. 90 g 3    ciclopirox 0.77 % Gel 1 application  2 (two) times daily.      diazePAM (VALIUM) 5 MG tablet Take 1 tablet (5 mg total) by mouth On call Procedure (prior to MRI). Take 1 hour prior to MRI. Do not drive after taking medication. 1 tablet 0    diclofenac sodium (VOLTAREN) 1 % Gel Apply 2 g topically 4 (four) times daily as needed (pain). 100 g 2    docusate sodium (COLACE) 50 MG capsule Take 100 mg by mouth 2 (two) times daily.      estradioL (ESTRACE) 1 MG tablet Take 1 tablet (1 mg total) by mouth once daily. 90 tablet 1    gabapentin (NEURONTIN) 400 MG capsule Take 400 mg by mouth 3 (three) times daily.      hepatitis A and B vaccine, PF, (TWINRIX) 720 ZACHARY unit- 20 mcg/mL Syrg suspension Inject 1 mL (720 Units total) into the muscle once. Second dose 1 month from initial dose. Third dose 6 months from initial dose. for 1 dose 1 mL 2    hydrocodone-acetaminophen 10-325mg (NORCO)  mg Tab Take 10 tablets by mouth 3 (three) times daily.      lancets Misc To check BG 2 times daily, to use with insurance preferred meter 200 each 1    levothyroxine (SYNTHROID) 150 MCG tablet TAKE 1 TABLET(150 MCG) BY MOUTH EVERY DAY 90 tablet 3    lisinopriL (PRINIVIL,ZESTRIL) 20 MG tablet Take 1 tablet (20 mg total) by mouth once daily. 90 tablet 1    metFORMIN (GLUCOPHAGE) 1000 MG tablet TAKE 1 TABLET(1000 MG) BY MOUTH EVERY EVENING 90 tablet 3    naproxen  (EC NAPROSYN) 500 MG EC tablet Take 500 mg by mouth 2 (two) times daily.      nitroGLYCERIN (NITROSTAT) 0.4 MG SL tablet Place 1 tablet (0.4 mg total) under the tongue every 5 (five) minutes as needed for Chest pain. 30 tablet 11    nystatin (MYCOSTATIN) powder Apply topically 2 (two) times daily. 30 g 11    omeprazole (PRILOSEC) 40 MG capsule TAKE 1 CAPSULE(40 MG) BY MOUTH TWICE DAILY BEFORE MEALS 180 capsule 3    ondansetron (ZOFRAN) 8 MG tablet       ondansetron (ZOFRAN-ODT) 4 MG TbDL Take 1 tablet (4 mg total) by mouth every 8 (eight) hours as needed (nausea). 30 tablet 2    RESTASIS 0.05 % ophthalmic emulsion       tirzepatide (MOUNJARO) 2.5 mg/0.5 mL PnIj Inject 2.5 mg into the skin every 7 days. 4 Pen 11     No current facility-administered medications for this visit.     Medication list reviewed and updated.      Review of Systems - as per HPI  Constitutional: Negative for unexpected weight change.   Respiratory: Negative for shortness of breath.    Cardiovascular: Negative for leg swelling.  Gastrointestinal: Negative for abdominal distention or abdominal pain. Negative for melena or hematemesis.  Musculoskeletal: Negative for myalgias.    Skin: Negative for jaundice or itching.  Neurological: Negative for confusion or slowed mentation. Negative for tremors.   Hematological: Does not bruise/bleed easily.       Physical Exam - limited by virtual visit  Constitutional: No distress. Alert and oriented.  Pulmonary/Chest: No respiratory distress.   Skin: No jaundice.   Psychiatric: Normal mood and affect. Speech, behavior, and thought content normal.      LABS & DIAGNOSTIC STUDIES     I have personally reviewed pertinent laboratory findings:    Lab Results   Component Value Date    ALT 25 08/21/2023    AST 26 08/21/2023    ALKPHOS 67 08/21/2023    BILITOT 0.5 08/21/2023    ALBUMIN 3.6 01/02/2024       Lab Results   Component Value Date    WBC 10.78 08/21/2023    HGB 13.0 08/21/2023    HCT 41.3 08/21/2023    MCV  "86 08/21/2023     08/21/2023       Lab Results   Component Value Date     01/02/2024    K 4.2 01/02/2024    BUN 9 01/02/2024    CREATININE 0.7 01/02/2024    ESTGFRAFRICA >60 06/17/2022    EGFRNONAA >60 06/17/2022       Lab Results   Component Value Date    FERRITIN 134 01/02/2024    FESATURATED 17 (L) 01/02/2024    HEPBSAG Non-reactive 01/02/2024    HEPBCAB Non-reactive 01/02/2024    HEPCAB Non-reactive 01/02/2024       No results found for: "AFP"      I have personally reviewed the following result reports:  Abdominal US - 8/2023   MRI - 3/2024  EGD - 7/2022      ASSESSMENT & PLAN     58 y.o. female with:    1. Fatty liver    2. Hepatic fibrosis, early fibrosis    3. Need for hepatitis A and B vaccination    4. Class 3 severe obesity with serious comorbidity and body mass index (BMI) of 45.0 to 49.9 in adult, unspecified obesity type    5. Type 2 diabetes mellitus with diabetic polyneuropathy, without long-term current use of insulin    6. Mixed hyperlipidemia        MRI elastography suggests mild steatosis and mild-moderate fibrosis (F1-2). She has normal liver enzymes and liver function.     Recommendations:  Repeat fibrosis staging in 1-2 years - Fibroscan vs MRI elasto pending weight loss  Recommend labs to monitor liver enzymes/LFTs 1-2 times per year with PCP  US surveillance every 1-2 years as testing suggests some fibrosis  Recommend vaccination for hepatitis A/B, ordered   Commended on weight loss success, encouraged to continue. Low carbohydrate/sugar, high protein/fiber diet.  Maintain good control of metabolic risk factors, specifically blood sugar. Now on Mounjaro, which should help with this.       Orders Placed This Encounter   Procedures    US Abdomen Limited    Hepatitis A / Hepatitis B Combined Vaccine (IM)         Return to clinic in 1 year with US.       Thank you for allowing me to participate in the care of [facility]  Hepatology          The " patient location is: [place], [place]  The chief complaint leading to consultation is: F/u for fatty liver    Visit type: audiovisual    Face to Face time with patient: 13 min  30 minutes of total time spent on the encounter, which includes face to face time and non-face to face time preparing to see the patient (eg, review of tests), Obtaining and/or reviewing separately obtained history, Documenting clinical information in the electronic or other health record, Independently interpreting results (not separately reported) and communicating results to the patient/family/caregiver, or Care coordination (not separately reported).     Each patient to whom he or she provides medical services by telemedicine is:  (1) informed of the relationship between the physician and patient and the respective role of any other health care provider with respect to management of the patient; and (2) notified that he or she may decline to receive medical services by telemedicine and may withdraw from such care at any time.

## 2024-03-12 ENCOUNTER — TELEPHONE (OUTPATIENT)
Dept: FAMILY MEDICINE | Facility: CLINIC | Age: 59
End: 2024-03-12
Payer: MEDICARE

## 2024-03-12 DIAGNOSIS — E11.42 TYPE 2 DIABETES MELLITUS WITH DIABETIC POLYNEUROPATHY, WITHOUT LONG-TERM CURRENT USE OF INSULIN: Chronic | ICD-10-CM

## 2024-03-12 DIAGNOSIS — E66.01 CLASS 3 SEVERE OBESITY DUE TO EXCESS CALORIES WITH SERIOUS COMORBIDITY AND BODY MASS INDEX (BMI) OF 45.0 TO 49.9 IN ADULT: ICD-10-CM

## 2024-03-12 RX ORDER — TIRZEPATIDE 5 MG/.5ML
5 INJECTION, SOLUTION SUBCUTANEOUS
Qty: 4 PEN | Refills: 11 | Status: SHIPPED | OUTPATIENT
Start: 2024-03-12 | End: 2024-05-16

## 2024-03-12 NOTE — TELEPHONE ENCOUNTER
----- Message from Karsten Camp sent at 3/12/2024 11:24 AM CDT -----  Type: Needs Medical Advice  Who Called:  pt  Symptoms (please be specific):  pt said she need to speak to nurse--that's all the details pt left--said she have a question--please call and advise  Best Call Back Number: [phone] (home)     Additional Information: thank you

## 2024-03-12 NOTE — TELEPHONE ENCOUNTER
Spoke with patient, wanted provider to know that she is doing well on Mounjaro and is requesting the next increased dose as previously discussed.

## 2024-03-18 LAB
ASCENDING AORTA: 2.93 CM
AV INDEX (PROSTH): 0.81
AV MEAN GRADIENT: 5 MMHG
AV PEAK GRADIENT: 11 MMHG
AV VALVE AREA BY VELOCITY RATIO: 2.54 CM²
AV VALVE AREA: 2.54 CM²
AV VELOCITY RATIO: 0.81
BSA FOR ECHO PROCEDURE: 2.49 M2
CV ECHO LV RWT: 0.42 CM
CV STRESS BASE HR: 88 BPM
DIASTOLIC BLOOD PRESSURE: 75 MMHG
DOP CALC AO PEAK VEL: 1.63 M/S
DOP CALC AO VTI: 35.1 CM
DOP CALC LVOT AREA: 3.1 CM2
DOP CALC LVOT DIAMETER: 2 CM
DOP CALC LVOT PEAK VEL: 1.32 M/S
DOP CALC LVOT STROKE VOLUME: 89.18 CM3
DOP CALCLVOT PEAK VEL VTI: 28.4 CM
E WAVE DECELERATION TIME: 240.83 MSEC
E/A RATIO: 0.72
E/E' RATIO: 11.08 M/S
ECHO LV POSTERIOR WALL: 1.07 CM (ref 0.6–1.1)
FRACTIONAL SHORTENING: 30 % (ref 28–44)
INTERVENTRICULAR SEPTUM: 1.06 CM (ref 0.6–1.1)
IVRT: 102.76 MSEC
LEFT ATRIUM SIZE: 4.31 CM
LEFT ATRIUM VOLUME INDEX MOD: 30.9 ML/M2
LEFT ATRIUM VOLUME MOD: 72.69 CM3
LEFT INTERNAL DIMENSION IN SYSTOLE: 3.53 CM (ref 2.1–4)
LEFT VENTRICLE DIASTOLIC VOLUME INDEX: 51.61 ML/M2
LEFT VENTRICLE DIASTOLIC VOLUME: 121.28 ML
LEFT VENTRICLE MASS INDEX: 86 G/M2
LEFT VENTRICLE SYSTOLIC VOLUME INDEX: 22.2 ML/M2
LEFT VENTRICLE SYSTOLIC VOLUME: 52.07 ML
LEFT VENTRICULAR INTERNAL DIMENSION IN DIASTOLE: 5.05 CM (ref 3.5–6)
LEFT VENTRICULAR MASS: 201.41 G
LV LATERAL E/E' RATIO: 10.29 M/S
LV SEPTAL E/E' RATIO: 12 M/S
LVOT MG: 3.81 MMHG
LVOT MV: 0.91 CM/S
MV PEAK A VEL: 1 M/S
MV PEAK E VEL: 0.72 M/S
MV STENOSIS PRESSURE HALF TIME: 69.84 MS
MV VALVE AREA P 1/2 METHOD: 3.15 CM2
OHS CV CPX 1 MINUTE RECOVERY HEART RATE: 113 BPM
OHS CV CPX 85 PERCENT MAX PREDICTED HEART RATE MALE: 136
OHS CV CPX MAX PREDICTED HEART RATE: 160
OHS CV CPX PATIENT IS FEMALE: 1
OHS CV CPX PATIENT IS MALE: 0
OHS CV CPX PEAK DIASTOLIC BLOOD PRESSURE: 71 MMHG
OHS CV CPX PEAK HEAR RATE: 127 BPM
OHS CV CPX PEAK RATE PRESSURE PRODUCT: NORMAL
OHS CV CPX PEAK SYSTOLIC BLOOD PRESSURE: 143 MMHG
OHS CV CPX PERCENT MAX PREDICTED HEART RATE ACHIEVED: 83
OHS CV CPX RATE PRESSURE PRODUCT PRESENTING: NORMAL
OHS CV INITIAL DOSE: 10 MCG/KG/MIN
OHS CV PEAK DOSE: 30 MCG/KG/MIN
PISA TR MAX VEL: 2.39 M/S
PULM VEIN S/D RATIO: 1.45
PV PEAK D VEL: 0.42 M/S
PV PEAK S VEL: 0.61 M/S
RA PRESSURE ESTIMATED: 3 MMHG
RA VOL SYS: 48.74 ML
RIGHT ATRIAL AREA: 17.3 CM2
RIGHT VENTRICULAR END-DIASTOLIC DIMENSION: 4.34 CM
RIGHT VENTRICULAR LENGTH IN DIASTOLE (APICAL 4-CHAMBER VIEW): 8.65 CM
RV MID DIAMA: 2.89 CM
RV TB RVSP: 5 MMHG
RV TISSUE DOPPLER FREE WALL SYSTOLIC VELOCITY 1 (APICAL 4 CHAMBER VIEW): 23.76 CM/S
SINUS: 2.6 CM
STJ: 2.82 CM
SYSTOLIC BLOOD PRESSURE: 137 MMHG
TDI LATERAL: 0.07 M/S
TDI SEPTAL: 0.06 M/S
TDI: 0.07 M/S
TR MAX PG: 23 MMHG
TRICUSPID ANNULAR PLANE SYSTOLIC EXCURSION: 2.34 CM
TV REST PULMONARY ARTERY PRESSURE: 26 MMHG
Z-SCORE OF LEFT VENTRICULAR DIMENSION IN END DIASTOLE: -6.66
Z-SCORE OF LEFT VENTRICULAR DIMENSION IN END SYSTOLE: -4.07

## 2024-03-22 NOTE — TELEPHONE ENCOUNTER
Spoke to patient and scheduled.     Will send message to Dr Bob for referral to see what she needs addressed at her nephrology consult.    1

## 2024-03-29 DIAGNOSIS — E11.9 TYPE 2 DIABETES MELLITUS WITHOUT COMPLICATION, WITHOUT LONG-TERM CURRENT USE OF INSULIN: ICD-10-CM

## 2024-03-29 NOTE — TELEPHONE ENCOUNTER
Care Due:                  Date            Visit Type   Department     Provider  --------------------------------------------------------------------------------                                EP -                              [facility] FAMILY  Last Visit: 02-      CARE ([facility])   MEDICINE       Jacobo Joy                              EP -                              PRIMARY      [facility] FAMILY  Next Visit: 05-      CARE ([facility])   MEDICINE       Jacobo Joy                                                            Last  Test          Frequency    Reason                     Performed    Due Date  --------------------------------------------------------------------------------    HBA1C.......  6 months...  metFORMIN, tirzepatide...  11- 04-    Lipid Panel.  12 months..  atorvastatin.............  10-   10-    [facility] Embedded Care Due Messages. Reference number: [id number].   3/29/2024 9:36:59 AM CDT

## 2024-03-31 NOTE — TELEPHONE ENCOUNTER
Refill Routing Note   Medication(s) are not appropriate for processing by [facility] for the following reason(s):        Drug-disease interaction: metFORMIN and Fatty liver; Hepatic fibrosis, early fibrosis     [facility] action(s):  Defer   Requires labs : Yes             Appointments  past 12m or future 3m with PCP    Date Provider   Last Visit   2/16/2024 Jacobo Joy MD   Next Visit   5/16/2024 Jacobo Joy MD   ED visits in past 90 days: 0        Note composed:11:57 PM 03/30/2024

## 2024-04-01 RX ORDER — METFORMIN HYDROCHLORIDE 1000 MG/1
TABLET ORAL
Qty: 90 TABLET | Refills: 1 | Status: SHIPPED | OUTPATIENT
Start: 2024-04-01 | End: 2024-05-16 | Stop reason: SDUPTHER

## 2024-04-10 DIAGNOSIS — I10 ESSENTIAL HYPERTENSION: Chronic | ICD-10-CM

## 2024-04-11 RX ORDER — POTASSIUM CHLORIDE 20 MEQ/1
TABLET, EXTENDED RELEASE ORAL
Qty: 180 TABLET | Refills: 2 | Status: SHIPPED | OUTPATIENT
Start: 2024-04-11

## 2024-04-19 ENCOUNTER — OFFICE VISIT (OUTPATIENT)
Dept: ENDOCRINOLOGY | Facility: CLINIC | Age: 59
End: 2024-04-19
Payer: MEDICARE

## 2024-04-19 VITALS
WEIGHT: 287.69 LBS | HEART RATE: 83 BPM | DIASTOLIC BLOOD PRESSURE: 64 MMHG | BODY MASS INDEX: 46.23 KG/M2 | SYSTOLIC BLOOD PRESSURE: 110 MMHG | HEIGHT: 66 IN | OXYGEN SATURATION: 97 %

## 2024-04-19 DIAGNOSIS — E04.1 THYROID CYST: ICD-10-CM

## 2024-04-19 DIAGNOSIS — K21.9 GASTROESOPHAGEAL REFLUX DISEASE, UNSPECIFIED WHETHER ESOPHAGITIS PRESENT: ICD-10-CM

## 2024-04-19 DIAGNOSIS — E89.0 POSTOPERATIVE HYPOTHYROIDISM: Primary | ICD-10-CM

## 2024-04-19 PROCEDURE — 4010F ACE/ARB THERAPY RXD/TAKEN: CPT | Mod: CPTII,S$GLB,, | Performed by: INTERNAL MEDICINE

## 2024-04-19 PROCEDURE — 99999 PR PBB SHADOW E&M-EST. PATIENT-LVL V: CPT | Mod: PBBFAC,,, | Performed by: INTERNAL MEDICINE

## 2024-04-19 PROCEDURE — 3078F DIAST BP <80 MM HG: CPT | Mod: CPTII,S$GLB,, | Performed by: INTERNAL MEDICINE

## 2024-04-19 PROCEDURE — 3074F SYST BP LT 130 MM HG: CPT | Mod: CPTII,S$GLB,, | Performed by: INTERNAL MEDICINE

## 2024-04-19 PROCEDURE — 1160F RVW MEDS BY RX/DR IN RCRD: CPT | Mod: CPTII,S$GLB,, | Performed by: INTERNAL MEDICINE

## 2024-04-19 PROCEDURE — 3008F BODY MASS INDEX DOCD: CPT | Mod: CPTII,S$GLB,, | Performed by: INTERNAL MEDICINE

## 2024-04-19 PROCEDURE — 99214 OFFICE O/P EST MOD 30 MIN: CPT | Mod: S$GLB,,, | Performed by: INTERNAL MEDICINE

## 2024-04-19 PROCEDURE — 1159F MED LIST DOCD IN RCRD: CPT | Mod: CPTII,S$GLB,, | Performed by: INTERNAL MEDICINE

## 2024-04-19 RX ORDER — LEVOTHYROXINE SODIUM 150 UG/1
150 TABLET ORAL DAILY
Qty: 90 TABLET | Refills: 3 | Status: SHIPPED | OUTPATIENT
Start: 2024-04-19

## 2024-04-19 NOTE — TELEPHONE ENCOUNTER
----- Message from Karsten Camp sent at 4/19/2024 12:01 PM CDT -----  Type:  RX Refill Request    Who Called:  pt  Refill or New Rx:  refill/ NEW RX   RX Name and Strength:  levothyroxine (SYNTHROID) 150 MCG tablet  How is the patient currently taking it? (ex. 1XDay):  as directed  Is this a 30 day or 90 day RX:  90  Preferred Pharmacy with phone number:    [facility][id number] - [facility] [address] [address] AT [facility] [address]  [address] [place] [address]  Phone: [phone] Fax: [phone]      Local or Mail Order:  local  Ordering Provider:  Perry Watt Call Back Number:  [phone] (home)     Additional Information:  please call and advise--thank you

## 2024-04-19 NOTE — PROGRESS NOTES
CHIEF COMPLAINT: Hypothyroid  58 y.o.  being seen as a f/u. Was seeing Dr. Mackey. Had a thyroidectomy in 2012. Appears to have some remnant tissue on the left with cyst.  On synthroid 150 mcg.  States difficulty swallowing improved. No tremors. Was having palpitations. She had an event monitor in March 2024. No Diarrhea or constipation. NO change in voice. GERD controlled with omeprazole.                 PAST MEDICAL HISTORY/PAST SURGICAL HISTORY:  Reviewed in [facility]     SOCIAL HISTORY: No T/A     FAMILY HISTORY:  No known thyroid disease. + Esophageal CA. + DM 2.      MEDICATIONS/ALLERGIES: The patient's MedCard has been updated and reviewed.            PE:    GENERAL: Well developed, well nourished.  NECK: Supple, trachea midline, No palpable thyroid nodules.   CHEST: Resp even and unlabored, CTA bilateral.  CARDIAC: RRR, S1, S2 heard, no murmurs, rubs, S3, or S4            ASSESSMENT/PLAN:  1. Hypothyroid- she does appear to be taking it appropriately.  Some of the symptoms that she was having before have resolved.  Check TSH.     2. Thyroid Cyst- has some remnant tissue after thyroidectomy.  Some of the obstructive symptoms that she was having before have resolved.  Can check thyroid ultrasound.    3. GERD- GERD controlled with PPI.    FOLLOWUP  Needs TSH and Thyroid Ultrasound

## 2024-04-22 DIAGNOSIS — I10 ESSENTIAL HYPERTENSION: Chronic | ICD-10-CM

## 2024-04-22 NOTE — TELEPHONE ENCOUNTER
No care due was identified.  [facility] Embedded Care Due Messages. Reference number: [id number].   4/22/2024 1:24:53 PM CDT

## 2024-04-22 NOTE — TELEPHONE ENCOUNTER
----- Message from Khanh Wright sent at 4/22/2024 11:25 AM CDT -----  Regarding: refill  Contact: patient  Type:  RX Refill Request    Who Called:  Patient   Refill or New Rx:  refill  RX Name and Strength:  amLODIPine (NORVASC) 10 MG tablet 90 tablet 3 8/9/2022 -   Sig: TAKE 1 TABLET(10 MG) BY MOUTH EVERY DAY   Sent to pharmacy as: amLODIPine (NORVASC) 10 MG tablet   E-Prescribing Status: Receipt confirmed by pharmacy (8/9/2022  5:48 PM CDT)       How is the patient currently taking it? (ex. 1XDay):  see above  Is this a 30 day or 90 day RX:  see above  Preferred Pharmacy with phone number:    [facility][id number] - [facility] [id number] [place] [address] AT [facility] [address] [facility]  [address] [place] [address]  Phone: [phone] Fax: [phone]    Local or Mail Order:  local  Ordering Provider:  Dr Joy  Best Call Back Number:  [phone]  Additional Information:  she is completely out

## 2024-04-23 ENCOUNTER — TELEPHONE (OUTPATIENT)
Dept: FAMILY MEDICINE | Facility: CLINIC | Age: 59
End: 2024-04-23
Payer: MEDICARE

## 2024-04-23 RX ORDER — AMLODIPINE BESYLATE 10 MG/1
10 TABLET ORAL DAILY
Qty: 90 TABLET | Refills: 3 | Status: SHIPPED | OUTPATIENT
Start: 2024-04-23

## 2024-04-23 NOTE — TELEPHONE ENCOUNTER
Refill Routing Note   Medication(s) are not appropriate for processing by [facility] for the following reason(s):        No active prescription written by provider    [facility] action(s):  Defer               Appointments  past 12m or future 3m with PCP    Date Provider   Last Visit   2/16/2024 Jacobo Joy MD   Next Visit   5/16/2024 Jacobo Joy MD   ED visits in past 90 days: 0        Note composed:5:13 AM 04/23/2024

## 2024-04-23 NOTE — TELEPHONE ENCOUNTER
----- Message from Xuan Rodríguez sent at 4/23/2024 11:51 AM CDT -----  Regarding: RX Refill Request-several attempts for blood pressure medication  Contact: patient at [phone]  Type:  RX Refill Request-several attempts for blood pressure medication    Who Called:  patient at [phone]    Preferred Pharmacy with phone number:    [facility] #[id number] - [facility] [address] [place] [address] AT [facility] [address]  [address] [place] [address]  Phone: [phone] Fax: [phone]      Additional Information:  patient out of medication and requesting refill. Please call and advise. Thank you    amLODIPine (NORVASC) 10 MG tablet 90 tablet 3 8/9/2022 -   Sig: TAKE 1 TABLET(10 MG) BY MOUTH EVERY DAY   Sent to pharmacy as: amLODIPine (NORVASC) 10 MG tablet   E-Prescribing Status: Receipt confirmed by pharmacy (8/9/2022  5:48 PM CDT)

## 2024-04-25 ENCOUNTER — HOSPITAL ENCOUNTER (OUTPATIENT)
Dept: RADIOLOGY | Facility: HOSPITAL | Age: 59
Discharge: HOME | End: 2024-04-25
Attending: INTERNAL MEDICINE
Payer: MEDICARE

## 2024-04-25 DIAGNOSIS — E89.0 POSTOPERATIVE HYPOTHYROIDISM: ICD-10-CM

## 2024-04-25 DIAGNOSIS — E04.1 THYROID CYST: ICD-10-CM

## 2024-04-25 PROCEDURE — 76536 US EXAM OF HEAD AND NECK: CPT | Mod: 26,,, | Performed by: RADIOLOGY

## 2024-04-25 PROCEDURE — 76536 US EXAM OF HEAD AND NECK: CPT | Mod: TC,PO

## 2024-04-26 ENCOUNTER — OFFICE VISIT (OUTPATIENT)
Dept: PODIATRY | Facility: CLINIC | Age: 59
End: 2024-04-26
Payer: MEDICARE

## 2024-04-26 VITALS — BODY MASS INDEX: 46.23 KG/M2 | WEIGHT: 287.69 LBS | HEIGHT: 66 IN

## 2024-04-26 DIAGNOSIS — B35.1 ONYCHOMYCOSIS DUE TO DERMATOPHYTE: ICD-10-CM

## 2024-04-26 DIAGNOSIS — E11.51 TYPE II DIABETES MELLITUS WITH PERIPHERAL CIRCULATORY DISORDER: ICD-10-CM

## 2024-04-26 DIAGNOSIS — E11.42 TYPE 2 DIABETES MELLITUS WITH DIABETIC POLYNEUROPATHY, WITHOUT LONG-TERM CURRENT USE OF INSULIN: Primary | ICD-10-CM

## 2024-04-26 DIAGNOSIS — L84 CORN OR CALLUS: ICD-10-CM

## 2024-04-26 PROCEDURE — 99999 PR PBB SHADOW E&M-EST. PATIENT-LVL IV: CPT | Mod: PBBFAC,,, | Performed by: PODIATRIST

## 2024-04-26 PROCEDURE — 11721 DEBRIDE NAIL 6 OR MORE: CPT | Mod: Q9,59,S$GLB, | Performed by: PODIATRIST

## 2024-04-26 PROCEDURE — 11056 PARNG/CUTG B9 HYPRKR LES 2-4: CPT | Mod: Q9,59,S$GLB, | Performed by: PODIATRIST

## 2024-04-26 PROCEDURE — 99499 UNLISTED E&M SERVICE: CPT | Mod: S$GLB,,, | Performed by: PODIATRIST

## 2024-04-26 RX ORDER — HYDRALAZINE HYDROCHLORIDE 25 MG/1
25 TABLET, FILM COATED ORAL 3 TIMES DAILY
COMMUNITY
Start: 2024-04-18

## 2024-04-26 NOTE — PROGRESS NOTES
Subjective:      Patient ID: Amena Anderson is a 58 y.o. female.    Chief Complaint: Nail Care and Diabetic Foot Exam (Endo 4/19/24)    Patient returns for follow up  thick and discolored nails for high risk foot care. She is high risk secondary to the diabetes with PVD. No acute changes since her last visit to her feet. Doing well overall, no acute changes doing well overall.     PCP: Dr. Miller  Last seen: 4/19/24 with endocrinology    Review of Systems   Constitutional: Negative for chills, diaphoresis, fever, malaise/fatigue and night sweats.   Cardiovascular:  Positive for leg swelling. Negative for claudication, cyanosis and syncope.   Skin:  Negative for color change, dry skin, nail changes, rash, suspicious lesions and unusual hair distribution.   Musculoskeletal:  Positive for joint pain. Negative for falls, joint swelling, muscle cramps, muscle weakness and stiffness.   Gastrointestinal:  Negative for constipation, diarrhea, nausea and vomiting.   Neurological:  Positive for paresthesias. Negative for brief paralysis, disturbances in coordination, focal weakness, numbness, sensory change and tremors.           Objective:      Physical Exam  Constitutional:       General: She is not in acute distress.     Appearance: She is well-developed. She is not diaphoretic.   Cardiovascular:      Pulses:           Dorsalis pedis pulses are 2+ on the right side and 2+ on the left side.        Posterior tibial pulses are 2+ on the right side and 2+ on the left side.      Comments: Capillary refill 3 seconds all toes/distal feet, all toes/both feet warm to touch.      Positive lower extremity edema bilateral.    Musculoskeletal:      Right ankle: Normal. No swelling, deformity, ecchymosis or lacerations. Normal range of motion. Normal pulse.      Right Achilles Tendon: Normal. No defects. De La Rcuz's test negative.      Comments: Improving pain to palpation lateral left ankle at cfl and atfl without instability deformity  or loss of function. Also has pain along the peroneal tendons to the brevis insertion.    Ankle dorsiflexion decreased at <10 degrees bilateral with moderate increase with knee flexion bilateral. There is tenderness to palpation bilateral achilles attachments on the calcaneus.    Pain on palpation plantar medial and central heel right foot. No pain with ROM or MMT. No pain with medial and lateral compression of heel.     Feet:      Right foot:      Protective Sensation: 10 sites tested.  8 sites sensed.      Left foot:      Protective Sensation: 10 sites tested.  9 sites sensed.   Lymphadenopathy:      Comments: Negative lymphadenopathy bilateral popliteal fossa and tarsal tunnel.   Skin:     General: Skin is warm and dry.      Coloration: Skin is not pale.      Findings: Lesion present. No abrasion, bruising, burn, ecchymosis, erythema, laceration, petechiae or rash.      Nails: There is no clubbing.      Comments:   Skin is thin and atrophic bilateral    Nails 1-5 bilateral are thick 3-4 mm, long 3-6 mm, and discolored with subungual debris    Hyperkeratotic lesions bilateral plantar medial aspect first metatarsal bilateral          Neurological:      Mental Status: She is alert and oriented to person, place, and time.      Sensory: Sensory deficit present.      Motor: No tremor, atrophy, abnormal muscle tone or seizure activity.      Gait: Gait normal.      Comments: Negative tinel sign to percussion sural, superficial peroneal, deep peroneal, saphenous, and posterior tibial nerves right and left ankles and feet. Decreased vibratory sensation bilateral     Psychiatric:         Behavior: Behavior normal. Behavior is cooperative.             Assessment:       Encounter Diagnoses   Name Primary?    Type 2 diabetes mellitus with diabetic polyneuropathy, without long-term current use of insulin Yes    Type II diabetes mellitus with peripheral circulatory disorder     Onychomycosis due to dermatophyte     Corn or  callus                    Plan:       Amena was seen today for nail care and diabetic foot exam.    Diagnoses and all orders for this visit:    Type 2 diabetes mellitus with diabetic polyneuropathy, without long-term current use of insulin    Type II diabetes mellitus with peripheral circulatory disorder    Onychomycosis due to dermatophyte    Corn or callus                I counseled the patient on her conditions, their implications and medical management.    Shoe inspection. Diabetic Foot Education. Patient reminded of the importance of good nutrition and blood sugar control to help prevent podiatric complications of diabetes. Patient instructed on proper foot hygeine. We discussed wearing proper shoe gear, daily foot inspections, never walking without protective shoe gear, never putting sharp instruments to feet Discussed importance of supportive shoes with accommodative toe box to reduce pressure and irritation to forefoot.     With patient's verbal consent, nails were aggressively reduced and debrided x 10 to their soft tissue attachment mechanically removing all offending nail and debris. Patient relates relief following the procedure. No anesthesia or hemostasis required. No blood loss.     With patient's verbal consent the hyperkeratotic lesions x2 bilateral foot were trimmed to healthy appearing skin using a # 15 scalpel. Patient relates relief of pain following the procedure. Patient tolerated the procedure well without complications. No anesthesia or hemostasis required. No blood loss.        Follow up in 3 months routine care.    Edson Martinez DPM

## 2024-05-10 DIAGNOSIS — G89.4 CHRONIC PAIN SYNDROME: Chronic | ICD-10-CM

## 2024-05-10 RX ORDER — AMITRIPTYLINE HYDROCHLORIDE 25 MG/1
25 TABLET, FILM COATED ORAL NIGHTLY
Qty: 90 TABLET | Refills: 3 | Status: SHIPPED | OUTPATIENT
Start: 2024-05-10

## 2024-05-10 NOTE — TELEPHONE ENCOUNTER
No care due was identified.  [facility] Embedded Care Due Messages. Reference number: [id number].   5/10/2024 5:22:27 AM CDT

## 2024-05-10 NOTE — TELEPHONE ENCOUNTER
Refill Decision Note   Amena Anderson  is requesting a refill authorization.  Brief Assessment and Rationale for Refill:  Approve     Medication Therapy Plan:         Comments:     Note composed:9:42 AM 05/10/2024

## 2024-05-16 ENCOUNTER — LAB VISIT (OUTPATIENT)
Dept: LAB | Facility: HOSPITAL | Age: 59
End: 2024-05-16
Payer: MEDICARE

## 2024-05-16 ENCOUNTER — OFFICE VISIT (OUTPATIENT)
Dept: FAMILY MEDICINE | Facility: CLINIC | Age: 59
End: 2024-05-16
Payer: MEDICARE

## 2024-05-16 VITALS
WEIGHT: 279 LBS | HEIGHT: 66 IN | BODY MASS INDEX: 44.84 KG/M2 | HEART RATE: 90 BPM | DIASTOLIC BLOOD PRESSURE: 74 MMHG | OXYGEN SATURATION: 97 % | SYSTOLIC BLOOD PRESSURE: 130 MMHG

## 2024-05-16 DIAGNOSIS — E11.42 TYPE 2 DIABETES MELLITUS WITH DIABETIC POLYNEUROPATHY, WITHOUT LONG-TERM CURRENT USE OF INSULIN: Chronic | ICD-10-CM

## 2024-05-16 DIAGNOSIS — E66.01 CLASS 3 SEVERE OBESITY DUE TO EXCESS CALORIES WITH SERIOUS COMORBIDITY AND BODY MASS INDEX (BMI) OF 45.0 TO 49.9 IN ADULT: ICD-10-CM

## 2024-05-16 DIAGNOSIS — E11.42 TYPE 2 DIABETES MELLITUS WITH DIABETIC POLYNEUROPATHY, WITHOUT LONG-TERM CURRENT USE OF INSULIN: Primary | Chronic | ICD-10-CM

## 2024-05-16 DIAGNOSIS — E78.2 MIXED HYPERLIPIDEMIA: Chronic | ICD-10-CM

## 2024-05-16 DIAGNOSIS — I10 ESSENTIAL HYPERTENSION: Chronic | ICD-10-CM

## 2024-05-16 LAB
CHOLEST SERPL-MCNC: 148 MG/DL (ref 120–199)
CHOLEST/HDLC SERPL: 3.9 {RATIO} (ref 2–5)
ESTIMATED AVG GLUCOSE: 108 MG/DL (ref 68–131)
HBA1C MFR BLD: 5.4 % (ref 4–5.6)
HDLC SERPL-MCNC: 38 MG/DL (ref 40–75)
HDLC SERPL: 25.7 % (ref 20–50)
LDLC SERPL CALC-MCNC: 87.6 MG/DL (ref 63–159)
NONHDLC SERPL-MCNC: 110 MG/DL
TRIGL SERPL-MCNC: 112 MG/DL (ref 30–150)

## 2024-05-16 PROCEDURE — 80061 LIPID PANEL: CPT | Performed by: STUDENT IN AN ORGANIZED HEALTH CARE EDUCATION/TRAINING PROGRAM

## 2024-05-16 PROCEDURE — 3075F SYST BP GE 130 - 139MM HG: CPT | Mod: CPTII,S$GLB,, | Performed by: STUDENT IN AN ORGANIZED HEALTH CARE EDUCATION/TRAINING PROGRAM

## 2024-05-16 PROCEDURE — 3008F BODY MASS INDEX DOCD: CPT | Mod: CPTII,S$GLB,, | Performed by: STUDENT IN AN ORGANIZED HEALTH CARE EDUCATION/TRAINING PROGRAM

## 2024-05-16 PROCEDURE — 1159F MED LIST DOCD IN RCRD: CPT | Mod: CPTII,S$GLB,, | Performed by: STUDENT IN AN ORGANIZED HEALTH CARE EDUCATION/TRAINING PROGRAM

## 2024-05-16 PROCEDURE — 83036 HEMOGLOBIN GLYCOSYLATED A1C: CPT | Performed by: STUDENT IN AN ORGANIZED HEALTH CARE EDUCATION/TRAINING PROGRAM

## 2024-05-16 PROCEDURE — 4010F ACE/ARB THERAPY RXD/TAKEN: CPT | Mod: CPTII,S$GLB,, | Performed by: STUDENT IN AN ORGANIZED HEALTH CARE EDUCATION/TRAINING PROGRAM

## 2024-05-16 PROCEDURE — 3078F DIAST BP <80 MM HG: CPT | Mod: CPTII,S$GLB,, | Performed by: STUDENT IN AN ORGANIZED HEALTH CARE EDUCATION/TRAINING PROGRAM

## 2024-05-16 PROCEDURE — 99214 OFFICE O/P EST MOD 30 MIN: CPT | Mod: S$GLB,,, | Performed by: STUDENT IN AN ORGANIZED HEALTH CARE EDUCATION/TRAINING PROGRAM

## 2024-05-16 PROCEDURE — 36415 COLL VENOUS BLD VENIPUNCTURE: CPT | Mod: PO | Performed by: STUDENT IN AN ORGANIZED HEALTH CARE EDUCATION/TRAINING PROGRAM

## 2024-05-16 PROCEDURE — 99999 PR PBB SHADOW E&M-EST. PATIENT-LVL V: CPT | Mod: PBBFAC,,, | Performed by: STUDENT IN AN ORGANIZED HEALTH CARE EDUCATION/TRAINING PROGRAM

## 2024-05-16 RX ORDER — METFORMIN HYDROCHLORIDE 1000 MG/1
1000 TABLET ORAL NIGHTLY
Qty: 90 TABLET | Refills: 3 | Status: SHIPPED | OUTPATIENT
Start: 2024-05-16

## 2024-05-16 NOTE — PROGRESS NOTES
Name: Amena Anderson  MRN: [id number]  : 1965  PCP: Jacobo Joy MD    Subjective   Patient presents for regular follow up. Last visit, I started Mounjaro for diabetes and for weight.     Review of Systems   Respiratory:  Negative for shortness of breath.    Cardiovascular:  Negative for chest pain.   Gastrointestinal:  Negative for constipation, diarrhea, nausea and vomiting.       Patient Active Problem List   Diagnosis    Type 2 diabetes mellitus with diabetic polyneuropathy, without long-term current use of insulin    Essential hypertension    MANUEL (obstructive sleep apnea)    Chronic pain syndrome    Class 3 severe obesity due to excess calories with serious comorbidity and body mass index (BMI) of 45.0 to 49.9 in adult    Hypothyroidism    Gastroesophageal reflux disease without esophagitis    Dysphagia    Fatty liver    Mixed hyperlipidemia    Diastolic dysfunction    Chronic lower back pain    Candidal skin infection    Dry eye syndrome of both eyes    Age-related nuclear cataract of both eyes    Primary open angle glaucoma (POAG) of both eyes, mild stage    Anxiety about health    Hepatic fibrosis, early fibrosis       Health Maintenance Due   Topic Date Due    TETANUS VACCINE  Never done    Diabetes Urine Screening  2018    COVID-19 Vaccine (2023- season) 2023    RSV Vaccine (Age 60+ and Pregnant patients) (1 - 1-dose 60+ series) Never done    Lipid Panel  10/14/2023    Hemoglobin A1c  2024    Eye Exam  2024       Objective   Vitals:    24 0853   BP: 130/74   Pulse: 90       Physical Exam  Constitutional:       General: She is not in acute distress.     Appearance: Normal appearance. She is well-developed.   HENT:      Head: Normocephalic and atraumatic.      Right Ear: External ear normal.      Left Ear: External ear normal.   Eyes:      Conjunctiva/sclera: Conjunctivae normal.   Pulmonary:      Effort: Pulmonary effort is normal. No respiratory distress.    Abdominal:      General: Abdomen is flat. There is no distension.   Musculoskeletal:         General: No swelling or deformity.      Right lower leg: No edema.      Left lower leg: No edema.   Skin:     General: Skin is warm and dry.      Coloration: Skin is not jaundiced.   Neurological:      Mental Status: She is alert and oriented to person, place, and time. Mental status is at baseline.   Psychiatric:         Attention and Perception: Attention and perception normal.         Mood and Affect: Mood normal.         Speech: Speech normal.         Behavior: Behavior normal. Behavior is cooperative.         Thought Content: Thought content normal.         Cognition and Memory: Cognition normal.         Judgment: Judgment normal.         Assessment & Plan   1. Type 2 diabetes mellitus with diabetic polyneuropathy, without long-term current use of insulin  Assessment & Plan:  Patient is tolerating Mounjaro well. I am increasing dose today. Continue metformin.     Orders:  -     tirzepatide 7.5 mg/0.5 mL PnIj; Inject 7.5 mg into the skin every 7 days.  Dispense: 4 Pen; Refill: 11  -     metFORMIN (GLUCOPHAGE) 1000 MG tablet; Take 1 tablet (1,000 mg total) by mouth every evening. TAKE 1 TABLET(1000 MG) BY MOUTH EVERY EVENING  Dispense: 90 tablet; Refill: 3  -     Microalbumin/Creatinine Ratio, Urine; Future  -     Hemoglobin A1C; Future  -     Lipid Panel; Future; Expected date: 05/16/2024    2. Class 3 severe obesity due to excess calories with serious comorbidity and body mass index (BMI) of 45.0 to 49.9 in adult  Assessment & Plan:  Patient has lost weight since our last visit. I am increasing Mounjaro today.    Orders:  -     tirzepatide 7.5 mg/0.5 mL PnIj; Inject 7.5 mg into the skin every 7 days.  Dispense: 4 Pen; Refill: 11    3. Mixed hyperlipidemia  -     Lipid Panel; Future; Expected date: 05/16/2024    4. Essential hypertension  Assessment & Plan:  At goal. Continue current medications.           Follow up in  6 months.     Jacobo Joy MD  05/16/2024

## 2024-06-19 DIAGNOSIS — I10 ESSENTIAL HYPERTENSION: Chronic | ICD-10-CM

## 2024-06-19 DIAGNOSIS — R11.0 NAUSEA: ICD-10-CM

## 2024-06-19 DIAGNOSIS — E11.9 TYPE 2 DIABETES MELLITUS WITHOUT COMPLICATION, WITHOUT LONG-TERM CURRENT USE OF INSULIN: ICD-10-CM

## 2024-06-19 RX ORDER — LISINOPRIL 20 MG/1
20 TABLET ORAL
Qty: 90 TABLET | Refills: 1 | Status: SHIPPED | OUTPATIENT
Start: 2024-06-19

## 2024-06-19 NOTE — TELEPHONE ENCOUNTER
Care Due:                  Date            Visit Type   Department     Provider  --------------------------------------------------------------------------------                                EP -                              PRIMARY      [facility]  Last Visit: 05-      CARE ([facility])   MEDICINE       Jacobo Joy  Next Visit: None Scheduled  None         None Found                                                            Last  Test          Frequency    Reason                     Performed    Due Date  --------------------------------------------------------------------------------    CMP.........  12 months..  atorvastatin.............  08-   08-    [facility] Embedded Care Due Messages. Reference number: [id number].   6/19/2024 2:40:18 PM CDT

## 2024-06-19 NOTE — TELEPHONE ENCOUNTER
Refill Routing Note   Medication(s) are not appropriate for processing by [facility] for the following reason(s):        Outside of protocol    [facility] action(s):  Route  Approve   Requires labs : Yes      Medication Therapy Plan: Ondansetron discontinued on 5/16/2024 by Jacobo Joy MD.      Appointments  past 12m or future 3m with PCP    Date Provider   Last Visit   5/16/2024 Jacobo Joy MD   Next Visit   11/18/2024 Jacobo Joy MD   ED visits in past 90 days: 0        Note composed:5:02 PM 06/19/2024

## 2024-06-20 ENCOUNTER — DOCUMENTATION ONLY (OUTPATIENT)
Dept: FAMILY MEDICINE | Facility: CLINIC | Age: 59
End: 2024-06-20
Payer: MEDICARE

## 2024-06-20 RX ORDER — ONDANSETRON 4 MG/1
TABLET, ORALLY DISINTEGRATING ORAL
Qty: 30 TABLET | Refills: 2 | Status: SHIPPED | OUTPATIENT
Start: 2024-06-20

## 2024-08-07 NOTE — TELEPHONE ENCOUNTER
----- Message from Kassandra Dalton sent at 5/31/2022  4:00 PM CDT -----  Contact: pt  Type:  RX new Request    Who Called:  pt  Refill or New Rx:  new  RX Name and Strength:  zofran dissolvable tablets  How is the patient currently taking it? (ex. 1XDay):  As Directed  Is this a 30 day or 90 day RX:  as prescribed    Preferred Pharmacy with phone number:    [id number] - [facility] [id number] [place] [address] AT [facility] [address]  [address] [place] [id number]  Phone: [phone] Fax: [phone]      Local or Mail Order:  local  Ordering Provider:  Paul Watt Call Back Number:  [phone]    Additional Information: pt states she's had nausea/vomiting & headache for almost a week.  Please contact pt upon completion-Thank you~         Home

## 2024-08-15 ENCOUNTER — OFFICE VISIT (OUTPATIENT)
Dept: CARDIOLOGY | Facility: CLINIC | Age: 59
End: 2024-08-15
Attending: INTERNAL MEDICINE
Payer: MEDICARE

## 2024-08-15 VITALS
DIASTOLIC BLOOD PRESSURE: 97 MMHG | SYSTOLIC BLOOD PRESSURE: 148 MMHG | WEIGHT: 274.69 LBS | BODY MASS INDEX: 44.34 KG/M2 | HEART RATE: 87 BPM

## 2024-08-15 DIAGNOSIS — E78.2 MIXED HYPERLIPIDEMIA: Chronic | ICD-10-CM

## 2024-08-15 DIAGNOSIS — I51.89 DIASTOLIC DYSFUNCTION: ICD-10-CM

## 2024-08-15 DIAGNOSIS — I10 ESSENTIAL HYPERTENSION: Primary | Chronic | ICD-10-CM

## 2024-08-15 PROCEDURE — 4010F ACE/ARB THERAPY RXD/TAKEN: CPT | Mod: CPTII,S$GLB,,

## 2024-08-15 PROCEDURE — 1159F MED LIST DOCD IN RCRD: CPT | Mod: CPTII,S$GLB,,

## 2024-08-15 PROCEDURE — 99214 OFFICE O/P EST MOD 30 MIN: CPT | Mod: S$GLB,,,

## 2024-08-15 PROCEDURE — 3080F DIAST BP >= 90 MM HG: CPT | Mod: CPTII,S$GLB,,

## 2024-08-15 PROCEDURE — 99999 PR PBB SHADOW E&M-EST. PATIENT-LVL II: CPT | Mod: PBBFAC,,,

## 2024-08-15 PROCEDURE — 3077F SYST BP >= 140 MM HG: CPT | Mod: CPTII,S$GLB,,

## 2024-08-15 PROCEDURE — 3044F HG A1C LEVEL LT 7.0%: CPT | Mod: CPTII,S$GLB,,

## 2024-08-15 PROCEDURE — 3008F BODY MASS INDEX DOCD: CPT | Mod: CPTII,S$GLB,,

## 2024-08-15 NOTE — PROGRESS NOTES
Subjective:    Patient ID:  Amena Anderson is a 58 y.o. female patient here for evaluation Follow-up (6 month )    History of Present Illness:     Mrs. Anderson is a 58 year old F who follows with Dr. Garcia here today for a follow up. She had a DSE and event monitor after her original appointment with Dr. Garcia and those were both unremarkable. She is feeling good, BP has been controlled at home. Wearing compression socks religiously and this is helping with her venous insufficiency.     She just had a sweet grand baby born who is doing well in the NICU.       Most Recent Echocardiogram Results  Results for orders placed during the hospital encounter of 03/04/24    Stress Echo Which stress agent will be used? Pharmacological; Color Flow Doppler? Yes    Interpretation Summary    Left Ventricle: The left ventricle is normal in size. There is mild concentric hypertrophy. There is normal systolic function with a visually estimated ejection fraction of 65 - 70%. Grade I diastolic dysfunction. Normal left ventricular filling pressure.    Right Ventricle: Normal right ventricular cavity size. Systolic function is normal.    Pulmonary Artery: No pulmonary hypertension. The estimated pulmonary artery systolic pressure is 26 mmHg.    IVC/SVC: Normal venous pressure at 3 mmHg.    Stress Protocol: The patient was infused intravenously with dobutamine. The patient received a graduated infusion of the stress agent beginning at 10.0 mcg/kg/min to a peak dose of 30.0 mcg/kg/min. The peak heart rate was 127 bpm, which is 83% of age predicted maximum heart rate. The patient reported no symptoms during the stress test. The test was stopped because the patient requested it.    Baseline ECG: The Baseline ECG reveals sinus rhythm.    ECG Conclusion: The ECG portion of the study is negative for ischemia. Sensitivity is reduced due to failure to reach target heart rate.    Post-stress      Most Recent Nuclear Stress Test Results  No results  found for this or any previous visit.      Most Recent Cardiac PET Stress Test Results  No results found for this or any previous visit.      Most Recent Cardiovascular Angiogram results  No results found for this or any previous visit.      Other Most Recent Cardiology Results  Results for orders placed during the hospital encounter of 24    Cardiac event monitor    Interpretation Summary    Patient monitored for 20d 23h 26m    PACs were not found during the monitoring period    PVC burden of 1%    No ventricular or supraventricular runs; no atrial fibrillation/flutter; no significant bradyarrhythmia.    / patient triggers c/w normal sinus rhythm      REVIEW OF SYSTEMS: As noted in HPI   CARDIOVASCULAR: No recent chest pain, palpitations, arm/neck/jaw pain, or edema.  RESPIRATORY: No recent fever, cough, SOB.  : No blood in the urine  GI: No reflux, nausea, vomiting, or blood in stool.   MUSCULOSKELETAL: No falls.   NEURO: No headaches, syncope, or dizziness.  EYES: No sudden changes in vision.     Past Medical History:   Diagnosis Date    AC (acromioclavicular) joint bone spurs, unspecified laterality     bilateral    Dysphagia     H. pylori infection     Insomnia      Past Surgical History:   Procedure Laterality Date     SECTION      Three times    COLONOSCOPY  2017    Dr. Benitez, repeat in 10 years    ESOPHAGEAL DILATION N/A 2022    Procedure: DILATION, ESOPHAGUS;  Surgeon: Randy Benitez Jr., MD;  Location: [facility];  Service: Endoscopy;  Laterality: N/A;    ESOPHAGOGASTRODUODENOSCOPY N/A 2022    Procedure: EGD (ESOPHAGOGASTRODUODENOSCOPY);  Surgeon: Randy Benitez Jr., MD;  Location: [facility];  Service: Endoscopy;  Laterality: N/A;    HYSTERECTOMY      total    LASIK      LUMBAR EPIDURAL INJECTION      OOPHORECTOMY      THYROIDECTOMY, PARTIAL      UPPER GASTROINTESTINAL ENDOSCOPY  2017    Dr. Benitez     Social History     Tobacco Use    Smoking status: Never     "Smokeless tobacco: Never   Substance Use Topics    Alcohol use: No    Drug use: Yes     Types: Hydrocodone         Objective      Vitals:    08/15/24 0945   BP: (!) 148/97   Pulse: 87       LAST EKG  Results for orders placed or performed in visit on 02/12/24   IN OFFICE EKG 12-LEAD (to [place])    Collection Time: 02/12/24  1:57 PM   Result Value Ref Range    QRS Duration 78 ms    [facility] QTC Calculation 457 ms    Narrative    Test Reason : R07.9,    Vent. Rate : 082 BPM     Atrial Rate : 082 BPM     P-R Int : 158 ms          QRS Dur : 078 ms      QT Int : 392 ms       P-R-T Axes : 071 034 095 degrees     QTc Int : 457 ms    Program found technically poor ECG  Normal sinus rhythm  Nonspecific T wave abnormality  Abnormal ECG  When compared with ECG of 17-JUN-2022 11:14,  Nonspecific T wave abnormality no longer evident in Inferior leads  Nonspecific T wave abnormality, improved in Anterior leads  QT has shortened  Confirmed by Deondre Dugan MD ([id number]) on 2/16/2024 4:12:32 PM    Referred By: [email or web address]   SELF           Confirmed By:Deondre Dugan MD     LIPIDS - LAST 2   Lab Results   Component Value Date    CHOL 148 05/16/2024    CHOL 159 10/14/2022    HDL 38 (L) 05/16/2024    HDL 40 10/14/2022    LDLCALC 87.6 05/16/2024    LDLCALC 99.8 10/14/2022    TRIG 112 05/16/2024    TRIG 96 10/14/2022    CHOLHDL 25.7 05/16/2024    CHOLHDL 25.2 10/14/2022     CARDIAC PROFILE - LAST 2  No results found for: "BNP", "CPK", "CPKMB", "LDH", "TROPONINI"   CBC - LAST 2  Lab Results   Component Value Date    WBC 10.78 08/21/2023    WBC 8.15 06/17/2022    HGB 13.0 08/21/2023    HGB 13.1 06/17/2022    HCT 41.3 08/21/2023    HCT 41.0 06/17/2022     08/21/2023     06/17/2022     No results found for: "LABPT", "INR", "APTT"  CHEMISTRY - LAST 2  Lab Results   Component Value Date     01/02/2024     11/01/2023    K 4.2 01/02/2024    K 4.4 11/01/2023    CO2 25 01/02/2024    CO2 28 11/01/2023    BUN 9 01/02/2024    BUN " 9 11/01/2023    CREATININE 0.7 01/02/2024    CREATININE 0.8 11/01/2023     01/02/2024     11/01/2023    CALCIUM 9.4 01/02/2024    CALCIUM 9.3 11/01/2023    MG 1.6 10/25/2023    ALBUMIN 3.6 01/02/2024    ALBUMIN 3.5 11/01/2023    ALT 25 08/21/2023    ALT 14 10/14/2022    AST 26 08/21/2023    AST 17 10/14/2022      ENDOCRINE - LAST 2  Lab Results   Component Value Date    HGBA1C 5.4 05/16/2024    HGBA1C 6.0 (H) 11/01/2023    TSH 0.415 04/25/2024    TSH 1.121 10/14/2022        PHYSICAL EXAM  CONSTITUTIONAL: Well built, well nourished in no apparent distress  NECK: no carotid bruit, no JVD  LUNGS: CTA  CHEST WALL: no tenderness  HEART: regular rate and rhythm, S1, S2 normal, no murmur, click, rub or gallop   ABDOMEN: soft, non-tender; bowel sounds normal; no masses,  no organomegaly  EXTREMITIES: Extremities normal, no edema, no calf tenderness noted  NEURO: AAO X 3    I HAVE REVIEWED :    The vital signs, most recent cardiac testing, and most recent pertinent non-cardiology provider notes.    Current Outpatient Medications   Medication Instructions    amitriptyline (ELAVIL) 25 mg, Oral, Nightly    amLODIPine (NORVASC) 10 mg, Oral, Daily    aspirin (ECOTRIN) 81 mg, Oral, Daily    atorvastatin (LIPITOR) 20 mg, Oral, Daily    azelastine (ASTELIN) 137 mcg (0.1 %) nasal spray USE 1 SPRAY BY NASAL ROUTE TWICE DAILY    blood sugar diagnostic Strp To check BG 2 times daily, to use with insurance preferred meter DX E11.42    blood-glucose meter kit To check BG 2 times daily, to use with insurance preferred meter    cetirizine (ZYRTEC) 10 mg, Oral, Daily    ciclopirox (LOPROX) 0.77 % Crea Topical (Top), 2 times daily    ciclopirox 0.77 % Gel 1 application , 2 times daily    diclofenac sodium (VOLTAREN) 2 g, Topical (Top), 4 times daily PRN    diphth,pertus,acell,,tetanus (BOOSTRIX TDAP) 2.5-8-5 Lf-mcg-Lf/0.5mL Syrg injection Intramuscular    docusate sodium (COLACE) 100 mg, Oral, 2 times daily    estradioL  (ESTRACE) 1 mg, Oral, Daily    gabapentin (NEURONTIN) 400 mg, Oral, 3 times daily    hydrALAZINE (APRESOLINE) 25 mg, Oral, 3 times daily    hydrocodone-acetaminophen 10-325mg (NORCO)  mg Tab 10 tablets, Oral, 3 times daily    lancets Misc To check BG 2 times daily, to use with insurance preferred meter    levothyroxine (SYNTHROID) 150 mcg, Oral, Daily    lisinopriL (PRINIVIL,ZESTRIL) 20 mg, Oral    metFORMIN (GLUCOPHAGE) 1,000 mg, Oral, Nightly, TAKE 1 TABLET(1000 MG) BY MOUTH EVERY EVENING    naproxen (EC NAPROSYN) 500 mg, Oral, 2 times daily    nitroGLYCERIN (NITROSTAT) 0.4 mg, Sublingual, Every 5 min PRN    omeprazole (PRILOSEC) 40 mg, Oral, 2 times daily before meals    ondansetron (ZOFRAN-ODT) 4 MG TbDL DISSOLVE 1 TABLET(4 MG) ON THE TONGUE EVERY 8 HOURS AS NEEDED FOR NAUSEA    potassium chloride SA (K-DUR,KLOR-CON) 20 MEQ tablet TAKE 1 TABLET(20 MEQ) BY MOUTH TWICE DAILY    RESTASIS 0.05 % ophthalmic emulsion No dose, route, or frequency recorded.    tirzepatide 7.5 mg, Subcutaneous, Every 7 days    varicella-zoster gE-AS01B, PF, (SHINGRIX) 50 mcg/0.5 mL injection Intramuscular        Assessment & Plan   1. Essential hypertension  Continue amlodipine 10 mg daily   Cotinue lisiopril 20 mg daily   Stop taking hydralazine 25 mg daily     2. Mixed hyperlipidemia  Continue lipitor 20 mg daily     3. Diastolic dysfunction  Euvolemic without diuretic          6 months with Dr. Jose Dalton PA-C   [facility]   Office: [phone]

## 2024-08-16 ENCOUNTER — OFFICE VISIT (OUTPATIENT)
Dept: PODIATRY | Facility: CLINIC | Age: 59
End: 2024-08-16
Payer: MEDICARE

## 2024-08-16 VITALS — HEIGHT: 66 IN | WEIGHT: 274.69 LBS | BODY MASS INDEX: 44.15 KG/M2

## 2024-08-16 DIAGNOSIS — E11.42 TYPE 2 DIABETES MELLITUS WITH DIABETIC POLYNEUROPATHY, WITHOUT LONG-TERM CURRENT USE OF INSULIN: Primary | ICD-10-CM

## 2024-08-16 DIAGNOSIS — B35.1 ONYCHOMYCOSIS DUE TO DERMATOPHYTE: ICD-10-CM

## 2024-08-16 DIAGNOSIS — E11.51 TYPE II DIABETES MELLITUS WITH PERIPHERAL CIRCULATORY DISORDER: ICD-10-CM

## 2024-08-16 PROCEDURE — 99999 PR PBB SHADOW E&M-EST. PATIENT-LVL II: CPT | Mod: PBBFAC,,, | Performed by: PODIATRIST

## 2024-08-16 NOTE — PROGRESS NOTES
Subjective:      Patient ID: Amena Anderson is a 58 y.o. female.    Chief Complaint: Diabetic Foot Exam (PCP 5/16/24) and Nail Care    Patient returns for follow up  thick and discolored nails for high risk foot care. She is high risk secondary to the diabetes with PVD. No acute changes since her last visit to her feet. Doing well overall, no acute changes doing well overall.     PCP: Dr. Miller  Last seen: 4/19/24 with endocrinology    Review of Systems   Constitutional: Negative for chills, diaphoresis, fever, malaise/fatigue and night sweats.   Cardiovascular:  Positive for leg swelling. Negative for claudication, cyanosis and syncope.   Skin:  Negative for color change, dry skin, nail changes, rash, suspicious lesions and unusual hair distribution.   Musculoskeletal:  Positive for joint pain. Negative for falls, joint swelling, muscle cramps, muscle weakness and stiffness.   Gastrointestinal:  Negative for constipation, diarrhea, nausea and vomiting.   Neurological:  Positive for paresthesias. Negative for brief paralysis, disturbances in coordination, focal weakness, numbness, sensory change and tremors.           Objective:      Physical Exam  Constitutional:       General: She is not in acute distress.     Appearance: She is well-developed. She is not diaphoretic.   Cardiovascular:      Pulses:           Dorsalis pedis pulses are 2+ on the right side and 2+ on the left side.        Posterior tibial pulses are 2+ on the right side and 2+ on the left side.      Comments: Capillary refill 3 seconds all toes/distal feet, all toes/both feet warm to touch.      Positive lower extremity edema bilateral.    Musculoskeletal:      Right ankle: Normal. No swelling, deformity, ecchymosis or lacerations. Normal range of motion. Normal pulse.      Right Achilles Tendon: Normal. No defects. De La Cruz's test negative.      Comments: Improving pain to palpation lateral left ankle at cfl and atfl without instability deformity or  loss of function. Also has pain along the peroneal tendons to the brevis insertion.    Ankle dorsiflexion decreased at <10 degrees bilateral with moderate increase with knee flexion bilateral. There is tenderness to palpation bilateral achilles attachments on the calcaneus.    Pain on palpation plantar medial and central heel right foot. No pain with ROM or MMT. No pain with medial and lateral compression of heel.     Feet:      Right foot:      Protective Sensation: 10 sites tested.  8 sites sensed.      Left foot:      Protective Sensation: 10 sites tested.  9 sites sensed.   Lymphadenopathy:      Comments: Negative lymphadenopathy bilateral popliteal fossa and tarsal tunnel.   Skin:     General: Skin is warm and dry.      Coloration: Skin is not pale.      Findings: Lesion present. No abrasion, bruising, burn, ecchymosis, erythema, laceration, petechiae or rash.      Nails: There is no clubbing.      Comments:   Skin is thin and atrophic bilateral    Nails 1-5 bilateral are thick 3-4 mm, long 3-6 mm, and discolored with subungual debris    Hyperkeratotic lesions bilateral plantar medial aspect first metatarsal bilateral          Neurological:      Mental Status: She is alert and oriented to person, place, and time.      Sensory: Sensory deficit present.      Motor: No tremor, atrophy, abnormal muscle tone or seizure activity.      Gait: Gait normal.      Comments: Negative tinel sign to percussion sural, superficial peroneal, deep peroneal, saphenous, and posterior tibial nerves right and left ankles and feet. Decreased vibratory sensation bilateral     Psychiatric:         Behavior: Behavior normal. Behavior is cooperative.               Assessment:       Encounter Diagnoses   Name Primary?    Type 2 diabetes mellitus with diabetic polyneuropathy, without long-term current use of insulin Yes    Type II diabetes mellitus with peripheral circulatory disorder     Onychomycosis due to dermatophyte                       Plan:       Amena was seen today for diabetic foot exam and nail care.    Diagnoses and all orders for this visit:    Type 2 diabetes mellitus with diabetic polyneuropathy, without long-term current use of insulin    Type II diabetes mellitus with peripheral circulatory disorder    Onychomycosis due to dermatophyte                  I counseled the patient on her conditions, their implications and medical management.    Shoe inspection. Diabetic Foot Education. Patient reminded of the importance of good nutrition and blood sugar control to help prevent podiatric complications of diabetes. Patient instructed on proper foot hygeine. We discussed wearing proper shoe gear, daily foot inspections, never walking without protective shoe gear, never putting sharp instruments to feet Discussed importance of supportive shoes with accommodative toe box to reduce pressure and irritation to forefoot.     With patient's verbal consent, nails were aggressively reduced and debrided x 10 to their soft tissue attachment mechanically removing all offending nail and debris. Patient relates relief following the procedure. No anesthesia or hemostasis required. No blood loss.           Follow up in 3 months routine care.    Edson Martinez DPM

## 2024-08-21 RX ORDER — AZELASTINE 1 MG/ML
SPRAY, METERED NASAL
Qty: 90 ML | Refills: 2 | Status: SHIPPED | OUTPATIENT
Start: 2024-08-21

## 2024-08-21 NOTE — TELEPHONE ENCOUNTER
Care Due:                  Date            Visit Type   Department     Provider  --------------------------------------------------------------------------------                                EP -                              [facility]  Last Visit: 05-      CARE ([facility])   BIMAL Joy                              EP -                              PRIMARY      [facility]  Next Visit: 11-      CARE ([facility])   BIMAL Joy                                                            Last  Test          Frequency    Reason                     Performed    Due Date  --------------------------------------------------------------------------------    CMP.........  12 months..  atorvastatin.............  08-   08-    HBA1C.......  6 months...  metFORMIN, tirzepatide...  05- 11-    [facility] Embedded Care Due Messages. Reference number: [id number].   8/21/2024 10:39:09 AM CDT

## 2024-08-22 NOTE — TELEPHONE ENCOUNTER
Provider Staff:  Action required for this patient     Please see care gap opportunities below in Care Due Message.    Thanks!  [facility]     Appointments      Date Provider   Last Visit   5/16/2024 Jacobo Joy MD   Next Visit   11/18/2024 Jacobo Joy MD      Refill Decision Note   Amena Anderson  is requesting a refill authorization.  Brief Assessment and Rationale for Refill:  Approve     Medication Therapy Plan:         Comments:     Note composed:9:57 PM 08/21/2024

## 2024-08-27 ENCOUNTER — PATIENT MESSAGE (OUTPATIENT)
Dept: FAMILY MEDICINE | Facility: CLINIC | Age: 59
End: 2024-08-27
Payer: MEDICARE

## 2024-09-10 DIAGNOSIS — Z91.09 ENVIRONMENTAL ALLERGIES: ICD-10-CM

## 2024-09-10 RX ORDER — CETIRIZINE HYDROCHLORIDE 10 MG/1
10 TABLET ORAL
Qty: 90 TABLET | Refills: 2 | Status: SHIPPED | OUTPATIENT
Start: 2024-09-10

## 2024-09-10 NOTE — TELEPHONE ENCOUNTER
No care due was identified.  [facility] Embedded Care Due Messages. Reference number: [id number].   9/10/2024 5:20:05 AM CDT

## 2024-09-10 NOTE — TELEPHONE ENCOUNTER
Refill Decision Note   Amena Anderson  is requesting a refill authorization.  Brief Assessment and Rationale for Refill:  Approve     Medication Therapy Plan:        Comments:     Note composed:11:08 AM 09/10/2024

## 2024-09-18 ENCOUNTER — TELEPHONE (OUTPATIENT)
Dept: FAMILY MEDICINE | Facility: CLINIC | Age: 59
End: 2024-09-18
Payer: MEDICARE

## 2024-10-14 ENCOUNTER — OFFICE VISIT (OUTPATIENT)
Dept: GASTROENTEROLOGY | Facility: CLINIC | Age: 59
End: 2024-10-14
Payer: MEDICARE

## 2024-10-14 VITALS — WEIGHT: 268.31 LBS | BODY MASS INDEX: 43.12 KG/M2 | HEIGHT: 66 IN

## 2024-10-14 DIAGNOSIS — Z12.11 SCREENING FOR COLON CANCER: ICD-10-CM

## 2024-10-14 DIAGNOSIS — R11.0 NAUSEA: ICD-10-CM

## 2024-10-14 DIAGNOSIS — K76.0 FATTY LIVER: ICD-10-CM

## 2024-10-14 DIAGNOSIS — Z79.899 ENCOUNTER FOR MONITORING LONG-TERM PROTON PUMP INHIBITOR THERAPY: Primary | ICD-10-CM

## 2024-10-14 DIAGNOSIS — Z87.898 HISTORY OF CHEST PAIN: ICD-10-CM

## 2024-10-14 DIAGNOSIS — Z51.81 ENCOUNTER FOR MONITORING LONG-TERM PROTON PUMP INHIBITOR THERAPY: Primary | ICD-10-CM

## 2024-10-14 DIAGNOSIS — R12 HEARTBURN: ICD-10-CM

## 2024-10-14 DIAGNOSIS — K21.9 GASTROESOPHAGEAL REFLUX DISEASE WITHOUT ESOPHAGITIS: ICD-10-CM

## 2024-10-14 DIAGNOSIS — R10.13 EPIGASTRIC PAIN: ICD-10-CM

## 2024-10-14 PROCEDURE — 99999 PR PBB SHADOW E&M-EST. PATIENT-LVL III: CPT | Mod: PBBFAC,,,

## 2024-10-14 PROCEDURE — 3008F BODY MASS INDEX DOCD: CPT | Mod: CPTII,S$GLB,,

## 2024-10-14 PROCEDURE — 3044F HG A1C LEVEL LT 7.0%: CPT | Mod: CPTII,S$GLB,,

## 2024-10-14 PROCEDURE — 1160F RVW MEDS BY RX/DR IN RCRD: CPT | Mod: CPTII,S$GLB,,

## 2024-10-14 PROCEDURE — 1159F MED LIST DOCD IN RCRD: CPT | Mod: CPTII,S$GLB,,

## 2024-10-14 PROCEDURE — 99214 OFFICE O/P EST MOD 30 MIN: CPT | Mod: S$GLB,,,

## 2024-10-14 PROCEDURE — 4010F ACE/ARB THERAPY RXD/TAKEN: CPT | Mod: CPTII,S$GLB,,

## 2024-10-14 RX ORDER — ONDANSETRON 4 MG/1
4 TABLET, ORALLY DISINTEGRATING ORAL EVERY 12 HOURS PRN
Qty: 60 TABLET | Refills: 0 | Status: SHIPPED | OUTPATIENT
Start: 2024-10-14

## 2024-10-14 RX ORDER — FAMOTIDINE 40 MG/1
40 TABLET, FILM COATED ORAL NIGHTLY PRN
Qty: 90 TABLET | Refills: 3 | Status: SHIPPED | OUTPATIENT
Start: 2024-10-14 | End: 2025-10-14

## 2024-10-14 RX ORDER — OMEPRAZOLE 40 MG/1
40 CAPSULE, DELAYED RELEASE ORAL EVERY MORNING
Qty: 90 CAPSULE | Refills: 3 | Status: SHIPPED | OUTPATIENT
Start: 2024-10-14

## 2024-10-14 NOTE — PROGRESS NOTES
Subjective:       Patient ID: Amena Anderson is a 59 y.o. female Body mass index is 43.3 kg/m².    Chief Complaint: Medication Refill and Gastroesophageal Reflux    Established patient of Candice Bernabe NP and myself.  Established patient of ANGELLA Rucker NP (hepatology).    Gastroesophageal Reflux  She complains of abdominal pain (improved since taking PPI; intermittent epigastric tenderness with palpation), chest pain (denies pain currently; intermittent chest pain that improves after taking glycerin; patient followed closely by Cardiology), early satiety (currently on Mounjaro for weight loss; hx of diabetes), heartburn, nausea (Improved; currently taking Zofran p.r.n. with relief; intermittently occurs after eating, but other times is spontaneous; risk factors: currently taking Mounjaro and hx of diabetes) and water brash (rare; improved since taking PPI). She reports no belching, no choking, no coughing, no dysphagia (resolved after EGD with dilation), no globus sensation, no hoarse voice or no sore throat. This is a chronic problem. The current episode started more than 1 year ago. The problem occurs occasionally. The problem has been gradually improving. The heartburn duration is several minutes (improved - less frequent; spontaneous). The heartburn is located in the substernum. The heartburn is of mild intensity. The heartburn does not wake her from sleep. The heartburn doesn't change with position. The symptoms are aggravated by certain foods (fried foods (currently trying to avoid)). Associated symptoms include weight loss (intentional; currently on Mounjaro and has implemented diet changes (avoiding fried foods and sugar)). Pertinent negatives include no anemia, fatigue, melena or muscle weakness. Denies diarrhea/constipation. Typically has 2 soft BMs daily. Denies family history of colon cancer/IBD. EGD 07/08/22 - Normal oropharynx. Normal cricopharyngeus.Normal esophagus. Z-line regular, 39 cm  "from the incisors. Patulous lower esophageal sphincter (NERD?). Normal stomach. Normal antrum. Biopsied. Normal pylorus. Normal examined duodenum. Normal major papilla. No endoscopic esophageal abnormality to explain patient's dysphagia. Esophagus dilated, 54 Fr. Non-erosive esophageal reflux (NERD) disease present; patho: benign, no bacteria. Risk factors include obesity and caffeine use (drinks 1-2 cups of coffee daily). She has tried a PPI and a diet change (Currently taking Omeprazole 40 mg once and rarely twice daily) for the symptoms. The treatment provided significant relief. Past procedures include an abdominal ultrasound (08/21/23 -"There is hepatic steatosis and hepatomegaly") and an EGD. Past procedures do not include esophageal manometry, esophageal pH monitoring, H. pylori antibody titer or a UGI. Past invasive treatments do not include gastroplasty, gastroplication or reflux surgery.     Review of Systems   Constitutional:  Positive for appetite change and weight loss (intentional; currently on Mounjaro and has implemented diet changes (avoiding fried foods and sugar)). Negative for activity change, chills, diaphoresis, fatigue, fever and unexpected weight change.   HENT:  Negative for hoarse voice, sore throat and trouble swallowing (denies dysphagia; resolved after EGD with dilation).    Respiratory:  Negative for cough, choking and shortness of breath.    Cardiovascular:  Positive for chest pain (denies pain currently; intermittent chest pain that improves after taking glycerin; patient followed closely by Cardiology).   Gastrointestinal:  Positive for abdominal pain (improved since taking PPI; intermittent epigastric tenderness with palpation), heartburn and nausea (Improved; currently taking Zofran p.r.n. with relief; intermittently occurs after eating, but other times is spontaneous; risk factors: currently taking Mounjaro and hx of diabetes). Negative for abdominal distention, anal bleeding, " blood in stool, constipation, diarrhea (Typically has 2 BMs daily; she reports not looking at stool after BMs, but denies constipation or diarrhea), dysphagia (resolved after EGD with dilation), melena, rectal pain and vomiting.   Genitourinary:  Negative for dysuria, frequency and hematuria.   Musculoskeletal:  Negative for muscle weakness.   Neurological:  Negative for headaches.       No LMP recorded. Patient has had a hysterectomy.  Past Medical History:   Diagnosis Date    AC (acromioclavicular) joint bone spurs, unspecified laterality     bilateral    Dysphagia     GERD (gastroesophageal reflux disease)     H. pylori infection     Insomnia      Past Surgical History:   Procedure Laterality Date     SECTION      Three times    COLONOSCOPY  2017    Dr. Benitez, repeat in 10 years    ESOPHAGEAL DILATION N/A 2022    Procedure: DILATION, ESOPHAGUS;  Surgeon: Randy Benitez Jr., MD;  Location: [facility];  Service: Endoscopy;  Laterality: N/A;    ESOPHAGOGASTRODUODENOSCOPY N/A 2022    Procedure: EGD (ESOPHAGOGASTRODUODENOSCOPY);  Surgeon: Randy Benitez Jr., MD;  Location: [facility];  Service: Endoscopy;  Laterality: N/A;    HYSTERECTOMY      total    LASIK      LUMBAR EPIDURAL INJECTION      OOPHORECTOMY      THYROIDECTOMY, PARTIAL      UPPER GASTROINTESTINAL ENDOSCOPY  2017    Dr. Benitez     Family History   Problem Relation Name Age of Onset    Cirrhosis Mother      Kidney disease Mother      Hypertension Father      Kidney disease Father      Heart disease Father      Alzheimer's disease Father      Throat cancer Maternal Uncle      Breast cancer Cousin paternal     Breast cancer Other niece 35    Colon cancer Neg Hx      Colon polyps Neg Hx      Crohn's disease Neg Hx      Ulcerative colitis Neg Hx      Stomach cancer Neg Hx      Esophageal cancer Neg Hx      Ovarian cancer Neg Hx       Social History     Tobacco Use    Smoking status: Never    Smokeless tobacco: Never   Substance  Use Topics    Alcohol use: No    Drug use: Yes     Types: Hydrocodone     Wt Readings from Last 10 Encounters:   10/14/24 121.7 kg (268 lb 4.8 oz)   08/16/24 124.6 kg (274 lb 11.1 oz)   08/15/24 124.6 kg (274 lb 11.1 oz)   05/16/24 126.5 kg (278 lb 15.9 oz)   04/26/24 130.5 kg (287 lb 11.2 oz)   04/19/24 130.5 kg (287 lb 11.2 oz)   03/04/24 132.9 kg (293 lb)   02/16/24 133.3 kg (293 lb 14 oz)   02/12/24 135.9 kg (299 lb 9.7 oz)   01/24/24 136.1 kg (300 lb)     Lab Results   Component Value Date    WBC 10.78 08/21/2023    HGB 13.0 08/21/2023    HCT 41.3 08/21/2023    MCV 86 08/21/2023     08/21/2023     CMP  Sodium   Date Value Ref Range Status   01/02/2024 141 136 - 145 mmol/L Final     Potassium   Date Value Ref Range Status   01/02/2024 4.2 3.5 - 5.1 mmol/L Final     Chloride   Date Value Ref Range Status   01/02/2024 105 95 - 110 mmol/L Final     CO2   Date Value Ref Range Status   01/02/2024 25 23 - 29 mmol/L Final     Glucose   Date Value Ref Range Status   01/02/2024 110 70 - 110 mg/dL Final     BUN   Date Value Ref Range Status   01/02/2024 9 6 - 20 mg/dL Final     Creatinine   Date Value Ref Range Status   01/02/2024 0.7 0.5 - 1.4 mg/dL Final     Calcium   Date Value Ref Range Status   01/02/2024 9.4 8.7 - 10.5 mg/dL Final     Total Protein   Date Value Ref Range Status   08/21/2023 7.5 6.0 - 8.4 g/dL Final     Albumin   Date Value Ref Range Status   01/02/2024 3.6 3.5 - 5.2 g/dL Final     Total Bilirubin   Date Value Ref Range Status   08/21/2023 0.5 0.1 - 1.0 mg/dL Final     Comment:     For infants and newborns, interpretation of results should be based  on gestational age, weight and in agreement with clinical  observations.    Premature Infant recommended reference ranges:  Up to 24 hours.............<8.0 mg/dL  Up to 48 hours............<12.0 mg/dL  3-5 days..................<15.0 mg/dL  6-29 days.................<15.0 mg/dL       Alkaline Phosphatase   Date Value Ref Range Status   08/21/2023  67 55 - 135 U/L Final     AST   Date Value Ref Range Status   08/21/2023 26 10 - 40 U/L Final     ALT   Date Value Ref Range Status   08/21/2023 25 10 - 44 U/L Final     Anion Gap   Date Value Ref Range Status   01/02/2024 11 8 - 16 mmol/L Final     eGFR if    Date Value Ref Range Status   06/17/2022 >60 >60 mL/min/1.73 m^2 Final     eGFR if non    Date Value Ref Range Status   06/17/2022 >60 >60 mL/min/1.73 m^2 Final     Comment:     Calculation used to obtain the estimated glomerular filtration  rate (eGFR) is the CKD-EPI equation.        Lab Results   Component Value Date    AMYLASE 88 02/05/2018     Lab Results   Component Value Date    LIPASE 11 08/21/2023     Lab Results   Component Value Date    LIPASERES 44 06/17/2022     Lab Results   Component Value Date    TSH 0.415 04/25/2024     Reviewed prior medical records including office visit with Candice Irving NP 08/12/2022, radiology report of abdominal ultrasound 08/21/2023, MRI MRCP 03/12/2018, abdominal ultrasound 02/12/2018, KUB 02/05/2018, barium swallow 11/13/2017 & endoscopy history (see surgical history).    Objective:      Physical Exam  Vitals and nursing note reviewed.   Constitutional:       General: She is not in acute distress.     Appearance: Normal appearance. She is obese. She is not ill-appearing.   HENT:      Mouth/Throat:      Lips: Pink. No lesions.   Cardiovascular:      Rate and Rhythm: Normal rate.      Pulses: Normal pulses.      Heart sounds: Normal heart sounds.   Pulmonary:      Effort: Pulmonary effort is normal. No respiratory distress.      Breath sounds: Normal breath sounds.   Abdominal:      General: Abdomen is protuberant. Bowel sounds are normal. There is no distension or abdominal bruit. There are no signs of injury.      Palpations: Abdomen is soft. There is no shifting dullness, fluid wave, hepatomegaly, splenomegaly or mass.      Tenderness: There is abdominal tenderness in the epigastric  area. There is no guarding or rebound. Negative signs include Kwon's sign, Rovsing's sign and McBurney's sign.   Skin:     General: Skin is warm and dry.      Coloration: Skin is not jaundiced or pale.   Neurological:      Mental Status: She is alert and oriented to person, place, and time.   Psychiatric:         Attention and Perception: Attention normal.         Mood and Affect: Mood normal.         Speech: Speech normal.         Behavior: Behavior normal.         Assessment:       1. Encounter for monitoring long-term proton pump inhibitor therapy    2. Gastroesophageal reflux disease without esophagitis    3. Heartburn    4. Epigastric pain    5. Nausea    6. Screening for colon cancer    7. History of chest pain    8. Fatty liver          Plan:       Encounter for monitoring long-term proton pump inhibitor therapy  - preference to use lowest effective dose or discontinuing if possible- recommend a diet high in calcium and/or taking OTC calcium and vitamin d supplements as directed (such as Citracal +D)  - recommend annual monitoring with blood work to include CMP, CBC, vitamin B12, and magnesium.  -     Comprehensive Metabolic Panel; Future; Expected date: 10/14/2024  -     CBC Without Differential; Future; Expected date: 10/14/2024  -     Vitamin B12; Future; Expected date: 10/14/2024  -     Magnesium; Future; Expected date: 10/14/2024    Gastroesophageal reflux disease without esophagitis & Heartburn  -Avoid large meals, avoid eating within 2-3 hours of bedtime (avoid late night eating & lying down soon after eating), elevate head of bed if nocturnal symptoms are present, smoking cessation (if current smoker), & weight loss (if overweight).   -Avoid known foods which trigger reflux symptoms & to minimize/avoid high-fat foods, chocolate, caffeine, citrus, alcohol, & tomato products.  -Avoid/limit use of NSAID's, since they can cause GI upset, bleeding, and/or ulcers. If needed, take with food.  -START:  famotidine (PEPCID) 40 MG tablet; Take 1 tablet (40 mg total) by mouth nightly as needed for Heartburn.  Dispense: 90 tablet; Refill: 3  - REFILL: omeprazole (PRILOSEC) 40 MG capsule; Take 1 capsule (40 mg total) by mouth every morning.  Dispense: 90 capsule; Refill: 3    Epigastric pain  -Avoid known foods which trigger reflux symptoms & to minimize/avoid high-fat foods, chocolate, caffeine, citrus, alcohol, & tomato products.  -Avoid/limit use of NSAID's, since they can cause GI upset, bleeding, and/or ulcers. If needed, take with food.  -START: famotidine (PEPCID) 40 MG tablet; Take 1 tablet (40 mg total) by mouth nightly as needed for Heartburn.  Dispense: 90 tablet; Refill: 3  - REFILL: omeprazole (PRILOSEC) 40 MG capsule; Take 1 capsule (40 mg total) by mouth every morning.  Dispense: 90 capsule; Refill: 3  -     Comprehensive Metabolic Panel; Future; Expected date: 10/14/2024  -     CBC Without Differential; Future; Expected date: 10/14/2024  -     Lipase; Future; Expected date: 10/14/2024    Nausea  Recommend small frequent meals instead of 3 big meals a day, low fat meals & low residue diet.  Avoid: high fiber (insoluble fiber), red meats, dairy, and non-digestible solids (peels, fruit pulp, etc). Avoid NSAIDs and narcotics.  - possibly due to Tirzepatide?  - consider repeat GES  -REFILL: ondansetron (ZOFRAN-ODT) 4 MG TbDL; Take 1 tablet (4 mg total) by mouth every 12 (twelve) hours as needed (nausea).  Dispense: 60 tablet; Refill: 0    Screening for colon cancer  -follow-up for surveillance colonoscopy 08/2027    History of chest pain  -follow-up with Cardiology for continued evaluation and management    Fatty liver  - follow-up with hepatology for continued evaluation and management      Follow up in about 4 weeks (around 11/11/2024), or if symptoms worsen or fail to improve.      If no improvement in symptoms or symptoms worsen, call/follow-up at clinic or go to ER.        Total time spent on the  encounter includes face to face time and non-face to face time preparing to see the patient (eg, review of tests), Obtaining and/or reviewing separately obtained history, Documenting clinical information in the electronic or other health record, Independently interpreting results (not separately reported) and communicating results to the patient/family/caregiver, or Care coordination (not separately reported).     A dictation software program was used for this note. Please expect some simple typographical  errors in this note.

## 2024-11-05 ENCOUNTER — TELEPHONE (OUTPATIENT)
Dept: FAMILY MEDICINE | Facility: CLINIC | Age: 59
End: 2024-11-05
Payer: MEDICARE

## 2024-11-05 DIAGNOSIS — E89.0 POSTABLATIVE HYPOTHYROIDISM: Chronic | ICD-10-CM

## 2024-11-05 DIAGNOSIS — E11.42 TYPE 2 DIABETES MELLITUS WITH DIABETIC POLYNEUROPATHY, WITHOUT LONG-TERM CURRENT USE OF INSULIN: Primary | Chronic | ICD-10-CM

## 2024-11-05 NOTE — TELEPHONE ENCOUNTER
Spoke to pt. She has labs steve for Friday prior to your appt. Would you like to add any additional labs?

## 2024-11-05 NOTE — TELEPHONE ENCOUNTER
----- Message from Nancy sent at 11/5/2024  1:52 PM CST -----   Type:  Needs Medical Advice    Who Called: PT  Symptoms (please be specific): PT trying to get blood labs put in from 5/16/24        Best Call Back Number: [phone]        Additional Information:  Please call back to advise. Thank you!

## 2024-11-08 ENCOUNTER — LAB VISIT (OUTPATIENT)
Dept: LAB | Facility: HOSPITAL | Age: 59
End: 2024-11-08
Payer: MEDICARE

## 2024-11-08 DIAGNOSIS — R10.13 EPIGASTRIC PAIN: ICD-10-CM

## 2024-11-08 DIAGNOSIS — E11.42 TYPE 2 DIABETES MELLITUS WITH DIABETIC POLYNEUROPATHY, WITHOUT LONG-TERM CURRENT USE OF INSULIN: Chronic | ICD-10-CM

## 2024-11-08 DIAGNOSIS — Z79.899 ENCOUNTER FOR MONITORING LONG-TERM PROTON PUMP INHIBITOR THERAPY: ICD-10-CM

## 2024-11-08 DIAGNOSIS — E89.0 POSTABLATIVE HYPOTHYROIDISM: Chronic | ICD-10-CM

## 2024-11-08 DIAGNOSIS — R11.0 NAUSEA: ICD-10-CM

## 2024-11-08 DIAGNOSIS — Z51.81 ENCOUNTER FOR MONITORING LONG-TERM PROTON PUMP INHIBITOR THERAPY: ICD-10-CM

## 2024-11-08 LAB
ALBUMIN SERPL BCP-MCNC: 3.6 G/DL (ref 3.5–5.2)
ALP SERPL-CCNC: 59 U/L (ref 40–150)
ALT SERPL W/O P-5'-P-CCNC: 10 U/L (ref 10–44)
ANION GAP SERPL CALC-SCNC: 13 MMOL/L (ref 8–16)
AST SERPL-CCNC: 13 U/L (ref 10–40)
BILIRUB SERPL-MCNC: 0.4 MG/DL (ref 0.1–1)
BUN SERPL-MCNC: 8 MG/DL (ref 8–23)
CALCIUM SERPL-MCNC: 9.1 MG/DL (ref 8.7–10.5)
CHLORIDE SERPL-SCNC: 105 MMOL/L (ref 95–110)
CO2 SERPL-SCNC: 23 MMOL/L (ref 23–29)
CREAT SERPL-MCNC: 0.8 MG/DL (ref 0.5–1.4)
ERYTHROCYTE [DISTWIDTH] IN BLOOD BY AUTOMATED COUNT: 14.2 % (ref 11.5–14.5)
EST. GFR  (NO RACE VARIABLE): >60 ML/MIN/1.73 M^2
ESTIMATED AVG GLUCOSE: 103 MG/DL (ref 68–131)
GLUCOSE SERPL-MCNC: 97 MG/DL (ref 70–110)
HBA1C MFR BLD: 5.2 % (ref 4–5.6)
HCT VFR BLD AUTO: 41.9 % (ref 37–48.5)
HGB BLD-MCNC: 13.4 G/DL (ref 12–16)
LIPASE SERPL-CCNC: 19 U/L (ref 4–60)
MAGNESIUM SERPL-MCNC: 1.7 MG/DL (ref 1.6–2.6)
MCH RBC QN AUTO: 26.8 PG (ref 27–31)
MCHC RBC AUTO-ENTMCNC: 32 G/DL (ref 32–36)
MCV RBC AUTO: 84 FL (ref 82–98)
PLATELET # BLD AUTO: 364 K/UL (ref 150–450)
PMV BLD AUTO: 11.1 FL (ref 9.2–12.9)
POTASSIUM SERPL-SCNC: 3.9 MMOL/L (ref 3.5–5.1)
PROT SERPL-MCNC: 7.4 G/DL (ref 6–8.4)
RBC # BLD AUTO: 5 M/UL (ref 4–5.4)
SODIUM SERPL-SCNC: 141 MMOL/L (ref 136–145)
T4 FREE SERPL-MCNC: 1.34 NG/DL (ref 0.71–1.51)
TSH SERPL DL<=0.005 MIU/L-ACNC: 0.07 UIU/ML (ref 0.4–4)
VIT B12 SERPL-MCNC: 254 PG/ML (ref 210–950)
WBC # BLD AUTO: 9.62 K/UL (ref 3.9–12.7)

## 2024-11-08 PROCEDURE — 83735 ASSAY OF MAGNESIUM: CPT

## 2024-11-08 PROCEDURE — 82607 VITAMIN B-12: CPT

## 2024-11-08 PROCEDURE — 83690 ASSAY OF LIPASE: CPT

## 2024-11-08 PROCEDURE — 80053 COMPREHEN METABOLIC PANEL: CPT

## 2024-11-08 PROCEDURE — 36415 COLL VENOUS BLD VENIPUNCTURE: CPT | Mod: PO | Performed by: STUDENT IN AN ORGANIZED HEALTH CARE EDUCATION/TRAINING PROGRAM

## 2024-11-08 PROCEDURE — 84443 ASSAY THYROID STIM HORMONE: CPT | Performed by: STUDENT IN AN ORGANIZED HEALTH CARE EDUCATION/TRAINING PROGRAM

## 2024-11-08 PROCEDURE — 84439 ASSAY OF FREE THYROXINE: CPT | Performed by: STUDENT IN AN ORGANIZED HEALTH CARE EDUCATION/TRAINING PROGRAM

## 2024-11-08 PROCEDURE — 85027 COMPLETE CBC AUTOMATED: CPT

## 2024-11-08 PROCEDURE — 83036 HEMOGLOBIN GLYCOSYLATED A1C: CPT | Performed by: STUDENT IN AN ORGANIZED HEALTH CARE EDUCATION/TRAINING PROGRAM

## 2024-11-18 ENCOUNTER — PATIENT OUTREACH (OUTPATIENT)
Dept: ADMINISTRATIVE | Facility: HOSPITAL | Age: 59
End: 2024-11-18
Payer: MEDICARE

## 2024-11-18 ENCOUNTER — OFFICE VISIT (OUTPATIENT)
Dept: FAMILY MEDICINE | Facility: CLINIC | Age: 59
End: 2024-11-18
Payer: MEDICARE

## 2024-11-18 ENCOUNTER — OFFICE VISIT (OUTPATIENT)
Dept: PODIATRY | Facility: CLINIC | Age: 59
End: 2024-11-18
Payer: MEDICARE

## 2024-11-18 VITALS
OXYGEN SATURATION: 96 % | BODY MASS INDEX: 42.45 KG/M2 | SYSTOLIC BLOOD PRESSURE: 132 MMHG | HEIGHT: 66 IN | DIASTOLIC BLOOD PRESSURE: 70 MMHG | HEART RATE: 81 BPM | WEIGHT: 264.13 LBS

## 2024-11-18 VITALS — WEIGHT: 268.31 LBS | BODY MASS INDEX: 43.12 KG/M2 | HEIGHT: 66 IN

## 2024-11-18 DIAGNOSIS — E66.813 CLASS 3 SEVERE OBESITY DUE TO EXCESS CALORIES WITH SERIOUS COMORBIDITY AND BODY MASS INDEX (BMI) OF 40.0 TO 44.9 IN ADULT: ICD-10-CM

## 2024-11-18 DIAGNOSIS — I10 ESSENTIAL HYPERTENSION: Chronic | ICD-10-CM

## 2024-11-18 DIAGNOSIS — M25.552 CHRONIC LEFT HIP PAIN: ICD-10-CM

## 2024-11-18 DIAGNOSIS — E11.51 TYPE II DIABETES MELLITUS WITH PERIPHERAL CIRCULATORY DISORDER: ICD-10-CM

## 2024-11-18 DIAGNOSIS — E89.0 POSTABLATIVE HYPOTHYROIDISM: Primary | Chronic | ICD-10-CM

## 2024-11-18 DIAGNOSIS — E66.01 CLASS 3 SEVERE OBESITY DUE TO EXCESS CALORIES WITH SERIOUS COMORBIDITY AND BODY MASS INDEX (BMI) OF 40.0 TO 44.9 IN ADULT: ICD-10-CM

## 2024-11-18 DIAGNOSIS — G89.29 CHRONIC LEFT HIP PAIN: ICD-10-CM

## 2024-11-18 DIAGNOSIS — B35.1 ONYCHOMYCOSIS DUE TO DERMATOPHYTE: ICD-10-CM

## 2024-11-18 DIAGNOSIS — Z12.31 BREAST CANCER SCREENING BY MAMMOGRAM: ICD-10-CM

## 2024-11-18 DIAGNOSIS — G89.29 CHRONIC LOW BACK PAIN, UNSPECIFIED BACK PAIN LATERALITY, UNSPECIFIED WHETHER SCIATICA PRESENT: ICD-10-CM

## 2024-11-18 DIAGNOSIS — E11.42 TYPE 2 DIABETES MELLITUS WITH DIABETIC POLYNEUROPATHY, WITHOUT LONG-TERM CURRENT USE OF INSULIN: Primary | ICD-10-CM

## 2024-11-18 DIAGNOSIS — M54.50 CHRONIC LOW BACK PAIN, UNSPECIFIED BACK PAIN LATERALITY, UNSPECIFIED WHETHER SCIATICA PRESENT: ICD-10-CM

## 2024-11-18 DIAGNOSIS — E11.42 TYPE 2 DIABETES MELLITUS WITH DIABETIC POLYNEUROPATHY, WITHOUT LONG-TERM CURRENT USE OF INSULIN: Chronic | ICD-10-CM

## 2024-11-18 PROCEDURE — 99999 PR PBB SHADOW E&M-EST. PATIENT-LVL IV: CPT | Mod: PBBFAC,,, | Performed by: STUDENT IN AN ORGANIZED HEALTH CARE EDUCATION/TRAINING PROGRAM

## 2024-11-18 PROCEDURE — 4010F ACE/ARB THERAPY RXD/TAKEN: CPT | Mod: CPTII,S$GLB,, | Performed by: STUDENT IN AN ORGANIZED HEALTH CARE EDUCATION/TRAINING PROGRAM

## 2024-11-18 PROCEDURE — 99214 OFFICE O/P EST MOD 30 MIN: CPT | Mod: S$GLB,,, | Performed by: STUDENT IN AN ORGANIZED HEALTH CARE EDUCATION/TRAINING PROGRAM

## 2024-11-18 PROCEDURE — 3078F DIAST BP <80 MM HG: CPT | Mod: CPTII,S$GLB,, | Performed by: STUDENT IN AN ORGANIZED HEALTH CARE EDUCATION/TRAINING PROGRAM

## 2024-11-18 PROCEDURE — 3044F HG A1C LEVEL LT 7.0%: CPT | Mod: CPTII,S$GLB,, | Performed by: STUDENT IN AN ORGANIZED HEALTH CARE EDUCATION/TRAINING PROGRAM

## 2024-11-18 PROCEDURE — 3075F SYST BP GE 130 - 139MM HG: CPT | Mod: CPTII,S$GLB,, | Performed by: STUDENT IN AN ORGANIZED HEALTH CARE EDUCATION/TRAINING PROGRAM

## 2024-11-18 PROCEDURE — 1159F MED LIST DOCD IN RCRD: CPT | Mod: CPTII,S$GLB,, | Performed by: STUDENT IN AN ORGANIZED HEALTH CARE EDUCATION/TRAINING PROGRAM

## 2024-11-18 PROCEDURE — 99999 PR PBB SHADOW E&M-EST. PATIENT-LVL IV: CPT | Mod: PBBFAC,,, | Performed by: PODIATRIST

## 2024-11-18 PROCEDURE — 3008F BODY MASS INDEX DOCD: CPT | Mod: CPTII,S$GLB,, | Performed by: STUDENT IN AN ORGANIZED HEALTH CARE EDUCATION/TRAINING PROGRAM

## 2024-11-18 RX ORDER — NAPROXEN 500 MG/1
500 TABLET ORAL 2 TIMES DAILY PRN
COMMUNITY
Start: 2024-11-02 | End: 2024-11-18 | Stop reason: SDUPTHER

## 2024-11-18 RX ORDER — LEVOTHYROXINE SODIUM 125 UG/1
125 TABLET ORAL DAILY
Qty: 30 TABLET | Refills: 11 | Status: SHIPPED | OUTPATIENT
Start: 2024-11-18

## 2024-11-18 NOTE — LETTER
FAX      AUTHORIZATION FOR RELEASE OF   CONFIDENTIAL INFORMATION      Dr. Vargas,      We are seeing Amena Anderson, date of birth 1965, in the clinic at [facility]. Jacobo Joy MD is the patient's PCP. Amena Anderson has an outstanding lab/procedure at the time we reviewed their chart. In order to help keep their health information updated, Amena Anderson has authorized us to request the following medical records:        EYE EXAM - WITH RETINAL SCREENING (MOST RECENT WITHIN 48 MONTHS)    Diabetic EYE EXAM           Please include the actual eye exam report along with the significant findings:    ________ No Diabetic Retinopathy  ________ Minimal Background Diabetic Retinopathy  ________ Moderate to Severe Background Diabetic Retinopathy  ________ Clinically Significant Macular Edema  ________ Proliferative Diabetic Retinopathy      Please fax records to Jacobo Joy MD at [facility] at [phone].    If you have any questions, please contact Ramonita at [phone]    Thank you,    Ramonita Lofton LPN LPN Clinical Care Coordinator  [facility]        Patient Name: Amena Anderson  : 1965  Patient Phone #: [phone]

## 2024-11-18 NOTE — ASSESSMENT & PLAN NOTE
TSH two weeks ago was suppressed. I suspect that this may be related to her weight loss. Decrease levothyroxine from 150mcg to 125mcg. Labs in 6 weeks.

## 2024-11-18 NOTE — ASSESSMENT & PLAN NOTE
Patient has lost a significant amount of weight. Maximum earlier this year was 306 lbs but she now weighs 264 lbs. Continue tirzepatide.

## 2024-11-18 NOTE — PROGRESS NOTES
Subjective:      Patient ID: Amena Anderson is a 59 y.o. female.    Chief Complaint: Diabetic Foot Exam (PCP 5/16/24) and Nail Care    Patient returns for follow up  thick and discolored nails for high risk foot care. She is high risk secondary to the diabetes with PVD. No acute changes since her last visit to her feet. Doing well overall, no acute changes doing well overall.     PCP: Dr. Miller  Last seen: 11/18/24    Review of Systems   Constitutional: Negative for chills, diaphoresis, fever, malaise/fatigue and night sweats.   Cardiovascular:  Positive for leg swelling. Negative for claudication, cyanosis and syncope.   Skin:  Negative for color change, dry skin, nail changes, rash, suspicious lesions and unusual hair distribution.   Musculoskeletal:  Positive for joint pain. Negative for falls, joint swelling, muscle cramps, muscle weakness and stiffness.   Gastrointestinal:  Negative for constipation, diarrhea, nausea and vomiting.   Neurological:  Positive for paresthesias. Negative for brief paralysis, disturbances in coordination, focal weakness, numbness, sensory change and tremors.           Objective:      Physical Exam  Constitutional:       General: She is not in acute distress.     Appearance: She is well-developed. She is not diaphoretic.   Cardiovascular:      Pulses:           Dorsalis pedis pulses are 2+ on the right side and 2+ on the left side.        Posterior tibial pulses are 2+ on the right side and 2+ on the left side.      Comments: Capillary refill 3 seconds all toes/distal feet, all toes/both feet warm to touch.      Positive lower extremity edema bilateral.    Musculoskeletal:      Right ankle: Normal. No swelling, deformity, ecchymosis or lacerations. Normal range of motion. Normal pulse.      Right Achilles Tendon: Normal. No defects. De La Cruz's test negative.      Comments: Improving pain to palpation lateral left ankle at cfl and atfl without instability deformity or loss of function.  Also has pain along the peroneal tendons to the brevis insertion.    Ankle dorsiflexion decreased at <10 degrees bilateral with moderate increase with knee flexion bilateral. There is tenderness to palpation bilateral achilles attachments on the calcaneus.    Pain on palpation plantar medial and central heel right foot. No pain with ROM or MMT. No pain with medial and lateral compression of heel.     Feet:      Right foot:      Protective Sensation: 10 sites tested.  8 sites sensed.      Left foot:      Protective Sensation: 10 sites tested.  9 sites sensed.   Lymphadenopathy:      Comments: Negative lymphadenopathy bilateral popliteal fossa and tarsal tunnel.   Skin:     General: Skin is warm and dry.      Coloration: Skin is not pale.      Findings: Lesion present. No abrasion, bruising, burn, ecchymosis, erythema, laceration, petechiae or rash.      Nails: There is no clubbing.      Comments:   Skin is thin and atrophic bilateral    Nails 1-5 bilateral are thick 3-4 mm, long 3-6 mm, and discolored with subungual debris    Hyperkeratotic lesions bilateral plantar medial aspect first metatarsal bilateral          Neurological:      Mental Status: She is alert and oriented to person, place, and time.      Sensory: Sensory deficit present.      Motor: No tremor, atrophy, abnormal muscle tone or seizure activity.      Gait: Gait normal.      Comments: Negative tinel sign to percussion sural, superficial peroneal, deep peroneal, saphenous, and posterior tibial nerves right and left ankles and feet. Decreased vibratory sensation bilateral     Psychiatric:         Behavior: Behavior normal. Behavior is cooperative.               Assessment:       Encounter Diagnoses   Name Primary?    Type 2 diabetes mellitus with diabetic polyneuropathy, without long-term current use of insulin Yes    Type II diabetes mellitus with peripheral circulatory disorder     Onychomycosis due to dermatophyte                        Plan:        Amena was seen today for diabetic foot exam and nail care.    Diagnoses and all orders for this visit:    Type 2 diabetes mellitus with diabetic polyneuropathy, without long-term current use of insulin    Type II diabetes mellitus with peripheral circulatory disorder    Onychomycosis due to dermatophyte                    I counseled the patient on her conditions, their implications and medical management.    Shoe inspection. Diabetic Foot Education. Patient reminded of the importance of good nutrition and blood sugar control to help prevent podiatric complications of diabetes. Patient instructed on proper foot hygeine. We discussed wearing proper shoe gear, daily foot inspections, never walking without protective shoe gear, never putting sharp instruments to feet Discussed importance of supportive shoes with accommodative toe box to reduce pressure and irritation to forefoot.     With patient's verbal consent, nails were aggressively reduced and debrided x 10 to their soft tissue attachment mechanically removing all offending nail and debris. Patient relates relief following the procedure. No anesthesia or hemostasis required. No blood loss.           Follow up in 3 months routine care.    Edson Martinez DPM

## 2024-11-18 NOTE — PROGRESS NOTES
Name: Amena Anderson  MRN: [id number]  : 1965    Subjective   HPI  Patient presents for regular follow up.    Review of Systems   Gastrointestinal:  Negative for constipation.   Musculoskeletal:  Positive for arthralgias (left hip pain) and back pain.        Patient Active Problem List   Diagnosis    Type 2 diabetes mellitus with diabetic polyneuropathy, without long-term current use of insulin    Essential hypertension    MANUEL (obstructive sleep apnea)    Chronic pain syndrome    Class 3 severe obesity due to excess calories with serious comorbidity and body mass index (BMI) of 40.0 to 44.9 in adult    Hypothyroidism    Gastroesophageal reflux disease without esophagitis    Dysphagia    Fatty liver    Mixed hyperlipidemia    Diastolic dysfunction    Chronic lower back pain    Candidal skin infection    Dry eye syndrome of both eyes    Age-related nuclear cataract of both eyes    Primary open angle glaucoma (POAG) of both eyes, mild stage    Anxiety about health    Hepatic fibrosis, early fibrosis    Chronic left hip pain       Current Outpatient Medications on File Prior to Visit   Medication Sig Dispense Refill    amitriptyline (ELAVIL) 25 MG tablet TAKE 1 TABLET(25 MG) BY MOUTH EVERY EVENING 90 tablet 3    amLODIPine (NORVASC) 10 MG tablet Take 1 tablet (10 mg total) by mouth once daily. 90 tablet 3    aspirin (ECOTRIN) 81 MG EC tablet Take 81 mg by mouth once daily.      atorvastatin (LIPITOR) 20 MG tablet Take 1 tablet (20 mg total) by mouth once daily. 90 tablet 1    azelastine (ASTELIN) 137 mcg (0.1 %) nasal spray INSTILL 1 SPRAY INTO NOSTRILS TWICE DAILY 90 mL 2    blood sugar diagnostic Strp To check BG 2 times daily, to use with insurance preferred meter DX E11.42 200 strip 1    blood-glucose meter kit To check BG 2 times daily, to use with insurance preferred meter 100 each 11    cetirizine (ZYRTEC) 10 MG tablet TAKE 1 TABLET BY MOUTH EVERY DAY 90 tablet 2    ciclopirox (LOPROX) 0.77 % Crea Apply  topically 2 (two) times daily. 90 g 3    ciclopirox 0.77 % Gel 1 application  2 (two) times daily.      estradioL (ESTRACE) 1 MG tablet Take 1 tablet (1 mg total) by mouth once daily. 90 tablet 1    famotidine (PEPCID) 40 MG tablet Take 1 tablet (40 mg total) by mouth nightly as needed for Heartburn. 90 tablet 3    gabapentin (NEURONTIN) 400 MG capsule Take 400 mg by mouth 3 (three) times daily.      hydrocodone-acetaminophen 10-325mg (NORCO)  mg Tab Take 10 tablets by mouth 3 (three) times daily.      lancets Misc To check BG 2 times daily, to use with insurance preferred meter 200 each 1    lisinopriL (PRINIVIL,ZESTRIL) 20 MG tablet TAKE 1 TABLET(20 MG) BY MOUTH EVERY DAY 90 tablet 1    metFORMIN (GLUCOPHAGE) 1000 MG tablet Take 1 tablet (1,000 mg total) by mouth every evening. TAKE 1 TABLET(1000 MG) BY MOUTH EVERY EVENING 90 tablet 3    naproxen (EC NAPROSYN) 500 MG EC tablet Take 500 mg by mouth 2 (two) times daily.      nitroGLYCERIN (NITROSTAT) 0.4 MG SL tablet Place 1 tablet (0.4 mg total) under the tongue every 5 (five) minutes as needed for Chest pain. 30 tablet 11    omeprazole (PRILOSEC) 40 MG capsule Take 1 capsule (40 mg total) by mouth every morning. 90 capsule 3    ondansetron (ZOFRAN-ODT) 4 MG TbDL Take 1 tablet (4 mg total) by mouth every 12 (twelve) hours as needed (nausea). 60 tablet 0    potassium chloride SA (K-DUR,KLOR-CON) 20 MEQ tablet TAKE 1 TABLET(20 MEQ) BY MOUTH TWICE DAILY 180 tablet 2    RESTASIS 0.05 % ophthalmic emulsion       tirzepatide 7.5 mg/0.5 mL PnIj Inject 7.5 mg into the skin every 7 days. 4 Pen 11    [DISCONTINUED] diclofenac sodium (VOLTAREN) 1 % Gel Apply 2 g topically 4 (four) times daily as needed (pain). 100 g 2    [DISCONTINUED] diphth,pertus,acell,,tetanus (BOOSTRIX TDAP) 2.5-8-5 Lf-mcg-Lf/0.5mL Syrg injection Inject into the muscle. 0.5 mL 0    [DISCONTINUED] docusate sodium (COLACE) 50 MG capsule Take 100 mg by mouth 2 (two) times daily.      [DISCONTINUED]  hydrALAZINE (APRESOLINE) 25 MG tablet Take 25 mg by mouth 3 (three) times daily.      [DISCONTINUED] levothyroxine (SYNTHROID) 150 MCG tablet Take 1 tablet (150 mcg total) by mouth once daily. 90 tablet 3     No current facility-administered medications on file prior to visit.       Past Medical History:   Diagnosis Date    AC (acromioclavicular) joint bone spurs, unspecified laterality     bilateral    Dysphagia     GERD (gastroesophageal reflux disease)     H. pylori infection     Insomnia        Past Surgical History:   Procedure Laterality Date     SECTION      Three times    COLONOSCOPY  2017    Dr. Benitez, repeat in 10 years    ESOPHAGEAL DILATION N/A 2022    Procedure: DILATION, ESOPHAGUS;  Surgeon: Randy Benitez Jr., MD;  Location: [facility];  Service: Endoscopy;  Laterality: N/A;    ESOPHAGOGASTRODUODENOSCOPY N/A 2022    Procedure: EGD (ESOPHAGOGASTRODUODENOSCOPY);  Surgeon: Randy Benitez Jr., MD;  Location: [facility];  Service: Endoscopy;  Laterality: N/A;    HYSTERECTOMY      total    LASIK      LUMBAR EPIDURAL INJECTION      OOPHORECTOMY      THYROIDECTOMY, PARTIAL      UPPER GASTROINTESTINAL ENDOSCOPY  2017    Dr. Benitez        Family History   Problem Relation Name Age of Onset    Cirrhosis Mother      Kidney disease Mother      Hypertension Father      Kidney disease Father      Heart disease Father      Alzheimer's disease Father      Throat cancer Maternal Uncle      Breast cancer Cousin paternal     Breast cancer Other niece 35    Colon cancer Neg Hx      Colon polyps Neg Hx      Crohn's disease Neg Hx      Ulcerative colitis Neg Hx      Stomach cancer Neg Hx      Esophageal cancer Neg Hx      Ovarian cancer Neg Hx         Social History     Socioeconomic History    Marital status: Single   Occupational History     Comment: retired   Tobacco Use    Smoking status: Never    Smokeless tobacco: Never   Substance and Sexual Activity    Alcohol use: No    Drug use: Yes      Types: Hydrocodone    Sexual activity: Not Currently   Social History Narrative    ** Merged History Encounter **          Social Drivers of Health     Food Insecurity: Food Insecurity Present (3/5/2024)    Hunger Vital Sign     Worried About Running Out of Food in the Last Year: Sometimes true     Ran Out of Food in the Last Year: Sometimes true   Transportation Needs: No Transportation Needs (3/5/2024)    PRAPARE - Transportation     Lack of Transportation (Medical): No     Lack of Transportation (Non-Medical): No   Stress: No Stress Concern Present (3/5/2024)    [employer] [place] [employer] - Occupational Stress Questionnaire     Feeling of Stress : Only a little   Housing Stability: Low Risk  (3/5/2024)    Housing Stability Vital Sign     Unable to Pay for Housing in the Last Year: No     Number of Places Lived in the Last Year: 1     Unstable Housing in the Last Year: No       Review of patient's allergies indicates:  No Known Allergies     Health Maintenance Due   Topic Date Due    Diabetes Urine Screening  01/19/2018    Eye Exam  08/02/2024    Influenza Vaccine (1) 09/01/2024    COVID-19 Vaccine (4 - 2024-25 season) 09/01/2024    Mammogram  11/17/2024       Objective   Vitals:    11/18/24 0840   BP: 132/70   Pulse: 81        Physical Exam  Constitutional:       General: She is not in acute distress.     Appearance: Normal appearance. She is well-developed.   HENT:      Head: Normocephalic and atraumatic.      Right Ear: External ear normal.      Left Ear: External ear normal.   Eyes:      Conjunctiva/sclera: Conjunctivae normal.   Pulmonary:      Effort: Pulmonary effort is normal. No respiratory distress.   Abdominal:      General: Abdomen is flat. There is no distension.   Musculoskeletal:         General: No swelling or deformity.      Right lower leg: No edema.      Left lower leg: No edema.   Skin:     General: Skin is warm and dry.      Coloration: Skin is not jaundiced.   Neurological:       Mental Status: She is alert and oriented to person, place, and time. Mental status is at baseline.   Psychiatric:         Attention and Perception: Attention and perception normal.         Mood and Affect: Mood normal.         Speech: Speech normal.         Behavior: Behavior normal. Behavior is cooperative.         Thought Content: Thought content normal.         Cognition and Memory: Cognition normal.         Judgment: Judgment normal.          Assessment & Plan   1. Postablative hypothyroidism  Overview:  Thyroid surgery 2012 at [facility]    Assessment & Plan:  TSH two weeks ago was suppressed. I suspect that this may be related to her weight loss. Decrease levothyroxine from 150mcg to 125mcg. Labs in 6 weeks.    Orders:  -     levothyroxine (SYNTHROID) 125 MCG tablet; Take 1 tablet (125 mcg total) by mouth once daily.  Dispense: 30 tablet; Refill: 11  -     TSH; Future; Expected date: 12/30/2024    2. Class 3 severe obesity due to excess calories with serious comorbidity and body mass index (BMI) of 40.0 to 44.9 in adult  Assessment & Plan:  Patient has lost a significant amount of weight. Maximum earlier this year was 306 lbs but she now weighs 264 lbs. Continue tirzepatide.      3. Type 2 diabetes mellitus with diabetic polyneuropathy, without long-term current use of insulin  Assessment & Plan:  A1C two weeks ago was in normal range. Continue metformin and tirzepatide.       4. Essential hypertension  Assessment & Plan:  Controlled. Continue current medications.      5. Chronic low back pain, unspecified back pain laterality, unspecified whether sciatica present  Assessment & Plan:  Patient mentioned that she will be getting a radiofrequency ablation in the first week of December. Continue current medications.      6. Chronic left hip pain  Assessment & Plan:  Patient sees external orthopedist. She mentions joint laxity but surgeon says he will not perform surgery due to risk. I don't  think patient is metabolically at risk of complications, so I explained that I think the issue is her current weight reducing the chances of a successful surgical outcome. Continue to monitor.      7. Breast cancer screening by mammogram  -     Mammo Digital Screening Bilat w/ [facility]; Future; Expected date: 11/18/2024        Follow up in 6 months.     Jacobo Joy MD  11/18/2024

## 2024-11-18 NOTE — PROGRESS NOTES
Population Health Chart Review & Patient Outreach Details      Additional Pop Health Notes:               Updates Requested / Reviewed:      Updated Care Coordination Note         Health Maintenance Topics Overdue:      VB Score: 3     Urine Screening  Eye Exam  Mammogram    Influenza Vaccine                  Health Maintenance Topic(s) Outreach Outcomes & Actions Taken:    Eye Exam - Outreach Outcomes & Actions Taken  : External Records Requested & Care Team Updated if Applicable

## 2024-11-18 NOTE — ASSESSMENT & PLAN NOTE
Patient mentioned that she will be getting a radiofrequency ablation in the first week of December. Continue current medications.

## 2024-11-18 NOTE — ASSESSMENT & PLAN NOTE
Patient sees external orthopedist. She mentions joint laxity but surgeon says he will not perform surgery due to risk. I don't think patient is metabolically at risk of complications, so I explained that I think the issue is her current weight reducing the chances of a successful surgical outcome. Continue to monitor.

## 2024-11-29 ENCOUNTER — PATIENT OUTREACH (OUTPATIENT)
Dept: ADMINISTRATIVE | Facility: HOSPITAL | Age: 59
End: 2024-11-29
Payer: MEDICARE

## 2024-11-29 NOTE — PROGRESS NOTES
Population Health Chart Review & Patient Outreach Details      Additional [facility] Health Notes:               Updates Requested / Reviewed:      Updated Care Coordination Note and Immunizations Reconciliation Completed or Queried: [facility] Topics Overdue:      [facility] Score: 1     Urine Screening    Influenza Vaccine                  Health Maintenance Topic(s) Outreach Outcomes & Actions Taken:    Eye Exam - Outreach Outcomes & Actions Taken  : Diabetic Eye External Records Uploaded, Care Team & History Updated if Applicable

## 2024-12-08 DIAGNOSIS — I10 ESSENTIAL HYPERTENSION: Chronic | ICD-10-CM

## 2024-12-08 DIAGNOSIS — E11.9 TYPE 2 DIABETES MELLITUS WITHOUT COMPLICATION, WITHOUT LONG-TERM CURRENT USE OF INSULIN: ICD-10-CM

## 2024-12-08 RX ORDER — LISINOPRIL 20 MG/1
20 TABLET ORAL DAILY
Qty: 90 TABLET | Refills: 3 | Status: SHIPPED | OUTPATIENT
Start: 2024-12-08

## 2024-12-08 NOTE — TELEPHONE ENCOUNTER
No care due was identified.  [facility] Embedded Care Due Messages. Reference number: [id number].   12/08/2024 5:24:33 AM CST

## 2024-12-09 ENCOUNTER — HOSPITAL ENCOUNTER (OUTPATIENT)
Dept: RADIOLOGY | Facility: HOSPITAL | Age: 59
Discharge: HOME | End: 2024-12-09
Attending: STUDENT IN AN ORGANIZED HEALTH CARE EDUCATION/TRAINING PROGRAM
Payer: MEDICARE

## 2024-12-09 DIAGNOSIS — Z12.31 BREAST CANCER SCREENING BY MAMMOGRAM: ICD-10-CM

## 2024-12-09 PROCEDURE — 77067 SCR MAMMO BI INCL CAD: CPT | Mod: TC,PO

## 2024-12-09 PROCEDURE — 77063 BREAST TOMOSYNTHESIS BI: CPT | Mod: 26,,, | Performed by: RADIOLOGY

## 2024-12-09 PROCEDURE — 77067 SCR MAMMO BI INCL CAD: CPT | Mod: 26,,, | Performed by: RADIOLOGY

## 2024-12-09 NOTE — TELEPHONE ENCOUNTER
Refill Decision Note   Amena Anderson  is requesting a refill authorization.  Brief Assessment and Rationale for Refill:  Approve     Medication Therapy Plan:         Comments:     Note composed:10:35 PM 12/08/2024

## 2024-12-11 DIAGNOSIS — E78.5 DYSLIPIDEMIA: ICD-10-CM

## 2024-12-11 RX ORDER — ESTRADIOL 1 MG/1
1 TABLET ORAL
Qty: 90 TABLET | Refills: 1 | Status: SHIPPED | OUTPATIENT
Start: 2024-12-11

## 2024-12-11 RX ORDER — ATORVASTATIN CALCIUM 20 MG/1
20 TABLET, FILM COATED ORAL
Qty: 90 TABLET | Refills: 3 | Status: SHIPPED | OUTPATIENT
Start: 2024-12-11

## 2024-12-11 NOTE — TELEPHONE ENCOUNTER
No care due was identified.  Health [facility] Embedded Care Due Messages. Reference number: [id number].   12/11/2024 9:23:38 AM CST

## 2024-12-11 NOTE — TELEPHONE ENCOUNTER
----- Message from Terry sent at 12/11/2024  9:32 AM CST -----  Contact: self  Type:  RX Refill Request    Who Called:  PT  Refill or New Rx:  refill   RX Name and Strength:  estradioL (ESTRACE) 1 MG tablet  atorvastatin (LIPITOR) 20 MG tablet    Preferred Pharmacy with phone number:    [facility] #[id number] - [place] [address] AT [facility] OF [facility] [address]  [address] [place] [id number]  Phone: [phone] Fax: [phone]     Local or Mail Order:  local  Ordering Provider:    Best Call Back Number:  [phone]   Additional Information:

## 2024-12-12 ENCOUNTER — TELEPHONE (OUTPATIENT)
Dept: FAMILY MEDICINE | Facility: CLINIC | Age: 59
End: 2024-12-12
Payer: MEDICARE

## 2024-12-12 ENCOUNTER — DOCUMENTATION ONLY (OUTPATIENT)
Dept: FAMILY MEDICINE | Facility: CLINIC | Age: 59
End: 2024-12-12

## 2024-12-12 NOTE — PROGRESS NOTES
Amena Anderson (Key: [id number]) PENDING APPROVAL  PA Case ID #: [id number]  Need Help? Call us at [phone]  Status  Sent to Plan today  Drug  Mounjaro 2.5MG/0.5ML auto-injectors    Form  [employer] Medicare Part D Electronic Prior Authorization Form (2017 NCPDP)

## 2024-12-12 NOTE — TELEPHONE ENCOUNTER
Call disconnected twice when I returned the call.     PA information sent to [employer] electronically again.     Amena Anderson (Key: [id number])  PA Case ID #: [id number]  Need Help? Call us at [phone]  Status  Sent to Plan today  Drug  Mounjaro 2.5MG/0.5ML auto-injectors    Form  [employer] Medicare Part D Electronic Prior Authorization Form (2017 NCPDP)

## 2024-12-12 NOTE — TELEPHONE ENCOUNTER
----- Message from Yary sent at 12/12/2024  1:56 PM CST -----  Regarding: Needs return call  Type: Needs Medical Advice  Who Called:  luis    Best Call Back Number: [phone]  Additional Information: needs to clarify the medication quatity

## 2024-12-13 ENCOUNTER — TELEPHONE (OUTPATIENT)
Dept: FAMILY MEDICINE | Facility: CLINIC | Age: 59
End: 2024-12-13
Payer: MEDICARE

## 2024-12-13 DIAGNOSIS — I10 ESSENTIAL HYPERTENSION: Chronic | ICD-10-CM

## 2024-12-13 RX ORDER — AMLODIPINE BESYLATE 10 MG/1
10 TABLET ORAL
Qty: 90 TABLET | Refills: 3 | Status: SHIPPED | OUTPATIENT
Start: 2024-12-13

## 2024-12-13 NOTE — TELEPHONE ENCOUNTER
No care due was identified.  Health [facility] Embedded Care Due Messages. Reference number: [id number].   12/13/2024 12:08:06 PM CST

## 2024-12-13 NOTE — TELEPHONE ENCOUNTER
----- Message from Nancy sent at 12/12/2024  4:15 PM CST -----   Type:  Needs Medical Advice    Who Called: Heidi/Chucho      Would the patient rather a call back or a response via [facility]? call  Best Call Back Number: [phone]      Additional Information: need to clarify the med quantity of   Disp Refills Start End   tirzepatide 7.5 mg/0.5 mL PnIj         Please call back to advise. Thank you!

## 2024-12-13 NOTE — TELEPHONE ENCOUNTER
Spoke to pt, pt does not want prescription sent over to different pharmacy.  Verified pharmacy with pt.

## 2024-12-14 NOTE — TELEPHONE ENCOUNTER
Refill Decision Note   Amena Anderson  is requesting a refill authorization.  Brief Assessment and Rationale for Refill:  Approve     Medication Therapy Plan:         Comments:     Note composed:7:19 PM 12/13/2024

## 2024-12-16 ENCOUNTER — TELEPHONE (OUTPATIENT)
Dept: FAMILY MEDICINE | Facility: CLINIC | Age: 59
End: 2024-12-16
Payer: MEDICARE

## 2024-12-16 NOTE — TELEPHONE ENCOUNTER
Returned patient call, notified her that med has been sent to pharmacy and receipt confirmation received.   Verbalized understanding.

## 2024-12-16 NOTE — TELEPHONE ENCOUNTER
----- Message from Amanda sent at 12/16/2024 10:34 AM CST -----  Regarding: Refill Request  Type:  RX Refill Request    Who Called: pt  Refill or New Rx:refill    RX Name and Strength:amLODIPine (NORVASC) 10 MG tablet  How is the patient currently taking it? (ex. 1XDay):1x per day  Is this a 30 day or 90 day RX:90    Preferred Pharmacy with phone number:  [facility][id number] - [facility] [address] [place] [address] AT [facility] [address] [facility]  [address] [place] [address]  Phone: [phone] Fax: [phone]        Local or Mail Order:local  Ordering Provider:     Would the patient rather a call back or a response via [facility]?  Call back    Best Call Back Number:[phone]      Additional Information:    Please advise. Thank you!

## 2025-01-08 DIAGNOSIS — I10 ESSENTIAL HYPERTENSION: Chronic | ICD-10-CM

## 2025-01-08 RX ORDER — POTASSIUM CHLORIDE 20 MEQ/1
TABLET, EXTENDED RELEASE ORAL
Qty: 180 TABLET | Refills: 3 | Status: SHIPPED | OUTPATIENT
Start: 2025-01-08

## 2025-01-08 NOTE — TELEPHONE ENCOUNTER
No care due was identified.  Health [facility] Embedded Care Due Messages. Reference number: [id number].   1/08/2025 5:23:01 AM CST

## 2025-01-08 NOTE — TELEPHONE ENCOUNTER
Refill Decision Note   Amena Anderson  is requesting a refill authorization.    Brief Assessment and Rationale for Refill:   Approve       Medication Therapy Plan:         Comments:     Note composed:12:52 PM 01/08/2025

## 2025-02-17 ENCOUNTER — OFFICE VISIT (OUTPATIENT)
Dept: CARDIOLOGY | Facility: CLINIC | Age: 60
End: 2025-02-17
Payer: MEDICARE

## 2025-02-17 ENCOUNTER — TELEPHONE (OUTPATIENT)
Dept: CARDIOLOGY | Facility: CLINIC | Age: 60
End: 2025-02-17
Payer: MEDICARE

## 2025-02-17 VITALS
DIASTOLIC BLOOD PRESSURE: 99 MMHG | SYSTOLIC BLOOD PRESSURE: 157 MMHG | HEIGHT: 66 IN | BODY MASS INDEX: 41.88 KG/M2 | WEIGHT: 260.56 LBS

## 2025-02-17 DIAGNOSIS — E66.813 CLASS 3 SEVERE OBESITY DUE TO EXCESS CALORIES WITH SERIOUS COMORBIDITY AND BODY MASS INDEX (BMI) OF 40.0 TO 44.9 IN ADULT: ICD-10-CM

## 2025-02-17 DIAGNOSIS — E78.2 MIXED HYPERLIPIDEMIA: ICD-10-CM

## 2025-02-17 DIAGNOSIS — E66.01 CLASS 3 SEVERE OBESITY DUE TO EXCESS CALORIES WITH SERIOUS COMORBIDITY AND BODY MASS INDEX (BMI) OF 40.0 TO 44.9 IN ADULT: ICD-10-CM

## 2025-02-17 DIAGNOSIS — I50.32 CHRONIC DIASTOLIC CONGESTIVE HEART FAILURE: Primary | ICD-10-CM

## 2025-02-17 DIAGNOSIS — I10 PRIMARY HYPERTENSION: ICD-10-CM

## 2025-02-17 DIAGNOSIS — I10 PRIMARY HYPERTENSION: Primary | ICD-10-CM

## 2025-02-17 RX ORDER — NITROGLYCERIN 0.4 MG/1
0.4 TABLET SUBLINGUAL EVERY 5 MIN PRN
Qty: 25 TABLET | Refills: 8 | Status: SHIPPED | OUTPATIENT
Start: 2025-02-17

## 2025-02-17 RX ORDER — FUROSEMIDE 40 MG/1
40 TABLET ORAL 2 TIMES DAILY
COMMUNITY

## 2025-02-17 NOTE — TELEPHONE ENCOUNTER
"Called pt to go over medications. Pt stated she is currently taking furosemide 40mg BID. She stated that she was previously taking spironolactone 20mg but it was "affecting her really badly."   Informed pt that I would let Dr. Garcia know.   Added furosemide to current med list.   "

## 2025-02-17 NOTE — TELEPHONE ENCOUNTER
----- Message from Divina sent at 2/17/2025 11:47 AM CST -----  Type:  Needs Medical AdviceWho Called: ptWould the patient rather a call back or a response via [email or web address]? Call back Best Call Back Number: [phone] Additional Information: pt says oren told her to let fifi know she made it home. Pt called when she made it home.     Please call back to advise. Thank you.

## 2025-02-17 NOTE — TELEPHONE ENCOUNTER
Care Due:                  Date            Visit Type   Department     Provider  --------------------------------------------------------------------------------                                EP -                              PRIMARY      [facility] FAMILY  Last Visit: 11-      CARE ([facility])   MEDICINE       Jacobo Joy  Next Visit: None Scheduled  None         None Found                                                            Last  Test          Frequency    Reason                     Performed    Due Date  --------------------------------------------------------------------------------    HBA1C.......  6 months...  metFORMIN, tirzepatide...  11- 05-    Lipid Panel.  12 months..  atorvastatin.............  05- 05-    Health [facility] Embedded Care Due Messages. Reference number: [id number].   2/17/2025 11:30:03 AM CST

## 2025-02-17 NOTE — PROGRESS NOTES
" Cardiology Clinic Note  Date: 25    Patient: Amena Anderson, 1965, [id number]  Primary Care Provider: Jacobo Joy MD     Chief Complaint/Reason for Referral: atypical chest discomfort     Subjective     Amena Anderson is a 59 y.o. female who presents for establishment of care. They are not accompanied by cousin Cheryl Adam.    Lost 40 lb over past 12 months using a combination of diet and tirzepatide.  She takes furosemide at home all this is not on her med list to medrano off edema.  She denies chest pain, orthopnea, syncope, pathologic bleeding, falls.  She still has occasional palpitations.  Her blood pressure at home is similar to hear which is in the 150s over 90s    Focused Past History includes:  TTE 2021 reported normal  Dobutamine stress echo 2024:  EF 65-70%.  Normal RV.  PASP 26.    83% MPHR with negative EKG and echo response.  Patient requested premature termination test  Event monitor 2024:  0% PACs, 1% PVCs.  4/4 events correlated with sinus rhythm  BMI 48-->42  Nonalcoholic fatty liver disease, early   Type 2 diabetes on orals  GERD   Chronic pain   Hypertension  No renal artery stenosis reported by Doppler 2023  Never smoker   Sister  of "heart failure"       Current Outpatient Medications   Medication Sig    amitriptyline (ELAVIL) 25 MG tablet TAKE 1 TABLET(25 MG) BY MOUTH EVERY EVENING    amLODIPine (NORVASC) 10 MG tablet TAKE 1 TABLET(10 MG) BY MOUTH DAILY    aspirin (ECOTRIN) 81 MG EC tablet Take 81 mg by mouth once daily.    atorvastatin (LIPITOR) 20 MG tablet TAKE 1 TABLET(20 MG) BY MOUTH EVERY DAY    azelastine (ASTELIN) 137 mcg (0.1 %) nasal spray INSTILL 1 SPRAY INTO NOSTRILS TWICE DAILY    blood sugar diagnostic Strp To check BG 2 times daily, to use with insurance preferred meter DX E11.42    blood-glucose meter kit To check BG 2 times daily, to use with insurance preferred meter    cetirizine (ZYRTEC) 10 MG tablet TAKE 1 TABLET BY MOUTH " EVERY DAY    ciclopirox (LOPROX) 0.77 % Crea Apply topically 2 (two) times daily.    ciclopirox 0.77 % Gel 1 application  2 (two) times daily.    estradioL (ESTRACE) 1 MG tablet TAKE 1 TABLET(1 MG) BY MOUTH EVERY DAY    famotidine (PEPCID) 40 MG tablet Take 1 tablet (40 mg total) by mouth nightly as needed for Heartburn.    gabapentin (NEURONTIN) 400 MG capsule Take 400 mg by mouth 3 (three) times daily.    hydrocodone-acetaminophen 10-325mg (NORCO)  mg Tab Take 10 tablets by mouth 3 (three) times daily.    lancets Misc To check BG 2 times daily, to use with insurance preferred meter    levothyroxine (SYNTHROID) 125 MCG tablet Take 1 tablet (125 mcg total) by mouth once daily.    lisinopriL (PRINIVIL,ZESTRIL) 20 MG tablet Take 1 tablet (20 mg total) by mouth once daily.    metFORMIN (GLUCOPHAGE) 1000 MG tablet Take 1 tablet (1,000 mg total) by mouth every evening. TAKE 1 TABLET(1000 MG) BY MOUTH EVERY EVENING    naproxen (EC NAPROSYN) 500 MG EC tablet Take 500 mg by mouth 2 (two) times daily.    nitroGLYCERIN (NITROSTAT) 0.4 MG SL tablet Place 1 tablet (0.4 mg total) under the tongue every 5 (five) minutes as needed for Chest pain.    omeprazole (PRILOSEC) 40 MG capsule Take 1 capsule (40 mg total) by mouth every morning.    ondansetron (ZOFRAN-ODT) 4 MG TbDL Take 1 tablet (4 mg total) by mouth every 12 (twelve) hours as needed (nausea).    potassium chloride SA (K-DUR,KLOR-CON) 20 MEQ tablet TAKE 1 TABLET(20 MEQ) BY MOUTH TWICE DAILY    RESTASIS 0.05 % ophthalmic emulsion     tirzepatide 7.5 mg/0.5 mL PnIj Inject 7.5 mg into the skin every 7 days.     No current facility-administered medications for this visit.               Review of Systems  Constitutional: negative for fevers, night sweats, and weight loss  Eyes: negative for visual disturbance, diplopia  Respiratory: negative for cough, hemoptysis, sputum, and wheezing  Cardiovascular: see HPI  Gastrointestinal: negative for abdominal pain, bright red  blood per rectum, change in bowel habits, dysphagia, melena, and reflux symptoms  Genitourinary:negative for dysuria, frequency, and hematuria  Hematologic/lymphatic: negative for bleeding, easy bruising, and lymphadenopathy  Musculoskeletal:negative for arthralgias, back pain, and myalgias  Neurological: negative for gait problems, paresthesia, speech problems, vertigo, and weakness  Behavioral/Psych: negative for excessive alcohol consumption, illegal drug usage, and sleep disturbance    -------------------------------------    AC (acromioclavicular) joint bone spurs, unspecified laterality    bilateral    Dysphagia    GERD (gastroesophageal reflux disease)    H. pylori infection    Insomnia     ----------------------------     section    Three times    Colonoscopy    Dr. Benitez, repeat in 10 years    Esophageal dilation    Procedure: DILATION, ESOPHAGUS;  Surgeon: Randy Benitez Jr., MD;  Location: [facility];  Service: Endoscopy;  Laterality: N/A;    Esophagogastroduodenoscopy    Procedure: EGD (ESOPHAGOGASTRODUODENOSCOPY);  Surgeon: Randy Benitez Jr., MD;  Location: [facility];  Service: Endoscopy;  Laterality: N/A;    Hysterectomy    total    Lasik    Lumbar epidural injection    Oophorectomy    Thyroidectomy, partial    Upper gastrointestinal endoscopy    Dr. Benitez        Family History   Problem Relation Name Age of Onset    Cirrhosis Mother      Kidney disease Mother      Hypertension Father      Kidney disease Father      Heart disease Father      Alzheimer's disease Father      Throat cancer Maternal Uncle      Breast cancer Cousin paternal     Breast cancer Other niece 35    Colon cancer Neg Hx      Colon polyps Neg Hx      Crohn's disease Neg Hx      Ulcerative colitis Neg Hx      Stomach cancer Neg Hx      Esophageal cancer Neg Hx      Ovarian cancer Neg Hx       Social History     Tobacco Use    Smoking status: Never    Smokeless tobacco: Never   Substance Use Topics    Alcohol use: No     "Drug use: Yes     Types: Hydrocodone         Physical Exam  BP (!) 157/99 (BP Location: Right forearm, Patient Position: Sitting)   Ht 5' 6" (1.676 m)   Wt 118.2 kg (260 lb 9.3 oz)   BMI 42.06 kg/m²   Body surface area is 2.35 meters squared.  Body mass index is 42.06 kg/m².    General appearance: alert, appears stated age, cooperative, and no distress  Head: Normocephalic, without obvious abnormality, atraumatic  Neck: no carotid bruit, no JVD, and supple, symmetrical, trachea midline  Lungs: clear to auscultation bilaterally  Heart: regular rate and rhythm; S1, S2 normal, no murmur, click, rub or gallop  Abdomen: soft, non-tender, no distended  Extremities: extremities atraumatic, no pitting edema  Skin: warm, no cyanosis, no pathologic ecchymosis in exposed portions  Neurologic: Grossly normal. A&O x3      Labs Reviewed     Lab Results   Component Value Date    WBC 9.62 11/08/2024    HGB 13.4 11/08/2024     11/08/2024       Lab Results   Component Value Date     11/08/2024    K 3.9 11/08/2024     11/08/2024    CO2 23 11/08/2024    CALCIUM 9.1 11/08/2024    MG 1.7 11/08/2024    GLU 97 11/08/2024    PROT 7.4 11/08/2024       Lab Results   Component Value Date    BUN 8 11/08/2024    BUN 9 01/02/2024    BUN 9 11/01/2023    CREATININE 0.8 11/08/2024    CREATININE 0.7 01/02/2024    CREATININE 0.8 11/01/2023    EGFRNORACEVR >60.0 11/08/2024    EGFRNORACEVR >60.0 01/02/2024    EGFRNORACEVR >60.0 11/01/2023       Lab Results   Component Value Date    ALBUMIN 3.6 11/08/2024    BILITOT 0.4 11/08/2024    ALKPHOS 59 11/08/2024    ALT 10 11/08/2024       Lab Results   Component Value Date    TRIG 112 05/16/2024    CHOL 148 05/16/2024    HDL 38 (L) 05/16/2024    LDLCALC 87.6 05/16/2024    LDLCALC 99.8 10/14/2022       Lab Results   Component Value Date    HGBA1C 5.2 11/08/2024    HGBA1C 5.4 05/16/2024    TSH 0.584 12/09/2024    FREET4 1.34 11/08/2024       No results found for: "NTPROBNP", "BNP"            "     ASSESSMENT & PLAN:    This is a 59 y.o. pleasant female with obesity related HFpEF     1. Chronic diastolic congestive heart failure        2. Class 3 severe obesity due to excess calories with serious comorbidity and body mass index (BMI) of 40.0 to 44.9 in adult        3. Primary hypertension             Continue amlodipine 10, lisinopril 20 for hypertension - we will add a thiazide once she confirms she did not have an adverse side effect to it  We will add her loop diuretic to the medication list  Continue atorvastatin for primary prevention of atherosclerotic disease  Metformin and tirzepatide for diabetes which is well-controlled  Emphasized the importance of modifying lifestyle related risk factors including weight loss, limiting alcohol intake, exercise, diet most resembling a Mediterranean diet.    I appreciate the opportunity to participate in Amena Anderson 's care today.  Please follow up with me in 9-12 months.      Tejinder Garcia MD, [facility]  Interventional Cardiology/Structural Heart Disease  [facility]  Office: [phone]            Parts of this note were completed using voice recognition software. Please excuse any misspellings or syntax errors and reach out to me with questions.

## 2025-02-18 ENCOUNTER — OFFICE VISIT (OUTPATIENT)
Dept: PODIATRY | Facility: CLINIC | Age: 60
End: 2025-02-18
Payer: MEDICARE

## 2025-02-18 VITALS — WEIGHT: 260.56 LBS | HEIGHT: 66 IN | BODY MASS INDEX: 41.88 KG/M2

## 2025-02-18 DIAGNOSIS — E11.51 TYPE II DIABETES MELLITUS WITH PERIPHERAL CIRCULATORY DISORDER: ICD-10-CM

## 2025-02-18 DIAGNOSIS — E11.42 TYPE 2 DIABETES MELLITUS WITH DIABETIC POLYNEUROPATHY, WITHOUT LONG-TERM CURRENT USE OF INSULIN: Primary | ICD-10-CM

## 2025-02-18 DIAGNOSIS — B35.1 ONYCHOMYCOSIS DUE TO DERMATOPHYTE: ICD-10-CM

## 2025-02-18 DIAGNOSIS — L84 CORN OR CALLUS: ICD-10-CM

## 2025-02-18 PROCEDURE — 99999 PR PBB SHADOW E&M-EST. PATIENT-LVL II: CPT | Mod: PBBFAC,,, | Performed by: PODIATRIST

## 2025-02-18 NOTE — PROGRESS NOTES
Subjective:      Patient ID: Amena Anderson is a 59 y.o. female.    Chief Complaint: Diabetic Foot Exam and Diabetes Mellitus    Patient returns for follow up  thick and discolored nails for high risk foot care. She is high risk secondary to the diabetes with PVD. No acute changes since her last visit to her feet. Doing well overall, also here for her annual diabetic foot exam for the new year      PCP: Dr. Miller  Last seen: 11/18/24    Review of Systems   Constitutional: Negative for chills, diaphoresis, fever, malaise/fatigue and night sweats.   Cardiovascular:  Positive for leg swelling. Negative for claudication, cyanosis and syncope.   Skin:  Negative for color change, dry skin, nail changes, rash, suspicious lesions and unusual hair distribution.   Musculoskeletal:  Positive for joint pain. Negative for falls, joint swelling, muscle cramps, muscle weakness and stiffness.   Gastrointestinal:  Negative for constipation, diarrhea, nausea and vomiting.   Neurological:  Positive for paresthesias. Negative for brief paralysis, disturbances in coordination, focal weakness, numbness, sensory change and tremors.           Objective:      Physical Exam  Constitutional:       General: She is not in acute distress.     Appearance: She is well-developed. She is not diaphoretic.   Cardiovascular:      Pulses:           Dorsalis pedis pulses are 2+ on the right side and 2+ on the left side.        Posterior tibial pulses are 2+ on the right side and 2+ on the left side.      Comments: Capillary refill 3 seconds all toes/distal feet, all toes/both feet warm to touch.      Positive lower extremity edema bilateral.    Musculoskeletal:      Right ankle: Normal. No swelling, deformity, ecchymosis or lacerations. Normal range of motion. Normal pulse.      Right Achilles Tendon: Normal. No defects. De La Cruz's test negative.      Comments: Improving pain to palpation lateral left ankle at cfl and atfl without instability deformity  or loss of function. Also has pain along the peroneal tendons to the brevis insertion.    Ankle dorsiflexion decreased at <10 degrees bilateral with moderate increase with knee flexion bilateral. There is tenderness to palpation bilateral achilles attachments on the calcaneus.    Pain on palpation plantar medial and central heel right foot. No pain with ROM or MMT. No pain with medial and lateral compression of heel.     Feet:      Right foot:      Protective Sensation: 10 sites tested.  8 sites sensed.      Left foot:      Protective Sensation: 10 sites tested.  9 sites sensed.   Lymphadenopathy:      Comments: Negative lymphadenopathy bilateral popliteal fossa and tarsal tunnel.   Skin:     General: Skin is warm and dry.      Coloration: Skin is not pale.      Findings: Lesion present. No abrasion, bruising, burn, ecchymosis, erythema, laceration, petechiae or rash.      Nails: There is no clubbing.      Comments:   Skin is thin and atrophic bilateral    Nails 1-5 bilateral are thick 3-4 mm, long 3-6 mm, and discolored with subungual debris    Hyperkeratotic lesion right plantar fifth metatarsal head         Neurological:      Mental Status: She is alert and oriented to person, place, and time.      Sensory: Sensory deficit present.      Motor: No tremor, atrophy, abnormal muscle tone or seizure activity.      Gait: Gait normal.      Comments: Negative tinel sign to percussion sural, superficial peroneal, deep peroneal, saphenous, and posterior tibial nerves right and left ankles and feet. Decreased vibratory sensation bilateral     Psychiatric:         Behavior: Behavior normal. Behavior is cooperative.             Assessment:       Encounter Diagnoses   Name Primary?    Type 2 diabetes mellitus with diabetic polyneuropathy, without long-term current use of insulin Yes    Type II diabetes mellitus with peripheral circulatory disorder     Onychomycosis due to dermatophyte     Corn or callus                           Plan:       Amena was seen today for diabetic foot exam and diabetes mellitus.    Diagnoses and all orders for this visit:    Type 2 diabetes mellitus with diabetic polyneuropathy, without long-term current use of insulin    Type II diabetes mellitus with peripheral circulatory disorder    Onychomycosis due to dermatophyte    Corn or callus                      I counseled the patient on her conditions, their implications and medical management.    Shoe inspection. Diabetic Foot Education. Patient reminded of the importance of good nutrition and blood sugar control to help prevent podiatric complications of diabetes. Patient instructed on proper foot hygeine. We discussed wearing proper shoe gear, daily foot inspections, never walking without protective shoe gear, never putting sharp instruments to feet Discussed importance of supportive shoes with accommodative toe box to reduce pressure and irritation to forefoot.     With patient's verbal consent, nails were aggressively reduced and debrided x 10 to their soft tissue attachment mechanically removing all offending nail and debris. Patient relates relief following the procedure. No anesthesia or hemostasis required. No blood loss.     With patient's verbal consent the hyperkeratotic lesion right foot was trimmed to healthy appearing skin using a # 15 scalpel. Patient relates relief of pain following the procedure. Patient tolerated the procedure well without complications. No anesthesia or hemostasis required. No blood loss.      Follow up in 3 months routine care.    Edson Martinez DPM

## 2025-02-18 NOTE — TELEPHONE ENCOUNTER
Provider Staff:  Action required for this patient     Please see care gap opportunities below in Care Due Message.    Thanks!  [facility]     Appointments      Date Provider   Last Visit   11/18/2024 Jacobo Joy MD   Next Visit   5/19/2025 Jacobo Joy MD      Refill Decision Note   Amena Anderson  is requesting a refill authorization.  Brief Assessment and Rationale for Refill:  Approve     Medication Therapy Plan:         Comments:     Note composed:10:14 PM 02/17/2025

## 2025-02-21 ENCOUNTER — PATIENT MESSAGE (OUTPATIENT)
Dept: CARDIOLOGY | Facility: CLINIC | Age: 60
End: 2025-02-21
Payer: MEDICARE

## 2025-02-21 RX ORDER — HYDROCHLOROTHIAZIDE 25 MG/1
25 TABLET ORAL DAILY
Qty: 30 TABLET | Refills: 11 | Status: SHIPPED | OUTPATIENT
Start: 2025-02-21 | End: 2026-02-21

## 2025-02-21 RX ORDER — POTASSIUM CHLORIDE 20 MEQ/1
20 TABLET, EXTENDED RELEASE ORAL DAILY
Qty: 30 TABLET | Refills: 11 | Status: SHIPPED | OUTPATIENT
Start: 2025-02-21

## 2025-02-27 ENCOUNTER — HOSPITAL ENCOUNTER (OUTPATIENT)
Dept: RADIOLOGY | Facility: HOSPITAL | Age: 60
Discharge: HOME | End: 2025-02-27
Attending: NURSE PRACTITIONER
Payer: MEDICARE

## 2025-02-27 ENCOUNTER — RESULTS FOLLOW-UP (OUTPATIENT)
Facility: CLINIC | Age: 60
End: 2025-02-27

## 2025-02-27 DIAGNOSIS — Z23 NEED FOR HEPATITIS A AND B VACCINATION: ICD-10-CM

## 2025-02-27 DIAGNOSIS — K74.01 HEPATIC FIBROSIS, EARLY FIBROSIS: ICD-10-CM

## 2025-02-27 DIAGNOSIS — K76.0 FATTY LIVER: ICD-10-CM

## 2025-02-27 PROCEDURE — 76705 ECHO EXAM OF ABDOMEN: CPT | Mod: TC,PO

## 2025-02-27 PROCEDURE — 76705 ECHO EXAM OF ABDOMEN: CPT | Mod: 26,,, | Performed by: RADIOLOGY

## 2025-03-07 ENCOUNTER — PATIENT MESSAGE (OUTPATIENT)
Dept: ADMINISTRATIVE | Facility: HOSPITAL | Age: 60
End: 2025-03-07
Payer: MEDICARE

## 2025-03-10 NOTE — TELEPHONE ENCOUNTER
----- Message from Cady Uribe sent at 2/12/2024  4:02 PM CST -----  Regarding: Call back  Type:  Needs Medical Advice    Who Called: Pt    Would the patient rather a call back or a response via [employer]? Call back    Best Call Back Number: [phone]    Additional Information: Pt would like a call back from the nurse. Thank you       Consultation - Pernell Covarrubias 78 y.o. male MRN: [id number]    Unit/Bed#: [address] [place] [address] Encounter: [id number]      Assessment & Plan   Hypothermia  2.  Bradycardia  Bradycardia has resolved.  Blood pressure within normal limits  Body temperature has improved, currently not using Stella hugger.  Denies having any symptoms of adrenal insufficiency at this time  -Will follow results of a.m. cortisol checked today  -Add on free T4 to labs  Will continue to follow    3. type 2 diabetes mellitus not on long-term insulin therapy  A1c 6.9% in 2/11/2025, type B controlled for age  Currently on sliding scale insulin, blood sugars well-controlled, continue sliding scale insulin only.    4. Hyponatremia  Nephrology following; possibly with SIADH. Received tolvaptan today   - fluid restriction       Inpatient consult to Endocrinology  Consult performed by: Lela Begum MD  Consult ordered by: NEO Carrera          CC: Hypothermia     History of Present Illness     HPI: Pernell Covarrubias is a 78 y.o. year old male with type 2 diabetes mellitus, CKD, neuropathy, hypertension, hyperlipidemia, CAD, CHF, prostate cancer who did to the ED yesterday 3/9/2024 after he fell twice at home (out of bed and the second time when he was standing and trying to put on slip on shoes).  Did not hit his head; no loss of consciousness this.    On presentation, patient was hypothermic with temperature 90.1 °F, pulse 53, blood pressure 186/79  On labs, anemic, hemoglobin 10.2-baseline, platelets 133, hyponatremic with sodium 125, bicarb 19, anion gap 10, blood sugar 139 mg/dL  TSH 6.6, free T4-not done, total T47.83, total T3 0.6  8 A.m. cortisol checked today-results not available    Follows up with endocrinology at [facility] for management of diabetes mellitus last seen in 11/21/2024.  Was taking metformin XR 1000 mg orally twice a day, Januvia 25 mg orally daily.    States that he feels okay.  Does complain of fatigue, does not have  any other complaints  At the time that he fell, did not have any symptoms prior.  Denies nausea, vomiting, abdominal pain.  No lightheadedness, dizziness.  Did not have any chest pain, shortness of breath, sweating.      Review of Systems    Historical Information   Past Medical History:   Diagnosis Date    Acquired hallux valgus     last assessed: 8/1/2013    Allergic rhinitis     Anemia 10/26/2010    Atrophy of nail     last assessed: 7/7/2015    Cancer ([facility]) 2020    Chronic kidney disease     chronic renal insufficiency    Coronary artery disease     Deformity of ankle and foot, acquired     last assessed: 2/22/2016    Diabetes mellitus ([facility]) 1994    Difficulty walking     last assessed: 12/14/2015    Dysesthesia     last assessed: 11/25/2016    Hammer toe     unspecified laterality; last assessed: 8/1/2013    Hypertension     Neuropathy in diabetes ([facility]) 1994    Onychomycosis of toenail     last assessed: 2/22/2016    Peripheral vascular disease ([facility])     left SFA stent in 2010    Pes planus     unspecified laterality; last assessed: 8/1/2013    Pneumonia     last assessed: 2/27/2016    Prostate cancer ([facility])     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     last assessed: 8/15/2016    Type 2 diabetes mellitus ([facility]) 07/26/2010    Viral warts     last assessed: 7/24/2015     Past Surgical History:   Procedure Laterality Date    CARDIAC CATHETERIZATION  07/29/2010    CATARACT EXTRACTION, BILATERAL Bilateral     R 1999, L 2008    COLONOSCOPY  2011    FEMORAL ARTERY - POPLITEAL ARTERY BYPASS GRAFT  09/23/2013    FOOT SURGERY      bone spur removed    LEG SURGERY Bilateral     repair; L 8/20/2010 and 7/26/2012    [place] PLMT INTERSTITIAL DEV RADIAT TX PROSTATE 1/MULT N/A 6/22/2021    Procedure: INSERTION OF FIDUCIAL MARKER (TRANSRECTAL ULTRASOUND GUIDANCE), SPACEOAR;  Surgeon: Jero Loomis MD;  Location: [facility];  Service: Urology    ROTATOR CUFF REPAIR Left 2009    SHOULDER SURGERY Right 2005    collar  bone     Social History   Social History     Substance and Sexual Activity   Alcohol Use Yes    Alcohol/week: 1.0 standard drink of alcohol    Types: 1 Glasses of wine per week    Comment: Stopped in 1986     Social History     Substance and Sexual Activity   Drug Use Never     Social History     Tobacco Use   Smoking Status Never    Passive exposure: Past   Smokeless Tobacco Never     Family History:   Family History   Problem Relation Age of Onset    Aortic aneurysm Mother     Heart disease Father     Heart attack Father         acute myocardial infarction    Heart disease Sister     Heart attack Sister         acute myocardial infarction    Throat cancer Maternal Grandfather     Heart disease Paternal Grandfather     No Known Problems Son        Meds/Allergies   Current Facility-Administered Medications   Medication Dose Route Frequency Provider Last Rate Last Admin    acetaminophen (TYLENOL) tablet 650 mg  650 mg Oral Q6H PRN NEO Carrera        ammonium lactate (LAC-HYDRIN) 12 % lotion   Topical BID NEO Carrera   Given at 03/10/25 0819    aspirin chewable tablet 81 mg  81 mg Oral Daily Chantel MANUELA Serrato CRNP   81 mg at 03/10/25 0818    atorvastatin (LIPITOR) tablet 20 mg  20 mg Oral Daily With Dinner MARYJANE CarreraNP   20 mg at 03/09/25 1549    brimonidine tartrate 0.2 % ophthalmic solution 1 drop  1 drop Both Eyes BID MARYJANE CarreraNP   1 drop at 03/10/25 0818    carvedilol (COREG) tablet 12.5 mg  12.5 mg Oral BID Chantel MANUELA Serrato CRNP   12.5 mg at 03/10/25 0818    cefTRIAXone (ROCEPHIN) IVPB (premix in dextrose) 1,000 mg 50 mL  1,000 mg Intravenous Q24H Megan Kapoorar,  mL/hr at 03/10/25 1020 1,000 mg at 03/10/25 1020    co-enzyme Q-10 capsule 100 mg  100 mg Oral Daily Chantel Serrato CRNP   100 mg at 03/10/25 0818    enoxaparin (LOVENOX) subcutaneous injection 40 mg  40 mg Subcutaneous Daily Chantel MANUELA Serrato CRNP   40 mg at 03/10/25 0818    ferrous  "sulfate tablet 325 mg  325 mg Oral BID With Meals Chantel MANUELA Rojelio CRNP   325 mg at 03/10/25 0818    [Held by provider] furosemide (LASIX) tablet 20 mg  20 mg Oral Once per day on Monday Wednesday Friday Chantel A Rojelio CRNP        insulin lispro (HumALOG/ADMELOG) 100 units/mL subcutaneous injection 1-5 Units  1-5 Units Subcutaneous TID AC Chantel A Divinavary CRNP        insulin lispro (HumALOG/ADMELOG) 100 units/mL subcutaneous injection 1-5 Units  1-5 Units Subcutaneous HS Chantel MANUELA NEO Serrato        losartan (COZAAR) tablet 25 mg  25 mg Oral Daily Chantel MANUELA Rojelio CRNP   25 mg at 03/10/25 0818    multivitamin stress formula tablet 1 tablet  1 tablet Oral Daily Chantel Serrato CRNP   1 tablet at 03/10/25 0818    pantoprazole (PROTONIX) EC tablet 40 mg  40 mg Oral Early Morning Chantel Serrato CRNP   40 mg at 03/10/25 0627    [Held by provider] sitaGLIPtin (JANUVIA) tablet 25 mg  25 mg Oral Daily NEO Carrera        [Held by provider] spironolactone (ALDACTONE) tablet 25 mg  25 mg Oral Daily NEO Carrera        tamsulosin (FLOMAX) capsule 0.4 mg  0.4 mg Oral Daily With Dinner ChantelNEO Hsu   0.4 mg at 03/09/25 1549    tolvaptan (Samsca) split tablet 7.5 mg  7.5 mg Oral Once Khris Antunez MD         No Known Allergies    Objective   Vitals: Blood pressure 157/59, pulse 64, temperature (!) 97.3 °F (36.3 °C), resp. rate 20, height 5' 11\" (1.803 m), weight 72.2 kg (159 lb 3.2 oz), SpO2 98%.    Intake/Output Summary (Last 24 hours) at 3/10/2025 1400  Last data filed at 3/10/2025 0902  Gross per 24 hour   Intake 295 ml   Output 1600 ml   Net -1305 ml     Invasive Devices       Peripheral Intravenous Line  Duration             Peripheral IV 03/09/25 Distal;Left;Upper;Ventral (anterior) Arm 1 day    Peripheral IV 03/09/25 Distal;Right;Upper;Ventral (anterior) Arm 1 day              Drain  Duration             External Urinary Catheter Small 1 day              " "      Physical Exam  Constitutional:Oriented to person, place, and time  Appears well-developed and well-nourished. Not in any acute distress.   HENT:   Head: Normocephalic and atraumatic.   Neck: Normal range of motion.   Pulmonary/Chest: Effort normal/ breathing comfortably on room air   Musculoskeletal: Normal range of motion.   Neurological: Alert and oriented to person, place, and time.   Skin: Not diaphoretic.   Psychiatric: Normal mood and affect. Behavior is normal.     The history was obtained from the review of the chart, patient.    Lab Results:       Lab Results   Component Value Date    WBC 5.71 03/10/2025    HGB 9.1 (L) 03/10/2025    HCT 26.8 (L) 03/10/2025    MCV 86 03/10/2025     (L) 03/10/2025     Lab Results   Component Value Date/Time    BUN 23 03/10/2025 08:12 AM    BUN 18 04/17/2014 04:46 AM     04/17/2014 04:46 AM    K 4.1 03/10/2025 08:12 AM    K 3.9 04/17/2014 04:46 AM    CL 98 03/10/2025 08:12 AM     04/17/2014 04:46 AM    CO2 20 (L) 03/10/2025 08:12 AM    CO2 24 04/17/2014 04:46 AM    CREATININE 1.08 03/10/2025 08:12 AM    CREATININE 0.94 04/17/2014 04:46 AM    AST 33 03/10/2025 08:12 AM    ALT 36 03/10/2025 08:12 AM    TP 6.1 (L) 03/10/2025 08:12 AM    ALB 3.5 03/10/2025 08:12 AM     No results for input(s): \"CHOL\", \"HDL\", \"LDL\", \"TRIG\", \"VLDL\" in the last 72 hours.  No results found for: \"MICROALBUR\", \"XWPK90DLF\"  POC Glucose (mg/dl)   Date Value   03/10/2025 143 (H)   03/10/2025 90       Imaging Studies: Results Review Statement: I reviewed radiology reports from this admission including: CT head.    Portions of the record may have been created with voice recognition software.  Occasional wrong word or \"sound a like\" substitutions may have occurred due to the inherent limitations of voice recognition software.  Read the chart carefully and recognize, using context, where substitutions have occurred.       "

## 2025-03-13 ENCOUNTER — OFFICE VISIT (OUTPATIENT)
Facility: CLINIC | Age: 60
End: 2025-03-13
Payer: MEDICARE

## 2025-03-13 VITALS — BODY MASS INDEX: 41.8 KG/M2 | HEIGHT: 66 IN | WEIGHT: 260.13 LBS

## 2025-03-13 DIAGNOSIS — K74.01 HEPATIC FIBROSIS, EARLY FIBROSIS: ICD-10-CM

## 2025-03-13 DIAGNOSIS — E66.01 CLASS 3 SEVERE OBESITY DUE TO EXCESS CALORIES WITH SERIOUS COMORBIDITY AND BODY MASS INDEX (BMI) OF 40.0 TO 44.9 IN ADULT: ICD-10-CM

## 2025-03-13 DIAGNOSIS — E66.813 CLASS 3 SEVERE OBESITY DUE TO EXCESS CALORIES WITH SERIOUS COMORBIDITY AND BODY MASS INDEX (BMI) OF 40.0 TO 44.9 IN ADULT: ICD-10-CM

## 2025-03-13 DIAGNOSIS — K76.0 METABOLIC DYSFUNCTION-ASSOCIATED STEATOTIC LIVER DISEASE (MASLD): Primary | ICD-10-CM

## 2025-03-13 DIAGNOSIS — E78.2 MIXED HYPERLIPIDEMIA: Chronic | ICD-10-CM

## 2025-03-13 DIAGNOSIS — E11.42 TYPE 2 DIABETES MELLITUS WITH DIABETIC POLYNEUROPATHY, WITHOUT LONG-TERM CURRENT USE OF INSULIN: Chronic | ICD-10-CM

## 2025-03-13 PROCEDURE — 99214 OFFICE O/P EST MOD 30 MIN: CPT | Mod: S$GLB,,, | Performed by: NURSE PRACTITIONER

## 2025-03-13 PROCEDURE — 1159F MED LIST DOCD IN RCRD: CPT | Mod: CPTII,S$GLB,, | Performed by: NURSE PRACTITIONER

## 2025-03-13 PROCEDURE — 99999 PR PBB SHADOW E&M-EST. PATIENT-LVL III: CPT | Mod: PBBFAC,,, | Performed by: NURSE PRACTITIONER

## 2025-03-13 PROCEDURE — 3008F BODY MASS INDEX DOCD: CPT | Mod: CPTII,S$GLB,, | Performed by: NURSE PRACTITIONER

## 2025-03-13 NOTE — PATIENT INSTRUCTIONS
Will attempt Fibroscan (in clinic) in 1 year   Congrats on the weight loss - keep up the good work!

## 2025-03-13 NOTE — PROGRESS NOTES
[facility] - Established Patient    Last Clinic Visit: 3/5/24    Chief Complaint: Follow-up for fatty liver        HISTORY     This is a 59 y.o. female with PMH noted below, here for follow-up of MASLD.     Hepatic steatosis first noted on abd US in 2018. Imaging has shown hepatomegaly. No findings to suggest advanced liver fibrosis.      Risk factors for MASLD include:   BMI >50  HTN, HLD, DM, MANUEL    Does not drink alcohol.    She has normal liver enzymes and synthetic liver function.   Platelet count is normal.    Serologic workup has been negative for hemochromatosis and viral hepatitis.    Fibrosis staging:   MRI elastography 3/2024 = F1-2 (kPa 3.13)     Interval history:  Feels well, no new concerns from liver standpoint.     No symptoms of hepatic decompensation including jaundice, ascites, cognitive problems to suggest hepatic encephalopathy, or GI bleeding.     Updates on risk factors for MASLD:  Weight -- Body mass index is 41.99 kg/m².  Max weight 400 lb a few years ago -- was able to get down to 300 lb with dietary changes. Remains on Mounjaro and she is down to 260 lb today. Feels great since losing the weight.                  Dyslipidemia -- well controlled       On statin                           Insulin resistance / diabetes -- HgbA1c 5.2           Liver enzymes: WNL    US now shows no significant fat in the liver and liver has decreased in size (19 -- 16 cm).         Past medical history, surgical history, problem list, family history, social history, allergies: Reviewed and updated in the appropriate section of the electronic medical record.      Current Outpatient Medications   Medication Sig Dispense Refill    amitriptyline (ELAVIL) 25 MG tablet TAKE 1 TABLET(25 MG) BY MOUTH EVERY EVENING 90 tablet 3    amLODIPine (NORVASC) 10 MG tablet TAKE 1 TABLET(10 MG) BY MOUTH DAILY 90 tablet 3    aspirin (ECOTRIN) 81 MG EC tablet Take 81 mg by mouth once daily.      atorvastatin (LIPITOR)  20 MG tablet TAKE 1 TABLET(20 MG) BY MOUTH EVERY DAY 90 tablet 3    azelastine (ASTELIN) 137 mcg (0.1 %) nasal spray INSTILL 1 SPRAY INTO NOSTRILS TWICE DAILY 90 mL 2    blood sugar diagnostic Strp To check BG 2 times daily, to use with insurance preferred meter DX E11.42 200 strip 1    blood-glucose meter kit To check BG 2 times daily, to use with insurance preferred meter 100 each 11    cetirizine (ZYRTEC) 10 MG tablet TAKE 1 TABLET BY MOUTH EVERY DAY 90 tablet 2    ciclopirox (LOPROX) 0.77 % Crea Apply topically 2 (two) times daily. 90 g 3    ciclopirox 0.77 % Gel 1 application  2 (two) times daily.      estradioL (ESTRACE) 1 MG tablet TAKE 1 TABLET(1 MG) BY MOUTH EVERY DAY 90 tablet 1    famotidine (PEPCID) 40 MG tablet Take 1 tablet (40 mg total) by mouth nightly as needed for Heartburn. 90 tablet 3    furosemide (LASIX) 40 MG tablet Take 40 mg by mouth 2 (two) times daily.      gabapentin (NEURONTIN) 400 MG capsule Take 400 mg by mouth 3 (three) times daily.      hydroCHLOROthiazide (HYDRODIURIL) 25 MG tablet Take 1 tablet (25 mg total) by mouth once daily. 30 tablet 11    hydrocodone-acetaminophen 10-325mg (NORCO)  mg Tab Take 10 tablets by mouth 3 (three) times daily.      lancets Misc To check BG 2 times daily, to use with insurance preferred meter 200 each 1    levothyroxine (SYNTHROID) 125 MCG tablet Take 1 tablet (125 mcg total) by mouth once daily. 30 tablet 11    lisinopriL (PRINIVIL,ZESTRIL) 20 MG tablet Take 1 tablet (20 mg total) by mouth once daily. 90 tablet 3    metFORMIN (GLUCOPHAGE) 1000 MG tablet Take 1 tablet (1,000 mg total) by mouth every evening. TAKE 1 TABLET(1000 MG) BY MOUTH EVERY EVENING 90 tablet 3    naproxen (EC NAPROSYN) 500 MG EC tablet Take 500 mg by mouth 2 (two) times daily.      nitroGLYCERIN (NITROSTAT) 0.4 MG SL tablet Place 1 tablet (0.4 mg total) under the tongue every 5 (five) minutes as needed for Chest pain. 25 tablet 8    omeprazole (PRILOSEC) 40 MG capsule Take  1 capsule (40 mg total) by mouth every morning. 90 capsule 3    ondansetron (ZOFRAN-ODT) 4 MG TbDL Take 1 tablet (4 mg total) by mouth every 12 (twelve) hours as needed (nausea). 60 tablet 0    potassium chloride SA (K-DUR,KLOR-CON) 20 MEQ tablet Take 1 tablet (20 mEq total) by mouth once daily. 30 tablet 11    RESTASIS 0.05 % ophthalmic emulsion       tirzepatide 7.5 mg/0.5 mL PnIj Inject 7.5 mg into the skin every 7 days. 4 Pen 11     No current facility-administered medications for this visit.     Medication list reviewed and updated.      Review of Systems - as per HPI  Constitutional: Negative for unexpected weight change.   Respiratory: Negative for shortness of breath.    Cardiovascular: Negative for leg swelling.  Gastrointestinal: Negative for abdominal distention or abdominal pain. Negative for melena or hematemesis.  Musculoskeletal: +myalgias.    Skin: Negative for jaundice or itching.  Neurological: Negative for confusion or slowed mentation. Negative for tremors.   Hematological: Does not bruise/bleed easily.       Physical Exam   Constitutional: No distress. Alert and oriented.  Eyes: No scleral icterus.   Pulmonary/Chest: Respiratory effort normal. No respiratory distress.   Abdominal: No distension, no ascites appreciated.   Extremities: No edema.   Neurological: No tremor.   Skin: No jaundice.   Psychiatric: Normal mood and affect. Speech, behavior, and thought content normal.         LABS & DIAGNOSTIC STUDIES     I have personally reviewed pertinent laboratory findings:    Lab Results   Component Value Date    ALT 10 11/08/2024    AST 13 11/08/2024    ALKPHOS 59 11/08/2024    BILITOT 0.4 11/08/2024    ALBUMIN 3.6 11/08/2024       Lab Results   Component Value Date    WBC 9.62 11/08/2024    HGB 13.4 11/08/2024    HCT 41.9 11/08/2024    MCV 84 11/08/2024     11/08/2024       Lab Results   Component Value Date     11/08/2024    K 3.9 11/08/2024    BUN 8 11/08/2024    CREATININE 0.8  "11/08/2024    ESTGFRAFRICA >60 06/17/2022    EGFRNONAA >60 06/17/2022       Lab Results   Component Value Date    FERRITIN 134 01/02/2024    FESATURATED 17 (L) 01/02/2024    HEPBSAG Non-reactive 01/02/2024    HEPBCAB Non-reactive 01/02/2024    HEPCAB Non-reactive 01/02/2024       No results found for: "AFP"      I have personally reviewed the following result reports:  Abdominal US - 2/27/25   MRI - 3/2024  EGD - 7/2022      ASSESSMENT & PLAN     59 y.o. female with:    1. Metabolic dysfunction-associated steatotic liver disease (MASLD)    2. Mixed hyperlipidemia    3. Type 2 diabetes mellitus with diabetic polyneuropathy, without long-term current use of insulin    4. Class 3 severe obesity due to excess calories with serious comorbidity and body mass index (BMI) of 40.0 to 44.9 in adult    5. Hepatic fibrosis, early fibrosis        MRI elastography last year suggested mild-moderate fibrosis (F1-2). With significant weight loss, her US no longer shows hepatic steatosis and liver has decreased in size. Liver enzymes are normal.     Recommendations:   Attempt Fibroscan in 1 year to reassess fibrosis. She has lost a good bit of weight though may still be difficulty with body habitus. May need to continue MRI elasto.   Labs to monitor liver enzymes/LFTs every 6-12 months, which can include labs with PCP  US surveillance every 1-2 years  Weight loss efforts with Mediterranean diet and moderate exercise (as tolerated). Commended on success thus far, encouraged to continue. Doing well with Mounjaro, which will help to reverse MASLD.     Maintain good control of blood pressure, cholesterol, and blood sugar.       Orders Placed This Encounter   Procedures    FibroScan Transplant Hepatology(Vibration Controlled Transient Elastography)       Return to clinic in 1 year with Fibroscan.       Thank you for allowing me to participate in the care of LEYDA Caruso  Hepatology          Duration of " encounter: 30 min  This includes face to face time and non-face to face time preparing to see the patient (eg, review of tests), obtaining and/or reviewing separately obtained history, documenting clinical information in the electronic or other health record, independently interpreting results and communicating results to the patient/family/caregiver, or Care coordination.

## 2025-05-20 ENCOUNTER — OFFICE VISIT (OUTPATIENT)
Dept: PODIATRY | Facility: CLINIC | Age: 60
End: 2025-05-20
Payer: MEDICARE

## 2025-05-20 VITALS — HEIGHT: 66 IN | BODY MASS INDEX: 41.8 KG/M2 | WEIGHT: 260.13 LBS

## 2025-05-20 DIAGNOSIS — E11.51 TYPE II DIABETES MELLITUS WITH PERIPHERAL CIRCULATORY DISORDER: ICD-10-CM

## 2025-05-20 DIAGNOSIS — B35.1 ONYCHOMYCOSIS DUE TO DERMATOPHYTE: ICD-10-CM

## 2025-05-20 DIAGNOSIS — E11.42 TYPE 2 DIABETES MELLITUS WITH DIABETIC POLYNEUROPATHY, WITHOUT LONG-TERM CURRENT USE OF INSULIN: Primary | ICD-10-CM

## 2025-05-20 PROCEDURE — 11721 DEBRIDE NAIL 6 OR MORE: CPT | Mod: Q9,S$GLB,, | Performed by: PODIATRIST

## 2025-05-20 PROCEDURE — 99499 UNLISTED E&M SERVICE: CPT | Mod: S$GLB,,, | Performed by: PODIATRIST

## 2025-05-20 PROCEDURE — 99999 PR PBB SHADOW E&M-EST. PATIENT-LVL II: CPT | Mod: PBBFAC,,, | Performed by: PODIATRIST

## 2025-05-20 RX ORDER — LEVOTHYROXINE SODIUM 150 UG/1
150 TABLET ORAL
COMMUNITY
Start: 2025-03-13

## 2025-05-20 RX ORDER — NAPROXEN 500 MG/1
500 TABLET ORAL 2 TIMES DAILY PRN
COMMUNITY
Start: 2025-04-14

## 2025-05-20 NOTE — PROGRESS NOTES
Subjective:      Patient ID: Amena Anderson is a 59 y.o. female.    Chief Complaint: Nail Care and Diabetic Foot Exam (PCP 11/18/24)    Patient returns for follow up  thick and discolored nails for high risk foot care. She is high risk secondary to the diabetes with PVD. No acute changes since her last visit to her feet. Doing well overall, also here for her annual diabetic foot exam for the new year      PCP: Dr. Miller  Last seen: 11/18/24    Review of Systems   Constitutional: Negative for chills, diaphoresis, fever, malaise/fatigue and night sweats.   Cardiovascular:  Positive for leg swelling. Negative for claudication, cyanosis and syncope.   Skin:  Negative for color change, dry skin, nail changes, rash, suspicious lesions and unusual hair distribution.   Musculoskeletal:  Positive for joint pain. Negative for falls, joint swelling, muscle cramps, muscle weakness and stiffness.   Gastrointestinal:  Negative for constipation, diarrhea, nausea and vomiting.   Neurological:  Positive for paresthesias. Negative for brief paralysis, disturbances in coordination, focal weakness, numbness, sensory change and tremors.           Objective:      Physical Exam  Constitutional:       General: She is not in acute distress.     Appearance: She is well-developed. She is not diaphoretic.   Cardiovascular:      Pulses:           Dorsalis pedis pulses are 2+ on the right side and 2+ on the left side.        Posterior tibial pulses are 2+ on the right side and 2+ on the left side.      Comments: Capillary refill 3 seconds all toes/distal feet, all toes/both feet warm to touch.      Positive lower extremity edema bilateral.    Musculoskeletal:      Right ankle: Normal. No swelling, deformity, ecchymosis or lacerations. Normal range of motion. Normal pulse.      Right Achilles Tendon: Normal. No defects. De La Cruz's test negative.      Comments: Improving pain to palpation lateral left ankle at cfl and atfl without instability  deformity or loss of function. Also has pain along the peroneal tendons to the brevis insertion.    Ankle dorsiflexion decreased at <10 degrees bilateral with moderate increase with knee flexion bilateral. There is tenderness to palpation bilateral achilles attachments on the calcaneus.    Pain on palpation plantar medial and central heel right foot. No pain with ROM or MMT. No pain with medial and lateral compression of heel.     Feet:      Right foot:      Protective Sensation: 10 sites tested.  8 sites sensed.      Left foot:      Protective Sensation: 10 sites tested.  9 sites sensed.   Lymphadenopathy:      Comments: Negative lymphadenopathy bilateral popliteal fossa and tarsal tunnel.   Skin:     General: Skin is warm and dry.      Coloration: Skin is not pale.      Findings: Lesion present. No abrasion, bruising, burn, ecchymosis, erythema, laceration, petechiae or rash.      Nails: There is no clubbing.      Comments:   Skin is thin and atrophic bilateral    Nails 1-5 bilateral are thick 3-4 mm, long 3-6 mm, and discolored with subungual debris    Hyperkeratotic lesion right plantar fifth metatarsal head         Neurological:      Mental Status: She is alert and oriented to person, place, and time.      Sensory: Sensory deficit present.      Motor: No tremor, atrophy, abnormal muscle tone or seizure activity.      Gait: Gait normal.      Comments: Negative tinel sign to percussion sural, superficial peroneal, deep peroneal, saphenous, and posterior tibial nerves right and left ankles and feet. Decreased vibratory sensation bilateral     Psychiatric:         Behavior: Behavior normal. Behavior is cooperative.               Assessment:       Encounter Diagnoses   Name Primary?    Type 2 diabetes mellitus with diabetic polyneuropathy, without long-term current use of insulin Yes    Type II diabetes mellitus with peripheral circulatory disorder     Onychomycosis due to dermatophyte            Plan:       Amena  was seen today for nail care and diabetic foot exam.    Diagnoses and all orders for this visit:    Type 2 diabetes mellitus with diabetic polyneuropathy, without long-term current use of insulin    Type II diabetes mellitus with peripheral circulatory disorder    Onychomycosis due to dermatophyte      I counseled the patient on her conditions, their implications and medical management.    Shoe inspection. Diabetic Foot Education. Patient reminded of the importance of good nutrition and blood sugar control to help prevent podiatric complications of diabetes. Patient instructed on proper foot hygeine. We discussed wearing proper shoe gear, daily foot inspections, never walking without protective shoe gear, never putting sharp instruments to feet Discussed importance of supportive shoes with accommodative toe box to reduce pressure and irritation to forefoot.     With patient's verbal consent, nails were aggressively reduced and debrided x 10 to their soft tissue attachment mechanically removing all offending nail and debris. Patient relates relief following the procedure. No anesthesia or hemostasis required. No blood loss.           Follow up in 3 months routine care.    Edson Martinez DPM

## 2025-05-21 DIAGNOSIS — E11.9 TYPE 2 DIABETES MELLITUS WITHOUT COMPLICATION: ICD-10-CM

## 2025-05-22 ENCOUNTER — PATIENT MESSAGE (OUTPATIENT)
Dept: OBSTETRICS AND GYNECOLOGY | Facility: CLINIC | Age: 60
End: 2025-05-22
Payer: MEDICARE

## 2025-06-03 DIAGNOSIS — E66.813 CLASS 3 SEVERE OBESITY DUE TO EXCESS CALORIES WITH SERIOUS COMORBIDITY AND BODY MASS INDEX (BMI) OF 45.0 TO 49.9 IN ADULT: ICD-10-CM

## 2025-06-03 DIAGNOSIS — E66.01 CLASS 3 SEVERE OBESITY DUE TO EXCESS CALORIES WITH SERIOUS COMORBIDITY AND BODY MASS INDEX (BMI) OF 45.0 TO 49.9 IN ADULT: ICD-10-CM

## 2025-06-03 DIAGNOSIS — E11.42 TYPE 2 DIABETES MELLITUS WITH DIABETIC POLYNEUROPATHY, WITHOUT LONG-TERM CURRENT USE OF INSULIN: Chronic | ICD-10-CM

## 2025-06-04 RX ORDER — TIRZEPATIDE 7.5 MG/.5ML
INJECTION, SOLUTION SUBCUTANEOUS
Qty: 6 ML | Refills: 1 | Status: SHIPPED | OUTPATIENT
Start: 2025-06-04

## 2025-06-09 DIAGNOSIS — Z91.09 ENVIRONMENTAL ALLERGIES: ICD-10-CM

## 2025-06-09 RX ORDER — CETIRIZINE HYDROCHLORIDE 10 MG/1
10 TABLET ORAL
Qty: 90 TABLET | Refills: 1 | Status: SHIPPED | OUTPATIENT
Start: 2025-06-09

## 2025-06-09 RX ORDER — LEVOTHYROXINE SODIUM 150 UG/1
150 TABLET ORAL
OUTPATIENT
Start: 2025-06-09

## 2025-06-09 NOTE — TELEPHONE ENCOUNTER
Refill Decision Note   Amena Anderson  is requesting a refill authorization.  Brief Assessment and Rationale for Refill:  Approve     Medication Therapy Plan:         Comments:     Note composed:12:08 PM 06/09/2025

## 2025-06-09 NOTE — TELEPHONE ENCOUNTER
No care due was identified.  [facility] Embedded Care Due Messages. Reference number: [id number].   6/09/2025 5:19:39 AM CDT

## 2025-06-09 NOTE — TELEPHONE ENCOUNTER
LOV: 11/18/2024  Please see refill request and advise; this may be a duplicate it was sent in error to podiatry

## 2025-06-11 DIAGNOSIS — E11.42 TYPE 2 DIABETES MELLITUS WITH DIABETIC POLYNEUROPATHY, WITHOUT LONG-TERM CURRENT USE OF INSULIN: Chronic | ICD-10-CM

## 2025-06-11 RX ORDER — METFORMIN HYDROCHLORIDE 1000 MG/1
1000 TABLET ORAL NIGHTLY
Qty: 90 TABLET | Refills: 0 | Status: SHIPPED | OUTPATIENT
Start: 2025-06-11

## 2025-06-11 NOTE — TELEPHONE ENCOUNTER
Refill Routing Note   Medication(s) are not appropriate for processing by [facility] for the following reason(s):        Required labs outdated    [facility] action(s):  Defer             Appointments  past 12m or future 3m with PCP    Date Provider   Last Visit   11/18/2024 Jacobo Joy MD   Next Visit   7/10/2025 Jacobo Joy MD   ED visits in past 90 days: 0        Note composed:1:39 PM 06/11/2025

## 2025-06-11 NOTE — TELEPHONE ENCOUNTER
No care due was identified.  [facility] Embedded Care Due Messages. Reference number: [id number].   6/11/2025 11:58:28 AM CDT

## 2025-07-10 ENCOUNTER — LAB VISIT (OUTPATIENT)
Dept: LAB | Facility: HOSPITAL | Age: 60
End: 2025-07-10
Attending: STUDENT IN AN ORGANIZED HEALTH CARE EDUCATION/TRAINING PROGRAM
Payer: MEDICARE

## 2025-07-10 ENCOUNTER — OFFICE VISIT (OUTPATIENT)
Dept: FAMILY MEDICINE | Facility: CLINIC | Age: 60
End: 2025-07-10
Payer: MEDICARE

## 2025-07-10 VITALS
BODY MASS INDEX: 41.45 KG/M2 | SYSTOLIC BLOOD PRESSURE: 148 MMHG | OXYGEN SATURATION: 98 % | WEIGHT: 257.94 LBS | DIASTOLIC BLOOD PRESSURE: 86 MMHG | HEART RATE: 76 BPM | HEIGHT: 66 IN

## 2025-07-10 DIAGNOSIS — I10 ESSENTIAL HYPERTENSION: Chronic | ICD-10-CM

## 2025-07-10 DIAGNOSIS — E66.01 CLASS 3 SEVERE OBESITY DUE TO EXCESS CALORIES WITH SERIOUS COMORBIDITY AND BODY MASS INDEX (BMI) OF 40.0 TO 44.9 IN ADULT: Primary | ICD-10-CM

## 2025-07-10 DIAGNOSIS — E11.42 TYPE 2 DIABETES MELLITUS WITH DIABETIC POLYNEUROPATHY, WITHOUT LONG-TERM CURRENT USE OF INSULIN: Chronic | ICD-10-CM

## 2025-07-10 DIAGNOSIS — E66.813 CLASS 3 SEVERE OBESITY DUE TO EXCESS CALORIES WITH SERIOUS COMORBIDITY AND BODY MASS INDEX (BMI) OF 40.0 TO 44.9 IN ADULT: Primary | ICD-10-CM

## 2025-07-10 DIAGNOSIS — E89.0 POSTABLATIVE HYPOTHYROIDISM: Chronic | ICD-10-CM

## 2025-07-10 LAB
ALBUMIN/CREAT UR: 4.1 UG/MG
CREAT UR-MCNC: 245 MG/DL (ref 15–325)
MICROALBUMIN UR-MCNC: 10 UG/ML (ref ?–5000)

## 2025-07-10 PROCEDURE — 99999 PR PBB SHADOW E&M-EST. PATIENT-LVL IV: CPT | Mod: PBBFAC,,, | Performed by: STUDENT IN AN ORGANIZED HEALTH CARE EDUCATION/TRAINING PROGRAM

## 2025-07-10 PROCEDURE — 82043 UR ALBUMIN QUANTITATIVE: CPT

## 2025-07-10 RX ORDER — LEVOTHYROXINE SODIUM 125 UG/1
125 TABLET ORAL DAILY
Qty: 90 TABLET | Refills: 3 | Status: SHIPPED | OUTPATIENT
Start: 2025-07-10

## 2025-07-10 RX ORDER — TIRZEPATIDE 10 MG/.5ML
10 INJECTION, SOLUTION SUBCUTANEOUS
Qty: 4 PEN | Refills: 11 | Status: SHIPPED | OUTPATIENT
Start: 2025-07-10

## 2025-07-10 RX ORDER — LISINOPRIL 40 MG/1
40 TABLET ORAL DAILY
Qty: 90 TABLET | Refills: 3 | Status: SHIPPED | OUTPATIENT
Start: 2025-07-10

## 2025-07-10 NOTE — PROGRESS NOTES
Name: Amena Anderson  MRN: [id number]  : 1965  PCP: Jacobo Joy MD    Subjective   HPI  Patient presents for regular follow up.     Review of Systems   Cardiovascular:  Negative for chest pain.   Gastrointestinal:  Negative for constipation, nausea and vomiting.   Neurological:  Negative for dizziness and light-headedness.        Problem List[1]    Medications Ordered Prior to Encounter[2]    Past Medical History:   Diagnosis Date    AC (acromioclavicular) joint bone spurs, unspecified laterality     bilateral    Dysphagia     GERD (gastroesophageal reflux disease)     H. pylori infection     Insomnia        Past Surgical History:   Procedure Laterality Date     SECTION      Three times    COLONOSCOPY  2017    Dr. Benitez, repeat in 10 years    ESOPHAGEAL DILATION N/A 2022    Procedure: DILATION, ESOPHAGUS;  Surgeon: Randy Benitez Jr., MD;  Location: [facility];  Service: Endoscopy;  Laterality: N/A;    ESOPHAGOGASTRODUODENOSCOPY N/A 2022    Procedure: EGD (ESOPHAGOGASTRODUODENOSCOPY);  Surgeon: Randy Benitez Jr., MD;  Location: [facility];  Service: Endoscopy;  Laterality: N/A;    HYSTERECTOMY      total    LASIK      LUMBAR EPIDURAL INJECTION      OOPHORECTOMY      THYROIDECTOMY, PARTIAL      UPPER GASTROINTESTINAL ENDOSCOPY  2017    Dr. Benitez        Family History   Problem Relation Name Age of Onset    Cirrhosis Mother      Kidney disease Mother      Hypertension Father      Kidney disease Father      Heart disease Father      Alzheimer's disease Father      Throat cancer Maternal Uncle      Breast cancer Cousin paternal     Breast cancer Other niece 35    Colon cancer Neg Hx      Colon polyps Neg Hx      Crohn's disease Neg Hx      Ulcerative colitis Neg Hx      Stomach cancer Neg Hx      Esophageal cancer Neg Hx      Ovarian cancer Neg Hx         Social History[3]    Review of patient's allergies indicates:  No Known Allergies     Health Maintenance Due   Topic  Date Due    Diabetes Urine Screening  01/19/2018    Diabetic Eye Exam  02/07/2025    Hemoglobin A1c  05/08/2025    Lipid Panel  05/16/2025       Objective   Vitals:    07/10/25 0801   BP: (!) 148/86   Pulse: 76     Wt Readings from Last 3 Encounters:   07/10/25 117 kg (257 lb 15 oz)   05/20/25 118 kg (260 lb 2.3 oz)   03/13/25 118 kg (260 lb 2.3 oz)   Body mass index is 41.63 kg/m².     Physical Exam  Constitutional:       General: She is not in acute distress.     Appearance: Normal appearance. She is well-developed.   HENT:      Head: Normocephalic and atraumatic.      Right Ear: External ear normal.      Left Ear: External ear normal.   Eyes:      Conjunctiva/sclera: Conjunctivae normal.   Pulmonary:      Effort: Pulmonary effort is normal. No respiratory distress.   Abdominal:      General: Abdomen is flat. There is no distension.   Musculoskeletal:         General: No swelling or deformity.      Right lower leg: No edema.      Left lower leg: No edema.   Skin:     General: Skin is warm and dry.      Coloration: Skin is not jaundiced.   Neurological:      Mental Status: She is alert and oriented to person, place, and time. Mental status is at baseline.   Psychiatric:         Attention and Perception: Attention and perception normal.         Mood and Affect: Mood normal.         Speech: Speech normal.         Behavior: Behavior normal. Behavior is cooperative.         Thought Content: Thought content normal.         Cognition and Memory: Cognition normal.         Judgment: Judgment normal.          Assessment & Plan    1. Class 3 severe obesity due to excess calories with serious comorbidity and body mass index (BMI) of 40.0 to 44.9 in adult  Assessment & Plan:  Weight has stagnated recently. Increase Mounjaro to 10mg. She has started chair exercises - advised to continue such as I do not want her losing skeletal muscle mass.      2. Type 2 diabetes mellitus with diabetic polyneuropathy, without long-term current  use of insulin  Assessment & Plan:  See plan for obesity. Continue metformin. Patient states she had an eye exam earlier this year, so we will get those records.    Orders:  -     tirzepatide (MOUNJARO) 10 mg/0.5 mL PnIj; Inject 10 mg into the skin every 7 days.  Dispense: 4 Pen; Refill: 11  -     Comprehensive Metabolic Panel; Future; Expected date: 07/10/2025  -     Hemoglobin A1C; Future; Expected date: 07/10/2025  -     Lipid Panel; Future; Expected date: 07/10/2025  -     Microalbumin/Creatinine Ratio, Urine; Future    3. Essential hypertension  Assessment & Plan:  Elevated today. Increase lisinopril from 20mg to 40mg. Continue amlodipine, hydrochlorothiazide. Check TSH.    Orders:  -     lisinopriL (PRINIVIL,ZESTRIL) 40 MG tablet; Take 1 tablet (40 mg total) by mouth once daily.  Dispense: 90 tablet; Refill: 3    4. Postablative hypothyroidism  Overview:  Thyroid surgery 2012 at [facility]    Orders:  -     levothyroxine (SYNTHROID) 125 MCG tablet; Take 1 tablet (125 mcg total) by mouth once daily.  Dispense: 90 tablet; Refill: 3  -     TSH; Future; Expected date: 07/10/2025    Visit today included increased complexity associated with the care of the episodic problem none addressed and managing the longitudinal care of the patient due to the serious and/or complex managed problem(s) as above.      Follow up in 4 months.    Jacobo Joy MD  07/10/2025          [1]   Patient Active Problem List  Diagnosis    Type 2 diabetes mellitus with diabetic polyneuropathy, without long-term current use of insulin    Essential hypertension    MANUEL (obstructive sleep apnea)    Chronic pain syndrome    Class 3 severe obesity due to excess calories with serious comorbidity and body mass index (BMI) of 40.0 to 44.9 in adult    Hypothyroidism    Gastroesophageal reflux disease without esophagitis    Dysphagia    Metabolic dysfunction-associated steatotic liver disease (MASLD)    Mixed hyperlipidemia     Diastolic dysfunction    Chronic lower back pain    Candidal skin infection    Dry eye syndrome of both eyes    Age-related nuclear cataract of both eyes    Primary open angle glaucoma (POAG) of both eyes, mild stage    Anxiety about health    Hepatic fibrosis, early fibrosis    Chronic left hip pain    Chronic diastolic congestive heart failure   [2]   Current Outpatient Medications on File Prior to Visit   Medication Sig Dispense Refill    amitriptyline (ELAVIL) 25 MG tablet TAKE 1 TABLET(25 MG) BY MOUTH EVERY EVENING 90 tablet 1    amLODIPine (NORVASC) 10 MG tablet TAKE 1 TABLET(10 MG) BY MOUTH DAILY 90 tablet 3    aspirin (ECOTRIN) 81 MG EC tablet Take 81 mg by mouth once daily.      atorvastatin (LIPITOR) 20 MG tablet TAKE 1 TABLET(20 MG) BY MOUTH EVERY DAY 90 tablet 3    azelastine (ASTELIN) 137 mcg (0.1 %) nasal spray INSTILL 1 SPRAY INTO NOSTRILS TWICE DAILY 90 mL 2    cetirizine (ZYRTEC) 10 MG tablet TAKE 1 TABLET BY MOUTH EVERY DAY 90 tablet 1    ciclopirox (LOPROX) 0.77 % Crea Apply topically 2 (two) times daily. 90 g 3    estradioL (ESTRACE) 1 MG tablet TAKE 1 TABLET(1 MG) BY MOUTH EVERY DAY 90 tablet 1    famotidine (PEPCID) 40 MG tablet Take 1 tablet (40 mg total) by mouth nightly as needed for Heartburn. 90 tablet 3    gabapentin (NEURONTIN) 400 MG capsule Take 400 mg by mouth 3 (three) times daily.      hydroCHLOROthiazide (HYDRODIURIL) 25 MG tablet Take 1 tablet (25 mg total) by mouth once daily. 30 tablet 11    hydrocodone-acetaminophen 10-325mg (NORCO)  mg Tab Take 10 tablets by mouth 3 (three) times daily.      metFORMIN (GLUCOPHAGE) 1000 MG tablet Take 1 tablet (1,000 mg total) by mouth every evening. TAKE 1 TABLET(1000 MG) BY MOUTH EVERY EVENING 90 tablet 0    naproxen (EC NAPROSYN) 500 MG EC tablet Take 500 mg by mouth 2 (two) times daily.      naproxen (NAPROSYN) 500 MG tablet Take 500 mg by mouth 2 (two) times daily as needed.      omeprazole (PRILOSEC) 40 MG capsule Take 1 capsule (40  mg total) by mouth every morning. 90 capsule 3    ondansetron (ZOFRAN-ODT) 4 MG TbDL Take 1 tablet (4 mg total) by mouth every 12 (twelve) hours as needed (nausea). 60 tablet 0    potassium chloride SA (K-DUR,KLOR-CON) 20 MEQ tablet Take 1 tablet (20 mEq total) by mouth once daily. 30 tablet 11    RESTASIS 0.05 % ophthalmic emulsion       [DISCONTINUED] furosemide (LASIX) 40 MG tablet Take 40 mg by mouth 2 (two) times daily.      [DISCONTINUED] levothyroxine (SYNTHROID) 150 MCG tablet Take 150 mcg by mouth.      [DISCONTINUED] lisinopriL (PRINIVIL,ZESTRIL) 20 MG tablet Take 1 tablet (20 mg total) by mouth once daily. 90 tablet 3    [DISCONTINUED] tirzepatide (MOUNJARO) 7.5 mg/0.5 mL PnIj ADMINISTER 7.5 MG UNDER THE SKIN EVERY 7 DAYS 6 mL 1    blood sugar diagnostic Strp To check BG 2 times daily, to use with insurance preferred meter DX E11.42 200 strip 1    blood-glucose meter kit To check BG 2 times daily, to use with insurance preferred meter 100 each 11    ciclopirox 0.77 % Gel 1 application  2 (two) times daily.      lancets Misc To check BG 2 times daily, to use with insurance preferred meter 200 each 1    nitroGLYCERIN (NITROSTAT) 0.4 MG SL tablet Place 1 tablet (0.4 mg total) under the tongue every 5 (five) minutes as needed for Chest pain. 25 tablet 8    [DISCONTINUED] levothyroxine (SYNTHROID) 125 MCG tablet Take 1 tablet (125 mcg total) by mouth once daily. (Patient not taking: Reported on 7/10/2025) 30 tablet 11     No current facility-administered medications on file prior to visit.   [3]   Social History  Socioeconomic History    Marital status: Single   Occupational History     Comment: retired   Tobacco Use    Smoking status: Never    Smokeless tobacco: Never   Substance and Sexual Activity    Alcohol use: No    Drug use: Yes     Types: Hydrocodone    Sexual activity: Not Currently     Social Drivers of Health     Food Insecurity: Food Insecurity Present (3/5/2024)    Hunger Vital Sign     Worried  About Running Out of Food in the Last Year: Sometimes true     Ran Out of Food in the Last Year: Sometimes true   Transportation Needs: No Transportation Needs (3/5/2024)    PRAPARE - Transportation     Lack of Transportation (Medical): No     Lack of Transportation (Non-Medical): No   Stress: No Stress Concern Present (3/5/2024)    [employer] - Occupational Stress Questionnaire     Feeling of Stress : Only a little   Housing Stability: Low Risk  (3/5/2024)    Housing Stability Vital Sign     Unable to Pay for Housing in the Last Year: No     Number of Places Lived in the Last Year: 1     Unstable Housing in the Last Year: No

## 2025-07-10 NOTE — ASSESSMENT & PLAN NOTE
Elevated today. Increase lisinopril from 20mg to 40mg. Continue amlodipine, hydrochlorothiazide. Check TSH.

## 2025-07-10 NOTE — ASSESSMENT & PLAN NOTE
Weight has stagnated recently. Increase Mounjaro to 10mg. She has started chair exercises - advised to continue such as I do not want her losing skeletal muscle mass.

## 2025-07-10 NOTE — ASSESSMENT & PLAN NOTE
See plan for obesity. Continue metformin. Patient states she had an eye exam earlier this year, so we will get those records.

## 2025-07-11 ENCOUNTER — PATIENT OUTREACH (OUTPATIENT)
Dept: ADMINISTRATIVE | Facility: HOSPITAL | Age: 60
End: 2025-07-11
Payer: MEDICARE

## 2025-07-11 NOTE — LETTER
AUTHORIZATION FOR RELEASE OF   CONFIDENTIAL INFORMATION        We are seeing Amena Anderson, date of birth 1965, in the clinic at [facility]. Jacobo Joy MD is the patient's PCP. Amena Anderson has an outstanding lab/procedure at the time we reviewed her chart. In order to help keep her health information updated, she has authorized us to request the following medical record(s):                                          (  x)  EYE EXAM              Please fax records to [facility], Petitjean, Jason M., MD,  at [phone] or email to [email or web address].      Patient Name: Amena Anderson  : 1965  Patient Phone #: [phone]                Amena Anderson  MRN: [id number]  : 1965  Age: 58 y.o.  Sex: female         Patient/Legal Guardian Signature  This signature was collected at 2024    Amena Anderson     Self  _______________________________   Printed Name/Relationship to Patient      Consent for Examination and Treatment: I hereby authorize the providers and employees of [email or web address] ([facility]) to provide medical treatment/services which includes, but is not limited to, performing and administering tests and diagnostic procedures that are deemed necessary, including, but not limited to, imaging examinations, blood tests and other laboratory procedures as may be required by the hospital, clinic, or may be ordered by my physician(s) or persons working under the general and/or special instructions of my physician(s).      I understand and agree that this consent covers all authorized persons, including but not limited to physicians, residents, nurse practitioners, physicians' assistants, specialists, consultants, student nurses, and independently contracted physicians, who are called upon by the physician in charge, to carry out the diagnostic procedures and medical or surgical treatment.     I hereby authorize [facility] to retain or dispose of any  specimens or tissue, should there be such remaining from any test or procedure.     I hereby authorize and give consent for [facility] providers and employees to take photographs, images or videotapes of such diagnostic, surgical or treatment procedures of Patient as may be required by [facility] or as may be ordered by a physician. I further acknowledge and agree that [facility] may use cameras or other devices for patient monitoring.     I am aware that the practice of medicine is not an exact science, and I acknowledge that no guarantees have been made to me as to the outcome of any tests, procedures or treatment.     Authorization for Release of Information: I understand that my insurance company and/or their agents may need information necessary to make determinations about payment/reimbursement. I hereby provide authorization to release to all insurance companies, their successors, assignees, other parties with whom they may have contracted, or others acting on their behalf, that are involved with payment for any hospital and/or clinic charges incurred by the patient, any information that they request and deem necessary for payment/reimbursement, and/or quality review.  I further authorize the release of my health information to physicians or other health care practitioners on staff who are involved in my health care now and in the future, and to other health care providers, entities, or institutions for the purpose of my continued care and treatment, including referrals.     REGISTRATION AUTHORIZATION  Form No. 37949 (Rev. 3/25/2024)    Page 1 of 3                       Medicare Patient's Certification and Authorization to Release Information and Payment Request:  I certify that the information given by me in applying for payment under Title XVIII of the Social Security Act is correct. I authorize any nickerson of medical or other information about me to release to the Social SecurityAdministration, or its intermediaries  or carriers, any information needed for this or a related Medicare claim. I request that payment of authorized benefits be made on my behalf.     Assignment of Insurance Benefits:   I hereby authorize any and all insurance companies, health plans, defined   benefit plans, health insurers or any entity that is or may be responsible for payment of my medical expenses to pay all hospital and medical benefits now due, and to become due and payable to me under any hospital benefits, sick benefits, injury benefits or any other benefit for services rendered to me, including Major Medical Benefits, direct to [facility] and all independently contracted physicians. I assign any and all rights that I may have against any and all insurance companies, health plans, defined benefit plans, health insurers or any entity that is or may be responsible for payment of my medical expenses, including, but not limited to any right to appeal a denial of a claim, any right to bring any action, lawsuit, administrative proceeding, or other cause of action on my behalf. I specifically assign my right to pursue litigation against any and all insurance companies, health plans, defined benefit plans, health insurers or any entity that is or may be responsible for payment of my medical expenses based upon a refusal to pay charges.            E. Valuables: It is understood and agreed that [facility] is not liable for the damage to or loss of any money, jewelry,   documents, dentures, eye glasses, hearing aids, prosthetics, or other property of value.     F. Computer Equipment: I understand and agree that should I choose to use computer equipment owned by [facility] or if I choose to access the Internet via [facility]s network, I do so at my own risk. [facility] is not responsible for any damage to my computer equipment or to any damages of any type that might arise from my loss of equipment or data.     G. Acceptance of Financial Responsibility:  I agree that in  consideration of the services and   supplies that have been   or will be furnished to the patient, I am hereby obligated to pay all charges made for or on the account of the patient according to the standard rates (in effect at the time the services and supplies are delivered) established by [facility], including its Patient Financial Assistance Policy to the extent it is applicable. I understand that I am responsible for all charges, or portions thereof, not covered by insurance or other sources. Patient refunds will be distributed only after balances at all [facility] facilities are paid.     H. Communication Authorization:  I hereby authorize [facility] and its representatives, along with any billing service   or  who may work on their behalf, to contact me on   my cell phone and/or home phone using pre- recorded messages, artificial voice messages, automatic telephone dialing devices or other computer assisted technology, or by electronic      mail, text messaging, or by any other form of electronic communication. This includes, but is not limited to, appointment reminders, yearly physical exam reminders, preventive care reminders, patient campaigns, welcome calls, and calls about account balances on my account or any account on which I am listed as a guarantor. I understand I have the right to opt out of these communications at any time.      Relationship  Between  Facility and  Provider:      I understand that some, but not all, providers furnishing services to the patient are not employees or agents of [facility]. The patient is under the care and supervision of his/her attending physician, and it is the responsibility of the facility and its nursing staff to carry out the instructions of such physicians. It is the responsibility of the patient's physician/designee to obtain the patient's informed consent, when required, for medical or surgical treatment, special diagnostic or therapeutic procedures, or  hospital services rendered for the patient under the special instructions of the physician/designee.           REGISTRATION AUTHORIZATION  Form No. 07273 (Rev. 3/25/2024)    Page 2 of 3                       Immunizations: [facility] shares immunization information with state sponsored health departments to help you and your doctor keep track of your immunization records. By signing, you consent to have this information shared with the health department in your state:                                [place] - LINKS ([place] Immunization Network for Kids Statewide)                                [place] - MIIX ([place] Immunization Information eXchange)                                [place] - ImmPRINT (Immunization Patient Registry with Integrated Technology)     TERM: This authorization is valid for this and subsequent care/treatment I receive at [facility] and will remain valid unless/until revoked in writing by me.     [facility]: As used in this document, [facility] means all [facility] owned and managed facilities, including, but not limited to, all health centers, surgery centers, clinics, urgent care centers, and hospitals.         [facility] complies with applicable Federal civil rights laws and does not discriminate on the basis of race, color, national origin, age, disability, or sex.  ATENCIÓN: si habla sharonda, tiene a baird disposición servicios gratuitos de asistencia lingüística. Jenni al [phone].  CHÚ Ý: N?u b?n nói Ti?ng Vi?t, có các d?ch v? h? tr? ngôn ng? mi?n phí dành cho b?n. G?i s? [phone].        REGISTRATION AUTHORIZATION  Form No. 93879 (Rev. 3/25/2024)   Page 3 of 3     Patient

## 2025-07-11 NOTE — PROGRESS NOTES
Care Coordination Encounter Details:       MyChart Portal Status:         [x]  Reviewed MyChart Portal Status offered / enrolled if applicable        Additional Notes:     MyChart Outcomes: Pt is enrolled & active          Updates Requested / Reviewed:        Updated Care Coordination Note and Immunizations Reconciliation Completed or Queried: [place]         Health Maintenance Screening(s) Due:      Health Maintenance Topics Overdue:      VBHM Score: 2     Eye Exam  Uncontrolled BP                       Health Maintenance Topic(s) Outreach Outcomes & Actions Taken:    Eye Exam - Outreach Outcomes & Actions Taken  : External Records Requested & Care Team Updated if Applicable    Blood Pressure - Outreach Outcomes & Actions Taken  :                Chronic Disease Management:     Diabetes Measures        Lab Results   Component Value Date    HGBA1C 5.2 07/10/2025           [x]  Reviewed chart for active Diabetes diagnosis     []  Scheduled necessary follow up appointments if needed              Hypertension Measures        BP Readings from Last 1 Encounters:   07/10/25 (!) 148/86           [x]  Reviewed chart for active Hypertension diagnosis     []  Reviewed & documented Home BP Cuff     []  Documented a Remote BP if needed & applicable     []  Scheduled necessary follow up appointments with Primary Care if needed               Provider Team Continuity:     Last PCP Visit Date: 7/10/2025          [x]  Reviewed Primary Care Provider Visits, Annual Wellness Visit, and Future          Appointments to ensure appointments have been scheduled and/or           completed

## 2025-08-11 RX ORDER — ESTRADIOL 1 MG/1
1 TABLET ORAL
Qty: 90 TABLET | Refills: 1 | Status: SHIPPED | OUTPATIENT
Start: 2025-08-11

## 2025-08-25 ENCOUNTER — OFFICE VISIT (OUTPATIENT)
Dept: PODIATRY | Facility: CLINIC | Age: 60
End: 2025-08-25
Payer: MEDICARE

## 2025-08-25 VITALS — BODY MASS INDEX: 41.45 KG/M2 | HEIGHT: 66 IN | WEIGHT: 257.94 LBS

## 2025-08-25 DIAGNOSIS — E11.42 TYPE 2 DIABETES MELLITUS WITH DIABETIC POLYNEUROPATHY, WITHOUT LONG-TERM CURRENT USE OF INSULIN: Primary | ICD-10-CM

## 2025-08-25 DIAGNOSIS — L84 CORN OR CALLUS: ICD-10-CM

## 2025-08-25 DIAGNOSIS — B35.1 ONYCHOMYCOSIS DUE TO DERMATOPHYTE: ICD-10-CM

## 2025-08-25 DIAGNOSIS — E11.51 TYPE II DIABETES MELLITUS WITH PERIPHERAL CIRCULATORY DISORDER: ICD-10-CM

## 2025-08-25 PROCEDURE — 99999 PR PBB SHADOW E&M-EST. PATIENT-LVL IV: CPT | Mod: PBBFAC,,, | Performed by: PODIATRIST

## 2025-09-05 DIAGNOSIS — Z91.09 ENVIRONMENTAL ALLERGIES: ICD-10-CM

## 2025-09-05 DIAGNOSIS — E11.42 TYPE 2 DIABETES MELLITUS WITH DIABETIC POLYNEUROPATHY, WITHOUT LONG-TERM CURRENT USE OF INSULIN: Chronic | ICD-10-CM

## 2025-09-05 DIAGNOSIS — I10 ESSENTIAL HYPERTENSION: Chronic | ICD-10-CM

## 2025-09-05 RX ORDER — AMLODIPINE BESYLATE 10 MG/1
10 TABLET ORAL DAILY
Qty: 90 TABLET | Refills: 3 | Status: SHIPPED | OUTPATIENT
Start: 2025-09-05

## 2025-09-05 RX ORDER — METFORMIN HYDROCHLORIDE 1000 MG/1
1000 TABLET ORAL NIGHTLY
Qty: 90 TABLET | Refills: 1 | Status: SHIPPED | OUTPATIENT
Start: 2025-09-05

## 2025-09-05 RX ORDER — CETIRIZINE HYDROCHLORIDE 10 MG/1
10 TABLET ORAL DAILY
Qty: 90 TABLET | Refills: 3 | Status: SHIPPED | OUTPATIENT
Start: 2025-09-05